# Patient Record
Sex: MALE | Race: WHITE | NOT HISPANIC OR LATINO | Employment: STUDENT | URBAN - METROPOLITAN AREA
[De-identification: names, ages, dates, MRNs, and addresses within clinical notes are randomized per-mention and may not be internally consistent; named-entity substitution may affect disease eponyms.]

---
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
## 2023-05-25 ENCOUNTER — HOSPITAL ENCOUNTER (INPATIENT)
Facility: HOSPITAL | Age: 18
DRG: 751 | End: 2023-05-25
Attending: PSYCHIATRY & NEUROLOGY | Admitting: PSYCHIATRY & NEUROLOGY
Payer: COMMERCIAL

## 2023-05-25 DIAGNOSIS — F39 MOOD DISORDER (HCC): Primary | ICD-10-CM

## 2023-05-25 PROBLEM — Z00.8 MEDICAL CLEARANCE FOR PSYCHIATRIC ADMISSION: Status: ACTIVE | Noted: 2023-05-25

## 2023-05-25 PROCEDURE — 99253 IP/OBS CNSLTJ NEW/EST LOW 45: CPT | Performed by: PHYSICIAN ASSISTANT

## 2023-05-25 RX ORDER — ACETAMINOPHEN 325 MG/1
650 TABLET ORAL EVERY 4 HOURS PRN
Status: DISCONTINUED | OUTPATIENT
Start: 2023-05-25 | End: 2023-08-11 | Stop reason: HOSPADM

## 2023-05-25 RX ORDER — LANOLIN ALCOHOL/MO/W.PET/CERES
9 CREAM (GRAM) TOPICAL
Status: DISCONTINUED | OUTPATIENT
Start: 2023-05-25 | End: 2023-05-31

## 2023-05-25 RX ORDER — DIPHENHYDRAMINE HYDROCHLORIDE 50 MG/ML
50 INJECTION INTRAMUSCULAR; INTRAVENOUS EVERY 6 HOURS PRN
Status: DISCONTINUED | OUTPATIENT
Start: 2023-05-25 | End: 2023-08-11 | Stop reason: HOSPADM

## 2023-05-25 RX ORDER — BENZTROPINE MESYLATE 1 MG/1
1 TABLET ORAL
Status: DISCONTINUED | OUTPATIENT
Start: 2023-05-25 | End: 2023-08-11 | Stop reason: HOSPADM

## 2023-05-25 RX ORDER — MAGNESIUM HYDROXIDE/ALUMINUM HYDROXICE/SIMETHICONE 120; 1200; 1200 MG/30ML; MG/30ML; MG/30ML
30 SUSPENSION ORAL EVERY 4 HOURS PRN
Status: DISCONTINUED | OUTPATIENT
Start: 2023-05-25 | End: 2023-08-11 | Stop reason: HOSPADM

## 2023-05-25 RX ORDER — HYDROXYZINE HYDROCHLORIDE 25 MG/1
25 TABLET, FILM COATED ORAL
Status: DISCONTINUED | OUTPATIENT
Start: 2023-05-25 | End: 2023-08-11 | Stop reason: HOSPADM

## 2023-05-25 RX ORDER — TRAZODONE HYDROCHLORIDE 50 MG/1
50 TABLET ORAL
Status: DISCONTINUED | OUTPATIENT
Start: 2023-05-25 | End: 2023-08-11 | Stop reason: HOSPADM

## 2023-05-25 RX ORDER — OLANZAPINE 10 MG/1
5 INJECTION, POWDER, LYOPHILIZED, FOR SOLUTION INTRAMUSCULAR
Status: DISCONTINUED | OUTPATIENT
Start: 2023-05-25 | End: 2023-08-11 | Stop reason: HOSPADM

## 2023-05-25 RX ORDER — AMOXICILLIN 250 MG
1 CAPSULE ORAL DAILY PRN
Status: DISCONTINUED | OUTPATIENT
Start: 2023-05-25 | End: 2023-08-11 | Stop reason: HOSPADM

## 2023-05-25 RX ORDER — LORAZEPAM 2 MG/ML
2 INJECTION INTRAMUSCULAR EVERY 6 HOURS PRN
Status: DISCONTINUED | OUTPATIENT
Start: 2023-05-25 | End: 2023-08-11 | Stop reason: HOSPADM

## 2023-05-25 RX ORDER — BENZTROPINE MESYLATE 1 MG/ML
1 INJECTION INTRAMUSCULAR; INTRAVENOUS
Status: DISCONTINUED | OUTPATIENT
Start: 2023-05-25 | End: 2023-08-11 | Stop reason: HOSPADM

## 2023-05-25 RX ORDER — ACETAMINOPHEN 325 MG/1
975 TABLET ORAL EVERY 6 HOURS PRN
Status: DISCONTINUED | OUTPATIENT
Start: 2023-05-25 | End: 2023-08-11 | Stop reason: HOSPADM

## 2023-05-25 RX ORDER — OLANZAPINE 2.5 MG/1
2.5 TABLET ORAL
Status: DISCONTINUED | OUTPATIENT
Start: 2023-05-25 | End: 2023-08-11 | Stop reason: HOSPADM

## 2023-05-25 RX ORDER — PROPRANOLOL HYDROCHLORIDE 10 MG/1
10 TABLET ORAL EVERY 8 HOURS PRN
Status: DISCONTINUED | OUTPATIENT
Start: 2023-05-25 | End: 2023-08-04

## 2023-05-25 RX ORDER — OLANZAPINE 5 MG/1
5 TABLET ORAL
Status: DISCONTINUED | OUTPATIENT
Start: 2023-05-25 | End: 2023-08-11 | Stop reason: HOSPADM

## 2023-05-25 RX ORDER — MIRTAZAPINE 7.5 MG/1
7.5 TABLET, FILM COATED ORAL
Status: DISCONTINUED | OUTPATIENT
Start: 2023-05-25 | End: 2023-08-11 | Stop reason: HOSPADM

## 2023-05-25 RX ORDER — ACETAMINOPHEN 325 MG/1
650 TABLET ORAL EVERY 6 HOURS PRN
Status: DISCONTINUED | OUTPATIENT
Start: 2023-05-25 | End: 2023-08-11 | Stop reason: HOSPADM

## 2023-05-25 RX ORDER — POLYETHYLENE GLYCOL 3350 17 G/17G
17 POWDER, FOR SOLUTION ORAL DAILY PRN
Status: DISCONTINUED | OUTPATIENT
Start: 2023-05-25 | End: 2023-05-26

## 2023-05-25 RX ORDER — OLANZAPINE 10 MG/1
10 INJECTION, POWDER, LYOPHILIZED, FOR SOLUTION INTRAMUSCULAR
Status: DISCONTINUED | OUTPATIENT
Start: 2023-05-25 | End: 2023-08-11 | Stop reason: HOSPADM

## 2023-05-25 RX ORDER — HYDROXYZINE 50 MG/1
100 TABLET, FILM COATED ORAL
Status: DISCONTINUED | OUTPATIENT
Start: 2023-05-25 | End: 2023-08-11 | Stop reason: HOSPADM

## 2023-05-25 RX ORDER — HYDROXYZINE 50 MG/1
50 TABLET, FILM COATED ORAL
Status: DISCONTINUED | OUTPATIENT
Start: 2023-05-25 | End: 2023-08-11 | Stop reason: HOSPADM

## 2023-05-25 RX ORDER — OLANZAPINE 10 MG/1
10 TABLET ORAL
Status: DISCONTINUED | OUTPATIENT
Start: 2023-05-25 | End: 2023-08-11 | Stop reason: HOSPADM

## 2023-05-25 RX ADMIN — Medication 9 MG: at 21:25

## 2023-05-25 RX ADMIN — DIVALPROEX SODIUM 750 MG: 500 TABLET, FILM COATED, EXTENDED RELEASE ORAL at 14:47

## 2023-05-25 RX ADMIN — MIRTAZAPINE 7.5 MG: 7.5 TABLET, FILM COATED ORAL at 21:25

## 2023-05-25 RX ADMIN — HYDROXYZINE HYDROCHLORIDE 50 MG: 50 TABLET, FILM COATED ORAL at 21:53

## 2023-05-25 NOTE — PROGRESS NOTES
05/25/23 1522   Referral Data   Referral Source Physician   Referral Name Dr. Annamarie Dowling from [facility]   Referral Reason [address] [facility]   Readmission Root Cause   30 Day Readmission No   Patient Information   Mental Status Alert   Primary Caregiver Self   Accompanied by/Relationship N/A   Support System Immediate family  (Parents Sarina Jurado and Gloria Monroy))   Mosque/Cultural Requests None identified   Legal Information   Tx Plan Signed Yes  (5/26/2023)   Current Status: 10   Legal Issues June 2022 summary harassment offense from [facility] a peer at [employer], but not on probation or parole; states he went to [facility] for a week.    Advance Directives   (None reported)   Health Care Proxy Appointed No   Activities of Daily Living Prior to Admission   Functional Status Minimum assistance   Assistive Device No device needed   Living Arrangement Lives with someone  (Parents and older siblings)   Ambulation Independent   Access to Firearms   Access to Firearms No   Income [address] [employer] [facility] of Transport to Appts: Family transport

## 2023-05-25 NOTE — CONSULTS
[facility]  Consult  Name: Lindy Chisholm 21 y.o. male I MRN: [id number]  Unit/Bed#: [facility] 106-01 I Date of Admission: 5/25/2023   Date of Service: 5/25/2023 I Hospital Day: 0    Inpatient consult for Medical Clearance for [facility] patient  Consult performed by: Tammy Giordano PA-C  Consult ordered by: Denise Ferrer, DO          Assessment/Plan   Medical clearance for psychiatric admission  Assessment & Plan  · VSS. Admission labs pending at the time of this review  Patient is medically cleared for admission to the [facility] for treatment of the underlying psychiatric illness  SLIM will sign off. Please call with questions or concerns              Counseling / Coordination of Care Time: 30 minutes. Greater than 50% of total time spent on patient counseling and coordination of care. Collaboration of Care: Were Recommendations Directly Discussed with Primary Treatment Team? - No     History of Present Illness:    Lindy Chisholm is a 21 y.o. male who is originally admitted to the psychiatry service due to need for extended inpatient psychiatric treatment. We are consulted for medical clearance for admission to [facility] and treatment of underlying psychiatric illness. This patient has a past medical history signficant for autism and MDD. Per chart review, he was initially hospitalized for SI with plan to jump off of a water tower. Review of Systems:    Review of Systems   Constitutional: Negative for chills, diaphoresis and fever. HENT: Negative for congestion, rhinorrhea, sinus pressure, sinus pain and sore throat. Eyes: Negative for pain and visual disturbance. Respiratory: Negative for cough, shortness of breath and wheezing. Cardiovascular: Negative for chest pain and palpitations. Gastrointestinal: Negative for abdominal distention, abdominal pain, constipation, diarrhea, nausea and vomiting.    Genitourinary: Negative for difficulty urinating, dysuria, frequency, hematuria and urgency. Musculoskeletal: Negative for arthralgias, back pain and myalgias. Skin: Negative for rash. Neurological: Negative for dizziness, weakness, light-headedness and headaches. All other systems reviewed and are negative. Past Medical and Surgical History:     Past Medical History:   Diagnosis Date   • Anxiety    • Asperger's syndrome    • Suicide attempt ([address])        No past surgical history on file. Meds/Allergies:    all medications and allergies reviewed    Allergies: Allergies   Allergen Reactions   • Chlorpromazine Other (See Comments)       Social History:     Marital Status: Single    Substance Use History:   Social History     Substance and Sexual Activity   Alcohol Use Not Currently     Social History     Tobacco Use   Smoking Status Never   Smokeless Tobacco Never     Social History     Substance and Sexual Activity   Drug Use Not Currently       Family History:    non-contributory    Physical Exam:     Vitals:   Blood Pressure: 128/75 (05/25/23 1400)  Pulse: 96 (05/25/23 1400)    Physical Exam  Constitutional:       General: He is not in acute distress. Appearance: Normal appearance. He is not ill-appearing, toxic-appearing or diaphoretic. HENT:      Head: Normocephalic and atraumatic. Nose: Nose normal. No congestion or rhinorrhea. Mouth/Throat:      Mouth: Mucous membranes are moist.   Eyes:      General: No scleral icterus. Extraocular Movements: Extraocular movements intact. Cardiovascular:      Rate and Rhythm: Normal rate and regular rhythm. Heart sounds: Normal heart sounds. No murmur heard. Pulmonary:      Effort: Pulmonary effort is normal. No respiratory distress. Breath sounds: Normal breath sounds. No wheezing. Abdominal:      General: Abdomen is flat. Bowel sounds are normal. There is no distension. Palpations: Abdomen is soft. Tenderness: There is no abdominal tenderness.    Musculoskeletal: Right lower leg: No edema. Left lower leg: No edema. Skin:     General: Skin is warm and dry. Coloration: Skin is not jaundiced. Neurological:      General: No focal deficit present. Mental Status: He is alert. Additional Data:     Lab Results: I have personally reviewed pertinent reports. No results found for: "HGBA1C"        EKG, Pathology, and Other Studies Reviewed on Admission:   · EKG     ** Please Note: This note has been constructed using a voice recognition system.  **

## 2023-05-25 NOTE — NURSING NOTE
Patient is a 201 admit from [facility] due to [facility] with plan to jump off a water tower. Hx of autism spectrum disorder, major depressive disorder. Patient was cooperative with the admission process and denies anxiety, depression, SI/HI, A/V/H and pain. Skin intact. Q 7 minutes safety checks initiated.

## 2023-05-25 NOTE — PLAN OF CARE
Problem: Ineffective Coping  Goal: Cooperates with admission process  Description: Interventions:   - Complete admission process  Outcome: Not Progressing  Goal: Identifies ineffective coping skills  Outcome: Not Progressing  Goal: Identifies healthy coping skills  Outcome: Not Progressing  Goal: Demonstrates healthy coping skills  Outcome: Not Progressing  Goal: Participates in unit activities  Description: Interventions:  - Provide therapeutic environment   - Provide required programming   - Redirect inappropriate behaviors   Outcome: Not Progressing  Goal: Patient/Family participate in treatment and DC plans  Description: Interventions:  - Provide therapeutic environment  Outcome: Not Progressing  Goal: Patient/Family verbalizes awareness of resources  Outcome: Not Progressing  Goal: Understands least restrictive measures  Description: Interventions:  - Utilize least restrictive behavior  Outcome: Not Progressing  Goal: Free from restraint events  Description: - Utilize least restrictive measures   - Provide behavioral interventions   - Redirect inappropriate behaviors   Outcome: Not Progressing     Problem: Risk for Self Injury/Neglect  Goal: Treatment Goal: Remain safe during length of stay, learn and adopt new coping skills, and be free of self-injurious ideation, impulses and acts at the time of discharge  Outcome: Not Progressing  Goal: Verbalize thoughts and feelings  Description: Interventions:  - Assess and re-assess patient's lethality and potential for self-injury  - Engage patient in 1:1 interactions, daily, for a minimum of 15 minutes  - Encourage patient to express feelings, fears, frustrations, hopes  - Establish rapport/trust with patient   Outcome: Not Progressing  Goal: Refrain from harming self  Description: Interventions:  - Monitor patient closely, per order  - Develop a trusting relationship  - Supervise medication ingestion, monitor effects and side effects   Outcome: Not Progressing  Goal: Attend and participate in unit activities, including therapeutic, recreational, and educational groups  Description: Interventions:  - Provide therapeutic and educational activities daily, encourage attendance and participation, and document same in the medical record  - Obtain collateral information, encourage visitation and family involvement in care   Outcome: Not Progressing  Goal: Recognize maladaptive responses and adopt new coping mechanisms  Outcome: Not Progressing  Goal: Complete daily ADLs, including personal hygiene independently, as able  Description: Interventions:  - Observe, teach, and assist patient with ADLS  - Monitor and promote a balance of rest/activity, with adequate nutrition and elimination  Outcome: Not Progressing     Problem: Depression  Goal: Treatment Goal: Demonstrate behavioral control of depressive symptoms, verbalize feelings of improved mood/affect, and adopt new coping skills prior to discharge  Outcome: Not Progressing  Goal: Verbalize thoughts and feelings  Description: Interventions:  - Assess and re-assess patient's level of risk   - Engage patient in 1:1 interactions, daily, for a minimum of 15 minutes   - Encourage patient to express feelings, fears, frustrations, hopes   Outcome: Not Progressing  Goal: Refrain from harming self  Description: Interventions:  - Monitor patient closely, per order   - Supervise medication ingestion, monitor effects and side effects   Outcome: Not Progressing  Goal: Refrain from isolation  Description: Interventions:  - Develop a trusting relationship   - Encourage socialization   Outcome: Not Progressing  Goal: Refrain from self-neglect  Outcome: Not Progressing  Goal: Attend and participate in unit activities, including therapeutic, recreational, and educational groups  Description: Interventions:  - Provide therapeutic and educational activities daily, encourage attendance and participation, and document same in the medical record   Outcome: Not Progressing  Goal: Complete daily ADLs, including personal hygiene independently, as able  Description: Interventions:  - Observe, teach, and assist patient with ADLS  -  Monitor and promote a balance of rest/activity, with adequate nutrition and elimination   Outcome: Not Progressing     Problem: Anxiety  Goal: Anxiety is at manageable level  Description: Interventions:  - Assess and monitor patient's anxiety level. - Monitor for signs and symptoms (heart palpitations, chest pain, shortness of breath, headaches, nausea, feeling jumpy, restlessness, irritable, apprehensive). - Collaborate with interdisciplinary team and initiate plan and interventions as ordered.   - [place] patient to unit/surroundings  - Explain treatment plan  - Encourage participation in care  - Encourage verbalization of concerns/fears  - Identify coping mechanisms  - Assist in developing anxiety-reducing skills  - Administer/offer alternative therapies  - Limit or eliminate stimulants  Outcome: Not Progressing

## 2023-05-25 NOTE — PROGRESS NOTES
05/25/23 1522   Patient Intake   Living Arrangement Lives with someone  (Parents and older siblings)   Can patient return home? 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 History of multiple suicide attempts and physical aggression. Treatment History Diagnosed with depression and anxiety at age 11, and started working with a therapist.  First hospitalization was at [facility] in 2022 for a suicide attempt; has had a total of 3 hospitalizations. Had a psychiatrist and therapist at [facility] prior to admit and a ICM at [employer] [facility]. Currently in Treatment Yes   Current Psychiatrist/Therapist ANA Overton and Darlene (therapist) [facility] [address] None; patient doesn't want to return   Name of ICM: [facility], but switching to Payton Valenzuela for [facility]   [address]: Other  ([email or web address])   [facility] Phone Number: [address] and Management   Medical Problems None   Legal Issues June 2022 summary harassment offense from [address] a peer at [employer], but not on probation or parole; states he went to senior care for a week.    Probation/ Name (if applicable) N/A   Substance Abuse No   Crisis Info   Release of Information Signed Yes  ([facility], parents, Miriam KLEIN, [place] and Bigg.)

## 2023-05-25 NOTE — ASSESSMENT & PLAN NOTE
· VSS. Admission labs pending at the time of this review  Patient is medically cleared for admission to the [facility] for treatment of the underlying psychiatric illness  MARV will sign off.  Please call with questions or concerns

## 2023-05-26 PROBLEM — F33.2 SEVERE EPISODE OF RECURRENT MAJOR DEPRESSIVE DISORDER, WITHOUT PSYCHOTIC FEATURES (HCC): Status: ACTIVE | Noted: 2023-05-26

## 2023-05-26 PROBLEM — F84.0 AUTISM SPECTRUM DISORDER: Status: ACTIVE | Noted: 2023-05-26

## 2023-05-26 PROBLEM — F41.9 ANXIETY DISORDER: Chronic | Status: ACTIVE | Noted: 2023-05-26

## 2023-05-26 PROBLEM — F39 MOOD DISORDER (HCC): Status: ACTIVE | Noted: 2023-05-26

## 2023-05-26 PROBLEM — F84.0 AUTISM SPECTRUM DISORDER: Chronic | Status: ACTIVE | Noted: 2023-05-26

## 2023-05-26 PROBLEM — F41.9 ANXIETY DISORDER: Status: ACTIVE | Noted: 2023-05-26

## 2023-05-26 PROBLEM — F33.2 SEVERE EPISODE OF RECURRENT MAJOR DEPRESSIVE DISORDER, WITHOUT PSYCHOTIC FEATURES (HCC): Chronic | Status: ACTIVE | Noted: 2023-05-26

## 2023-05-26 PROCEDURE — 93005 ELECTROCARDIOGRAM TRACING: CPT

## 2023-05-26 PROCEDURE — 99223 1ST HOSP IP/OBS HIGH 75: CPT | Performed by: PSYCHIATRY & NEUROLOGY

## 2023-05-26 RX ORDER — POLYETHYLENE GLYCOL 3350 17 G/17G
17 POWDER, FOR SOLUTION ORAL DAILY PRN
Status: DISCONTINUED | OUTPATIENT
Start: 2023-05-26 | End: 2023-08-11 | Stop reason: HOSPADM

## 2023-05-26 RX ADMIN — HYDROXYZINE HYDROCHLORIDE 50 MG: 50 TABLET, FILM COATED ORAL at 20:36

## 2023-05-26 RX ADMIN — DIVALPROEX SODIUM 750 MG: 500 TABLET, FILM COATED, EXTENDED RELEASE ORAL at 21:10

## 2023-05-26 RX ADMIN — Medication 9 MG: at 21:10

## 2023-05-26 RX ADMIN — MIRTAZAPINE 7.5 MG: 7.5 TABLET, FILM COATED ORAL at 21:10

## 2023-05-26 RX ADMIN — DIVALPROEX SODIUM 750 MG: 500 TABLET, FILM COATED, EXTENDED RELEASE ORAL at 08:26

## 2023-05-26 RX ADMIN — ACETAMINOPHEN 975 MG: 325 TABLET ORAL at 13:21

## 2023-05-26 NOTE — NURSING NOTE
Pt came to this writer and asked, "can I have ativan?" when this writer said no he said "what about klonopin?" Pt was smiling and calm during this encounter. He then stated, "I am not allergic to them, I am addicted to them. I thought I could get them since this is a different hospital." (still smiling.) Pt stated, "they help me control myself." This writer explained that if he can't control himself, his provider can review his medications with him and make changes. I encouraged him to use his coping skills. Pt then went to dining room where he is now spending time with peers.

## 2023-05-26 NOTE — TREATMENT PLAN
TREATMENT PLAN REVIEW - [address] 21 y.o. 2005 male MRN: [id number]    [address] [facility] [address] Room / Bed: [facility] 106-01 Encounter: [id number]          Admit Date/Time:  5/25/2023  1:49 PM    Treatment Team: Attending Provider: Jerry Santos MD; Registered Nurse: Suni Harkins RN; Patient Care Technician: My Cole;  Licensed Practical Nurse: Aileen Jennings LPN; : MICKY Soto    Diagnosis: Principal Problem:    Severe episode of recurrent major depressive disorder, without psychotic features ([address])  Active Problems:    Autism spectrum disorder    Mood disorder ([address])    Anxiety disorder    Medical clearance for psychiatric admission      Patient Strengths/Assets: cooperative, negotiates basic needs, patient is on a voluntary commitment    Patient Barriers/Limitations: difficulty adapting, chronic mental illness    Short Term Goals: decrease in depressive symptoms, decrease in anxiety symptoms, decrease in suicidal thoughts, ability to stay safe on the unit, mood stabilization    Long Term Goals: improvement in depression, improvement in anxiety, stabilization of mood, free of suicidal thoughts    Progress Towards Goals: continue psychiatric medications as prescribed    Recommended Treatment: medication management, patient medication education, group therapy, milieu therapy, continued Behavioral Health psychiatric evaluation/assessment process    Treatment Frequency: daily medication monitoring, group and milieu therapy daily, monitoring through interdisciplinary rounds, monitoring through weekly patient care conferences    Expected Discharge Date:  TBD    Discharge Plan: referrals as indicated    Treatment Plan Created/Updated By: Myranda Strange DO

## 2023-05-26 NOTE — PLAN OF CARE
Problem: Ineffective Coping  Goal: Cooperates with admission process  Description: Interventions:   - Complete admission process  Outcome: Progressing     Problem: Risk for Self Injury/Neglect  Goal: Verbalize thoughts and feelings  Description: Interventions:  - Assess and re-assess patient's lethality and potential for self-injury  - Engage patient in 1:1 interactions, daily, for a minimum of 15 minutes  - Encourage patient to express feelings, fears, frustrations, hopes  - Establish rapport/trust with patient   Outcome: Progressing     Problem: Depression  Goal: Verbalize thoughts and feelings  Description: Interventions:  - Assess and re-assess patient's level of risk   - Engage patient in 1:1 interactions, daily, for a minimum of 15 minutes   - Encourage patient to express feelings, fears, frustrations, hopes   Outcome: Progressing  Goal: Refrain from isolation  Description: Interventions:  - Develop a trusting relationship   - Encourage socialization   Outcome: Not Progressing     Problem: Anxiety  Goal: Anxiety is at manageable level  Description: Interventions:  - Assess and monitor patient's anxiety level. - Monitor for signs and symptoms (heart palpitations, chest pain, shortness of breath, headaches, nausea, feeling jumpy, restlessness, irritable, apprehensive). - Collaborate with interdisciplinary team and initiate plan and interventions as ordered.   - [place] patient to unit/surroundings  - Explain treatment plan  - Encourage participation in care  - Encourage verbalization of concerns/fears  - Identify coping mechanisms  - Assist in developing anxiety-reducing skills  - Administer/offer alternative therapies  - Limit or eliminate stimulants  Outcome: Progressing

## 2023-05-26 NOTE — PROGRESS NOTES
Progress Note - Behavioral Health     Shantelle Del Toro 21 y.o. male MRN: [id number]  [facility]-02 Encounter: [id number]      Shantelle Del Toro is a Pt with ASD. He was seen for continuing care and reviewed with treatment team.  Pt was calm, cooperative and internally preoccupied on approach, but agreeable to interview. He at times stared blankly at this provider, but seemed to take in what I was saying and answered questions correctly. He knows he is here for SI but denies further issues since admission and verbalizes the desire to leave multiple times. He presently denies depression, SI, HI, anxiety or psychotic Sxs. Per EMR and floor team, the Pt is acclimating to the unit, visible in the milieu, and quiet but cooperative. He already attended at least one therapeutic group with appropriate behavior among peers. He has been medication compliant, but with some seeking of BZD, though accepted it fairly well when the nurse encouraged Hydroxyzine prn and coping skills instead last night. However, later last night, he made some threatening statements that he would flip out if he had to stay longer, and also reminded staff of his violent outburst at a previous institution. He looked as though he might  a chair, but there have been no physically aggressive outbursts but staff has been cautiously observant for such. He is visible but socially isolative in the milieu.       Sleep: Reduced per Pt --due to being here; but adequate per nursing  Appetite: Good   Medication side effects: None per Pt    ROS: No complaints per Pt, (All ROS Negative)    Labs/Tests: Reviewed    Mental Status Evaluation:  Appearance:  Dressed, clean, good eye contact, with a blank stare at times   Behavior:  Calm, cooperative, pleasant   Speech:  Clear, normal rate and volume   Mood:  Euthymic during interview, but can be irritable    Affect:  Constricted   Thought Process:  Organized, Goal directed, [place], discharge focused   Associations: [place] associations   Thought Content:  No verbalized delusions   Perceptual Disturbances: Pt denies any hallucinations or paranoia and does not appear to be responding to internal stimuli   Risk Potential: Pt presently denies SI or HI , but he poses a risk to harm others or destroy property   Sensorium:  Self, birthday, Place, Day of the week, Month, Year   Memory:  short term memory grossly intact   Consciousness:  alert, awake   Attention: Fair   Insight:  Poor   Judgment: Limited   Gait/Station: Normal gait/station   Motor Activity: No abnormal movements     Vitals:    05/25/23 1400 05/26/23 0821 05/26/23 1516 05/27/23 0735   BP: 128/75 106/58 118/68 132/69   BP Location: Right arm Left arm Right arm Left arm   Pulse: 96 67 73 68   Resp:  16 18 18   Temp: 97.7 °F (36.5 °C) 97.6 °F (36.4 °C) 97.5 °F (36.4 °C) 97.5 °F (36.4 °C)   TempSrc: Temporal Temporal Temporal Temporal   SpO2:  100% 99% 97%   Weight:  80.3 kg (177 lb)     Height: 5' 11" (1.803 m)          Admission on 05/25/2023   Component Date Value   • WBC 05/27/2023 6.14    • RBC 05/27/2023 4.87    • Hemoglobin 05/27/2023 14.9    • Hematocrit 05/27/2023 45.2    • MCV 05/27/2023 93    • MCH 05/27/2023 30.6    • MCHC 05/27/2023 33.0    • RDW 05/27/2023 12.9    • MPV 05/27/2023 9.9    • Platelets 81/10/9284 224    • nRBC 05/27/2023 0    • Neutrophils Relative 05/27/2023 38 (L)    • Immat GRANS % 05/27/2023 0    • Lymphocytes Relative 05/27/2023 45 (H)    • Monocytes Relative 05/27/2023 14 (H)    • Eosinophils Relative 05/27/2023 2    • Basophils Relative 05/27/2023 1    • Neutrophils Absolute 05/27/2023 2.33    • Immature Grans Absolute 05/27/2023 0.00    • Lymphocytes Absolute 05/27/2023 2.82    • Monocytes Absolute 05/27/2023 0.83    • Eosinophils Absolute 05/27/2023 0.12    • Basophils Absolute 05/27/2023 0.04    • Sodium 05/27/2023 140    • Potassium 05/27/2023 4.1    • Chloride 05/27/2023 103    • CO2 05/27/2023 26 • ANION GAP 05/27/2023 11    • BUN 05/27/2023 15    • Creatinine 05/27/2023 0.66    • Glucose 05/27/2023 88    • Calcium 05/27/2023 9.4    • AST 05/27/2023 25    • ALT 05/27/2023 38    • Alkaline Phosphatase 05/27/2023 89    • Total Protein 05/27/2023 7.1    • Albumin 05/27/2023 4.7    • Total Bilirubin 05/27/2023 0.70    • eGFR 05/27/2023 140    • Cholesterol 05/27/2023 208 (H)    • Triglycerides 05/27/2023 190 (H)    • HDL, Direct 05/27/2023 39 (L)    • LDL Calculated 05/27/2023 131 (H)    • Non-HDL-Chol (CHOL-HDL) 05/27/2023 169    • TSH 3RD GENERATON 05/27/2023 1.637    • Valproic Acid, Total 05/27/2023 122 (H)    • Ventricular Rate 05/26/2023 62    • Atrial Rate 05/26/2023 62    • MA Interval 05/26/2023 152    • QRSD Interval 05/26/2023 88    • QT Interval 05/26/2023 368    • QTC Interval 05/26/2023 373    • P Axis 05/26/2023 68    • QRS Axis 05/26/2023 56    • T Wave Axis 05/26/2023 47        Progress Toward Goals:  Based on today's interview and review of prior notes, Pt is acclimating to the unit, but discharge focused. Though no overt physical outbursts have been made, he did verbalize a threat and the risk is there. Assault precautions placed. Continue the present medication regimen as ordered recently by the primary team -- but will recheck the VPA level due to the potential that the testing was just after dosing-??  Pt shows no signs of toxicity. If the level returns high again, I will reduce by 250mg per day. Assessment/Plan   Principal Problem:    Severe episode of recurrent major depressive disorder, without psychotic features ([address])  Active Problems:    Medical clearance for psychiatric admission    Autism spectrum disorder    Mood disorder ([address])    Anxiety disorder      Recommended Treatment: Continue with pharmacotherapy, group therapy, milieu therapy and occupational therapy.   The patient will be maintained on the following medications:  Current Facility-Administered Medications Medication Dose Route Frequency Provider Last Rate   • acetaminophen  650 mg Oral Q6H PRN Chyrel Michel, DO     • acetaminophen  650 mg Oral Q4H PRN Chyrel Michel, DO     • acetaminophen  975 mg Oral Q6H PRN Chyrel Michel, DO     • aluminum-magnesium hydroxide-simethicone  30 mL Oral Q4H PRN Chyrel Michel, DO     • benztropine  1 mg Intramuscular Q4H PRN Max 6/day Chyrel Michel, DO     • benztropine  1 mg Oral Q4H PRN Max 6/day Chyrel Michel, DO     • hydrOXYzine HCL  50 mg Oral Q6H PRN Max 4/day Chyrel Michel, DO      Or   • diphenhydrAMINE  50 mg Intramuscular Q6H PRN Chyrel Michel, DO     • divalproex sodium  750 mg Oral BID ANA Calle     • glycerin-hypromellose-  1 drop Both Eyes Q3H PRN Chyrel Michel, DO     • hydrOXYzine HCL  100 mg Oral Q6H PRN Max 4/day Chyrel Michel, DO      Or   • LORazepam  2 mg Intramuscular Q6H PRN Chyrel Michel, DO     • hydrOXYzine HCL  25 mg Oral Q6H PRN Max 4/day Chyrel Michel, DO     • melatonin  9 mg Oral HS Chyrel Michel, DO     • mirtazapine  7.5 mg Oral HS Chyrel Michel, DO     • OLANZapine  10 mg Oral Q3H PRN Max 3/day Chyrel Michel, DO      Or   • OLANZapine  10 mg Intramuscular Q3H PRN Max 3/day Chyrel Michel, DO     • OLANZapine  5 mg Oral Q3H PRN Max 6/day Chyrel Michel, DO      Or   • OLANZapine  5 mg Intramuscular Q3H PRN Max 6/day Chyrel Michel, DO     • OLANZapine  2.5 mg Oral Q3H PRN Max 8/day Ciro A Prayson, DO     • polyethylene glycol  17 g Oral Daily PRN Chyrel Michel, DO     • propranolol  10 mg Oral Q8H PRN Chyrel Michel, DO     • senna-docusate sodium  1 tablet Oral Daily PRN Chyrel Michel, DO     • traZODone  50 mg Oral HS PRN Chyrel Michel, DO         Risks, benefits and possible side effects of Medications:   Risks, benefits, and possible side effects of medications explained to patient and patient verbalizes understanding

## 2023-05-26 NOTE — NURSING NOTE
Received pt in bed at change of shift with eyes closed; chest movement noted. Continues the same thus this far as per q 7 min room checks. Will continue to monitor. 4196:  Attempted to obtain the lab as ordered and this nurse was unsuccessful as QNS. Samantha Dalal advised to increase fluids and must be fasting. Ice water provided. Will report to 7-3 shift. Pt was cooperative with procedure. computer/internet/verbal instruction/skill demonstration

## 2023-05-26 NOTE — PROGRESS NOTES
05/26/23 0830   Team Meeting   Meeting Type Daily Rounds   Initial Conference Date 05/26/23   Patient/Family Present   Patient Present No   Patient's Family Present No     Daily Rounds Documentation     Team Members Present:   DO Curtis Boateng CRNP Bertina Aris, FRANCOIS Castro, [address] admission as of yesterday; 201. Originally admitted to [facility] due to a suicide attempt; went up a water tower, but then couldn't jump or get down independently; trigger was he [facility]. Pleasant and cooperative with admission process. Utilized Atarax PRN once. Diagnosed with THAD, ASD, and MDD. Multiple suicide attempts. Supportive family. Compliant with medications and meals. Slept.

## 2023-05-26 NOTE — NURSING NOTE
Patient is compliant with medication and meals . Patient is new to the unit so he is still adjusting He is quiet and co operative  He attended 1 group after he was admitted yesterday Will continue to monitor and chart any progress.

## 2023-05-26 NOTE — TREATMENT TEAM
05/26/23 1350   Team Meeting   Meeting Type Tx Team Meeting   Initial Conference Date 05/26/23   Team Members Present   Team Members Present Physician;Nurse;   Physician Team Member Dr Philmore Olszewski Team Member [facility] Team Member Rachel Nino   Patient/Family Present   Patient Present Yes   Patient's Family Present No     Treatment plan and goals reviewed with pt; pt in agreement and signed.

## 2023-05-26 NOTE — NURSING NOTE
Dmitri Blanton has been awake, alert, and visible intermittently out in the milieu. Pt denies any depression, anxiety, a/v hallucinations, and has not verbalized any delusions. Pt denies having any SI, HI, intent, or plan and agrees to come to staff if he has any. Pt adjusting well to unit. Pt ate 100% supper. Quiet and spends time resting quietly in room most of shift. No interaction noted with peers. Pt did not attend any evening groups but came out for snack with peers. Compliant with scheduled meds   No behavioral issues. Continue to monitor/assess for any changes.

## 2023-05-26 NOTE — NURSING NOTE
Writer noted pt to have hand shakiness and anxious. Writer asked pt how he was feeling and pt stated that he was anxious. Writer asked pt what he was feeling anxious about and encouraged him to verbalize his feelings. Pt stated, "Just about being in here."  Writer offered pt prn medication and Atarax 50 mg po prn given for moderate anxiety at 2153. Pt compliant with medication and then went to his room. Will monitor/assess for effectiveness.

## 2023-05-26 NOTE — PLAN OF CARE
Problem: Ineffective Coping  Goal: Cooperates with admission process  Description: Interventions:   - Complete admission process  Outcome: Progressing  Goal: Identifies ineffective coping skills  Outcome: Not Progressing  Goal: Identifies healthy coping skills  Outcome: Not Progressing  Goal: Demonstrates healthy coping skills  Outcome: Not Progressing

## 2023-05-27 LAB
ALBUMIN SERPL BCP-MCNC: 4.7 G/DL (ref 3.5–5)
ALP SERPL-CCNC: 89 U/L (ref 34–104)
ALT SERPL W P-5'-P-CCNC: 38 U/L (ref 7–52)
ANION GAP SERPL CALCULATED.3IONS-SCNC: 11 MMOL/L (ref 4–13)
AST SERPL W P-5'-P-CCNC: 25 U/L (ref 13–39)
ATRIAL RATE: 62 BPM
BASOPHILS # BLD AUTO: 0.04 THOUSANDS/ÂΜL (ref 0–0.1)
BASOPHILS NFR BLD AUTO: 1 % (ref 0–1)
BILIRUB SERPL-MCNC: 0.7 MG/DL (ref 0.2–1)
BUN SERPL-MCNC: 15 MG/DL (ref 5–25)
CALCIUM SERPL-MCNC: 9.4 MG/DL (ref 8.4–10.2)
CHLORIDE SERPL-SCNC: 103 MMOL/L (ref 96–108)
CHOLEST SERPL-MCNC: 208 MG/DL
CO2 SERPL-SCNC: 26 MMOL/L (ref 21–32)
CREAT SERPL-MCNC: 0.66 MG/DL (ref 0.6–1.3)
EOSINOPHIL # BLD AUTO: 0.12 THOUSAND/ÂΜL (ref 0–0.61)
EOSINOPHIL NFR BLD AUTO: 2 % (ref 0–6)
ERYTHROCYTE [DISTWIDTH] IN BLOOD BY AUTOMATED COUNT: 12.9 % (ref 11.6–15.1)
EST. AVERAGE GLUCOSE BLD GHB EST-MCNC: 108 MG/DL
GFR SERPL CREATININE-BSD FRML MDRD: 140 ML/MIN/1.73SQ M
GLUCOSE SERPL-MCNC: 88 MG/DL (ref 65–140)
HBA1C MFR BLD: 5.4 %
HCT VFR BLD AUTO: 45.2 % (ref 36.5–49.3)
HDLC SERPL-MCNC: 39 MG/DL
HGB BLD-MCNC: 14.9 G/DL (ref 12–17)
IMM GRANULOCYTES # BLD AUTO: 0 THOUSAND/UL (ref 0–0.2)
IMM GRANULOCYTES NFR BLD AUTO: 0 % (ref 0–2)
LDLC SERPL CALC-MCNC: 131 MG/DL (ref 0–100)
LYMPHOCYTES # BLD AUTO: 2.82 THOUSANDS/ÂΜL (ref 0.6–4.47)
LYMPHOCYTES NFR BLD AUTO: 45 % (ref 14–44)
MCH RBC QN AUTO: 30.6 PG (ref 26.8–34.3)
MCHC RBC AUTO-ENTMCNC: 33 G/DL (ref 31.4–37.4)
MCV RBC AUTO: 93 FL (ref 82–98)
MONOCYTES # BLD AUTO: 0.83 THOUSAND/ÂΜL (ref 0.17–1.22)
MONOCYTES NFR BLD AUTO: 14 % (ref 4–12)
NEUTROPHILS # BLD AUTO: 2.33 THOUSANDS/ÂΜL (ref 1.85–7.62)
NEUTS SEG NFR BLD AUTO: 38 % (ref 43–75)
NONHDLC SERPL-MCNC: 169 MG/DL
NRBC BLD AUTO-RTO: 0 /100 WBCS
P AXIS: 68 DEGREES
PLATELET # BLD AUTO: 224 THOUSANDS/UL (ref 149–390)
PMV BLD AUTO: 9.9 FL (ref 8.9–12.7)
POTASSIUM SERPL-SCNC: 4.1 MMOL/L (ref 3.5–5.3)
PR INTERVAL: 152 MS
PROT SERPL-MCNC: 7.1 G/DL (ref 6.4–8.4)
QRS AXIS: 56 DEGREES
QRSD INTERVAL: 88 MS
QT INTERVAL: 368 MS
QTC INTERVAL: 373 MS
RBC # BLD AUTO: 4.87 MILLION/UL (ref 3.88–5.62)
SODIUM SERPL-SCNC: 140 MMOL/L (ref 135–147)
T WAVE AXIS: 47 DEGREES
TRIGL SERPL-MCNC: 190 MG/DL
TSH SERPL DL<=0.05 MIU/L-ACNC: 1.64 UIU/ML (ref 0.45–4.5)
VALPROATE SERPL-MCNC: 122 UG/ML (ref 50–100)
VENTRICULAR RATE: 62 BPM
WBC # BLD AUTO: 6.14 THOUSAND/UL (ref 4.31–10.16)

## 2023-05-27 PROCEDURE — 85025 COMPLETE CBC W/AUTO DIFF WBC: CPT

## 2023-05-27 PROCEDURE — 83036 HEMOGLOBIN GLYCOSYLATED A1C: CPT | Performed by: PSYCHIATRY & NEUROLOGY

## 2023-05-27 PROCEDURE — 84443 ASSAY THYROID STIM HORMONE: CPT

## 2023-05-27 PROCEDURE — 80053 COMPREHEN METABOLIC PANEL: CPT

## 2023-05-27 PROCEDURE — 99232 SBSQ HOSP IP/OBS MODERATE 35: CPT | Performed by: PHYSICIAN ASSISTANT

## 2023-05-27 PROCEDURE — 80061 LIPID PANEL: CPT

## 2023-05-27 PROCEDURE — 93010 ELECTROCARDIOGRAM REPORT: CPT | Performed by: INTERNAL MEDICINE

## 2023-05-27 PROCEDURE — 80164 ASSAY DIPROPYLACETIC ACD TOT: CPT

## 2023-05-27 RX ADMIN — MIRTAZAPINE 7.5 MG: 7.5 TABLET, FILM COATED ORAL at 21:19

## 2023-05-27 RX ADMIN — OLANZAPINE 10 MG: 10 TABLET, FILM COATED ORAL at 20:57

## 2023-05-27 RX ADMIN — PROPRANOLOL HYDROCHLORIDE 10 MG: 10 TABLET ORAL at 16:59

## 2023-05-27 RX ADMIN — DIVALPROEX SODIUM 750 MG: 500 TABLET, FILM COATED, EXTENDED RELEASE ORAL at 21:19

## 2023-05-27 RX ADMIN — HYDROXYZINE HYDROCHLORIDE 50 MG: 50 TABLET, FILM COATED ORAL at 12:41

## 2023-05-27 RX ADMIN — HYDROXYZINE HYDROCHLORIDE 100 MG: 50 TABLET, FILM COATED ORAL at 19:02

## 2023-05-27 RX ADMIN — Medication 9 MG: at 21:19

## 2023-05-27 RX ADMIN — DIVALPROEX SODIUM 750 MG: 500 TABLET, FILM COATED, EXTENDED RELEASE ORAL at 08:48

## 2023-05-27 NOTE — NURSING NOTE
Pt is present on the milieu. Pt is bizarre, flat and fixated on discharge. Pt said to this writer and several other staff "if I don't get out of here in 2 days I'm gonna flip out. I heard by law that you can't use restraints or give people injections." Pt was redirected to focus on recovery and to follow milieu rules and program. Pt consumed 100 % of dinner and had evening snack. Pt attended Evening and Wrap up group. He took his medications without incidence. Pt reported some depression and anxiety. No behavioral issues.

## 2023-05-27 NOTE — PLAN OF CARE
Problem: Depression  Goal: Refrain from isolation  Description: Interventions:  - Develop a trusting relationship   - Encourage socialization   Outcome: Progressing     Problem: Anxiety  Goal: Anxiety is at manageable level  Description: Interventions:  - Assess and monitor patient's anxiety level. - Monitor for signs and symptoms (heart palpitations, chest pain, shortness of breath, headaches, nausea, feeling jumpy, restlessness, irritable, apprehensive). - Collaborate with interdisciplinary team and initiate plan and interventions as ordered.   - [place] patient to unit/surroundings  - Explain treatment plan  - Encourage participation in care  - Encourage verbalization of concerns/fears  - Identify coping mechanisms  - Assist in developing anxiety-reducing skills  - Administer/offer alternative therapies  - Limit or eliminate stimulants  Outcome: Not Progressing

## 2023-05-27 NOTE — NURSING NOTE
Alert, and visible intermittently. Pt noted less anxious. Pt did voice to staff that he wants to go home and stated to call a code and if he acts up he will get kicked out. Pt was redirected and educated on proper behavior on the unit. Pt then smiled and calmly walked to room. Consumed 100% of dinner. Took all medication without prompting. Results reviewed by psych provider. N.O valporic acid level on 5/28/23. Med Maintained on safe precautions without incident.

## 2023-05-27 NOTE — NURSING NOTE
Pt stated that he has anxiety a 2/4. Pt states he would like to go home. Pt administered atarax 50mg @ 7801 for moderate anxiety and [place] anxiety scale a #18. Results pending.

## 2023-05-27 NOTE — PROGRESS NOTES
Ezio Craig [facility] [address]   [address] [facility], [place], operated by [facility], [address]   OP NOTE       Name:  Beka Lamas   MR#:  [id number]   :  1959   Account #:  [de-identified]    Surgery Date:  2017   Date of Adm:  2017           PROCEDURE:  Flexible bronchoscopy. ESTIMATED BLOOD LOSS: None    LOCATION: [place] [address] [address] (room 3). INDICATION: Abnormal chest CT with possible progression of bilateral   interstitial lung disease. ASA CLASSIFICATION: Grade 2. ANESTHESIA/PREMEDICATIONS: The patient was sedated with the   help of Anesthesiology using MAC. PRE-PROCEDURE EVALUATION: Prior to the procedure, the patient   was seen as an inpatient and a complete history and physical   examination was performed at the bedside. Examination of the mouth   was otherwise unremarkable. The neck flexion and extension were   normal. The patient did not have dentures. Prior to starting the   procedure, universal protocol was followed including time-out which   adequately identified the patient. INFORMED CONSENT: Informed consent for bronchoscopy with   moderate sedation using MAC by Anesthesiology was obtained from   the patient. The patient had a condition for which the procedure was   indicated. The procedure and possible alternatives, including the   option of no procedure were discussed. The benefits of the procedure   and its alternatives were also discussed. The nature and probability of   risks of the procedure and its alternatives were discussed. After our   discussion, the patient gave informed consent to undergo the   procedure and wished to proceed. BRONCHOSCOPIST: Layne Rodriguez MD.      MONITORING: Throughout the entire procedure, an intravenous line   was in place and an endoscopy nurse monitored the patient with   continuous pulse oximetry, EKG, and serial measurements of blood   pressure.       CONSCIOUS SEDATION: In order to provide Progress Note - Behavioral Health     Balbir Burns 21 y.o. male MRN: [id number]  Unit/Bed#: [facility] 111-02 Encounter: [id number]      Balbir Burns was seen for continuing care and reviewed with treatment team.  Pt was generally scant, but cooperative for interview and pleasant. He reported he did not sleep so well last night due to feeling "Antsy to leave."  He reports he might not continue to take the Depakote after point of future discharge because it can make him sleepy, but for right now it's tolerable. He would continue he other medicines and f/u with outpatient psychiatry through [facility]). He presently denies depression, SI, HI, or psychotic Sxs and states he did play cards with peers last night. Per EMR and floor team, the Pt has remained basically cooperative and medication compliant, but agitated and anxious particularly after a control team was called last night for another peer on the unit. He was frequently seeking nursing attention for prn medications to deal with anxiety or express his desire to go home. He asked the staff questions in a childlike manner --thinking he could get "Kicked out" for bad behavior--which he would like to happen so he can go home. No report of any aggressive outbursts through the weekend.     Sleep:Decreased last night  Appetite: Good   Medication side effects: As per HPI    ROS: As per HPI, (All else negative)    Labs/Tests: Reviewed    Mental Status Evaluation:  Appearance:  Dressed, clean, good eye contact, blank stare at times, but not as prominent as yesterday   Behavior:  Calm, cooperative, pleasant   Speech:  Clear, normal rate and volume, not very spontaneous but answers questions readily   Mood:  Anxious   Affect:  Constricted   Thought Process:  Organized, Goal directed, [place], fixed on discharge   Associations: [place] associations   Thought Content:  No verbalized delusions   Perceptual Disturbances: Pt denies any hallucinations or paranoia and does not appear to be responding to internal stimuli   Risk Potential: Pt presently denies SI or HI    Sensorium:  Self, birthday, Place, Day of the week, Month, Year   Memory:  short term memory grossly intact   Consciousness:  alert, awake   Attention: Fair   Insight:  Poor   Judgment: Limited   Gait/Station: Normal gait/station   Motor Activity: No abnormal movements     Vitals:    05/27/23 1519 05/27/23 1703 05/28/23 0726 05/28/23 1458   BP: 124/75  119/72 119/68   BP Location: Right arm  Right arm Right arm   Pulse: 77 99 77 75   Resp: 18  18 18   Temp: 97.6 °F (36.4 °C)  98.2 °F (36.8 °C) 97.6 °F (36.4 °C)   TempSrc: Temporal  Skin Temporal   SpO2: 96%  98% 97%   Weight:       Height:           Admission on 05/25/2023   Component Date Value   • WBC 05/27/2023 6.14    • RBC 05/27/2023 4.87    • Hemoglobin 05/27/2023 14.9    • Hematocrit 05/27/2023 45.2    • MCV 05/27/2023 93    • MCH 05/27/2023 30.6    • MCHC 05/27/2023 33.0    • RDW 05/27/2023 12.9    • MPV 05/27/2023 9.9    • Platelets 06/72/5856 224    • nRBC 05/27/2023 0    • Neutrophils Relative 05/27/2023 38 (L)    • Immat GRANS % 05/27/2023 0    • Lymphocytes Relative 05/27/2023 45 (H)    • Monocytes Relative 05/27/2023 14 (H)    • Eosinophils Relative 05/27/2023 2    • Basophils Relative 05/27/2023 1    • Neutrophils Absolute 05/27/2023 2.33    • Immature Grans Absolute 05/27/2023 0.00    • Lymphocytes Absolute 05/27/2023 2.82    • Monocytes Absolute 05/27/2023 0.83    • Eosinophils Absolute 05/27/2023 0.12    • Basophils Absolute 05/27/2023 0.04    • Sodium 05/27/2023 140    • Potassium 05/27/2023 4.1    • Chloride 05/27/2023 103    • CO2 05/27/2023 26    • ANION GAP 05/27/2023 11    • BUN 05/27/2023 15    • Creatinine 05/27/2023 0.66    • Glucose 05/27/2023 88    • Calcium 05/27/2023 9.4    • AST 05/27/2023 25    • ALT 05/27/2023 38    • Alkaline Phosphatase 05/27/2023 89    • Total Protein 05/27/2023 7.1    • Albumin sedation during the   procedure, this was accomplished using MAC with the help of   Anesthesiology. DESCRIPTION OF PROCEDURE: After the patient was adequately   sedated, the flexible digital bronchoscope was placed through the   established oral cavity and advanced to the level of the larynx. The   vocal cords were well visualized, moved symmetrically in the midline,   and had no evidence of mass or abnormalities. After instilling   additional lidocaine, the bronchoscope was advanced past the vocal   cords into the trachea. The tracheal mucosa was noted to be normal.   After instilling additional lidocaine, the bronchoscope was advanced   further and the entire tracheobronchial tree was examined at   subsegmental levels. There was no evidence of endobronchial lesions,   masses, or active bleeding. There were mild mucoid secretions noted bilaterally, particularly in the   right upper lobe and left lower lobe consistent with mild bilateral   pneumonia. The bronchoscope was wedged in the anterior   subsegment of the right upper lobe. A BAL was performed instilling   approximately 90 mL of normal saline, recovering approximately 55 mL   of BAL fluid. The bronchoscope was withdrawn without any difficulties. The total   estimated endoscopic time was about 20 minutes. The patient   tolerated the procedure without any difficulties. Furthermore, the vital   signs, pulse oximetry, and EKG remained normal throughout the entire   procedure. INTERPRETATION   1. Visualization of the entire tracheobronchial tree to subsegmental   levels with no evidence of endobronchial lesions, masses, or active   bleeding. 2. There were mild mucoid secretions noted bilaterally, particularly in   the right upper lobe and left lower lobe consistent with mild bilateral   pneumonia.    3. Bronchoalveolar lavage was performed in the anterior subsegment   of the right upper lobe, and the BAL fluid was sent to microbiology for   bacteria 05/27/2023 4.7    • Total Bilirubin 05/27/2023 0.70    • eGFR 05/27/2023 140    • Cholesterol 05/27/2023 208 (H)    • Triglycerides 05/27/2023 190 (H)    • HDL, Direct 05/27/2023 39 (L)    • LDL Calculated 05/27/2023 131 (H)    • Non-HDL-Chol (CHOL-HDL) 05/27/2023 169    • TSH 3RD GENERATON 05/27/2023 1.637    • Valproic Acid, Total 05/27/2023 122 (H)    • Hemoglobin A1C 05/27/2023 5.4    • EAG 05/27/2023 108    • Ventricular Rate 05/26/2023 62    • Atrial Rate 05/26/2023 62    • TN Interval 05/26/2023 152    • QRSD Interval 05/26/2023 88    • QT Interval 05/26/2023 368    • QTC Interval 05/26/2023 373    • P Axis 05/26/2023 68    • QRS Axis 05/26/2023 56    • T Wave Axis 05/26/2023 47    • Valproic Acid, Total 05/28/2023 119 (H)        Progress Toward Goals:  Based on today's interview and review of prior notes, no change thorugh the weekend and he remains discharge focused. VPA level still elevated at 119, though slightly down trended. Pt is also c/o sleepiness as a SE. Reduce Depakote ER by 250mg q AM   Continue other medications unchanged  Continue assault precautions        Assessment/Plan   Principal Problem:    Severe episode of recurrent major depressive disorder, without psychotic features ([address])  Active Problems:    Medical clearance for psychiatric admission    Autism spectrum disorder    Mood disorder ([address])    Anxiety disorder      Recommended Treatment: Continue with pharmacotherapy, group therapy, milieu therapy and occupational therapy.   The patient will be maintained on the following medications:  Current Facility-Administered Medications   Medication Dose Route Frequency Provider Last Rate   • acetaminophen  650 mg Oral Q6H PRN Sissy Matter, DO     • acetaminophen  650 mg Oral Q4H PRN Sissy Matter, DO     • acetaminophen  975 mg Oral Q6H PRN Sissy Matter, DO     • aluminum-magnesium hydroxide-simethicone  30 mL Oral Q4H PRN Sissy Matter, DO     • benztropine  1 mg smear and culture, AFB smear and culture, fungal culture,   legionella, and for respiratory viruses. 4. A portion of the BAL was sent for PCP, nocardia, and for cytology. 5. The patient tolerated the procedure well and there were no apparent   complications noted. 6. The procedure was performed with the help of Anesthesiology using   MAC. At the time of this dictation the results of all the tests are still pending.         Seth Cade MD      SR / DV   D:  02/03/2017   15:51   T:  02/03/2017   16:19   Job #:  [id number] Intramuscular Q4H PRN Max 6/day Live Sheehan DO     • benztropine  1 mg Oral Q4H PRN Max 6/day Live Sheehan DO     • hydrOXYzine HCL  50 mg Oral Q6H PRN Max 4/day Live Sheehan DO      Or   • diphenhydrAMINE  50 mg Intramuscular Q6H PRN Live Sheehan DO     • [START ON 5/29/2023] divalproex sodium  500 mg Oral QAM Ann Castle PA-C     • divalproex sodium  750 mg Oral HS Ann Castle PA-C     • glycerin-hypromellose-  1 drop Both Eyes Q3H PRN Live Sheehan DO     • hydrOXYzine HCL  100 mg Oral Q6H PRN Max 4/day Live Sheehan DO      Or   • LORazepam  2 mg Intramuscular Q6H PRN Live Sheehan DO     • hydrOXYzine HCL  25 mg Oral Q6H PRN Max 4/day Live Sheehan DO     • melatonin  9 mg Oral HS Live Sheehan DO     • mirtazapine  7.5 mg Oral HS Live Sheehan DO     • OLANZapine  10 mg Oral Q3H PRN Max 3/day Live Sheehan DO      Or   • OLANZapine  10 mg Intramuscular Q3H PRN Max 3/day Live Sheehan DO     • OLANZapine  5 mg Oral Q3H PRN Max 6/day Live Sheehan DO      Or   • OLANZapine  5 mg Intramuscular Q3H PRN Max 6/day Live Sheehan DO     • OLANZapine  2.5 mg Oral Q3H PRN Max 8/day Ciro Watters, DO     • polyethylene glycol  17 g Oral Daily PRN Live Sheehan DO     • propranolol  10 mg Oral Q8H PRN Live Sheehan DO     • senna-docusate sodium  1 tablet Oral Daily PRN Live Sheehan DO     • traZODone  50 mg Oral HS PRN Live Sheehan DO         Risks, benefits and possible side effects of Medications:   Risks, benefits, and possible side effects of medications explained to patient and patient verbalizes understanding

## 2023-05-27 NOTE — NURSING NOTE
Pt noted restless and up frequently at nurse's station. PRN propanol 10mg administered @ 1659. Results pending.
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
## 2023-05-27 NOTE — NURSING NOTE
Received pt in bed at change of shift with eyes closed; chest movement noted. Continues the same thus this far as per q 7 min room checks. Will continue to monitor.

## 2023-05-27 NOTE — NURSING NOTE
Pt reporting severe anxiety following control on unit. Pavon score of 26 recorded. Atarax given ordered.
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
## 2023-05-28 LAB — VALPROATE SERPL-MCNC: 119 UG/ML (ref 50–100)

## 2023-05-28 PROCEDURE — 99232 SBSQ HOSP IP/OBS MODERATE 35: CPT | Performed by: PHYSICIAN ASSISTANT

## 2023-05-28 PROCEDURE — 80164 ASSAY DIPROPYLACETIC ACD TOT: CPT | Performed by: PHYSICIAN ASSISTANT

## 2023-05-28 RX ORDER — DIVALPROEX SODIUM 500 MG/1
500 TABLET, EXTENDED RELEASE ORAL EVERY MORNING
Status: DISCONTINUED | OUTPATIENT
Start: 2023-05-29 | End: 2023-06-14

## 2023-05-28 RX ADMIN — PROPRANOLOL HYDROCHLORIDE 10 MG: 10 TABLET ORAL at 15:12

## 2023-05-28 RX ADMIN — HYDROXYZINE HYDROCHLORIDE 100 MG: 50 TABLET, FILM COATED ORAL at 17:58

## 2023-05-28 RX ADMIN — DIVALPROEX SODIUM 750 MG: 500 TABLET, FILM COATED, EXTENDED RELEASE ORAL at 08:46

## 2023-05-28 RX ADMIN — DIVALPROEX SODIUM 750 MG: 500 TABLET, FILM COATED, EXTENDED RELEASE ORAL at 21:18

## 2023-05-28 RX ADMIN — Medication 9 MG: at 21:18

## 2023-05-28 RX ADMIN — MIRTAZAPINE 7.5 MG: 7.5 TABLET, FILM COATED ORAL at 21:19
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
## 2023-05-28 NOTE — NURSING NOTE
Alert, cooperative and visible ocassionally this shift. Pt in room most of shift. No SI or HI noted. Denies depression, anxiety and pain. Attended fresh air. Consumed 100% of breakfast and 100% of lunch. Took all medication without prompting. Maintained on safe precautions without incident.  Will continue to monitor progress and recovery program.

## 2023-05-28 NOTE — PLAN OF CARE
Problem: Ineffective Coping  Goal: Identifies healthy coping skills  Outcome: Not Progressing     Problem: Anxiety  Goal: Anxiety is at manageable level  Description: Interventions:  - Assess and monitor patient's anxiety level. - Monitor for signs and symptoms (heart palpitations, chest pain, shortness of breath, headaches, nausea, feeling jumpy, restlessness, irritable, apprehensive). - Collaborate with interdisciplinary team and initiate plan and interventions as ordered.   - [place] patient to unit/surroundings  - Explain treatment plan  - Encourage participation in care  - Encourage verbalization of concerns/fears  - Identify coping mechanisms  - Assist in developing anxiety-reducing skills  - Administer/offer alternative therapies  - Limit or eliminate stimulants  5/28/2023 1936 by Glendon Eisenmenger, RN  Outcome: Not Progressing  5/28/2023 1804 by Glendon Eisenmenger, RN  Outcome: Not Progressing

## 2023-05-28 NOTE — NURSING NOTE
Alert, cooperative and visible intermittently. Consumed 100% of dinner. Took all medication without prompting. Pt noted anxious and trying to guard main unit doors. Pt educated on proper behavior on the unit and verbalized understanding. Pt stated he was very anxious. Pavon anxiety scale score a #25. PRN atarax 100mg administered PO @ 1758 and was effective an hr after administration. Pavon anxiety scale score a # 10 after one hr. Psych Provider gave N.O Valporic Acid level, CMP. CBC with diff on 5/31, and depakote 500mg in morning and 750mg @ bedtime. Maintained on safe precautions without incident.
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
## 2023-05-28 NOTE — NURSING NOTE
Pt c/o of feeling restless. Pt at nurse's station frequently this shift. PRN Propanolol administered PO @ 1512. Results pending.

## 2023-05-29 PROCEDURE — 99232 SBSQ HOSP IP/OBS MODERATE 35: CPT | Performed by: PSYCHIATRY & NEUROLOGY

## 2023-05-29 RX ADMIN — Medication 9 MG: at 21:11

## 2023-05-29 RX ADMIN — HYDROXYZINE HYDROCHLORIDE 100 MG: 50 TABLET, FILM COATED ORAL at 19:02

## 2023-05-29 RX ADMIN — DIVALPROEX SODIUM 500 MG: 500 TABLET, FILM COATED, EXTENDED RELEASE ORAL at 08:48

## 2023-05-29 RX ADMIN — DIVALPROEX SODIUM 750 MG: 500 TABLET, FILM COATED, EXTENDED RELEASE ORAL at 21:10

## 2023-05-29 RX ADMIN — MIRTAZAPINE 7.5 MG: 7.5 TABLET, FILM COATED ORAL at 21:11
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
## 2023-05-29 NOTE — NURSING NOTE
Ryan Harding has been visible intermittently in the milieu. Minimal interaction with peers noted. Able to make needs known to staff. Denies anxiety, depression and voices. No agitation noted. Ate 100% of his meals. Took medication without difficulty. Attended Coping Skills. Did go out on the deck for [employer]. Behavior controlled. Continue to monitor. Precautions maintained.
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
## 2023-05-30 PROCEDURE — 99232 SBSQ HOSP IP/OBS MODERATE 35: CPT | Performed by: PSYCHIATRY & NEUROLOGY

## 2023-05-30 RX ADMIN — Medication 9 MG: at 21:16

## 2023-05-30 RX ADMIN — ACETAMINOPHEN 650 MG: 325 TABLET ORAL at 13:35

## 2023-05-30 RX ADMIN — DIVALPROEX SODIUM 500 MG: 500 TABLET, FILM COATED, EXTENDED RELEASE ORAL at 08:16

## 2023-05-30 RX ADMIN — MIRTAZAPINE 7.5 MG: 7.5 TABLET, FILM COATED ORAL at 21:16

## 2023-05-30 RX ADMIN — DIVALPROEX SODIUM 750 MG: 500 TABLET, FILM COATED, EXTENDED RELEASE ORAL at 21:16
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
## 2023-05-30 NOTE — PLAN OF CARE
Problem: Risk for Self Injury/Neglect  Goal: Treatment Goal: Remain safe during length of stay, learn and adopt new coping skills, and be free of self-injurious ideation, impulses and acts at the time of discharge  Outcome: Progressing  Goal: Verbalize thoughts and feelings  Description: Interventions:  - Assess and re-assess patient's lethality and potential for self-injury  - Engage patient in 1:1 interactions, daily, for a minimum of 15 minutes  - Encourage patient to express feelings, fears, frustrations, hopes  - Establish rapport/trust with patient   Outcome: Progressing  Goal: Recognize maladaptive responses and adopt new coping mechanisms  Outcome: Progressing

## 2023-05-30 NOTE — SOCIAL WORK
SW met with patient privately for a therapeutic check-in. Patient was pleasant, calm, and attentive, but fixated on discharge. He expressed multiple times that he does not want to be here, but acknowledged that he has no where to go. He asked about a shelter, which we then spoke at length about. He seemed to understand that a shelter would not be in his best interest.  He also asked about his right to transfer to another hospital; SW confirmed that he does have a right to request a transfer, but that doesn't guarantee that he would be accepted. Patient did not request a transfer at this time. Some of his complaints were regarding the food, visitation, and no sunlight. SW offered to arrange a virtual visit for him, but he requested time to think about it. Additionally, he reported that the soap has no smell, and that his shoes don't fit him properly without the actual zip-ties. SW acknowledged all his concerns, and validated his feelings and frustrations, but also notes some things we could achieve together such as, applying for Social Security benefits, psychotherapy, housing, and building his support system. Patient expressed that he does want Social Security, and more supports. He thought it might be best to live in [facility] or [facility] as they have more supports; SW explained why this is not an option for him at the present. Though he continued to repeat that he doesn't want to be here he was calm, and has enough insight to understand that he has no options at this time. SW agreed to reach out to [employer] at [facility] tomorrow as patient is concerned they have denied him due to his history of aggression. SW did explain to patient that if he continues to make threats or have other episodes of aggression that it will make it difficult for us to find him housing, and also give the doctor grounds to keep him in the hospital longer. SW then praised patient for his much improved behaviors today.   Patient denied having any further questions or concerns.
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
## 2023-05-30 NOTE — PROGRESS NOTES
[facility] Group Note     05/30/23 1100   Activity/Group Checklist   Group Wellness  (Strengths Use Plan)   Attendance Attended   Attendance Duration (min) 46-60   Interactions Interacted appropriately  (but reserved and guarded at times)   Affect/Mood Appropriate;Calm   Goals Achieved Identified feelings; Discussed coping strategies; Able to listen to others; Able to engage in interactions; Able to reflect/comment on own behavior;Able to recieve feedback; Able to give feedback to another

## 2023-05-30 NOTE — PLAN OF CARE
Problem: Risk for Self Injury/Neglect  Goal: Treatment Goal: Remain safe during length of stay, learn and adopt new coping skills, and be free of self-injurious ideation, impulses and acts at the time of discharge  5/30/2023 1907 by Jeferson Hernandez RN  Outcome: Progressing  5/30/2023 1906 by Jeferson Hernandez RN  Outcome: Progressing  Goal: Verbalize thoughts and feelings  Description: Interventions:  - Assess and re-assess patient's lethality and potential for self-injury  - Engage patient in 1:1 interactions, daily, for a minimum of 15 minutes  - Encourage patient to express feelings, fears, frustrations, hopes  - Establish rapport/trust with patient   5/30/2023 1907 by Jeferson Hernandez RN  Outcome: Progressing  5/30/2023 1906 by Jeferson Hernandez RN  Outcome: Progressing  Goal: Recognize maladaptive responses and adopt new coping mechanisms  5/30/2023 1907 by Jeferson Hernandez RN  Outcome: Progressing  5/30/2023 1906 by Jeferson Hernandez RN  Outcome: Progressing
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
## 2023-05-30 NOTE — NURSING NOTE
Pt is present on the milieu and social with select peers. He consumed 100% of breakfast and lunch. Took his medications without incidence. Tylenol 650 mg given at 1335 for 6/10 neck pain. It was effective. He denied all psychiatric symptoms. Pt stares and smiles at this writer in a bizarre way. No behavioral issues.
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
## 2023-05-31 LAB
ALBUMIN SERPL BCP-MCNC: 4.4 G/DL (ref 3.5–5)
ALP SERPL-CCNC: 98 U/L (ref 34–104)
ALT SERPL W P-5'-P-CCNC: 43 U/L (ref 7–52)
ANION GAP SERPL CALCULATED.3IONS-SCNC: 9 MMOL/L (ref 4–13)
AST SERPL W P-5'-P-CCNC: 24 U/L (ref 13–39)
BASOPHILS # BLD AUTO: 0.04 THOUSANDS/ÂΜL (ref 0–0.1)
BASOPHILS NFR BLD AUTO: 1 % (ref 0–1)
BILIRUB SERPL-MCNC: 0.52 MG/DL (ref 0.2–1)
BUN SERPL-MCNC: 11 MG/DL (ref 5–25)
CALCIUM SERPL-MCNC: 9.3 MG/DL (ref 8.4–10.2)
CHLORIDE SERPL-SCNC: 102 MMOL/L (ref 96–108)
CO2 SERPL-SCNC: 29 MMOL/L (ref 21–32)
CREAT SERPL-MCNC: 0.72 MG/DL (ref 0.6–1.3)
EOSINOPHIL # BLD AUTO: 0.17 THOUSAND/ÂΜL (ref 0–0.61)
EOSINOPHIL NFR BLD AUTO: 2 % (ref 0–6)
ERYTHROCYTE [DISTWIDTH] IN BLOOD BY AUTOMATED COUNT: 12.8 % (ref 11.6–15.1)
GFR SERPL CREATININE-BSD FRML MDRD: 135 ML/MIN/1.73SQ M
GLUCOSE P FAST SERPL-MCNC: 89 MG/DL (ref 65–99)
GLUCOSE SERPL-MCNC: 89 MG/DL (ref 65–140)
HCT VFR BLD AUTO: 45.6 % (ref 36.5–49.3)
HGB BLD-MCNC: 15.2 G/DL (ref 12–17)
IMM GRANULOCYTES # BLD AUTO: 0.01 THOUSAND/UL (ref 0–0.2)
IMM GRANULOCYTES NFR BLD AUTO: 0 % (ref 0–2)
LYMPHOCYTES # BLD AUTO: 3.38 THOUSANDS/ÂΜL (ref 0.6–4.47)
LYMPHOCYTES NFR BLD AUTO: 48 % (ref 14–44)
MCH RBC QN AUTO: 30.7 PG (ref 26.8–34.3)
MCHC RBC AUTO-ENTMCNC: 33.3 G/DL (ref 31.4–37.4)
MCV RBC AUTO: 92 FL (ref 82–98)
MONOCYTES # BLD AUTO: 1.09 THOUSAND/ÂΜL (ref 0.17–1.22)
MONOCYTES NFR BLD AUTO: 15 % (ref 4–12)
NEUTROPHILS # BLD AUTO: 2.39 THOUSANDS/ÂΜL (ref 1.85–7.62)
NEUTS SEG NFR BLD AUTO: 34 % (ref 43–75)
NRBC BLD AUTO-RTO: 0 /100 WBCS
PLATELET # BLD AUTO: 227 THOUSANDS/UL (ref 149–390)
PMV BLD AUTO: 9.7 FL (ref 8.9–12.7)
POTASSIUM SERPL-SCNC: 4.4 MMOL/L (ref 3.5–5.3)
PROT SERPL-MCNC: 7 G/DL (ref 6.4–8.4)
RBC # BLD AUTO: 4.95 MILLION/UL (ref 3.88–5.62)
SODIUM SERPL-SCNC: 140 MMOL/L (ref 135–147)
VALPROATE SERPL-MCNC: 106 UG/ML (ref 50–100)
WBC # BLD AUTO: 7.08 THOUSAND/UL (ref 4.31–10.16)

## 2023-05-31 PROCEDURE — 99232 SBSQ HOSP IP/OBS MODERATE 35: CPT | Performed by: PSYCHIATRY & NEUROLOGY

## 2023-05-31 PROCEDURE — 80164 ASSAY DIPROPYLACETIC ACD TOT: CPT | Performed by: PSYCHIATRY & NEUROLOGY

## 2023-05-31 PROCEDURE — 80053 COMPREHEN METABOLIC PANEL: CPT | Performed by: PSYCHIATRY & NEUROLOGY

## 2023-05-31 PROCEDURE — 85025 COMPLETE CBC W/AUTO DIFF WBC: CPT | Performed by: PSYCHIATRY & NEUROLOGY

## 2023-05-31 RX ADMIN — DIVALPROEX SODIUM 750 MG: 500 TABLET, FILM COATED, EXTENDED RELEASE ORAL at 21:15

## 2023-05-31 RX ADMIN — HYDROXYZINE HYDROCHLORIDE 50 MG: 50 TABLET, FILM COATED ORAL at 16:23

## 2023-05-31 RX ADMIN — Medication 10 MG: at 21:15

## 2023-05-31 RX ADMIN — DIVALPROEX SODIUM 500 MG: 500 TABLET, FILM COATED, EXTENDED RELEASE ORAL at 08:10

## 2023-05-31 RX ADMIN — MIRTAZAPINE 7.5 MG: 7.5 TABLET, FILM COATED ORAL at 21:15

## 2023-05-31 NOTE — PLAN OF CARE
Problem: Ineffective Coping  Goal: Demonstrates healthy coping skills  Outcome: Progressing     Problem: Risk for Self Injury/Neglect  Goal: Verbalize thoughts and feelings  Description: Interventions:  - Assess and re-assess patient's lethality and potential for self-injury  - Engage patient in 1:1 interactions, daily, for a minimum of 15 minutes  - Encourage patient to express feelings, fears, frustrations, hopes  - Establish rapport/trust with patient   Outcome: Progressing     Problem: Depression  Goal: Refrain from isolation  Description: Interventions:  - Develop a trusting relationship   - Encourage socialization   Outcome: Progressing     Problem: Anxiety  Goal: Anxiety is at manageable level  Description: Interventions:  - Assess and monitor patient's anxiety level. - Monitor for signs and symptoms (heart palpitations, chest pain, shortness of breath, headaches, nausea, feeling jumpy, restlessness, irritable, apprehensive). - Collaborate with interdisciplinary team and initiate plan and interventions as ordered.   - [place] patient to unit/surroundings  - Explain treatment plan  - Encourage participation in care  - Encourage verbalization of concerns/fears  - Identify coping mechanisms  - Assist in developing anxiety-reducing skills  - Administer/offer alternative therapies  - Limit or eliminate stimulants  Outcome: Not Progressing

## 2023-05-31 NOTE — PROGRESS NOTES
05/31/23 0830   Team Meeting   Meeting Type Daily Rounds   Initial Conference Date 05/31/23   Patient/Family Present   Patient Present No   Patient's Family Present No     Daily Rounds Documentation     Team Members Present:   MD Erasmo Dickson Dr., MD Dr. Sherlyn Schiff, MD Spence Ricker, RN  [place] , [id number], PhD, LCSW    Asked multiple times about discharge. Elopement precautions added; has a history of elopement. Intrusive. Tylenol PRN for neck pain. Attended 3/6 groups. Compliant with medications and meals. Slept.

## 2023-05-31 NOTE — NURSING NOTE
Pt is medication and meal compliant. Pt is visible in the milieu and social with select peers. Pt denies s/s, but remains fixated on discharge. Pt observed pacing the unit and walking past male peer stating " you want the smoke?" with his arms up and continued to walk away with out issue. Pt counseled regarding appropriate interactions and not further issues at this time. Pt offers no complaints. Will continue to monitor.

## 2023-05-31 NOTE — NURSING NOTE
Hans Rose stated that prn Atarax "helped" and was effective for decrease in anxiety. Will monitor/assess for any changes.

## 2023-05-31 NOTE — SOCIAL WORK
E-mail sent to patient's supports coordinator confirming our meeting (in-person) for June 8th at 11:00AM, and asking her to confirm if her colleague (Cyrus Gasca) submitted a SSI/SSDI application on patient's behalf. E-mail also sent to Yris Olivas at [facility] inquiring if they are going to accept patient at their [facility] (OUTPATIENT CAMP[facility]). Tania Donald from the [facility] and [facility]) were also included on this e-mail. 13:11  Response received from Yris Olivas at [facility]; patient has been denied as they do not feel they can meet his needs.

## 2023-05-31 NOTE — NURSING NOTE
Patient was visible in the milieu socializing with select peers. Denies all psych s/s. No complaints offered. No behaviors noted. Attended 2/3 evening groups. Had 100% for dinner. Took his HS medications. Safety checks ongoing.
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
## 2023-05-31 NOTE — NURSING NOTE
Pt requested and given 50 mg Atarax for moderate anxiety at 1623. Pt scored #18 on [facility] Anxiety Scale. Pt said "I feel like everyone is trying to get me to slip up so I can go into restraints". Pt educated pt on the criteria to be put in to restraints and was receptive to this. Pt said "I just want to get out of here". Will monitor for effectiveness.

## 2023-05-31 NOTE — PROGRESS NOTES
[facility] Group Note     05/31/23 1100   Activity/Group Checklist   Group Wellness  (Positive Vs. Negative Statements)   Attendance Attended   Attendance Duration (min) 46-60   Interactions Interacted appropriately   Affect/Mood Appropriate;Bright   Goals Achieved Identified feelings; Identified triggers; Discussed coping strategies; Able to listen to others; Able to engage in interactions; Able to reflect/comment on own behavior;Able to recieve feedback; Able to give feedback to another

## 2023-06-01 PROCEDURE — 99232 SBSQ HOSP IP/OBS MODERATE 35: CPT | Performed by: PSYCHIATRY & NEUROLOGY

## 2023-06-01 RX ADMIN — DIVALPROEX SODIUM 750 MG: 500 TABLET, FILM COATED, EXTENDED RELEASE ORAL at 21:21

## 2023-06-01 RX ADMIN — Medication 10 MG: at 21:21

## 2023-06-01 RX ADMIN — ACETAMINOPHEN 975 MG: 325 TABLET ORAL at 23:08

## 2023-06-01 RX ADMIN — DIVALPROEX SODIUM 500 MG: 500 TABLET, FILM COATED, EXTENDED RELEASE ORAL at 08:30

## 2023-06-01 RX ADMIN — MIRTAZAPINE 7.5 MG: 7.5 TABLET, FILM COATED ORAL at 21:21

## 2023-06-01 NOTE — NURSING NOTE
Pt is present on the milieu and social with peers. He consumed 100% of breakfast and lunch. Took his medication without incidence. Pt reported 3/10 headache and stated he hasn't drank any water today. Fresh water given. It was effective. Pt stated, "I feel anxious about discharge" and when I asked if he feels depressed he stated, "no, I just feel down" with a smile on his face. He denied any needs. No behavioral issues.

## 2023-06-01 NOTE — PROGRESS NOTES
06/01/23 0830   Team Meeting   Meeting Type Daily Rounds   Initial Conference Date 06/01/23   Patient/Family Present   Patient Present No   Patient's Family Present No     Daily Rounds Documentation     Team Members Present:   MD Jeffrey Gustafson Dr., MD Dr. Rosita Knack, MD Waldon Harden, RN  Sherin Copbrian, [facility], [place]    More comfortable on the unit. Immature and goofy at times, but can be redirected. Poor boundaries. Still asking to discharge. Atarax PRN utilized once. Attended 6/7 groups. Compliant with medications and meals. Slept. Received denial letter from [facility]); SW will discuss with him today.

## 2023-06-01 NOTE — NURSING NOTE
Nelly Monreal has been awake, alert, and visible intermittently out in the milieu. Pt went out on the deck with staff and peers for fresh air. Pt ate 100% supper. Pt preoccupied at times. When asked pt if he has any depression, pt stated, "I think about it but I'm not ready to talk about it yet."  Pt sits in dining room with peers and watches tv at times. No behavioral issues. Pt attended and participated in evening nursing group, wrap up group, and had snack with peers. Compliant with scheduled meds. Continue to monitor/assess for any changes.
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
## 2023-06-01 NOTE — SOCIAL WORK
SW confirmed that we will discuss other placement options for patient at the meeting scheduled for 06/08 at 11:00AM.  Patient's CM at [facility] will also be participating to assist with patient's SIS assessment, which is needed in order to get a waiver.

## 2023-06-01 NOTE — NURSING NOTE
Received pt in bed at change of shift with eyes closed; chest movement noted. Awake and out of his room at 0310 requesting sleeping pill. Educated on the sleeping medication regimen for the unit then asked for atarax to get back to sleep. Appeared calm. Advised Atarax is not intended as a sleeping aid. Fede Walton stated "ok I'm going to bed now"  Retired to bed without any difficulties and appears to be sleeping at this time. Q 7 min safety checks in progress.

## 2023-06-02 PROCEDURE — 99232 SBSQ HOSP IP/OBS MODERATE 35: CPT | Performed by: PSYCHIATRY & NEUROLOGY

## 2023-06-02 RX ORDER — LITHIUM CARBONATE 300 MG/1
300 TABLET, FILM COATED, EXTENDED RELEASE ORAL EVERY 12 HOURS SCHEDULED
Status: DISCONTINUED | OUTPATIENT
Start: 2023-06-02 | End: 2023-06-03

## 2023-06-02 RX ADMIN — ALUMINUM HYDROXIDE, MAGNESIUM HYDROXIDE, AND SIMETHICONE 30 ML: 200; 200; 20 SUSPENSION ORAL at 02:10

## 2023-06-02 RX ADMIN — Medication 10 MG: at 21:22

## 2023-06-02 RX ADMIN — HYDROXYZINE HYDROCHLORIDE 25 MG: 25 TABLET ORAL at 14:52

## 2023-06-02 RX ADMIN — DIVALPROEX SODIUM 500 MG: 500 TABLET, FILM COATED, EXTENDED RELEASE ORAL at 08:32

## 2023-06-02 RX ADMIN — LITHIUM CARBONATE 300 MG: 300 TABLET, EXTENDED RELEASE ORAL at 10:51

## 2023-06-02 RX ADMIN — DIVALPROEX SODIUM 750 MG: 500 TABLET, FILM COATED, EXTENDED RELEASE ORAL at 21:22

## 2023-06-02 RX ADMIN — MIRTAZAPINE 7.5 MG: 7.5 TABLET, FILM COATED ORAL at 21:22

## 2023-06-02 RX ADMIN — LITHIUM CARBONATE 300 MG: 300 TABLET, EXTENDED RELEASE ORAL at 21:22

## 2023-06-02 NOTE — PROGRESS NOTES
06/02/23 0830   Team Meeting   Meeting Type Daily Rounds   Initial Conference Date 06/02/23   Patient/Family Present   Patient Present No   Patient's Family Present No     Daily Rounds Documentation     Team Members Present:   MD Raman Armstrong CRNP Teri Sharper, FRANCOIS Valentine, [facility]  Boo Moreno, W    Tylenol PRN for headache. Wanted anxiety medications, but appeared happy and was smiling so encouraged to use coping skills. Mylanta PRN over night due to episode of vomiting. Improved behaviors. Attended 6/10 groups. Compliant with medications and meals. Slept.

## 2023-06-02 NOTE — SOCIAL WORK
SW met with patient briefly, and informed him of the denial from [facility]. He was calm and maintained control, but grew more anxious. He expressed fearing we would send him to state hospital or that he would be here until 2024. SW acknowledged his feelings and provided support. SW reminded him of the meeting coming up this Thursday, and how he will have a whole team discussing other options for him. SW also reassured patient that he is not appropriate for [facility], and praised him for his good behaviors over the last several days. Patient reported feeling hopeless; however, was smiling. Overall, patient did not appear too distressed by this news. We will touch base again on Monday. Patient denied thoughts of suicide or self-harm. He was dressed appropriately, and appeared well groomed.

## 2023-06-02 NOTE — NURSING NOTE
Prn Atarax effective for decrease in anxiety. Pt stated "It helped."  Continue to monitor/assess for any changes.

## 2023-06-02 NOTE — PLAN OF CARE
Problem: Ineffective Coping  Goal: Demonstrates healthy coping skills  Outcome: Progressing     Problem: Risk for Self Injury/Neglect  Goal: Treatment Goal: Remain safe during length of stay, learn and adopt new coping skills, and be free of self-injurious ideation, impulses and acts at the time of discharge  Outcome: Progressing  Goal: Verbalize thoughts and feelings  Description: Interventions:  - Assess and re-assess patient's lethality and potential for self-injury  - Engage patient in 1:1 interactions, daily, for a minimum of 15 minutes  - Encourage patient to express feelings, fears, frustrations, hopes  - Establish rapport/trust with patient   Outcome: Progressing     Problem: Depression  Goal: Treatment Goal: Demonstrate behavioral control of depressive symptoms, verbalize feelings of improved mood/affect, and adopt new coping skills prior to discharge  Outcome: Progressing  Goal: Verbalize thoughts and feelings  Description: Interventions:  - Assess and re-assess patient's level of risk   - Engage patient in 1:1 interactions, daily, for a minimum of 15 minutes   - Encourage patient to express feelings, fears, frustrations, hopes   Outcome: Progressing  Goal: Refrain from isolation  Description: Interventions:  - Develop a trusting relationship   - Encourage socialization   Outcome: Progressing     Problem: Anxiety  Goal: Anxiety is at manageable level  Description: Interventions:  - Assess and monitor patient's anxiety level. - Monitor for signs and symptoms (heart palpitations, chest pain, shortness of breath, headaches, nausea, feeling jumpy, restlessness, irritable, apprehensive). - Collaborate with interdisciplinary team and initiate plan and interventions as ordered.   - [place] patient to unit/surroundings  - Explain treatment plan  - Encourage participation in care  - Encourage verbalization of concerns/fears  - Identify coping mechanisms  - Assist in developing anxiety-reducing skills  - Administer/offer alternative therapies  - Limit or eliminate stimulants  Outcome: Progressing
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
## 2023-06-02 NOTE — NURSING NOTE
Paris Miles complained of headache #9/10 and requested prn medication. Tylenol 975 mg po prn given at 2308. Continue to monitor/assess for effectiveness.

## 2023-06-02 NOTE — PROGRESS NOTES
06/02/23 1100   Activity/Group Checklist   Group Wellness   Attendance Attended   Attendance Duration (min) 31-45   Interactions Interacted appropriately   Affect/Mood Calm  (Appeared distracted at times)   Goals Achieved Able to engage in interactions

## 2023-06-02 NOTE — NURSING NOTE
Felicia Gómez has been awake, alert, and visible intermittently out in the milieu. Pt went out on the deck with staff and peers for fresh air group. Pt ate 100% supper. Pt napped in room at intervals. Minimal interaction noted with peers. Pt denies any depression, anxiety, a/v hallucinations, and has not verbalized any delusions. Pt stated that his thoughts are "good". Preoccupied at times. Pt did not attend evening group or wrap up group but came out for snack with peers after. Compliant with scheduled meds. No behavioral issues. Continue to monitor/assess for any changes.

## 2023-06-02 NOTE — TREATMENT TEAM
06/02/23 1300   Activity/Group Checklist   Group Nursing Education   Attendance Attended   Attendance Duration (min) 31-45   Interactions Interacted appropriately   Affect/Mood Appropriate   Goals Achieved Able to listen to others; Able to engage in interactions
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
## 2023-06-02 NOTE — NURSING NOTE
Pt came to this writer reporting feeling anxious because he "received bad news." He stated he "feels hopeless." Pavon anxiety score 7. Atarax 25 mg given at  for mild anxiety. This writer encouraged him to work on some goals for himself and pt agreed.

## 2023-06-02 NOTE — NURSING NOTE
Received pt in bed at start of shift with eyes closed, chest movement noted. Awake at 0200 to the BR experiencing an episode of vomiting wish  was witnessed by this writer. Emesis purplish in color with particles of indigested food. Vaibhav Pedro informs having a big salad for dinner. Pt advised to rest his stomach. Avoid salad for the next 24 hrs. Accepted Mylanta 30 ml po at 0210 and GA in small sips. Retired to bed without any difficulties. Will continue to monitor. Q 7 min safety checks in progress. 0421Cjuana Pedro continues to sleep after medicated with Mylanta thus this far. Q 7 min safety checks in progress. PRN seams to be effective.

## 2023-06-03 LAB
BASOPHILS # BLD AUTO: 0.02 THOUSANDS/ÂΜL (ref 0–0.1)
BASOPHILS NFR BLD AUTO: 0 % (ref 0–1)
EOSINOPHIL # BLD AUTO: 0.16 THOUSAND/ÂΜL (ref 0–0.61)
EOSINOPHIL NFR BLD AUTO: 2 % (ref 0–6)
ERYTHROCYTE [DISTWIDTH] IN BLOOD BY AUTOMATED COUNT: 13.1 % (ref 11.6–15.1)
HCT VFR BLD AUTO: 49 % (ref 36.5–49.3)
HGB BLD-MCNC: 15.6 G/DL (ref 12–17)
IMM GRANULOCYTES # BLD AUTO: 0.02 THOUSAND/UL (ref 0–0.2)
IMM GRANULOCYTES NFR BLD AUTO: 0 % (ref 0–2)
LYMPHOCYTES # BLD AUTO: 3 THOUSANDS/ÂΜL (ref 0.6–4.47)
LYMPHOCYTES NFR BLD AUTO: 45 % (ref 14–44)
MCH RBC QN AUTO: 29.9 PG (ref 26.8–34.3)
MCHC RBC AUTO-ENTMCNC: 31.8 G/DL (ref 31.4–37.4)
MCV RBC AUTO: 94 FL (ref 82–98)
MONOCYTES # BLD AUTO: 0.53 THOUSAND/ÂΜL (ref 0.17–1.22)
MONOCYTES NFR BLD AUTO: 8 % (ref 4–12)
NEUTROPHILS # BLD AUTO: 3.01 THOUSANDS/ÂΜL (ref 1.85–7.62)
NEUTS SEG NFR BLD AUTO: 45 % (ref 43–75)
NRBC BLD AUTO-RTO: 0 /100 WBCS
PLATELET # BLD AUTO: 167 THOUSANDS/UL (ref 149–390)
PMV BLD AUTO: 11.1 FL (ref 8.9–12.7)
RBC # BLD AUTO: 5.22 MILLION/UL (ref 3.88–5.62)
WBC # BLD AUTO: 6.74 THOUSAND/UL (ref 4.31–10.16)

## 2023-06-03 PROCEDURE — 99232 SBSQ HOSP IP/OBS MODERATE 35: CPT | Performed by: PHYSICIAN ASSISTANT

## 2023-06-03 PROCEDURE — 85025 COMPLETE CBC W/AUTO DIFF WBC: CPT | Performed by: PHYSICIAN ASSISTANT

## 2023-06-03 RX ORDER — DIPHENHYDRAMINE HCL 25 MG
25 TABLET ORAL EVERY 6 HOURS PRN
Status: DISCONTINUED | OUTPATIENT
Start: 2023-06-03 | End: 2023-06-03

## 2023-06-03 RX ORDER — DIPHENHYDRAMINE HCL 25 MG
50 TABLET ORAL ONCE
Status: COMPLETED | OUTPATIENT
Start: 2023-06-03 | End: 2023-06-03

## 2023-06-03 RX ADMIN — MIRTAZAPINE 7.5 MG: 7.5 TABLET, FILM COATED ORAL at 21:27

## 2023-06-03 RX ADMIN — DIVALPROEX SODIUM 750 MG: 500 TABLET, FILM COATED, EXTENDED RELEASE ORAL at 21:27

## 2023-06-03 RX ADMIN — DIPHENHYDRAMINE HCL 50 MG: 25 TABLET ORAL at 22:01

## 2023-06-03 RX ADMIN — HYDROXYZINE HYDROCHLORIDE 50 MG: 50 TABLET, FILM COATED ORAL at 11:42

## 2023-06-03 RX ADMIN — LITHIUM CARBONATE 300 MG: 300 TABLET, EXTENDED RELEASE ORAL at 08:48

## 2023-06-03 RX ADMIN — Medication 10 MG: at 21:27

## 2023-06-03 RX ADMIN — DIVALPROEX SODIUM 500 MG: 500 TABLET, FILM COATED, EXTENDED RELEASE ORAL at 08:48

## 2023-06-03 RX ADMIN — DIPHENHYDRAMINE HCL 25 MG: 25 TABLET ORAL at 17:16

## 2023-06-03 NOTE — PROGRESS NOTES
Progress Note - Behavioral Health   Vern Ameya 21 y.o. male MRN: [id number]  Unit/Bed#: [facility] 436-26 Encounter: [id number]    Lawrence Miller was seen for follow-up, chart reviewed and discussed with nursing staff. Nursing staff notes he received as needed hydroxyzine for anxiety yesterday and late this morning with positive affect. Slept well last night. Visible in the milieu but minimal interaction with peers. No aggression or agitation. Compliant with medications and meals. Patient reports that he feels anxious today. States that he is feeling anxious and upset due to being denied by a group home. Worried that he will be here long term. Aware he has upcoming meeting next week to discuss other options with his treatment team.  Discussed coping strategies and things he could do here to decrease his anxiety. States he enjoys reading and writing. Also enjoys walking outdoors which he knows he cannot do here but has access to the fresh air group. Compliant with medications and started on lithium yesterday. No adverse effects noted with his medications. Physically no reports of pain or discomfort.     Behavior over the last 24 hours:  unchanged  Sleep: normal  Appetite: normal  Medication side effects: No  ROS: no complaints  /63 (BP Location: Right arm)   Pulse 86   Temp 98.2 °F (36.8 °C) (Temporal)   Resp 18   Ht 5' 11" (1.803 m)   Wt 80.3 kg (177 lb)   SpO2 97%   BMI 24.69 kg/m²     Medications:   Current Facility-Administered Medications   Medication Dose Route Frequency   • acetaminophen (TYLENOL) tablet 650 mg  650 mg Oral Q6H PRN   • acetaminophen (TYLENOL) tablet 650 mg  650 mg Oral Q4H PRN   • acetaminophen (TYLENOL) tablet 975 mg  975 mg Oral Q6H PRN   • aluminum-magnesium hydroxide-simethicone (MYLANTA) oral suspension 30 mL  30 mL Oral Q4H PRN   • benztropine (COGENTIN) injection 1 mg  1 mg Intramuscular Q4H PRN Max 6/day   • benztropine (COGENTIN) tablet 1 mg  1 mg Oral Q4H PRN Max 6/day   • hydrOXYzine HCL (ATARAX) tablet 50 mg  50 mg Oral Q6H PRN Max 4/day    Or   • diphenhydrAMINE (BENADRYL) injection 50 mg  50 mg Intramuscular Q6H PRN   • divalproex sodium (DEPAKOTE ER) 24 hr tablet 500 mg  500 mg Oral QAM   • divalproex sodium (DEPAKOTE ER) 24 hr tablet 750 mg  750 mg Oral HS   • glycerin-hypromellose- (ARTIFICIAL TEARS) ophthalmic solution 1 drop  1 drop Both Eyes Q3H PRN   • hydrOXYzine HCL (ATARAX) tablet 100 mg  100 mg Oral Q6H PRN Max 4/day    Or   • LORazepam (ATIVAN) injection 2 mg  2 mg Intramuscular Q6H PRN   • hydrOXYzine HCL (ATARAX) tablet 25 mg  25 mg Oral Q6H PRN Max 4/day   • lithium carbonate (LITHOBID) CR tablet 300 mg  300 mg Oral Q12H [address]   • melatonin tablet 10 mg  10 mg Oral HS   • mirtazapine (REMERON) tablet 7.5 mg  7.5 mg Oral HS   • OLANZapine (ZyPREXA) tablet 10 mg  10 mg Oral Q3H PRN Max 3/day    Or   • OLANZapine (ZyPREXA) IM injection 10 mg  10 mg Intramuscular Q3H PRN Max 3/day   • OLANZapine (ZyPREXA) tablet 5 mg  5 mg Oral Q3H PRN Max 6/day    Or   • OLANZapine (ZyPREXA) IM injection 5 mg  5 mg Intramuscular Q3H PRN Max 6/day   • OLANZapine (ZyPREXA) tablet 2.5 mg  2.5 mg Oral Q3H PRN Max 8/day   • polyethylene glycol (MIRALAX) packet 17 g  17 g Oral Daily PRN   • propranolol (INDERAL) tablet 10 mg  10 mg Oral Q8H PRN   • senna-docusate sodium (SENOKOT S) 8.6-50 mg per tablet 1 tablet  1 tablet Oral Daily PRN   • traZODone (DESYREL) tablet 50 mg  50 mg Oral HS PRN       Labs:   Admission on 05/25/2023   Component Date Value Ref Range Status   • WBC 05/27/2023 6.14  4.31 - 10.16 Thousand/uL Final   • RBC 05/27/2023 4.87  3.88 - 5.62 Million/uL Final   • Hemoglobin 05/27/2023 14.9  12.0 - 17.0 g/dL Final   • Hematocrit 05/27/2023 45.2  36.5 - 49.3 % Final   • MCV 05/27/2023 93  82 - 98 fL Final   • MCH 05/27/2023 30.6  26.8 - 34.3 pg Final   • MCHC 05/27/2023 33.0  31.4 - 37.4 g/dL Final   • RDW 05/27/2023 12.9  11.6 - 15.1 % Final   • MPV 05/27/2023 9.9  8.9 - 12.7 fL Final   • Platelets 61/47/7849 224  149 - 390 Thousands/uL Final   • nRBC 05/27/2023 0  /100 WBCs Final   • Neutrophils Relative 05/27/2023 38 (L)  43 - 75 % Final   • Immat GRANS % 05/27/2023 0  0 - 2 % Final   • Lymphocytes Relative 05/27/2023 45 (H)  14 - 44 % Final   • Monocytes Relative 05/27/2023 14 (H)  4 - 12 % Final   • Eosinophils Relative 05/27/2023 2  0 - 6 % Final   • Basophils Relative 05/27/2023 1  0 - 1 % Final   • Neutrophils Absolute 05/27/2023 2.33  1.85 - 7.62 Thousands/µL Final   • Immature Grans Absolute 05/27/2023 0.00  0.00 - 0.20 Thousand/uL Final   • Lymphocytes Absolute 05/27/2023 2.82  0.60 - 4.47 Thousands/µL Final   • Monocytes Absolute 05/27/2023 0.83  0.17 - 1.22 Thousand/µL Final   • Eosinophils Absolute 05/27/2023 0.12  0.00 - 0.61 Thousand/µL Final   • Basophils Absolute 05/27/2023 0.04  0.00 - 0.10 Thousands/µL Final   • Sodium 05/27/2023 140  135 - 147 mmol/L Final   • Potassium 05/27/2023 4.1  3.5 - 5.3 mmol/L Final   • Chloride 05/27/2023 103  96 - 108 mmol/L Final   • CO2 05/27/2023 26  21 - 32 mmol/L Final   • ANION GAP 05/27/2023 11  4 - 13 mmol/L Final   • BUN 05/27/2023 15  5 - 25 mg/dL Final   • Creatinine 05/27/2023 0.66  0.60 - 1.30 mg/dL Final   • Glucose 05/27/2023 88  65 - 140 mg/dL Final   • Calcium 05/27/2023 9.4  8.4 - 10.2 mg/dL Final   • AST 05/27/2023 25  13 - 39 U/L Final   • ALT 05/27/2023 38  7 - 52 U/L Final   • Alkaline Phosphatase 05/27/2023 89  34 - 104 U/L Final   • Total Protein 05/27/2023 7.1  6.4 - 8.4 g/dL Final   • Albumin 05/27/2023 4.7  3.5 - 5.0 g/dL Final   • Total Bilirubin 05/27/2023 0.70  0.20 - 1.00 mg/dL Final   • eGFR 05/27/2023 140  ml/min/1.73sq m Final   • Cholesterol 05/27/2023 208 (H)  See Comment mg/dL Final   • Triglycerides 05/27/2023 190 (H)  See Comment mg/dL Final   • HDL, Direct 05/27/2023 39 (L)  >=40 mg/dL Final   • LDL Calculated 05/27/2023 131 (H)  0 - 100 mg/dL Final   • Non-HDL-Chol (CHOL-HDL) 05/27/2023 169  mg/dl Final   • TSH 3RD GENERATON 05/27/2023 1.637  0.450 - 4.500 uIU/mL Final   • Valproic Acid, Total 05/27/2023 122 (H)  50 - 100 ug/mL Final   • Hemoglobin A1C 05/27/2023 5.4  Normal 3.8-5.6%; PreDiabetic 5.7-6.4%;  Diabetic >=6.5%; Glycemic control for adults with diabetes <7.0% % Final   • EAG 05/27/2023 108  mg/dl Final   • Ventricular Rate 05/26/2023 62  BPM Final   • Atrial Rate 05/26/2023 62  BPM Final   • WA Interval 05/26/2023 152  ms Final   • QRSD Interval 05/26/2023 88  ms Final   • QT Interval 05/26/2023 368  ms Final   • QTC Interval 05/26/2023 373  ms Final   • P Axis 05/26/2023 68  degrees Final   • QRS Axis 05/26/2023 56  degrees Final   • T Wave Axis 05/26/2023 47  degrees Final   • Valproic Acid, Total 05/28/2023 119 (H)  50 - 100 ug/mL Final   • Valproic Acid, Total 05/31/2023 106 (H)  50 - 100 ug/mL Final   • Sodium 05/31/2023 140  135 - 147 mmol/L Final   • Potassium 05/31/2023 4.4  3.5 - 5.3 mmol/L Final   • Chloride 05/31/2023 102  96 - 108 mmol/L Final   • CO2 05/31/2023 29  21 - 32 mmol/L Final   • ANION GAP 05/31/2023 9  4 - 13 mmol/L Final   • BUN 05/31/2023 11  5 - 25 mg/dL Final   • Creatinine 05/31/2023 0.72  0.60 - 1.30 mg/dL Final   • Glucose 05/31/2023 89  65 - 140 mg/dL Final   • Glucose, Fasting 05/31/2023 89  65 - 99 mg/dL Final   • Calcium 05/31/2023 9.3  8.4 - 10.2 mg/dL Final   • AST 05/31/2023 24  13 - 39 U/L Final   • ALT 05/31/2023 43  7 - 52 U/L Final   • Alkaline Phosphatase 05/31/2023 98  34 - 104 U/L Final   • Total Protein 05/31/2023 7.0  6.4 - 8.4 g/dL Final   • Albumin 05/31/2023 4.4  3.5 - 5.0 g/dL Final   • Total Bilirubin 05/31/2023 0.52  0.20 - 1.00 mg/dL Final   • eGFR 05/31/2023 135  ml/min/1.73sq m Final   • WBC 05/31/2023 7.08  4.31 - 10.16 Thousand/uL Final   • RBC 05/31/2023 4.95  3.88 - 5.62 Million/uL Final   • Hemoglobin 05/31/2023 15.2  12.0 - 17.0 g/dL Final   • Hematocrit 05/31/2023 45.6  36.5 - 49.3 % Final   • MCV 05/31/2023 92  82 - 98 fL Final   • MCH 05/31/2023 30.7  26.8 - 34.3 pg Final   • MCHC 05/31/2023 33.3  31.4 - 37.4 g/dL Final   • RDW 05/31/2023 12.8  11.6 - 15.1 % Final   • MPV 05/31/2023 9.7  8.9 - 12.7 fL Final   • Platelets 09/65/8730 227  149 - 390 Thousands/uL Final   • nRBC 05/31/2023 0  /100 WBCs Final   • Neutrophils Relative 05/31/2023 34 (L)  43 - 75 % Final   • Immat GRANS % 05/31/2023 0  0 - 2 % Final   • Lymphocytes Relative 05/31/2023 48 (H)  14 - 44 % Final   • Monocytes Relative 05/31/2023 15 (H)  4 - 12 % Final   • Eosinophils Relative 05/31/2023 2  0 - 6 % Final   • Basophils Relative 05/31/2023 1  0 - 1 % Final   • Neutrophils Absolute 05/31/2023 2.39  1.85 - 7.62 Thousands/µL Final   • Immature Grans Absolute 05/31/2023 0.01  0.00 - 0.20 Thousand/uL Final   • Lymphocytes Absolute 05/31/2023 3.38  0.60 - 4.47 Thousands/µL Final   • Monocytes Absolute 05/31/2023 1.09  0.17 - 1.22 Thousand/µL Final   • Eosinophils Absolute 05/31/2023 0.17  0.00 - 0.61 Thousand/µL Final   • Basophils Absolute 05/31/2023 0.04  0.00 - 0.10 Thousands/µL Final       Mental Status Evaluation:  Appearance:  age appropriate and casually dressed   Behavior:  Calm, cooperative   Speech:  normal pitch and normal volume   Mood:  anxious   Affect:  constricted   Thought Process:  goal directed and logical   Thought Content:     Associations: No delusions voiced    Intact   Perceptual Disturbances: No auditory or visual hallucinations   Risk Potential: Suicidal Ideations none, Homicidal Ideations none and Potential for Aggression No   Sensorium:  person, place and time/date   Cognition:  recent and remote memory grossly intact   Consciousness:  alert and awake    Attention: attention span appeared shorter than expected for age   Insight:  limited   Judgment: limited   Gait/Station: normal gait/station   Motor Activity: no abnormal movements     Progress Toward Goals: Gradually progressing, medications being adjusted and started on lithium. Plan to discharge to group home with [facility] when stable    Assessment/Plan   Principal Problem:    Severe episode of recurrent major depressive disorder, without psychotic features ([address])  Active Problems:    Medical clearance for psychiatric admission    Autism spectrum disorder    Mood disorder ([address])    Anxiety disorder      Recommended Treatment:  Depakote  mg every morning and 750 mg nightly  Lithobid 300 mg every 12 hours started yesterday, level pending for 6/7/2023  Mirtazapine 7.5 mg at at bedtime    Continue with group therapy, milieu therapy and occupational therapy. Risks, benefits and possible side effects of Medications:   Risks, benefits, and possible side effects of medications explained to patient and patient verbalizes understanding. Counseling / Coordination of Care  Total floor / unit time spent today 25 minutes. Greater than 50% of total time was spent with the patient and / or family counseling and / or coordination of care.  A description of the counseling / coordination of care: Medication management, chart review, patient interview

## 2023-06-03 NOTE — NURSING NOTE
Patient was on phone with sister and then came to staff and stated he was feeling some anxiety Patient was given Atarax  50 mg at 1145

## 2023-06-03 NOTE — PROGRESS NOTES
06/03/23 1000   Activity/Group Checklist   Group Other (Comment)  (OPEN STUDIO Art Therapy/Social Group)   Attendance Attended   Attendance Duration (min) Greater than 60   Interactions Interacted appropriately   Affect/Mood Appropriate   Goals Achieved Able to listen to others; Able to engage in interactions

## 2023-06-03 NOTE — NURSING NOTE
David Torres has been awake, alert, and visible intermittently out in the milieu. Pt went out on the deck with staff and peers for fresh air group. Some socialization noted with select peers. Pt ate 100% supper. Pt walks around unit at intervals and stops at nurse's station to talk to staff at times. Pt attended and participated in evening bingo group, wrap up group, and had snack. Compliant with scheduled meds. No behavioral issues. Continue to monitor/assess for any changes.

## 2023-06-03 NOTE — NURSING NOTE
Patient is compliant with medication and meals. Patient voices opinion that he does not want to stay here. He fears we will not let him be discharged. He was assured that when the doctor feels he is ready he will be discharged. Will continue to monitor and chart all progress.

## 2023-06-03 NOTE — NURSING NOTE
Alesia Velázquez was c/o itching of his hand. Upon further examination he appeared to have a slight reds rash of both hands and it went slightly up his arms. He then said it was also on his stomach and this was only slightly obvious. Floyd Carlos was contacted. She ordered prn Benadryl and a CBC with diff which was drawn and sent to lab at 1725  PRN Benadryl was administered at 1720. Will monitor the results  At 1815 Becky Herring was still c/o itching so he was encouraged to take a cool shower without using any soap and to pat the skin dry without rubbing with the towel and creating any friction. He did this and said it did give him a small bit of relief  Attempted to contact the [facility] EAC on Call MD but unable  To reach him as of 1900.

## 2023-06-03 NOTE — QUICK NOTE
Contacted by primary RN, patient with new onset of rash of upper extremities and trunk. I did not physically see this patient but from the pictures sent via TT the rash appears to be consistent with a drug reaction. Patient was started on lithium 300mg bid yesterday and received 3 doses. He has never taken lithium in the past. He is also taking Depakote which can also cause a drug reaction. This is less likely the cause as his dose is currently being tapered. He came from [address] [place] [address] on 750 mg Q12 hrs and does was decreased on 05/29/23 to 500mg QAM and 750 mg HS. He reports the rash is mildly pruritic but denies any other associated symptoms. Will order Benadryl 25mg once. Recommend holding additional doses of lithium. I will see patient tomorrow. If any additional symptoms arise please reach out to overnight provider.

## 2023-06-04 PROBLEM — R21 RASH: Status: ACTIVE | Noted: 2023-06-04

## 2023-06-04 PROCEDURE — 99231 SBSQ HOSP IP/OBS SF/LOW 25: CPT | Performed by: PHYSICIAN ASSISTANT

## 2023-06-04 PROCEDURE — 99232 SBSQ HOSP IP/OBS MODERATE 35: CPT

## 2023-06-04 RX ORDER — LITHIUM CARBONATE 300 MG/1
300 TABLET, FILM COATED, EXTENDED RELEASE ORAL EVERY 12 HOURS SCHEDULED
Status: DISCONTINUED | OUTPATIENT
Start: 2023-06-04 | End: 2023-06-09

## 2023-06-04 RX ORDER — DIPHENHYDRAMINE HCL 25 MG
25 TABLET ORAL EVERY 6 HOURS PRN
Status: DISCONTINUED | OUTPATIENT
Start: 2023-06-04 | End: 2023-08-11 | Stop reason: HOSPADM

## 2023-06-04 RX ORDER — AMMONIUM LACTATE 12 G/100G
LOTION TOPICAL 2 TIMES DAILY PRN
Status: DISCONTINUED | OUTPATIENT
Start: 2023-06-04 | End: 2023-08-11 | Stop reason: HOSPADM

## 2023-06-04 RX ADMIN — DIVALPROEX SODIUM 750 MG: 500 TABLET, FILM COATED, EXTENDED RELEASE ORAL at 21:22

## 2023-06-04 RX ADMIN — LITHIUM CARBONATE 300 MG: 300 TABLET, EXTENDED RELEASE ORAL at 14:01

## 2023-06-04 RX ADMIN — DIPHENHYDRAMINE HCL 25 MG: 25 TABLET ORAL at 19:00

## 2023-06-04 RX ADMIN — Medication 10 MG: at 21:23

## 2023-06-04 RX ADMIN — DIVALPROEX SODIUM 500 MG: 500 TABLET, FILM COATED, EXTENDED RELEASE ORAL at 08:16

## 2023-06-04 RX ADMIN — MIRTAZAPINE 7.5 MG: 7.5 TABLET, FILM COATED ORAL at 21:23

## 2023-06-04 RX ADMIN — LITHIUM CARBONATE 300 MG: 300 TABLET, EXTENDED RELEASE ORAL at 21:23

## 2023-06-04 NOTE — NURSING NOTE
Patient is compliant with medication and meals. Patient has child like behavior, also very needy wants saff to constant talk with him.  Valentino Patten insists that we want to keep him here

## 2023-06-04 NOTE — NURSING NOTE
Pt received @ 1900. @ approximately 2100 pt continued to c/o itchiness & rash on B/L arms, legs & abdomen. Assessed pt & noticed redness. Tiger Texted [facility] medical provider Mel Resendiz. Lithium d/c & benadryl 50 mg ordered. Dr. Kasey Ruiz came to see pt. Doctor stated it looked benign & instructed to call if any changes.  Will continue to monitor

## 2023-06-04 NOTE — ASSESSMENT & PLAN NOTE
· Contacted last night - Patient complaining of dry, pruritic rash of upper extremities and torso. Per pictures sent via TT, it appeared that rash may have been secondary to drug reaction as he was recently started on lithium. He was given PO benadryl and Lithium was held  · On my evaluation today, rash appears less likely 2/2 to drug reaction and more likely sensitivity to soap/detergent.    · Ok to restart Lithium, continue to monitor rash  · Recommend using johnsons baby soap and hydrating skin cream for now, benadryl for itching

## 2023-06-04 NOTE — PROGRESS NOTES
[facility] Group Note     06/04/23 1100   Activity/Group Checklist   Group Life Skills  (Teamwork and Communication)   Attendance Attended   Attendance Duration (min) 46-60   Interactions Interacted appropriately   Affect/Mood Appropriate;Bright   Goals Achieved Discussed coping strategies; Able to listen to others; Able to engage in interactions; Able to reflect/comment on own behavior;Able to recieve feedback; Able to give feedback to another

## 2023-06-04 NOTE — PROGRESS NOTES
Progress Note - Behavioral Health     Marta Mejia 21 y.o. male MRN: [id number]   Unit/Bed#: [facility] 600-30 Encounter: [id number]    Documentation, nursing notes, medication reconciliation, labs, and vitals reviewed. Patient was seen for continuing care and reviewed with treatment team. No acute events over the past 24 hours. Per nursing report, Fede Walton with new onset of rash on upper extremities and trunk, SLIM notified and Lithium held, received prn Atarax for c/o anxiety, med/meal compliant. Medication changes over the past 24 hours: Lithium held due to new onset of rash. Continues to tolerate current medications with no adverse effects. On evaluation today, Fede Walton is observed in milieu throughout morning. He is focused on length of hospitalization throughout evaluation and feels anxious due to hospitalization. He feels hopeless at times but denies suicidal ideation, plan, or intent. No HI. No manic or psychotic symptoms present on evaluation. Remains without acute behavioral concerns.      Psychiatric ROS:  Behavior over the last 24 hours: unchanged  Sleep: normal  Appetite: normal  Medication side effects: No   ROS: reports rash, all other systems are negative    Mental Status Evaluation:    Appearance:  casually dressed, adequate grooming   Behavior:  pleasant, cooperative, calm   Speech:  normal rate and volume   Mood:  anxious   Affect:  constricted   Thought Process:  coherent, goal directed, concrete   Associations: concrete associations   Thought Content:  no overt delusions   Perceptual Disturbances: no auditory hallucinations, no visual hallucinations   Risk Potential: Suicidal ideation - None  Homicidal ideation - None  Potential for aggression - Not at present   Sensorium:  oriented to person, place, time/date and situation   Memory:  recent and remote memory grossly intact   Consciousness:  alert and awake   Attention/Concentration: attention span and concentration appear shorter than expected for age   Insight:  limited   Judgment: limited   Gait/Station: normal gait/station, normal balance   Motor Activity: no abnormal movements     Vital signs in last 24 hours:    Temp:  [96.7 °F (35.9 °C)-97.6 °F (36.4 °C)] 96.7 °F (35.9 °C)  HR:  [66-89] 66  Resp:  [18] 18  BP: (119-122)/(62-78) 122/78    Laboratory results: I have personally reviewed all pertinent laboratory/tests results    Results from the past 24 hours:   Recent Results (from the past 24 hour(s))   CBC and differential    Collection Time: 06/03/23  5:16 PM   Result Value Ref Range    WBC 6.74 4.31 - 10.16 Thousand/uL    RBC 5.22 3.88 - 5.62 Million/uL    Hemoglobin 15.6 12.0 - 17.0 g/dL    Hematocrit 49.0 36.5 - 49.3 %    MCV 94 82 - 98 fL    MCH 29.9 26.8 - 34.3 pg    MCHC 31.8 31.4 - 37.4 g/dL    RDW 13.1 11.6 - 15.1 %    MPV 11.1 8.9 - 12.7 fL    Platelets 589 673 - 437 Thousands/uL    nRBC 0 /100 WBCs    Neutrophils Relative 45 43 - 75 %    Immat GRANS % 0 0 - 2 %    Lymphocytes Relative 45 (H) 14 - 44 %    Monocytes Relative 8 4 - 12 %    Eosinophils Relative 2 0 - 6 %    Basophils Relative 0 0 - 1 %    Neutrophils Absolute 3.01 1.85 - 7.62 Thousands/µL    Immature Grans Absolute 0.02 0.00 - 0.20 Thousand/uL    Lymphocytes Absolute 3.00 0.60 - 4.47 Thousands/µL    Monocytes Absolute 0.53 0.17 - 1.22 Thousand/µL    Eosinophils Absolute 0.16 0.00 - 0.61 Thousand/µL    Basophils Absolute 0.02 0.00 - 0.10 Thousands/µL     Most Recent Labs:   Lab Results   Component Value Date    ALB 4.4 05/31/2023    ALKPHOS 98 05/31/2023    ALT 43 05/31/2023    AST 24 05/31/2023    BUN 11 05/31/2023    CALCIUM 9.3 05/31/2023    CHOLESTEROL 208 (H) 05/27/2023     05/31/2023    CO2 29 05/31/2023    CREATININE 0.72 05/31/2023     05/27/2023    GLUC 89 05/31/2023    HCT 49.0 06/03/2023    HDL 39 (L) 05/27/2023    HGB 15.6 06/03/2023    HGBA1C 5.4 05/27/2023    K 4.4 05/31/2023    LDLCALC 131 (H) 05/27/2023    NEUTROABS 3.01 06/03/2023    [address] [id number] 05/27/2023     06/03/2023    RBC 5.22 06/03/2023    RDW 13.1 06/03/2023    SODIUM 140 05/31/2023    TBILI 0.52 05/31/2023    TP 7.0 05/31/2023    TRIG 190 (H) 05/27/2023    [id number] 1.637 05/27/2023    VALPROICTOT 106 (H) 05/31/2023    WBC 6.74 06/03/2023       Suicide/Homicide Risk Assessment:    Risk of Harm to Self:   Nursing Suicide Risk Assessment Last 24 hours: C-SSRS Risk (Since Last Contact)  Calculated C-SSRS Risk Score (Since Last Contact): No Risk Indicated  Current Specific Risk Factors include: diagnosis of depression, mental illness diagnosis, current anxiety symptoms  Protective Factors: no current suicidal ideation, ability to communicate with staff on the unit, able to contract for safety on the unit, taking medications as ordered on the unit  Based on today's assessment, Becky Herring presents the following risk of harm to self: low    Risk of Harm to Others:  Nursing Homicide Risk Assessment: Violence Risk to Others: Denies within past 6 months  Current Specific Risk Factors include: none  Protective Factors: no current homicidal ideation, compliant with medications on the unit as ordered, compliant with unit milieu  Based on today's assessment, Becky Herring presents the following risk of harm to others: low    The following interventions are recommended: behavioral checks every 7 minutes, continued hospitalization on locked unit    Progress Toward Goals: progressing    Assessment/Plan   Principal Problem:    Severe episode of recurrent major depressive disorder, without psychotic features ([address])  Active Problems:    Medical clearance for psychiatric admission    Autism spectrum disorder    Mood disorder ([address])    Anxiety disorder    Rash      Recommended Treatment:     Planned medication and treatment changes:     All current active medications have been reviewed  Encourage group therapy, milieu therapy and occupational therapy  Behavioral Health checks every 7 minutes  Assault precautions   Restart Lithium  mg q12 hours for mood stabilization. Patient evaluated by [facility] provider today and rash does not appear to be a drug reaction.   Lithium level 6/7/2023  Continue current medications:    Current Facility-Administered Medications   Medication Dose Route Frequency Provider Last Rate   • acetaminophen  650 mg Oral Q6H PRN Chyrel Michel, DO     • acetaminophen  650 mg Oral Q4H PRN Chyrel Michel, DO     • acetaminophen  975 mg Oral Q6H PRN Chyrel Michel, DO     • aluminum-magnesium hydroxide-simethicone  30 mL Oral Q4H PRN Chyrel Michel, DO     • ammonium lactate   Topical BID PRN Ingrid Lopez PA-C     • benztropine  1 mg Intramuscular Q4H PRN Max 6/day Chyrel Michel, DO     • benztropine  1 mg Oral Q4H PRN Max 6/day Chyrel Michel, DO     • hydrOXYzine HCL  50 mg Oral Q6H PRN Max 4/day Chyrel Michel, DO      Or   • diphenhydrAMINE  50 mg Intramuscular Q6H PRN Chyrel Michel, DO     • divalproex sodium  500 mg Oral QAM Ann Castle PA-C     • divalproex sodium  750 mg Oral HS Ann Castle PA-C     • glycerin-hypromellose-  1 drop Both Eyes Q3H PRN Chyrel Michel, DO     • hydrOXYzine HCL  100 mg Oral Q6H PRN Max 4/day Chyrel Michel, DO      Or   • LORazepam  2 mg Intramuscular Q6H PRN Chyrel Michel, DO     • hydrOXYzine HCL  25 mg Oral Q6H PRN Max 4/day Chyrel Michel, DO     • lithium carbonate  300 mg Oral Q12H [address]NA [address]     • melatonin  10 mg Oral HS Raghu Washington MD     • mirtazapine  7.5 mg Oral HS Chyrel Michel, DO     • OLANZapine  10 mg Oral Q3H PRN Max 3/day Chyrel Michel, DO      Or   • OLANZapine  10 mg Intramuscular Q3H PRN Max 3/day Chyrel Michel, DO     • OLANZapine  5 mg Oral Q3H PRN Max 6/day Chyrel Michel, DO      Or   • OLANZapine  5 mg Intramuscular Q3H PRN Max 6/day Ciro Watters, DO     • OLANZapine  2.5 mg Oral Q3H PRN Max 8/day Ciro Watters,      • polyethylene glycol  17 g Oral Daily PRN Cam Lukes, DO     • propranolol  10 mg Oral Q8H PRN Cam Lukes, DO     • senna-docusate sodium  1 tablet Oral Daily PRN Cam Lukes, DO     • traZODone  50 mg Oral HS PRN Cam Lukes, DO       Risks / Benefits of Treatment:    Risks, benefits, and possible side effects of medications explained to patient and patient verbalizes understanding and agreement for treatment. Counseling / Coordination of Care:    Patient's progress discussed with staff in treatment team meeting. Medications, treatment progress and treatment plan reviewed with patient.     ANA Zhu 06/04/23

## 2023-06-04 NOTE — PROGRESS NOTES
[facility]  Progress Note  Name: Nehemiah Howell  MRN: [id number]  Unit/Bed#: [facility] 822-69  Date of Admission: 2023   Date of Service: 2023 I Hospital Day: 10    Assessment/Plan   Rash  Assessment & Plan  · Contacted last night - Patient complaining of dry, pruritic rash of upper extremities and torso. Per pictures sent via TT, it appeared that rash may have been secondary to drug reaction as he was recently started on lithium. He was given PO benadryl and Lithium was held  · On my evaluation today, rash appears less likely 2/2 to drug reaction and more likely sensitivity to soap/detergent. · Ok to restart Lithium, continue to monitor rash  · Recommend using johnsons baby soap and hydrating skin cream for now, benadryl for itching       Nurse Coordination of Care Discussion: discussed assessment and plan with primary RN    Discussions with Specialists or Other Care Team Provider: None    Education and Discussions with Family / Patient: answered patients questions to the best of my ability    Time Spent for Care: 30 minutes. More than 50% of total time spent on counseling and coordination of care as described above. Current Length of Stay: 10 day(s)    Code Status: Level 1 - Full Code      Subjective:   Patient complaining of pruritic rash of trunk and upper extremities. Denies SOB or facial edema. Objective:     Vitals:   Temp (24hrs), Av.2 °F (36.2 °C), Min:96.7 °F (35.9 °C), Max:97.6 °F (36.4 °C)    Temp:  [96.7 °F (35.9 °C)-97.6 °F (36.4 °C)] 96.7 °F (35.9 °C)  HR:  [66-89] 66  Resp:  [18] 18  BP: (119-122)/(62-78) 122/78  SpO2:  [99 %] 99 %  Body mass index is 24.69 kg/m². Physical Exam:     Physical Exam  Constitutional:       General: He is not in acute distress. Appearance: Normal appearance. He is not ill-appearing, toxic-appearing or diaphoretic. HENT:      Head: Normocephalic and atraumatic.       Nose: Nose normal. No congestion or rhinorrhea. Mouth/Throat:      Mouth: Mucous membranes are moist.   Eyes:      General: No scleral icterus. Extraocular Movements: Extraocular movements intact. Cardiovascular:      Rate and Rhythm: Normal rate and regular rhythm. Heart sounds: Normal heart sounds. No murmur heard. Pulmonary:      Effort: Pulmonary effort is normal. No respiratory distress. Breath sounds: Normal breath sounds. No wheezing. Musculoskeletal:      Right lower leg: No edema. Left lower leg: No edema. Skin:     General: Skin is warm and dry. Coloration: Skin is not jaundiced. Neurological:      General: No focal deficit present. Mental Status: He is alert. Psychiatric:      Comments: Dry, fine, sandpaper rash of upper extremities and torso           Additional Data:     Labs:    Results from last 7 days   Lab Units 06/03/23  1716   EOS PCT % 2   HEMATOCRIT % 49.0   HEMOGLOBIN g/dL 15.6   LYMPHS PCT % 45*   MONOS PCT % 8   NEUTROS PCT % 45   PLATELETS Thousands/uL 167   WBC Thousand/uL 6.74     Results from last 7 days   Lab Units 05/31/23  0548   ANION GAP mmol/L 9   ALBUMIN g/dL 4.4   ALK PHOS U/L 98   ALT U/L 43   AST U/L 24   BUN mg/dL 11   CALCIUM mg/dL 9.3   CHLORIDE mmol/L 102   CO2 mmol/L 29   CREATININE mg/dL 0.72   GLUCOSE RANDOM mg/dL 89   POTASSIUM mmol/L 4.4   SODIUM mmol/L 140   TOTAL BILIRUBIN mg/dL 0.52                     * I Have Reviewed All Lab Data Listed Above. * Additional Pertinent Lab Tests Reviewed:  All Labs Within Last 24 Hours Reviewed    Imaging:    Imaging Reports Reviewed Today Include: No imaging reviewed today      Last 24 Hours Medication List:   Current Facility-Administered Medications   Medication Dose Route Frequency Provider Last Rate   • acetaminophen  650 mg Oral Q6H PRN Geannie Few, DO     • acetaminophen  650 mg Oral Q4H PRN Geannie Few, DO     • acetaminophen  975 mg Oral Q6H PRN Geannie Few, DO     • aluminum-magnesium hydroxide-simethicone  30 mL Oral Q4H PRN Chyrel Michel, DO     • benztropine  1 mg Intramuscular Q4H PRN Max 6/day Chyrel Michel, DO     • benztropine  1 mg Oral Q4H PRN Max 6/day Chyrel Michel, DO     • hydrOXYzine HCL  50 mg Oral Q6H PRN Max 4/day Chyrel Michel, DO      Or   • diphenhydrAMINE  50 mg Intramuscular Q6H PRN Chyrel Michel, DO     • divalproex sodium  500 mg Oral QAM Ann Castle PA-C     • divalproex sodium  750 mg Oral HS Ann Castle PA-C     • glycerin-hypromellose-  1 drop Both Eyes Q3H PRN Chyrel Michel, DO     • hydrOXYzine HCL  100 mg Oral Q6H PRN Max 4/day Chyrel Michel, DO      Or   • LORazepam  2 mg Intramuscular Q6H PRN Chyrel Michel, DO     • hydrOXYzine HCL  25 mg Oral Q6H PRN Max 4/day Chyrel Michel, DO     • melatonin  10 mg Oral HS Raghu Washington MD     • mirtazapine  7.5 mg Oral HS Chyrel Michel, DO     • OLANZapine  10 mg Oral Q3H PRN Max 3/day Chyrel Michel, DO      Or   • OLANZapine  10 mg Intramuscular Q3H PRN Max 3/day Chyrel Michel, DO     • OLANZapine  5 mg Oral Q3H PRN Max 6/day Chyrel Michel, DO      Or   • OLANZapine  5 mg Intramuscular Q3H PRN Max 6/day Chyrel Michel, DO     • OLANZapine  2.5 mg Oral Q3H PRN Max 8/day Ciro Watters, DO     • polyethylene glycol  17 g Oral Daily PRN Chyrel Michel, DO     • propranolol  10 mg Oral Q8H PRN Chyrel Michel, DO     • senna-docusate sodium  1 tablet Oral Daily PRN Chyrel Michel, DO     • traZODone  50 mg Oral HS PRN Chyrel Michel, DO          Today, Patient Was Seen By: Ingrid Lopez PA-C      ** Please Note: Dictation voice to text software may have been used in the creation of this document.  **

## 2023-06-05 PROCEDURE — 99232 SBSQ HOSP IP/OBS MODERATE 35: CPT | Performed by: PSYCHIATRY & NEUROLOGY

## 2023-06-05 RX ORDER — PROPRANOLOL HYDROCHLORIDE 10 MG/1
10 TABLET ORAL EVERY 8 HOURS PRN
Status: DISCONTINUED | OUTPATIENT
Start: 2023-06-05 | End: 2023-08-04

## 2023-06-05 RX ADMIN — LITHIUM CARBONATE 300 MG: 300 TABLET, EXTENDED RELEASE ORAL at 21:07

## 2023-06-05 RX ADMIN — PROPRANOLOL HYDROCHLORIDE 10 MG: 10 TABLET ORAL at 16:45

## 2023-06-05 RX ADMIN — LITHIUM CARBONATE 300 MG: 300 TABLET, EXTENDED RELEASE ORAL at 08:28

## 2023-06-05 RX ADMIN — Medication 10 MG: at 21:07

## 2023-06-05 RX ADMIN — DIVALPROEX SODIUM 750 MG: 500 TABLET, FILM COATED, EXTENDED RELEASE ORAL at 21:07

## 2023-06-05 RX ADMIN — MIRTAZAPINE 7.5 MG: 7.5 TABLET, FILM COATED ORAL at 21:07

## 2023-06-05 RX ADMIN — DIVALPROEX SODIUM 500 MG: 500 TABLET, FILM COATED, EXTENDED RELEASE ORAL at 08:28

## 2023-06-05 NOTE — PROGRESS NOTES
06/05/23 1728   Team Meeting   Meeting Type Tx Team Meeting   Initial Conference Date 06/05/23   Team Members Present   Team Members Present Physician;Nurse;   Physician Team Member Mona Zarate MD   Nursing Team Member [id number] RN   Social Work Team Member [facility]   Patient/Family Present   Patient Present No   Patient's Family Present No       Patient presented for his treatment team meeting this morning, prepared with a completed self assessment. He appears well groomed; his affect constricted and somewhat odd and incongruent with the serious topic. Patient denied all symptoms other than anxiety. He denied AH, VH, paranoia, SI, HI, and thoughts of self harming. A goal he identified having completed this week was going to group. One recovery centered goal he continues to work on is "patience." A challenge he identified is "getting declined from group home."    On his assessment, patient checked off "feeling depressed, sad, lonely" "feeling anxious" "feeling bored" "not feeling good about myself" and "negative thinking." He identifies coping skills of listening to music, walking, and grounding. His self care includes meditation, writing, and relaxing. Patient listed his medication and doses and seems to have a decent understanding of how his medications support him. He denied medical issues and reactions to medications this past week. He listed as a topic he wants to address "plan for outside to keep you out of the hospital."    Patient engaged well during his meeting, was able to communicate his thoughts clearly, and was fixated on discharge (he asked the doctor again "so when do you think I'll be leaving?" He has asked this before with explanations provided, though he does continue to ask (he verbalizes understanding of what steps are next, namely the meeting he has this week to discuss discharge plans and explore options.

## 2023-06-05 NOTE — PROGRESS NOTES
06/05/23 1208   Team Meeting   Meeting Type Daily Rounds   Initial Conference Date 06/05/23   Patient/Family Present   Patient Present No   Patient's Family Present No       Daily Titi Saucedo MD, Yuri Holman PhD, [place] RN, Antonio POOLEW  Case reviewed. Inappropriate, child-like behavior. Wanted to rap a song for a nurse and used many expletive words, required redirection. Saturday had a rash on his stomach and medical consulted. Stopped lithium Saturday but received Lithium Sunday. Rash improved. 6/7 groups.

## 2023-06-05 NOTE — PROGRESS NOTES
Psychiatry Progress Note [address] 21 y.o. male MRN: [id number]  [facility]01 Encounter: [id number]  Code Status: Level 1 - Full Code    PCP: Telma Clayton DO    Date of Admission:  5/25/2023 1349   Date of Service:  06/05/23    Patient Active Problem List   Diagnosis   • Medical clearance for psychiatric admission   • Autism spectrum disorder   • Mood disorder ([address])   • Severe episode of recurrent major depressive disorder, without psychotic features ([address])   • Anxiety disorder   • Rash         Review of systems: Unremarkable but complained of pruritic rashes on upper body and was thought to be from lithium which was held and then restarted after evaluation by Medical and placed on Benadryl and no rashes visible today at all after he switched his soap   Diagnosis: Major depression recurrent, moderate disorder, anxiety disorder, ADHD and PTSD   assessment  • Overall Status: Still somewhat childish demanding discharge attention seeking better not expressing any suicidal thoughts or aggressive behaviors and compliant with lithium to restarting it, childish, immature, attention seeking   • certification Statement: The patient will continue to require additional inpatient hospital stay due to potential for suicide and aggressive behaviors as he almost jumped off a high water tower prior to recent admission and has attempted to strangle self in the past    Medications: Depakote 1250 mg a day, melatonin 9 mg at bedtime, Remeron 7.5 mg at bedtime Lithobid 300 mg twice a day  Side effects from treatment: None reported  Medication changes   Lithobid  mg twice a day started last Friday, started inderal as 10 mg po q  Hrs as prn for anxiety at his request as it did help in the past    Medication education   Risks side effects benefits and precautions of medications discussed with patient and he did verbalize an understanding about risks for metabolic syndrome from being on neuroleptics and is form tardive dyskinesia etc.    Understanding of medications: Has some understanding   Justification for dual anti-psychotics: Not applicable    Non-pharmacological treatments  • Continue with individual, group, milieu and occupational therapy using recovery principles and psycho-education about accepting illness and the need for treatment. •  to get prior hospital records and contact adoptive family for more records and history  • Continued with assault precautions  • Placed on elopement precautions as it reportedly tried to elope from [facility] once using a student's ID badge  t    Safety  • Safety and communication plan established to target dynamic risk factors discussed above. Discharge Plan   • To the group home with [facility] services once stabilized as parents are refusing to take him back    Interval Progress   Did start taking the lithium but complained of rashes which was checked by medical and thought to be secondary to stop and lithium restarted with Benadryl as as needed. Not destructive or agitated or threatening or demanding or self-abusive on the unit but childish attention-seeking and again demanding discharge to live [facility]. Somewhat cold and aloof when approached but can smile transiently at times. Somewhat isolated in interaction and understands he needs to talk with supports coordination services to discuss appropriate placement as the group home refused to take [place] and [place]. No overt psychotic symptoms reported.  Staff reports he sings rap music and needed to be told not to sing music with curse words in it, Still somewhat immature, childish, attention seeking,   • Acceptance by patient: Not fully  • Hopefulness in recovery: Being at the group home  • Involved in reintegration process: Talking with family members  • Trusting in relationship with psychiatrist: Beginning to trust  • Sleep: Good  • Appetite: Good  • Compliance with Medications: Compliant  • Group attendance: Attending 6/7  Significant events: Demanding discharge and transfer to a different facility    Mental Status Exam  Appearance: age appropriate, dressed appropriately, marginal hygiene, looks younger than stated age attended team meeting, clean shaven, somewhat silly and childish     behavior: cooperative, mildly anxious, evasive, inappropriate  cold and aloof, with transient smile at times   fspeech: normal rate and volume, fluent, clear, coherent, monotone  Mood: normal  Affect: blunted, constricted, mood-congruent with minimal mood reactivity   thought Process: organized, logical, coherent, goal directed, linear, normal rate of thoughts, concrete  Thought Content: no overt delusions, preoccupied, poverty of thought, paucity of thought, no current suicidal or homicidal thoughts and no plans verbalized. No phobias obsessions or compulsions reported.    Perceptual Disturbances: denies auditory hallucinations when asked, does not appear responding to internal stimuli, no visual hallucinations, denies auditory or visual hallucinations when asked  Hx Risk Factors: chronic psychiatric problems, chronic mood disorder, history of suicidal behaviors, history of serious suicide attempts, history of incarceration on charges filed recently which were later dropped for assaulting a peer at the [address]: alert, oriented x 3 spheres and situation   Cognition: recent and remote memory grossly intact  Consciousness: alert and awake  Attention: attention span and concentration are age appropriate  Intellect: appears to be of average intelligence   Insight: limited  Judgement: limited  Motor Activity: no abnormal movements     Vitals  Temp:  [96.7 °F (35.9 °C)-97.6 °F (36.4 °C)] 97.6 °F (36.4 °C)  HR:  [66-78] 78  Resp:  [18] 18  BP: (122-135)/(69-78) 135/69  SpO2:  [96 %-99 %] 96 %  No intake or output data in the 24 hours ending 06/05/23 05    Lab Results:  [facility] Admission Reviewed    Current Facility-Administered Medications   Medication Dose Route Frequency Provider Last Rate   • acetaminophen  650 mg Oral Q6H PRN Larnell Darlington, DO     • acetaminophen  650 mg Oral Q4H PRN Larnell Darlington, DO     • acetaminophen  975 mg Oral Q6H PRN Larnell Darlington, DO     • aluminum-magnesium hydroxide-simethicone  30 mL Oral Q4H PRN Larnell Darlington, DO     • ammonium lactate   Topical BID PRN Kerney Alpers, PA-C     • benztropine  1 mg Intramuscular Q4H PRN Max 6/day Larnell Darlington, DO     • benztropine  1 mg Oral Q4H PRN Max 6/day Larnell Darlington, DO     • hydrOXYzine HCL  50 mg Oral Q6H PRN Max 4/day Larnell Darlington, DO      Or   • diphenhydrAMINE  50 mg Intramuscular Q6H PRN Larnell Darlington, DO     • diphenhydrAMINE  25 mg Oral Q6H PRN Frutoso GuÃ¡nica, DO     • divalproex sodium  500 mg Oral QAM Ann Castle PA-C     • divalproex sodium  750 mg Oral HS Ann Castle PA-C     • glycerin-hypromellose-  1 drop Both Eyes Q3H PRN Larnell Darlington, DO     • hydrOXYzine HCL  100 mg Oral Q6H PRN Max 4/day Larnell Darlington, DO      Or   • LORazepam  2 mg Intramuscular Q6H PRN Larnell Darlington, DO     • hydrOXYzine HCL  25 mg Oral Q6H PRN Max 4/day Larnell Darlington, DO     • lithium carbonate  300 mg Oral Q12H [address] [facility] ANA [place]     • melatonin  10 mg Oral HS Ally Stewart MD     • mirtazapine  7.5 mg Oral HS Larnell Darlington, DO     • OLANZapine  10 mg Oral Q3H PRN Max 3/day Larnell Darlington, DO      Or   • OLANZapine  10 mg Intramuscular Q3H PRN Max 3/day Larnell Darlington, DO     • OLANZapine  5 mg Oral Q3H PRN Max 6/day Larnell Darlington, DO      Or   • OLANZapine  5 mg Intramuscular Q3H PRN Max 6/day Ciro A Prayson, DO     • OLANZapine  2.5 mg Oral Q3H PRN Max 8/day Ciro A Prayson, DO     • polyethylene glycol  17 g Oral Daily PRN Larnell Darlington, DO     • propranolol  10 mg Oral Q8H PRN Ahsley Pop, DO     • senna-docusate sodium  1 tablet Oral Daily PRN Ashley Pop, DO     • traZODone  50 mg Oral HS PRN Ashley Pop, DO         Counseling / Coordination of Care: Total floor / unit time spent today 15 minutes. Greater than 50% of total time was spent with the patient and / or family counseling and / or somewhat receptive to supportive listening and teaching positive coping skills to deal with symptom mangement. Patient's Rights, confidentiality and exceptions to confidentiality, use of automated medical record, [facility] staff access to medical record, and consent to treatment reviewed. This note has been dictated and hence there may be problems with punctuation, spelling and formatting and if anyone has any concerns please address them to Dr. Arnold Lynch   This note is not shared with patient due to potential for making patient's condition worse by knowing the content of the note.

## 2023-06-05 NOTE — NURSING NOTE
Savanna Conway was saying he was depressed as he had nowhere to go. Initially he told another nurse that he was feeling hopeless  And he said "no one wanted him" When I spoke to him he said he was upset about being here. He said he had no where to go, there were no options, he said he wanted to go into the sunlight and walk around. He complained about the lack of SW to patient ratio. He wanted more counseling. He did not like the groups. He was encouraged to attend more groups and to journal some of these concerns. He was encouraged to have patience that he would find a place if he had patience and he is young and had a long life in front of him  After a lengthy talk he later came and requested something for anxiety. He was given 10 mg Propranol at 1440  Soon after that he ate dinner  After dinner he ambulated about the hallway a few times and then went to his room and when I checked on him he was asleep at 1530  He got up OOB at 1830 and was asking for soap to take a shower but he got his linens at 1600 and should have gotten his soap then and did not. Apparently this has been an issue before and Savanna Conway was told he needs to get his toiletries when they are announced. After going to several different staff members he stated that he "was just messing with the staff"   He is inappropriate at times and I spoke with him about this before  (like when he was playing a very inappropriate song during Spirituality Group) When I spoke to him about this he finally did admit that that was inappropriate.   He seems to like to do things to seek attention

## 2023-06-05 NOTE — NURSING NOTE
Juanis Owusu displays childlike behavior. Very needy and attention seeking. Needs much redirection. Kept asking for a new order for Benadryl for itching. Once the med was ordered, he never asked for it and kept himself occupied. Med and meal compliant, visible on the unit,some peer interacting noted. Q 7 minute patient checks maintained.

## 2023-06-05 NOTE — NURSING NOTE
Pt is present on the milieu and social with select peers. He consumed 100% of breakfast and lunch. Took his medications without incidence. Denied all psychiatric symptoms. Pt brightens on approach. During spirituality group he played a vulgar song. This writer explained the inappropriateness of this and pt was receptive.

## 2023-06-05 NOTE — TREATMENT TEAM
06/05/23 0930   Activity/Group Checklist   Group Spiritual resources   Attendance Attended   Attendance Duration (min) 31-45   Interactions Other (Comment)  (pt played music with curse words and needed redirection)   Affect/Mood Appropriate

## 2023-06-06 PROCEDURE — 99232 SBSQ HOSP IP/OBS MODERATE 35: CPT | Performed by: PSYCHIATRY & NEUROLOGY

## 2023-06-06 RX ADMIN — LITHIUM CARBONATE 300 MG: 300 TABLET, EXTENDED RELEASE ORAL at 08:31

## 2023-06-06 RX ADMIN — LITHIUM CARBONATE 300 MG: 300 TABLET, EXTENDED RELEASE ORAL at 21:06

## 2023-06-06 RX ADMIN — DIVALPROEX SODIUM 750 MG: 500 TABLET, FILM COATED, EXTENDED RELEASE ORAL at 21:05

## 2023-06-06 RX ADMIN — ACETAMINOPHEN 975 MG: 325 TABLET ORAL at 18:06

## 2023-06-06 RX ADMIN — MIRTAZAPINE 7.5 MG: 7.5 TABLET, FILM COATED ORAL at 21:05

## 2023-06-06 RX ADMIN — Medication 10 MG: at 21:05

## 2023-06-06 RX ADMIN — DIVALPROEX SODIUM 500 MG: 500 TABLET, FILM COATED, EXTENDED RELEASE ORAL at 08:31

## 2023-06-06 NOTE — PROGRESS NOTES
06/06/23 1100   Activity/Group Checklist   Group Other (Comment)  (Art Therapy)   Attendance Attended   Attendance Duration (min) 46-60   Interactions Interacted appropriately   Affect/Mood Appropriate;Calm   Goals Achieved Identified feelings; Able to engage in interactions; Able to listen to others     While engaging in the creative process, Pt did express that he no longer wanted to be here and he believes that he is okay and able to be transferred. Pt and this writer discussed why he was there and what he could do for himself to enjoy it more while he is on EAC.

## 2023-06-06 NOTE — NURSING NOTE
Received pt in bed at change of shift with eyes closed; chest movement noted. Awake times one briefly; returning to bed without any difficulties.

## 2023-06-06 NOTE — NURSING NOTE
Pt is present on the milieu intermittently and social with select peers. He consumed 100% of breakfast and lunch. Took his medications without incidence. Pt reported feeling "anxious and stressed." He said he wants to "get out of here." This writer encouraged him to attend groups and learn as much as he can so he doesn't need to come back to the hospital. He brightens on approach and is pleasant. No behavioral issues. Pt stated to this writer and another RN that he wants to move hospitals so that he can be prescribed klonopin or percocet.

## 2023-06-06 NOTE — PROGRESS NOTES
06/06/23 0858   Team Meeting   Meeting Type Daily Rounds   Initial Conference Date 06/06/23   Patient/Family Present   Patient Present No   Patient's Family Present No     Daily Rounds  Williams Sung MD, Jannette Sutherland, Meg Farmer RN, Veena Raines LCSW  Case reviewed. Played a vulgar inappropriate song during spirituality group and redirected with this again. Reported he does not like  to patient ratio. Child-like behaviors, seeking out staff frequently. Received Inderal 10 mg for anxiety. Labs due 6/9. 5/8 groups.

## 2023-06-06 NOTE — PROGRESS NOTES
Psychiatry Progress Note [address] 21 y.o. male MRN: [id number]  [facility]01 Encounter: [id number]  Code Status: Level 1 - Full Code    PCP: Noel Herrera DO    Date of Admission:  5/25/2023 1349   Date of Service:  06/06/23    Patient Active Problem List   Diagnosis   • Medical clearance for psychiatric admission   • Autism spectrum disorder   • Mood disorder ([address])   • Severe episode of recurrent major depressive disorder, without psychotic features ([address])   • Anxiety disorder   • Rash         Review of systems: Unremarkable and rashes have disappeared  Diagnosis: Major depression recurrent, mood disorder, anxiety disorder, ASD PTSD  assessment  • Overall Status: Still childish, attention seeking and immature and was reportedly singing a vulgar song during spirituality group which he later acknowledged as inappropriate, not expressing any suicidal or aggressive thoughts but did need Inderal 10 mg for anxiety and does feel frustrated over having no place to go feeling hopeless but to contract for safety  • certification Statement: The patient will continue to require additional inpatient hospital stay due to potential for suicide and aggressive behaviors as he almost jumped off a high water tower prior to recent admission and has attempted to strangle self in the past    Medications: Depakote 1250 mg a day, melatonin 9 mg at bedtime, Remeron 7.5 mg at bedtime Lithobid 300 mg twice a day  Side effects from treatment: None reported  Medication changes   Lithobid  mg twice a day started last Friday, started inderal as 10 mg po q  8 Hrs as prn for anxiety at his request as it did help in the past    Medication education   Risks side effects benefits and precautions of medications discussed with patient and he did verbalize an understanding about risks for metabolic syndrome from being on neuroleptics and is form tardive dyskinesia etc.    Understanding of medications: Has some understanding   Justification for dual anti-psychotics: Not applicable    Non-pharmacological treatments  • Continue with individual, group, milieu and occupational therapy using recovery principles and psycho-education about accepting illness and the need for treatment. •  to get prior hospital records and contact adoptive family for more records and history  • Continued with assault precautions  • Placed on elopement precautions as it reportedly tried to elope from [facility] once using a student's ID badge  t    Safety  • Safety and communication plan established to target dynamic risk factors discussed above. Discharge Plan   • To the group home with [facility] services once stabilized as parents are refusing to take him back    Interval Progress   Note longer complains of rashes and his Benadryl was started and lithium was resumed. Still childish attention seeking any major singing vulgar rap songs during spirituality group and was told to refrain from such behaviors in the future and he later acknowledged that it was inappropriate. Does report some hopelessness over having no place to go and understands supports coordinator will have a meeting with  and figuring out waiver options. Compliant with medications. Not aggressive or agitated or suicidal or self-abusive but did need Inderal 10 mg as as needed for anxiety yesterday. No overt hallucinations or delusions elicited. No destructive or demanding.     • Acceptance by patient: Not quite  • Hopefulness in recovery: Being at the group home  • Involved in reintegration process: Talking with family members  • Trusting in relationship with psychiatrist: Beginning to trust  • Sleep: Good  • Appetite: Good  •  Compliance with Medications: Compliant  • Group attendance: Most of the groups 5/8  Significant events: Feeling frustrated over having no place to go and again demanding transfer to a different unit    Mental Status Exam  Appearance: age appropriate, dressed appropriately, marginal hygiene, looks younger than stated age patient found sitting on his bed in his room somewhat childish interaction   behavior: cooperative, mildly anxious, evasive, inappropriate cold and aloof with transient smile at times as usual   fspeech: normal rate and volume, fluent, clear, coherent, monotone  Mood: normal  Affect: blunted, constricted, mood-congruent with minimal mood reactivity   thought Process: organized, logical, coherent, goal directed, linear, normal rate of thoughts, concrete  Thought Content: no overt delusions, preoccupied, poverty of thought, paucity of thought, no current suicidal or homicidal thoughts and no plans verbalized. No phobias obsessions or compulsions reported. Perceptual Disturbances: denies auditory hallucinations when asked, does not appear responding to internal stimuli, no visual hallucinations, denies auditory or visual hallucinations when asked  Hx Risk Factors: chronic psychiatric problems, chronic mood disorder, history of suicidal behaviors, history of serious suicide attempts, history of incarceration on charges filed recently which were later dropped for assaulting a peer at the [address] [place] [address] Road: Alert oriented x3 spheres and situation  Cognition: recent and remote memory grossly intact  Consciousness: alert and awake  Attention: attention span and concentration are age appropriate  Intellect: appears to be of average intelligence   Insight: limited  Judgement: limited  Motor Activity: no abnormal movements     Vitals  Temp:  [97.4 °F (36.3 °C)-97.5 °F (36.4 °C)] 97.5 °F (36.4 °C)  HR:  [64-81] 81  Resp:  [18] 18  BP: (122-124)/(70-80) 124/80  SpO2:  [98 %-100 %] 98 %  No intake or output data in the 24 hours ending 06/06/23 3191    Lab Results:  [facility] Admission Reviewed    Current Facility-Administered Medications   Medication Dose Route Frequency Provider Last Rate   • acetaminophen  650 mg Oral Q6H PRN Grier Jordan, DO     • acetaminophen  650 mg Oral Q4H PRN Haileier Jordan, DO     • acetaminophen  975 mg Oral Q6H PRN Grier Jordan, DO     • aluminum-magnesium hydroxide-simethicone  30 mL Oral Q4H PRN Grier Jordan, DO     • ammonium lactate   Topical BID PRN Don Ramirez PA-C     • benztropine  1 mg Intramuscular Q4H PRN Max 6/day Haileier Jordan, DO     • benztropine  1 mg Oral Q4H PRN Max 6/day Haileier Jordan, DO     • hydrOXYzine HCL  50 mg Oral Q6H PRN Max 4/day Haileier Jordan, DO      Or   • diphenhydrAMINE  50 mg Intramuscular Q6H PRN Haileier Jordan, DO     • diphenhydrAMINE  25 mg Oral Q6H PRN Yayo Cabrera DO     • divalproex sodium  500 mg Oral QAM Ann Castle PA-C     • divalproex sodium  750 mg Oral HS Ann Castle PA-C     • glycerin-hypromellose-  1 drop Both Eyes Q3H PRN Grier Jordan, DO     • hydrOXYzine HCL  100 mg Oral Q6H PRN Max 4/day Haileier Jordan, DO      Or   • LORazepam  2 mg Intramuscular Q6H PRN Grier Jordan, DO     • hydrOXYzine HCL  25 mg Oral Q6H PRN Max 4/day Grier Jordan, DO     • lithium carbonate  300 mg Oral Q12H [address] [facility] ANA [place]     • melatonin  10 mg Oral HS Isabella Scott MD     • mirtazapine  7.5 mg Oral HS Haile Santos, DO     • OLANZapine  10 mg Oral Q3H PRN Max 3/day Grier Jordan, DO      Or   • OLANZapine  10 mg Intramuscular Q3H PRN Max 3/day Haileier Jordan, DO     • OLANZapine  5 mg Oral Q3H PRN Max 6/day Haileier Jordan, DO      Or   • OLANZapine  5 mg Intramuscular Q3H PRN Max 6/day Ciro A Prayson, DO     • OLANZapine  2.5 mg Oral Q3H PRN Max 8/day Ciro A Prayson, DO     • polyethylene glycol  17 g Oral Daily PRN Haile Santos, DO     • propranolol  10 mg Oral Q8H PRN Haile Santos, DO     • propranolol  10 mg Oral Q8H PRN Isabella Scott MD     • senna-docusate sodium  1 tablet Oral Daily PRN Ivana Hudson DO     • traZODone  50 mg Oral HS PRN Ivana Hudson DO         Counseling / Coordination of Care: Total floor / unit time spent today 15 minutes. Greater than 50% of total time was spent with the patient and / or family counseling and / or somewhat receptive to supportive listening and teaching positive coping skills to deal with symptom mangement. Patient's Rights, confidentiality and exceptions to confidentiality, use of automated medical record, [address] [place] [address] staff access to medical record, and consent to treatment reviewed. This note has been dictated and hence there may be problems with punctuation, spelling and formatting and if anyone has any concerns please address them to Dr. Velia Garcia   This note is not shared with patient due to potential for making patient's condition worse by knowing the content of the note.

## 2023-06-06 NOTE — NURSING NOTE
Franki Bucio was quiet in the beginning of the evening. He came and was c/o of back pain in the mid back # 8 at 18 48. He received Tylenol 975 mg.  We will monitor for effectiveness  [place] stated the Tylenol took his back pain to a # 3-4 and said that he also felt better because he was participating in the [address] [place] [address],[facility] group and it was interesting to him and he spoke about how he likes to write (rap lyrics) and wants to travel (to [facility]) and about his love for music and his favorite singers  Delaney Favre and Lb ahumada)  After the group he said the back pain was gone completely

## 2023-06-07 PROCEDURE — 99232 SBSQ HOSP IP/OBS MODERATE 35: CPT | Performed by: PSYCHIATRY & NEUROLOGY

## 2023-06-07 RX ADMIN — MIRTAZAPINE 7.5 MG: 7.5 TABLET, FILM COATED ORAL at 21:28

## 2023-06-07 RX ADMIN — LITHIUM CARBONATE 300 MG: 300 TABLET, EXTENDED RELEASE ORAL at 21:28

## 2023-06-07 RX ADMIN — LITHIUM CARBONATE 300 MG: 300 TABLET, EXTENDED RELEASE ORAL at 08:17

## 2023-06-07 RX ADMIN — DIVALPROEX SODIUM 500 MG: 500 TABLET, FILM COATED, EXTENDED RELEASE ORAL at 08:17

## 2023-06-07 RX ADMIN — Medication 10 MG: at 21:28

## 2023-06-07 RX ADMIN — ACETAMINOPHEN 650 MG: 325 TABLET ORAL at 18:33

## 2023-06-07 RX ADMIN — OLANZAPINE 5 MG: 5 TABLET, FILM COATED ORAL at 18:52

## 2023-06-07 RX ADMIN — DIVALPROEX SODIUM 750 MG: 500 TABLET, FILM COATED, EXTENDED RELEASE ORAL at 21:28

## 2023-06-07 NOTE — NURSING NOTE
Pt is present on the milieu intermittently and social with select peers. He consumed 100% of breakfast and lunch. Took his medications without incidence. Reported feeling "anxious" but denied any needs. Pt smiles as he talks about feeling anxious. Denied all other psychiatric symptoms. No behavioral issues.

## 2023-06-07 NOTE — PROGRESS NOTES
06/06/23 1300   Activity/Group Checklist   Group Other (Comment)  (Group Art Therapy/Psychodynamic, Open Choice with Discussion)   Attendance Attended   Attendance Duration (min) Greater than 60   Interactions Interacted appropriately   Affect/Mood Appropriate   Goals Achieved Able to listen to others; Able to engage in interactions; Discussed coping strategies; Able to recieve feedback; Able to give feedback to another  (Patient chose to observe only; social and participated in discussion)

## 2023-06-07 NOTE — NURSING NOTE
Yuliya Steen requested Tylenol for 5/10 headache. Pt given Tylenol 650 mg for HA at 1833. Will monitor for effectiveness.

## 2023-06-07 NOTE — NURSING NOTE
Vincenzo Suero came to nurses station and said he felt lke punching something. He was sick of being here and didn't know why he was here and wanted to hit something He was given Zyprexa 5 mg po as prn for moderate anxiety at 1850.

## 2023-06-07 NOTE — PLAN OF CARE
Problem: Ineffective Coping  Goal: Identifies ineffective coping skills  Outcome: Not Progressing  Goal: Identifies healthy coping skills  Outcome: Progressing  Goal: Demonstrates healthy coping skills  Outcome: Progressing     Problem: Risk for Self Injury/Neglect  Goal: Treatment Goal: Remain safe during length of stay, learn and adopt new coping skills, and be free of self-injurious ideation, impulses and acts at the time of discharge  Outcome: Progressing  Goal: Verbalize thoughts and feelings  Description: Interventions:  - Assess and re-assess patient's lethality and potential for self-injury  - Engage patient in 1:1 interactions, daily, for a minimum of 15 minutes  - Encourage patient to express feelings, fears, frustrations, hopes  - Establish rapport/trust with patient   Outcome: Not Progressing  Goal: Recognize maladaptive responses and adopt new coping mechanisms  Outcome: Not Progressing     Problem: Depression  Goal: Treatment Goal: Demonstrate behavioral control of depressive symptoms, verbalize feelings of improved mood/affect, and adopt new coping skills prior to discharge  Outcome: Progressing  Goal: Verbalize thoughts and feelings  Description: Interventions:  - Assess and re-assess patient's level of risk   - Engage patient in 1:1 interactions, daily, for a minimum of 15 minutes   - Encourage patient to express feelings, fears, frustrations, hopes   Outcome: Not Progressing  Goal: Refrain from isolation  Description: Interventions:  - Develop a trusting relationship   - Encourage socialization   Outcome: Progressing     Problem: Anxiety  Goal: Anxiety is at manageable level  Description: Interventions:  - Assess and monitor patient's anxiety level. - Monitor for signs and symptoms (heart palpitations, chest pain, shortness of breath, headaches, nausea, feeling jumpy, restlessness, irritable, apprehensive).    - Collaborate with interdisciplinary team and initiate plan and interventions as ordered.   - [place] patient to unit/surroundings  - Explain treatment plan  - Encourage participation in care  - Encourage verbalization of concerns/fears  - Identify coping mechanisms  - Assist in developing anxiety-reducing skills  - Administer/offer alternative therapies  - Limit or eliminate stimulants  Outcome: Progressing

## 2023-06-07 NOTE — SOCIAL WORK
Notification received that patient's SIS assessment will not be until 6/14. Meeting with patient's supports coordinator will still be tomorrow at 11:00AM.      SW met with patient briefly to provide him of this update. Patient found in bed, but awake. He was calm, quiet, and attentive. He expressed that he feels anxious; he is worried about how long he will be here. SW reassured him that it won't be forever and that he won't be going to [facility] hospital.  SW also praised him for demonstrating improved behaviors. He also reported some depression, but denied thoughts of self-harm or suicide. He denied having any questions about the upcoming meetings. He denied having any needs at this time. He was encouraged to reach out should he need anything or wants to talk.

## 2023-06-07 NOTE — PROGRESS NOTES
06/07/23 0906   Team Meeting   Meeting Type Daily Rounds   Initial Conference Date 06/07/23   Patient/Family Present   Patient Present No   Patient's Family Present No       Daily Rounds  Darlyn Essex MD, Gaviota Faria RN, Rohan Gusman [facility]  Case reviewed. Lithium level due 6/9. Inappropriate language at times, can be child like. Tylenol prn for back pain, effective. Has meeting tomorrow with supports coordinator. 4/8 groups.

## 2023-06-07 NOTE — PROGRESS NOTES
Psychiatry Progress Note [address] 21 y.o. male MRN: [id number]  [facility] Encounter: [id number]  Code Status: Level 1 - Full Code    PCP: Jocelynn Fisher DO    Date of Admission:  5/25/2023 1349   Date of Service:  06/07/23    Patient Active Problem List   Diagnosis   • Medical clearance for psychiatric admission   • Autism spectrum disorder   • Mood disorder ([address] )   • Severe episode of recurrent major depressive disorder, without psychotic features ([address])   • Anxiety disorder   • Rash         Review of systems: Unremarkable with no more rashes  Diagnosis: Major depression recurrent mood disorder, anxiety disorder ASD and PTSD  assessment  • Overall Status: Status quo   • certification Statement: The patient will continue to require additional inpatient hospital stay due to potential for suicide and aggressive behaviors as he almost jumped off a high water tower prior to recent admission and has attempted to strangle self in the past    Medications: Depakote 1250 mg a day, melatonin 9 mg at bedtime, Remeron 7.5 mg at bedtime Lithobid 300 mg twice a day Inderal 10 mg every 8 hours as needed for anxiety  Side effects from treatment: None reported  Medication changes   None today   Medication education   Risks side effects benefits and precautions of medications discussed with patient and he did verbalize an understanding about risks for metabolic syndrome from being on neuroleptics and is form tardive dyskinesia etc.    Understanding of medications: Has some understanding   Justification for dual anti-psychotics: Not applicable    Non-pharmacological treatments  • Continue with individual, group, milieu and occupational therapy using recovery principles and psycho-education about accepting illness and the need for treatment.   •  to get prior hospital records and contact adoptive family for more records and history  • Continued with assault precautions  • Placed on elopement precautions as it reportedly tried to elope from [facility] once using a student's ID badge  t    Safety  • Safety and communication plan established to target dynamic risk factors discussed above. Discharge Plan   • To the group home with [facility] services once stabilized as parents are refusing to take him back    Interval Progress   No further complaints of rashes and tolerating lithium. Still somewhat attention seeking, immature and childish tending to sing Justina wrapped songs. Does report hopelessness about having a place to go and understands he will have the supports coordination meeting with  tomorrow to figure out discharge waiver options. Compliant with medications. Not aggressive or agitated or suicidal or self-abusive. No behavioral as needed as needed yesterday. No current hallucinations or delusions elicited.   Not aggressive or destructive or self demanding but periodically asks about being discharged to the street  • Acceptance by patient: Not quite  • Hopefulness in recovery: Being at the group home  • Involved in reintegration process: Talking with family members  • Trusting in relationship with psychiatrist: Beginning to trust  • Sleep: Good  • Appetite: Good  •  Compliance with Medications: Compliant  • Group attendance: Attending about  50% 4/8  Significant events: Frustrated over having no place to go and demanding transfer to a different unit but not aggressive or agitated and not self abusive or suicidal or homicidal or threatening    Mental Status Exam  Appearance: age appropriate, dressed appropriately, marginal hygiene, looks younger than stated age patient found outside in the hallway and friend of  his room somewhat childish and immature   behavior: cooperative, mildly anxious, evasive, inappropriate cold and aloof with transient smile at times as usual   fspeech: normal rate and volume, fluent, clear, coherent, monotone  Mood: normal  Affect: blunted, constricted, mood-congruent with minimal mood reactivity  thought Process: organized, logical, coherent, goal directed, linear, normal rate of thoughts, concrete  Thought Content: no overt delusions, preoccupied, poverty of thought, paucity of thought, no current suicidal or homicidal thoughts and no plans verbalized. No phobias obsessions or compulsions reported. Perceptual Disturbances: denies auditory hallucinations when asked, does not appear responding to internal stimuli, no visual hallucinations, denies auditory or visual hallucinations when asked  Hx Risk Factors: chronic psychiatric problems, chronic mood disorder, history of suicidal behaviors, history of serious suicide attempts, history of incarceration on charges filed recently which were later dropped for assaulting a peer at the [address]: Alert oriented x3 spheres and situation   cognition: recent and remote memory grossly intact  Consciousness: alert and awake  Attention: attention span and concentration are age appropriate  Intellect: appears to be of average intelligence   Insight: limited  Judgement: limited  Motor Activity: no abnormal movements     Vitals  Temp:  [97.9 °F (36.6 °C)-98.2 °F (36.8 °C)] 97.9 °F (36.6 °C)  HR:  [76-81] 81  Resp:  [18] 18  BP: (107-128)/(68-73) 128/73  SpO2:  [95 %-100 %] 100 %  No intake or output data in the 24 hours ending 06/07/23 0615    Lab Results:  [address] Admission Reviewed    Current Facility-Administered Medications   Medication Dose Route Frequency Provider Last Rate   • acetaminophen  650 mg Oral Q6H PRN Villafuerte Nestle, DO     • acetaminophen  650 mg Oral Q4H PRN Villafuerte Mindale, DO     • acetaminophen  975 mg Oral Q6H PRN Villafuerte Nestle, DO     • aluminum-magnesium hydroxide-simethicone  30 mL Oral Q4H PRN Villafuerte Nestle, DO     • ammonium lactate   Topical BID PRN Maximus Lenz PA-C     • benztropine  1 mg Intramuscular Q4H PRN Max 6/day Clearnce Roll, DO     • benztropine  1 mg Oral Q4H PRN Max 6/day Clearnce Roll, DO     • hydrOXYzine HCL  50 mg Oral Q6H PRN Max 4/day Clearnce Roll, DO      Or   • diphenhydrAMINE  50 mg Intramuscular Q6H PRN Clearnce Roll, DO     • diphenhydrAMINE  25 mg Oral Q6H PRN Enzo Pastel, DO     • divalproex sodium  500 mg Oral QAM Ann Caslte PA-C     • divalproex sodium  750 mg Oral HS Ann Castle PA-C     • glycerin-hypromellose-  1 drop Both Eyes Q3H PRN Clearnce Roll, DO     • hydrOXYzine HCL  100 mg Oral Q6H PRN Max 4/day Clearnce Roll, DO      Or   • LORazepam  2 mg Intramuscular Q6H PRN Clearnce Roll, DO     • hydrOXYzine HCL  25 mg Oral Q6H PRN Max 4/day Clearnce Roll, DO     • lithium carbonate  300 [facility] Q12H [address] [facility]     • melatonin  10 mg Oral HS Niranjan Bailon MD     • mirtazapine  7.5 mg Oral HS Clearnce Roll, DO     • OLANZapine  10 mg Oral Q3H PRN Max 3/day Clearnce Roll, DO      Or   • OLANZapine  10 mg Intramuscular Q3H PRN Max 3/day Clearnce Roll, DO     • OLANZapine  5 mg Oral Q3H PRN Max 6/day Clearnce Roll, DO      Or   • OLANZapine  5 mg Intramuscular Q3H PRN Max 6/day Ciro A Prayson, DO     • OLANZapine  2.5 mg Oral Q3H PRN Max 8/day Ciro A Prayson, DO     • polyethylene glycol  17 g Oral Daily PRN Clearnce Roll, DO     • propranolol  10 mg Oral Q8H PRN Clearnce Roll, DO     • propranolol  10 mg Oral Q8H PRN Niranjan Bailon MD     • senna-docusate sodium  1 tablet Oral Daily PRN Clearnce Roll, DO     • traZODone  50 mg Oral HS PRN Clearnce Roll, DO         Counseling / Coordination of Care: Total floor / unit time spent today 15 minutes. Greater than 50% of total time was spent with the patient and / or family counseling and / or somewhat receptive to supportive listening and teaching positive coping skills to deal with symptom mangement. Patient's Rights, confidentiality and exceptions to confidentiality, use of automated medical record, [address] [place] [address] staff access to medical record, and consent to treatment reviewed. This note has been dictated and hence there may be problems with punctuation, spelling and formatting and if anyone has any concerns please address them to Dr. Carlee Sánchez   This note is not shared with patient due to potential for making patient's condition worse by knowing the content of the note.

## 2023-06-07 NOTE — NURSING NOTE
Lawrence Miller stated the Zyprexa worked fairly well to lessen the agitation he was feeling earlier.  He still feels like he wants to be discharged and does not feel he needs to be here but he no longer feels like he wants to punch things at this time  At [facility] med pass Lawrence Miller was asleep in bed and had to be woken up for meds which he took then went back to sleep

## 2023-06-08 PROCEDURE — 99232 SBSQ HOSP IP/OBS MODERATE 35: CPT | Performed by: PSYCHIATRY & NEUROLOGY

## 2023-06-08 RX ADMIN — DIVALPROEX SODIUM 750 MG: 500 TABLET, FILM COATED, EXTENDED RELEASE ORAL at 21:09

## 2023-06-08 RX ADMIN — LITHIUM CARBONATE 300 MG: 300 TABLET, EXTENDED RELEASE ORAL at 08:10

## 2023-06-08 RX ADMIN — MIRTAZAPINE 7.5 MG: 7.5 TABLET, FILM COATED ORAL at 21:10

## 2023-06-08 RX ADMIN — DIVALPROEX SODIUM 500 MG: 500 TABLET, FILM COATED, EXTENDED RELEASE ORAL at 08:10

## 2023-06-08 RX ADMIN — LITHIUM CARBONATE 300 MG: 300 TABLET, EXTENDED RELEASE ORAL at 21:10

## 2023-06-08 RX ADMIN — PROPRANOLOL HYDROCHLORIDE 10 MG: 10 TABLET ORAL at 18:57

## 2023-06-08 RX ADMIN — Medication 10 MG: at 21:09

## 2023-06-08 NOTE — NURSING NOTE
Pt is present on the milieu intermittently and social with select peers. He consumed 100% of breakfast and lunch. Took his medications without incidence. Pt denied all psychiatric symptoms. He stated that the doctor is going to prescribe him klonopin. This writer encouraged him to use the punching bag next time he wants to punch walls. No behavioral issues.

## 2023-06-08 NOTE — SOCIAL WORK
Supports Coordination  11:00AM-12:30PM    SW and patient met privately with patient's newly assigned supports coordinator, Franck Donnelly from [employer]. Franck Donnelly completed her initial intake and treatment plan with patient. Patient presented as calm, quiet, flat, and distracted at times. He reported that he felt anxious, and explained that he is still worried about being in the hospital for a long time. He was able to answer almost all questions independently, and showed some good self-awareness. He was dressed appropriately, and well groomed. Patient's father participated in this meeting via telephone; he gave some valuable insight and seemed supportive. Patient identified 2 primary goal areas: employment and stabilization. Franck Donnelly spoke at length about patient's upcoming SIS assessment, and what patient should expect. She confirmed that the waiver is what pays for services such as group homes, , etc.  She stated that it should only take a couple of weeks to get the waiver once the SIS assessment is done, and that there are homes with open beds especially if he is willing to go outside of [place] county. Patient expressed willingness to live outside his county, and that he does want to live in a group home, but that he has some anxiety about living with strangers.   SIS assessment scheduled for 06/14/2023 at 1:00PM.

## 2023-06-08 NOTE — PROGRESS NOTES
Progress Note - Behavioral Health   Lindy Chisholm 21 y.o. male MRN: [id number]  Unit/Bed#: [facility] 109-01 Encounter: [id number]    Assessment/Plan   Principal Problem:    Severe episode of recurrent major depressive disorder, without psychotic features ([address])  Active Problems:    Medical clearance for psychiatric admission    Autism spectrum disorder    Mood disorder ([address] )    Anxiety disorder    Rash    Recommended Treatment:   Continue with group therapy, milieu therapy and occupational therapy. Continue medications: Depakote 1250 mg a day, melatonin 9 mg at bedtime, Remeron 7.5 mg at bedtime Lithobid 300 mg twice a day. Case discussed with treatment team.  Risks, benefits and possible side effects of Medications: Risks, benefits, and possible side effects of medications have been explained to the patient, who verbalizes understanding. Progress Toward Goals: slow improvement, good insight, needs to work on coping mechanisms for anger. Has a meeting today with  at 11:00 AM to discuss discharge options for him.     ------------------------------------------------------------    Subjective: Saleem Mg has been compliant with his medications without acute side effects. He has no questions or concerns at this time. Saleem Mg feels anxious about discharge and he "does not want to be trapped here". He is nervous about his meeting today, because he is afraid of being denied placement. Patient confirms feelings of depression and is sad at the prospect of being in a facility. At this time, patient denies suicidal ideations/plans. Christianne Bethea was feeling angry and wanted to punch a wall. He said his PRN's helped. When asked what non-pharmacological tool could help him with his anger, he stated "punching a wall". He no longer feels as angry, but states "I feel moderately angry today". Denies wanting to hurt himself, or others or wanting to punch a wall at this time.  Denies violent premonitions or delusions/voice commands urging him to be violent or act aggressive. Patient confirms good energy, appetite and sleep. Patient denies fatigue, overt paranoia, hearing voices, seeing things that are not fixed in reality, denies experiences involving delusions. Angelita Talamantes stated he attended "a couple of groups yesterday" and he enjoys them. Angelita Talamantes is consenting for safety on the unit. Psychiatric Review of Systems:  Behavior over the last 24 hours: improved, "less angry"  Sleep: good  Appetite: good  Medication side effects: none verbalized   ROS: complete review of systems is negative except as noted above     Vital signs in last 24 hours:  Temp:  [97.7 °F (36.5 °C)] 97.7 °F (36.5 °C)  HR:  [69-83] 83  Resp:  [18] 18  BP: (114-125)/(67-72) 114/67    Mental Status Evaluation:  Appearance:  age appropriate, looks stated age, well groomed, no facial hair, short buzzcut, poor eye contact, in bed sitting up wearing pajamas   Behavior:  cooperative, mildly anxious, friendly and pleasant, smiles inappropriately at times    Speech:  normal rate, normal volume, normal pitch, fluent, clear, coherent, soft, monotone   Mood:  Has been "feeling anxious"   Affect:  Congruent with mood, stable, within normal range, appears anxious, appropriate to thought content   Thought Process:  Organized, logical, goal-directed   Thought Content: No verbalized delusions of grandiose, persecutory, paranoia, somatic or bizarre nature. Denies overt paranoia. Denies suicidal/homicidal ideations/plans. Denies phobias, compulsions, distorted body perceptions. No preoccupation with violence. Not appearing paranoid or hypervigilant. Perceptual disturbances: Denied hallucinations and does not appear to be responding to internal stimuli at this time   Risk Potential: No active or passive suicidal or homicidal ideation was verbalized during interview. No longer wants to punch a wall, but still feels "moderately angry".     Cognition: oriented to self and situation, memory grossly intact, appears to be of average intelligence, cognition not formally tested.     Insight:  intact   Judgment: intact       Current Medications:  Current Facility-Administered Medications   Medication Dose Route Frequency Provider Last Rate   • acetaminophen  650 mg Oral Q6H PRN Lenny Sierras, DO     • acetaminophen  650 mg Oral Q4H PRN Lenny Sierras, DO     • acetaminophen  975 mg Oral Q6H PRN Lenny Sierras, DO     • aluminum-magnesium hydroxide-simethicone  30 mL Oral Q4H PRN Lenny Sierras, DO     • ammonium lactate   Topical BID PRN Roddy Loja PA-C     • benztropine  1 mg Intramuscular Q4H PRN Max 6/day Lenny Sierras, DO     • benztropine  1 mg Oral Q4H PRN Max 6/day Lenny Sierras, DO     • hydrOXYzine HCL  50 mg Oral Q6H PRN Max 4/day Lenny Sierras, DO      Or   • diphenhydrAMINE  50 mg Intramuscular Q6H PRN Lenny Sierras, DO     • diphenhydrAMINE  25 mg Oral Q6H PRN Ade Evens, DO     • divalproex sodium  500 mg Oral QAM Ann Castle PA-C     • divalproex sodium  750 mg Oral HS Ann Castle PA-C     • glycerin-hypromellose-  1 drop Both Eyes Q3H PRN Lenny Sierras, DO     • hydrOXYzine HCL  100 mg Oral Q6H PRN Max 4/day Lenny Sierras, DO      Or   • LORazepam  2 mg Intramuscular Q6H PRN Lenny Sierras, DO     • hydrOXYzine HCL  25 mg Oral Q6H PRN Max 4/day Lenny Sierras, DO     • lithium carbonate  300 mg Oral Q12H [address]     • melatonin  10 mg Oral HS Enzo Kim MD     • mirtazapine  7.5 mg Oral HS Lenny Sierras, DO     • OLANZapine  10 mg Oral Q3H PRN Max 3/day Lenny Sierras, DO      Or   • OLANZapine  10 mg Intramuscular Q3H PRN Max 3/day Lenny Sierras, DO     • OLANZapine  5 mg Oral Q3H PRN Max 6/day Lenny Sierras, DO      Or   • OLANZapine  5 mg Intramuscular Q3H PRN Max 6/day Ciro Watters DO     • OLANZapine  2.5 mg Oral Q3H PRN Max 8/day Kristal HUGGINS Prayson, DO     • polyethylene glycol  17 g Oral Daily PRN Romayne Duster, DO     • propranolol  10 mg Oral Q8H PRN Romayne Duster, DO     • propranolol  10 mg Oral Q8H PRN Jonah Huffman MD     • senna-docusate sodium  1 tablet Oral Daily PRN Romayne Duster, DO     • traZODone  50 mg Oral HS PRN Romayne Duster, DO         Behavioral Health Medications: all current active meds have been reviewed. Changes as above. Laboratory results:  I have personally reviewed all pertinent laboratory/tests results. No results found for this or any previous visit (from the past 48 hour(s)). This note has been constructed using a voice recognition system. There may be translation, syntax, or grammatical errors. If you have any questions, please contact the dictating provider.     SARAH Cruz

## 2023-06-08 NOTE — NURSING NOTE
Gerber Albert complained for feeling restless and anxious. He requested Inderal 10 mg po prn  At 1857 for same. Pavon scale was #13. Will continue to monitor.

## 2023-06-08 NOTE — PROGRESS NOTES
06/08/23 0905   Team Meeting   Meeting Type Daily Rounds   Initial Conference Date 06/08/23   Patient/Family Present   Patient Present No   Patient's Family Present No     Daily Sharron Damico MD, Gaviota Villanueva RN, Crescencio Chang LCSW  No changes. No behavioral issues in the evening. Reported he felt like punching the wall. Zyprexa 5 mg prn given. Tylenol prn for headache given. Has meeting today with supports coordinator. Lithium level due 6/9. 1/6 groups.

## 2023-06-09 LAB
B BURGDOR IGG+IGM SER-ACNC: <0.2 AI
LITHIUM SERPL-SCNC: 0.4 MMOL/L (ref 0.6–1.2)

## 2023-06-09 PROCEDURE — 99232 SBSQ HOSP IP/OBS MODERATE 35: CPT | Performed by: PSYCHIATRY & NEUROLOGY

## 2023-06-09 PROCEDURE — 86618 LYME DISEASE ANTIBODY: CPT | Performed by: PSYCHIATRY & NEUROLOGY

## 2023-06-09 PROCEDURE — 80178 ASSAY OF LITHIUM: CPT | Performed by: PSYCHIATRY & NEUROLOGY

## 2023-06-09 RX ORDER — LITHIUM CARBONATE 450 MG
450 TABLET, EXTENDED RELEASE ORAL EVERY 12 HOURS SCHEDULED
Status: DISCONTINUED | OUTPATIENT
Start: 2023-06-09 | End: 2023-08-11 | Stop reason: HOSPADM

## 2023-06-09 RX ORDER — CLONAZEPAM 0.5 MG/1
0.5 TABLET ORAL 2 TIMES DAILY
Status: DISCONTINUED | OUTPATIENT
Start: 2023-06-09 | End: 2023-08-09

## 2023-06-09 RX ADMIN — DIVALPROEX SODIUM 750 MG: 500 TABLET, FILM COATED, EXTENDED RELEASE ORAL at 21:22

## 2023-06-09 RX ADMIN — MIRTAZAPINE 7.5 MG: 7.5 TABLET, FILM COATED ORAL at 21:23

## 2023-06-09 RX ADMIN — LITHIUM CARBONATE 450 MG: 450 TABLET, EXTENDED RELEASE ORAL at 21:23

## 2023-06-09 RX ADMIN — LITHIUM CARBONATE 300 MG: 300 TABLET, EXTENDED RELEASE ORAL at 08:39

## 2023-06-09 RX ADMIN — CLONAZEPAM 0.5 MG: 0.5 TABLET ORAL at 17:10

## 2023-06-09 RX ADMIN — Medication 10 MG: at 21:23

## 2023-06-09 RX ADMIN — DIVALPROEX SODIUM 500 MG: 500 TABLET, FILM COATED, EXTENDED RELEASE ORAL at 08:39

## 2023-06-09 RX ADMIN — CLONAZEPAM 0.5 MG: 0.5 TABLET ORAL at 09:53

## 2023-06-09 NOTE — NURSING NOTE
Pt is present on the milieu intermittently and social with select peers. He consumed 100% of breakfast and 50% of lunch. Took his medications without incidence. When this writer gave him his klonopin he asked, "can I have another one?" He denied all psychiatric symptoms except reported feeling anxious. No behavioral issues.

## 2023-06-09 NOTE — PROGRESS NOTES
Progress Note - Behavioral Health   Balbir Burns 21 y.o. male MRN: [id number]  Unit/Bed#: CRUZITO RAMIREZ [facility] [place] [place] 109-01 Encounter: [id number]    Assessment/Plan   Principal Problem:    Severe episode of recurrent major depressive disorder, without psychotic features ([address])  Active Problems:    Medical clearance for psychiatric admission    Autism spectrum disorder    Mood disorder ([address])    Anxiety disorder    Rash    Recommended Treatment:   Continue with group therapy, milieu therapy and occupational therapy. Continue medications: Depakote 1250 mg a day, melatonin 9 mg at bedtime, Remeron 7.5 mg at bedtime, Lithobid was increased from 300 mg twice a day to 450 mg twice a day. Klonopin was recently added to medication list: 0.5 mg tablet BID. Case discussed with treatment team.  Risks, benefits and possible side effects of Medications: Risks, benefits, and possible side effects of medications have been explained to the patient, who verbalizes understanding. Progress Toward Goals: slow improvement, patient has a meeting next week to discuss discharge options. Patient feeling angry and aggressive at the prospect of being in this facility and not being transferred. Patient states his anger and anxiety are getting worse and feels like punching walls. Medication was readjusted and he will be reassessed the following days. Need to work on coping mechanisms for his anger. ------------------------------------------------------------    Subjective: Rashida Soliman has been compliant with medications without acute side effects. Chronic side effect patient states is a 10 lb weight gain from his Depakote. Patient wants to be transferred to a psych unit in [facility]. He states he feels like his "needs are not being met at this facility". He was under the impression he would have more freedom and integration into the community at this facility and regrets "signing a 201 and agreeing to come here".  Patient states he feels angry and wants to punch a wall and a mirror. He does not want to use the punching bag that is available in the unit. Patient states he is very anxious about discharge. He needed a Inderal PRN for anxiety last night. He states the medication helped his anxiety. He denies wanting to hurt himself or others. He denies feelings of depression, suicidal ideations or plans. Denies violent premonitions towards other people. Denies delusions/voice commands urging him to be violent or act aggressive. Patient confirms good energy, appetite and sleep. Patient denies fatigue, overt paranoia, hearing voices, seeing things that are not fixed in reality, denies experiences involving delusions. Patient states he wants to see his parents. He is requesting visitation. Shalini Alvarez attended 3/8 groups yesterday. Shalini Alvarez is consenting for safety on the unit. Psychiatric Review of Systems:  Behavior over the last 24 hours: regressed, feeling angry and anxious  Sleep: good  Appetite: good  Medication side effects: no acute side effects noted  ROS: complete review of systems except as noted above.      Vital signs in last 24 hours:  Temp:  [97.6 °F (36.4 °C)-97.7 °F (36.5 °C)] 97.7 °F (36.5 °C)  HR:  [86] 86  Resp:  [18] 18  BP: (110-117)/(60-88) 117/62    Mental Status Evaluation:  Appearance:  age appropriate, looks stated age, well groomed, no facial hair, short buzzcut, poor eye contact, in bed sitting up wearing clothes from home   Behavior:  Cooperative, anxious, friendly and pleasant, smiles inappropriately at times (smiled when talking about punching mirrors)   Speech:  normal rate, normal volume, normal pitch, fluent, clear, coherent, soft, monotone, became louder when frustrated   Mood:  Has been "feeling anxious and angry"   Affect:  Congruent with mood, stable, within normal range, appears anxious, appropriate to thought content, got frustrated when talking about healthy anger coping mechanisms and stood up, stared at me for awhile and raised his voice, then calmed down momentarily after    Thought Process:  Organized, logical, goal-directed   Thought Content: No verbalized delusions of grandiose, persecutory, paranoia, somatic or bizarre nature. Denies overt paranoia. Denies suicidal/homicidal ideations/plans. Denies phobias, compulsions, distorted body perceptions. No preoccupation with violence. Not appearing paranoid or hypervigilant   Perceptual disturbances: Denied hallucinations and does not appear to be responding to internal stimuli at this time   Risk Potential: No active or passive suicidal or homicidal ideation was verbalized during interview. Wants to punch a wall/mirror.     Cognition: oriented to self and situation, memory grossly intact, appears to be of average intelligence, cognition not formally tested   Insight:  intact   Judgment: intact       Current Medications:  Current Facility-Administered Medications   Medication Dose Route Frequency Provider Last Rate   • acetaminophen  650 mg Oral Q6H PRN Alfonso Valdez DO     • acetaminophen  650 mg Oral Q4H PRN Alfonso Valdez DO     • acetaminophen  975 mg Oral Q6H PRN Alfonso Valdez DO     • aluminum-magnesium hydroxide-simethicone  30 mL Oral Q4H PRN Alfonso Valdez DO     • ammonium lactate   Topical BID PRN Taco Ortiz PA-C     • benztropine  1 mg Intramuscular Q4H PRN Max 6/day Alfonso Valdez DO     • benztropine  1 mg Oral Q4H PRN Max 6/day Alfonso Valdez DO     • clonazePAM  0.5 mg Oral BID Aiyana Wilson MD     • hydrOXYzine HCL  50 mg Oral Q6H PRN Max 4/day Alfonso Valdez DO      Or   • diphenhydrAMINE  50 mg Intramuscular Q6H PRN Alfonso Valdez DO     • diphenhydrAMINE  25 mg Oral Q6H PRN Mae Doshi DO     • divalproex sodium  500 mg Oral QAM Ann Castle PA-C     • divalproex sodium  750 mg Oral HS Ann Castle PA-C     • glycerin-hypromellose-  1 drop Both Eyes Q3H PRN Alfonso Valdez DO     • hydrOXYzine HCL  100 mg Oral Q6H PRN Max 4/day Chyrel Michel, DO      Or   • LORazepam  2 mg Intramuscular Q6H PRN Chyrel Michel, DO     • hydrOXYzine HCL  25 mg Oral Q6H PRN Max 4/day Chyrel Michel, DO     • lithium carbonate  450 mg Oral Q12H Murphy Daley MD     • melatonin  10 mg Oral HS Raghu Washington MD     • mirtazapine  7.5 mg Oral HS Chyrel Michel, DO     • OLANZapine  10 mg Oral Q3H PRN Max 3/day Chyrel Michel, DO      Or   • OLANZapine  10 mg Intramuscular Q3H PRN Max 3/day Chyrel Michel, DO     • OLANZapine  5 mg Oral Q3H PRN Max 6/day Chyrel Michel, DO      Or   • OLANZapine  5 mg Intramuscular Q3H PRN Max 6/day Chyrel Michel, DO     • OLANZapine  2.5 mg Oral Q3H PRN Max 8/day Ciro A Prayson, DO     • polyethylene glycol  17 g Oral Daily PRN Chyrel Michel, DO     • propranolol  10 mg Oral Q8H PRN Chyrel Michel, DO     • propranolol  10 mg Oral Q8H PRN Raghu Washington MD     • senna-docusate sodium  1 tablet Oral Daily PRN Chyrel Michel, DO     • traZODone  50 mg Oral HS PRN Chyrel Michel, DO         Behavioral Health Medications: all current active meds have been reviewed. Changes as above. Laboratory results:  I have personally reviewed all pertinent laboratory/tests results. Recent Results (from the past 48 hour(s))   Lithium level    Collection Time: 06/09/23  8:36 AM   Result Value Ref Range    Lithium Lvl 0.4 (L) 0.6 - 1.2 mmol/L        This note has been constructed using a voice recognition system. There may be translation, syntax, or grammatical errors. If you have any questions, please contact the dictating provider.     SARAH Meyer

## 2023-06-09 NOTE — NURSING NOTE
Stephan Leak said that he had a good response to his prn Inderal he requested for his feeling anxious and restless. He does spend a lot of time standing at the office windows talking with staff. He is needy for attention, from staff. . He is med and meal compliant, poor peer interacting. Q 7 minute patient checks maintained.

## 2023-06-09 NOTE — SOCIAL WORK
SW and patient met privately for an individual session. Patient was polite, calm, and attentive; at times he smiled inappropriately. He continues to report anxiety, but denies depression, thoughts of self-harm, and suicide. He was dressed appropriately, and appeared well groomed. He immediately expressed that he wants to transfer to another hospital and when asked where he indicated [facility] even though he has never been there. SW again reminded him that he has a right to request a transfer; however, that there is no guarantee that another facility would accept him. SW also explained to him that it would slow down the progress we are making with getting him a waiver. Patient was able to be rational, and decided to think more about whether or not he should transfer. SW reminded patient of the progress he has made here. Patient talked about wanting more groups, and wanting to work on his anger. We spoke at length about anger, and tried to understand other feelings under the surface of his anger, but he was guarded. Later in session he did acknowledge that he has a low self-esteem. He spoke briefly about feeling ashamed and judged about being in a psychiatric hospital.  Patient seems to have some high standards for himself, but does not understand how his disabilities and up bringing have created more challenges for him. We agreed that next session we should continue to explore things further.

## 2023-06-09 NOTE — PROGRESS NOTES
06/09/23 0900   Team Meeting   Meeting Type Daily Rounds   Initial Conference Date 06/09/23   Patient/Family Present   Patient Present No   Patient's Family Present No       Daily Rounds  Kaleigh Goel MD, Gaviota Maurice RN, Cruz Benton LCSW  Case reviewed. Obtained labs. Lithium level and Lyme test pending. Received 10mg of Inderal for anxiety. Has [facility] appointment on 6/14. 2/8 groups.

## 2023-06-10 PROCEDURE — 99232 SBSQ HOSP IP/OBS MODERATE 35: CPT

## 2023-06-10 RX ADMIN — CLONAZEPAM 0.5 MG: 0.5 TABLET ORAL at 17:14

## 2023-06-10 RX ADMIN — DIVALPROEX SODIUM 750 MG: 500 TABLET, FILM COATED, EXTENDED RELEASE ORAL at 21:20

## 2023-06-10 RX ADMIN — MIRTAZAPINE 7.5 MG: 7.5 TABLET, FILM COATED ORAL at 21:20

## 2023-06-10 RX ADMIN — CLONAZEPAM 0.5 MG: 0.5 TABLET ORAL at 08:45

## 2023-06-10 RX ADMIN — LITHIUM CARBONATE 450 MG: 450 TABLET, EXTENDED RELEASE ORAL at 21:20

## 2023-06-10 RX ADMIN — Medication 10 MG: at 21:20

## 2023-06-10 RX ADMIN — LITHIUM CARBONATE 450 MG: 450 TABLET, EXTENDED RELEASE ORAL at 08:44

## 2023-06-10 RX ADMIN — DIVALPROEX SODIUM 500 MG: 500 TABLET, FILM COATED, EXTENDED RELEASE ORAL at 08:44

## 2023-06-10 NOTE — NURSING NOTE
Gerber Price has been visible on the unit the entire evening, although interacting with peers and staff is limited. Writer tried to engage Gerber Price in conversation. Writer asked how his evening was going and he said "I don't know."  Asked what the movie was about and he said the same thing. He looked at [facility] with a strange look of confusion as writer was trying for some conversation with him. A back and forth talk never occurred. He is med and meal compliant. Q 7 minute patient checks maintained.

## 2023-06-10 NOTE — PROGRESS NOTES
Progress Note - Behavioral Health     Laureen Mora 21 y.o. male MRN: [id number]   Unit/Bed#: [facility] 233-41 Encounter: [id number]    Documentation, nursing notes, medication reconciliation, labs, and vitals reviewed. Patient was seen for continuing care and reviewed with treatment team. No acute events over the past 24 hours. Per nursing report, patient remains fixated on discharge, remains in behavioral control. Medication changes over the past 24 hours: Lithium dose increased and Klonopin started. Continues to tolerate current medications with no adverse effects. On evaluation today, Samantha Dalal is observed in milieu throughout morning and interacts with peers. He remains focused on discharge. He describes his mood as "okay." Smiles inappropriately during evaluation. Denies perceptual disturbances on direct inquiry. No SI/HI. He reports daytime fatigue but remains awake throughout morning. No acute behavioral concerns.      Psychiatric ROS:  Behavior over the last 24 hours: unchanged  Sleep: normal  Appetite: normal  Medication side effects: Yes - some tiredness   ROS: no complaints, all other systems are negative    Mental Status Evaluation:    Appearance:  casually dressed, adequate grooming   Behavior:  cooperative, intense eye contact   Speech:  normal rate and volume   Mood:  anxious, irritable   Affect:  inappropriate smile   Thought Process:  coherent, goal directed   Associations: intact associations   Thought Content:  no overt delusions   Perceptual Disturbances: no auditory hallucinations, no visual hallucinations   Risk Potential: Suicidal ideation - None  Homicidal ideation - None  Potential for aggression - Not at present   Sensorium:  oriented to person, place, time/date and situation   Memory:  recent and remote memory grossly intact   Consciousness:  alert and awake   Attention/Concentration: attention span and concentration appear shorter than expected for age   Insight:  limited   Judgment: limited   Gait/Station: normal gait/station, normal balance   Motor Activity: no abnormal movements     Vital signs in last 24 hours:    Temp:  [97.3 °F (36.3 °C)-97.6 °F (36.4 °C)] 97.3 °F (36.3 °C)  HR:  [68-74] 68  Resp:  [18] 18  BP: (126-133)/(66-71) 133/71    Laboratory results: I have personally reviewed all pertinent laboratory/tests results    Results from the past 24 hours: No results found for this or any previous visit (from the past 24 hour(s)).   Most Recent Labs:   Lab Results   Component Value Date    ALB 4.4 05/31/2023    ALKPHOS 98 05/31/2023    ALT 43 05/31/2023    AST 24 05/31/2023    BUN 11 05/31/2023    CALCIUM 9.3 05/31/2023    CHOLESTEROL 208 (H) 05/27/2023     05/31/2023    CO2 29 05/31/2023    CREATININE 0.72 05/31/2023     05/27/2023    GLUC 89 05/31/2023    HCT 49.0 06/03/2023    HDL 39 (L) 05/27/2023    HGB 15.6 06/03/2023    HGBA1C 5.4 05/27/2023    K 4.4 05/31/2023    LDLCALC 131 (H) 05/27/2023    LITHIUM 0.4 (L) 06/09/2023    NEUTROABS 3.01 06/03/2023    NONHDLC 169 05/27/2023     06/03/2023    RBC 5.22 06/03/2023    RDW 13.1 06/03/2023    SODIUM 140 05/31/2023    TBILI 0.52 05/31/2023    TP 7.0 05/31/2023    TRIG 190 (H) 05/27/2023    EKN6WRGOCCKD 1.637 05/27/2023    VALPROICTOT 106 (H) 05/31/2023    WBC 6.74 06/03/2023       Suicide/Homicide Risk Assessment:    Risk of Harm to Self:   Nursing Suicide Risk Assessment Last 24 hours: C-SSRS Risk (Since Last Contact)  Calculated C-SSRS Risk Score (Since Last Contact): No Risk Indicated  Current Specific Risk Factors include: diagnosis of depression, mental illness diagnosis  Protective Factors: no current suicidal ideation, ability to communicate with staff on the unit, able to contract for safety on the unit, taking medications as ordered on the unit  Based on today's assessment, Juanis Owusu presents the following risk of harm to self: low    Risk of Harm to Others:  Nursing Homicide Risk Assessment: Violence Risk to Others: Denies within past 6 months  Current Specific Risk Factors include: none  Protective Factors: no current homicidal ideation, compliant with medications on the unit as ordered, compliant with unit milieu  Based on today's assessment, Odilon Nath presents the following risk of harm to others: low    The following interventions are recommended: behavioral checks every 7 minutes, continued hospitalization on locked unit    Progress Toward Goals: progressing    Assessment/Plan   Principal Problem:    Severe episode of recurrent major depressive disorder, without psychotic features ([address])  Active Problems:    Medical clearance for psychiatric admission    Autism spectrum disorder    Mood disorder ([address])    Anxiety disorder    Rash      Recommended Treatment:     Planned medication and treatment changes:     All current active medications have been reviewed  Encourage group therapy, milieu therapy and occupational therapy  Behavioral Health checks every 7 minutes  Assault precautions   Recheck Lithium level 6/14/2023  Continue current medications:    Current Facility-Administered Medications   Medication Dose Route Frequency Provider Last Rate   • acetaminophen  650 mg Oral Q6H PRN Jimena Espinal DO     • acetaminophen  650 mg Oral Q4H PRN Jimena Espinal DO     • acetaminophen  975 mg Oral Q6H PRN Jimena Espinal DO     • aluminum-magnesium hydroxide-simethicone  30 mL Oral Q4H PRN Jimena Espinal DO     • ammonium lactate   Topical BID PRN Brandon Gomez PA-C     • benztropine  1 mg Intramuscular Q4H PRN Max 6/day Jimena Espinal DO     • benztropine  1 mg Oral Q4H PRN Max 6/day Jimena Espinal DO     • clonazePAM  0.5 mg Oral BID Roberto Stephenson MD     • hydrOXYzine HCL  50 mg Oral Q6H PRN Max 4/day Jimena Espinal DO      Or   • diphenhydrAMINE  50 mg Intramuscular Q6H PRN Jimena Espinal DO     • diphenhydrAMINE  25 mg Oral Q6H PRN Nancy Thompson, DO     • divalproex sodium  500 mg Oral FARZAD Castle PA-C     • divalproex sodium  750 mg Oral HS Ann Castle PA-C     • glycerin-hypromellose-  1 drop Both Eyes Q3H PRN Chyrel Michel, DO     • hydrOXYzine HCL  100 mg Oral Q6H PRN Max 4/day Chyrel Michel, DO      Or   • LORazepam  2 mg Intramuscular Q6H PRN Chyrel Michel, DO     • hydrOXYzine HCL  25 mg Oral Q6H PRN Max 4/day Chyrel Michel, DO     • lithium carbonate  450 mg Oral Q12H Murphy Daley MD     • melatonin  10 mg Oral HS Raghu Washington MD     • mirtazapine  7.5 mg Oral HS Chyrel Michel, DO     • OLANZapine  10 mg Oral Q3H PRN Max 3/day Chyrel Michel, DO      Or   • OLANZapine  10 mg Intramuscular Q3H PRN Max 3/day Chyrel Michel, DO     • OLANZapine  5 mg Oral Q3H PRN Max 6/day Chyrel Michel, DO      Or   • OLANZapine  5 mg Intramuscular Q3H PRN Max 6/day Chyrel Michel, DO     • OLANZapine  2.5 mg Oral Q3H PRN Max 8/day Ciro A Prayson, DO     • polyethylene glycol  17 g Oral Daily PRN Chyrel Michel, DO     • propranolol  10 mg Oral Q8H PRN Chyrel Michel, DO     • propranolol  10 mg Oral Q8H PRN Raghu Washington MD     • senna-docusate sodium  1 tablet Oral Daily PRN Chyrel Michel, DO     • traZODone  50 mg Oral HS PRN Chyrel Michel, DO       Risks / Benefits of Treatment:    Risks, benefits, and possible side effects of medications explained to patient and patient verbalizes understanding and agreement for treatment. Counseling / Coordination of Care:    Patient's progress discussed with staff in treatment team meeting. Medications, treatment progress and treatment plan reviewed with patient.     ANA Grant 06/10/23

## 2023-06-10 NOTE — NURSING NOTE
Shalini Alvarez appears to be resting comfortably with no signs of distress and with no complaints as observed on Q 7 minute rounds by staff throughout the night

## 2023-06-10 NOTE — NURSING NOTE
Moshe Ceballos has been awake, alert, and visible intermittently out in the milieu. Pt went out on the deck with staff and peers for fresh air group. Pt ate 100% supper. Pt spends time listening to music on radio and watching tv in living room. Pt denies any depression, anxiety, a/v hallucinations, but still preoccupied at times and fixated on discharge. Some socialization noted with select peers. Pt watched movie with peers in living room for evening group. No behavioral issues. Pt attended and participated in evening wrap up group. Pt had evening snack. Compliant with scheduled meds. Continue to monitor/assess for any changes.

## 2023-06-10 NOTE — NURSING NOTE
Patient is compliant with medication and meals He does go to groups. He is fixated on discharge. He  Does get along with his peers and is polite and and social with peers.

## 2023-06-10 NOTE — PLAN OF CARE
Problem: Ineffective Coping  Goal: Demonstrates healthy coping skills  Outcome: Progressing     Problem: Risk for Self Injury/Neglect  Goal: Verbalize thoughts and feelings  Description: Interventions:  - Assess and re-assess patient's lethality and potential for self-injury  - Engage patient in 1:1 interactions, daily, for a minimum of 15 minutes  - Encourage patient to express feelings, fears, frustrations, hopes  - Establish rapport/trust with patient   Outcome: Progressing     Problem: Depression  Goal: Verbalize thoughts and feelings  Description: Interventions:  - Assess and re-assess patient's level of risk   - Engage patient in 1:1 interactions, daily, for a minimum of 15 minutes   - Encourage patient to express feelings, fears, frustrations, hopes   Outcome: Progressing  Goal: Refrain from isolation  Description: Interventions:  - Develop a trusting relationship   - Encourage socialization   Outcome: Progressing     Problem: Anxiety  Goal: Anxiety is at manageable level  Description: Interventions:  - Assess and monitor patient's anxiety level. - Monitor for signs and symptoms (heart palpitations, chest pain, shortness of breath, headaches, nausea, feeling jumpy, restlessness, irritable, apprehensive). - Collaborate with interdisciplinary team and initiate plan and interventions as ordered.   - [place] patient to unit/surroundings  - Explain treatment plan  - Encourage participation in care  - Encourage verbalization of concerns/fears  - Identify coping mechanisms  - Assist in developing anxiety-reducing skills  - Administer/offer alternative therapies  - Limit or eliminate stimulants  Outcome: Progressing

## 2023-06-11 PROCEDURE — 99232 SBSQ HOSP IP/OBS MODERATE 35: CPT

## 2023-06-11 RX ADMIN — MIRTAZAPINE 7.5 MG: 7.5 TABLET, FILM COATED ORAL at 21:16

## 2023-06-11 RX ADMIN — DIVALPROEX SODIUM 750 MG: 500 TABLET, FILM COATED, EXTENDED RELEASE ORAL at 21:15

## 2023-06-11 RX ADMIN — LITHIUM CARBONATE 450 MG: 450 TABLET, EXTENDED RELEASE ORAL at 21:16

## 2023-06-11 RX ADMIN — CLONAZEPAM 0.5 MG: 0.5 TABLET ORAL at 08:15

## 2023-06-11 RX ADMIN — DIVALPROEX SODIUM 500 MG: 500 TABLET, FILM COATED, EXTENDED RELEASE ORAL at 10:40

## 2023-06-11 RX ADMIN — SENNOSIDES AND DOCUSATE SODIUM 1 TABLET: 50; 8.6 TABLET ORAL at 08:15

## 2023-06-11 RX ADMIN — CLONAZEPAM 0.5 MG: 0.5 TABLET ORAL at 17:21

## 2023-06-11 RX ADMIN — Medication 10 MG: at 21:15

## 2023-06-11 RX ADMIN — LITHIUM CARBONATE 450 MG: 450 TABLET, EXTENDED RELEASE ORAL at 08:16

## 2023-06-11 NOTE — PROGRESS NOTES
[facility] Group Note     06/11/23 1100   Activity/Group Checklist   Group Life Skills  (Teamwork and Communication)   Attendance Attended   Attendance Duration (min) 46-60   Interactions Interacted appropriately   Affect/Mood Appropriate;Bright   Goals Achieved Discussed coping strategies; Able to listen to others; Able to engage in interactions; Able to reflect/comment on own behavior;Able to recieve feedback; Able to give feedback to another

## 2023-06-11 NOTE — PLAN OF CARE
Problem: Risk for Self Injury/Neglect  Goal: Verbalize thoughts and feelings  Description: Interventions:  - Assess and re-assess patient's lethality and potential for self-injury  - Engage patient in 1:1 interactions, daily, for a minimum of 15 minutes  - Encourage patient to express feelings, fears, frustrations, hopes  - Establish rapport/trust with patient   Outcome: Progressing     Problem: Depression  Goal: Verbalize thoughts and feelings  Description: Interventions:  - Assess and re-assess patient's level of risk   - Engage patient in 1:1 interactions, daily, for a minimum of 15 minutes   - Encourage patient to express feelings, fears, frustrations, hopes   Outcome: Progressing  Goal: Refrain from isolation  Description: Interventions:  - Develop a trusting relationship   - Encourage socialization   Outcome: Progressing     Problem: Anxiety  Goal: Anxiety is at manageable level  Description: Interventions:  - Assess and monitor patient's anxiety level. - Monitor for signs and symptoms (heart palpitations, chest pain, shortness of breath, headaches, nausea, feeling jumpy, restlessness, irritable, apprehensive). - Collaborate with interdisciplinary team and initiate plan and interventions as ordered.   - [place] patient to unit/surroundings  - Explain treatment plan  - Encourage participation in care  - Encourage verbalization of concerns/fears  - Identify coping mechanisms  - Assist in developing anxiety-reducing skills  - Administer/offer alternative therapies  - Limit or eliminate stimulants  Outcome: Progressing     Problem: Ineffective Coping  Goal: Identifies healthy coping skills  Outcome: Not Progressing

## 2023-06-11 NOTE — NURSING NOTE
Patient is compliant with medication and meals PATIENT IS SOMEWHAT  Social once in a while. He is fixated on going home.

## 2023-06-11 NOTE — NURSING NOTE
Juanis Umer has been visible intermittently in the milieu. Interacts with select peers and staff. Able to make needs known. Pleasant and cooperative upon approach. Fair eye contact. Denies anxiety and depression. Ate 100% of his meal. Swallowed pills without difficulty. He likes to listen to music and watch TV. Had a BM today. Took HS medication and ate snack. Behavior controlled. Continue to monitor. Precautions maintained.

## 2023-06-11 NOTE — PROGRESS NOTES
Progress Note - Behavioral Health     Yehuda Martinez 21 y.o. male MRN: [id number]   Unit/Bed#: [facility] 825-55 Encounter: [id number]    Documentation, nursing notes, medication reconciliation, labs, and vitals reviewed. Patient was seen for continuing care and reviewed with treatment team. No acute events over the past 24 hours. Per nursing report, patient reporting last BM on the 7th, minimal interaction in milieu, intense stare, denying symptoms. No scheduled medication changes over the past 24 hours. Continues to tolerate current medications with no adverse effects. On evaluation today, patient is found in bedroom. Showered and attended to ADLs. Ongoing intense stare during evaluation. Scant in response to questioning. Less focused on discharge today. Reports improved anxiety with recent medication changes. Denies depressed mood. No SI/HI. Remains without acute behavioral concerns.      Psychiatric ROS:  Behavior over the last 24 hours: slowly improving  Sleep: normal  Appetite: normal  Medication side effects: Yes - constipation   ROS: no complaints, all other systems are negative    Mental Status Evaluation:    Appearance:  casually dressed, adequate grooming   Behavior:  cooperative, calm, guarded, intense eye contact   Speech:  normal rate and volume, scant   Mood:  dysphoric   Affect:  blunted   Thought Process:  coherent, goal directed, concrete   Associations: concrete associations   Thought Content:  no overt delusions   Perceptual Disturbances: no auditory hallucinations, no visual hallucinations   Risk Potential: Suicidal ideation - None  Homicidal ideation - None  Potential for aggression - Not at present   Sensorium:  oriented to person, place, time/date and situation   Memory:  recent and remote memory grossly intact   Consciousness:  alert and awake   Attention/Concentration: attention span and concentration appear shorter than expected for age   Insight:  limited   Judgment: limited Gait/Station: normal gait/station, normal balance   Motor Activity: no abnormal movements     Vital signs in last 24 hours:    Temp:  [97.7 °F (36.5 °C)] 97.7 °F (36.5 °C)  HR:  [68-76] 68  Resp:  [19-20] 19  BP: (121)/(66-70) 121/66    Laboratory results: I have personally reviewed all pertinent laboratory/tests results    Results from the past 24 hours: No results found for this or any previous visit (from the past 24 hour(s)).   Most Recent Labs:   Lab Results   Component Value Date    ALB 4.4 05/31/2023    ALKPHOS 98 05/31/2023    ALT 43 05/31/2023    AST 24 05/31/2023    BUN 11 05/31/2023    CALCIUM 9.3 05/31/2023    CHOLESTEROL 208 (H) 05/27/2023     05/31/2023    CO2 29 05/31/2023    CREATININE 0.72 05/31/2023     05/27/2023    GLUC 89 05/31/2023    HCT 49.0 06/03/2023    HDL 39 (L) 05/27/2023    HGB 15.6 06/03/2023    HGBA1C 5.4 05/27/2023    K 4.4 05/31/2023    LDLCALC 131 (H) 05/27/2023    LITHIUM 0.4 (L) 06/09/2023    NEUTROABS 3.01 06/03/2023    NONHDLC 169 05/27/2023     06/03/2023    RBC 5.22 06/03/2023    RDW 13.1 06/03/2023    SODIUM 140 05/31/2023    TBILI 0.52 05/31/2023    TP 7.0 05/31/2023    TRIG 190 (H) 05/27/2023    JBY5MHVYCUHR 1.637 05/27/2023    VALPROICTOT 106 (H) 05/31/2023    WBC 6.74 06/03/2023       Suicide/Homicide Risk Assessment:    Risk of Harm to Self:   Nursing Suicide Risk Assessment Last 24 hours: C-SSRS Risk (Since Last Contact)  Calculated C-SSRS Risk Score (Since Last Contact): No Risk Indicated  Current Specific Risk Factors include: diagnosis of depression, mental illness diagnosis  Protective Factors: no current suicidal ideation, ability to communicate with staff on the unit, able to contract for safety on the unit, taking medications as ordered on the unit  Based on today's assessment, Shalini Alvarez presents the following risk of harm to self: low    Risk of Harm to Others:  Nursing Homicide Risk Assessment: Violence Risk to Others: Denies within past 6 months  Current Specific Risk Factors include: none  Protective Factors: no current homicidal ideation, compliant with medications on the unit as ordered, compliant with unit milieu  Based on today's assessment, Nelly Monreal presents the following risk of harm to others: low    The following interventions are recommended: behavioral checks every 7 minutes, continued hospitalization on locked unit    Progress Toward Goals: progressing    Assessment/Plan   Principal Problem:    Severe episode of recurrent major depressive disorder, without psychotic features ([address])  Active Problems:    Medical clearance for psychiatric admission    Autism spectrum disorder    Mood disorder ([address])    Anxiety disorder    Rash      Recommended Treatment:     Planned medication and treatment changes:     All current active medications have been reviewed  Encourage group therapy, milieu therapy and occupational therapy  Behavioral Health checks every 7 minutes  Assault precautions  Recheck Lithium level 6/14/2023  Continue current medications:    Current Facility-Administered Medications   Medication Dose Route Frequency Provider Last Rate   • acetaminophen  650 mg Oral Q6H PRN Chyrel Michel, DO     • acetaminophen  650 mg Oral Q4H PRN Chyrel Michel, DO     • acetaminophen  975 mg Oral Q6H PRN Chyrel Michel, DO     • aluminum-magnesium hydroxide-simethicone  30 mL Oral Q4H PRN Chyrel Michel, DO     • ammonium lactate   Topical BID PRN Ingrid Lopez PA-C     • benztropine  1 mg Intramuscular Q4H PRN Max 6/day Chyrel Michel, DO     • benztropine  1 mg Oral Q4H PRN Max 6/day Chyrel Michel, DO     • clonazePAM  0.5 mg Oral BID Raghu Washington MD     • hydrOXYzine HCL  50 mg Oral Q6H PRN Max 4/day Chyrel Michel, DO      Or   • diphenhydrAMINE  50 mg Intramuscular Q6H PRN Chyrel Michel, DO     • diphenhydrAMINE  25 mg Oral Q6H PRN Roxene Pancake, DO     • divalproex sodium  500 mg Oral MAGALYSM Ann Castle PA-C • divalproex sodium  750 mg Oral HS Ann Castle PA-C     • glycerin-hypromellose-  1 drop Both Eyes Q3H PRN Live Sheehan, DO     • hydrOXYzine HCL  100 mg Oral Q6H PRN Max 4/day Live Sheehan, DO      Or   • LORazepam  2 mg Intramuscular Q6H PRN Live Sheehan, DO     • hydrOXYzine HCL  25 mg Oral Q6H PRN Max 4/day Live Sheehan, DO     • lithium carbonate  450 mg Oral Q12H You Huang MD     • melatonin  10 mg Oral HS Vladislav Shi MD     • mirtazapine  7.5 mg Oral HS Live Sheehan, DO     • OLANZapine  10 mg Oral Q3H PRN Max 3/day Live Sheehan, DO      Or   • OLANZapine  10 mg Intramuscular Q3H PRN Max 3/day Live Sheehan, DO     • OLANZapine  5 mg Oral Q3H PRN Max 6/day Live Sheehan, DO      Or   • OLANZapine  5 mg Intramuscular Q3H PRN Max 6/day Live Sheehan, DO     • OLANZapine  2.5 mg Oral Q3H PRN Max 8/day Ciro Watters, DO     • polyethylene glycol  17 g Oral Daily PRN Live Sheehan, DO     • propranolol  10 mg Oral Q8H PRN Live Sheehan DO     • propranolol  10 mg Oral Q8H PRN Vladislav Shi MD     • senna-docusate sodium  1 tablet Oral Daily PRN Live Sheehan, DO     • traZODone  50 mg Oral HS PRN Live Sheehan DO       Risks / Benefits of Treatment:    Risks, benefits, and possible side effects of medications explained to patient and patient verbalizes understanding and agreement for treatment. Counseling / Coordination of Care:    Patient's progress discussed with staff in treatment team meeting. Medications, treatment progress and treatment plan reviewed with patient.     ANA Burleson 06/11/23

## 2023-06-11 NOTE — NURSING NOTE
Nelly Monreal maintained on ongoing assault and SAFE precaution without incident on this shift.  Observed laying in bed with eyes closed, breath even and easy.  Q 7 minutes checks implemented.  Behavioral control.  Will continue to monitor.

## 2023-06-11 NOTE — PLAN OF CARE
Problem: Ineffective Coping  Goal: Cooperates with admission process  Description: Interventions:   - Complete admission process  Outcome: Progressing  Goal: Identifies ineffective coping skills  Outcome: Not Progressing

## 2023-06-12 PROCEDURE — 99232 SBSQ HOSP IP/OBS MODERATE 35: CPT | Performed by: PSYCHIATRY & NEUROLOGY

## 2023-06-12 RX ADMIN — CLONAZEPAM 0.5 MG: 0.5 TABLET ORAL at 17:18

## 2023-06-12 RX ADMIN — CLONAZEPAM 0.5 MG: 0.5 TABLET ORAL at 08:52

## 2023-06-12 RX ADMIN — DIVALPROEX SODIUM 500 MG: 500 TABLET, FILM COATED, EXTENDED RELEASE ORAL at 08:51

## 2023-06-12 RX ADMIN — OLANZAPINE 5 MG: 5 TABLET, FILM COATED ORAL at 14:14

## 2023-06-12 RX ADMIN — LITHIUM CARBONATE 450 MG: 450 TABLET, EXTENDED RELEASE ORAL at 21:16

## 2023-06-12 RX ADMIN — LITHIUM CARBONATE 450 MG: 450 TABLET, EXTENDED RELEASE ORAL at 08:52

## 2023-06-12 RX ADMIN — DIVALPROEX SODIUM 750 MG: 500 TABLET, FILM COATED, EXTENDED RELEASE ORAL at 21:16

## 2023-06-12 RX ADMIN — MIRTAZAPINE 7.5 MG: 7.5 TABLET, FILM COATED ORAL at 21:16

## 2023-06-12 RX ADMIN — Medication 10 MG: at 21:16

## 2023-06-12 NOTE — PROGRESS NOTES
06/12/23 0830   Team Meeting   Meeting Type Daily Rounds   Initial Conference Date 06/12/23   Patient/Family Present   Patient Present No   Patient's Family Present No     Daily Rounds Documentation     Team Members Present:   MD Franck Erickson CRNP  [place], RN  Britni Jansen, ROB Posada    Good weekend; no behavioral issues. Fixated on discharge. Social.  Senna PRN effective. Attended 3/6 groups on Friday. Compliant with medications and meals. Slept.

## 2023-06-12 NOTE — SOCIAL WORK
Psychotherapy Summary    SW and patient met privately for his weekly scheduled individual session. Patient found in the hallway, and verbalized feeling frustrated. He was polite, calm, and attentive, but smiled inappropriately at times. He was dressed appropriately, and appeared well groomed. Patient was able to express appropriately why he is frustrated with his bathroom mate; SW validated his feelings. SW spoke to patient about threatening to "fight" due to his frustration, and he expressed he only said that out of anger. He was able to identify consequences that might occur should he act out aggressively. We also spoke at length about why threats to harm self or others are taken seriously, which he seemed to understand. SW went over some simple anger management techniques he can use to prevent outbursts or saying something he doesn't mean. SW informed patient that she would talk with staff about the possibility of making a room change, but also explained that it might not be feasible at the moment. Patient then became fixated on wanting to leave this hospital, and spoke about being transferred or living in the [facility]; however, said this with a smile. SW able to assist him with more rational thinking. He spoke briefly about being homeless prior to his adoption, and acknowledged that it was scary at times. He was again reminded that he has a right to request a transfer, but still has yet to do so. The remaining part of session we worked on identify some therapy goals as well as discussed some of his personal goals. He spoke about envisioning stabilization as a house with a yard, people inside, and 2 kids on a swing. He then clarified that the kids would be his. We started to break this goal into smaller steps. What he would like to accomplish most in the next 6 months is employment.   Regarding therapy, we discussed screening him for negative thinking patterns; he will be provided a handout of the most common cognitive distortions. At the end of session, he expressed that he felt better, but a few minutes later was overheard talking to the RN about wanting to transfer. He was also over heard telling the CRNP that he wants to fight; SW reminded him of what we just talked about.

## 2023-06-12 NOTE — NURSING NOTE
Pt advised he was feeling agitated. RN encouraged coping skills, but pt advised that was not working and requested PRN for agitation. Pt was given 5mg Zyprexa. Will monitor for PRN effectiveness.

## 2023-06-12 NOTE — NURSING NOTE
Patient is constricted, incongruent, and child-like. Staff reported patient was cursing to male peer. When approached, patient was smiling stating peer left water on the floor after his shower. This writer educated for him to report to staff next time he has an issue. This writer cleaned up water and spoke to male peer about cleanliness. Patient requested immediate discharge to [facility]. 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 Patient denies ever surfing before. Patient also asking staff to contact his parents to put money on his commissary so he can buy Ramen noodles. Patient reminded he his on a behavioral health unit and not custodial. Frequently appraoching this writer w/ intense stare w/o having a need to be met. Ate 100% of breakfast and lunch. Medication complaint. Attended coping skills and wellness. No exit seeking behaviors observed. Assault and elopement precautions maintained.

## 2023-06-12 NOTE — PROGRESS NOTES
Progress Note - Behavioral Health   Cecile Mancini 21 y.o. male MRN: [id number]  Unit/Bed#: [facility] 109-01 Encounter: [id number]    Assessment/Plan   Principal Problem:    Severe episode of recurrent major depressive disorder, without psychotic features ([address])  Active Problems:    Medical clearance for psychiatric admission    Autism spectrum disorder    Mood disorder ([address])    Anxiety disorder    Rash    Recommended Treatment:   Continue with group therapy, milieu therapy and occupational therapy. Continue medications: Depakote 1250 mg a day, melatonin 9 mg at bedtime, Remeron 7.5 mg at bedtime, Lithobid was increased from 300 mg twice a day to 450 mg twice a day. Klonopin was recently added to medication list: 0.5 mg tablet BID.      Case discussed with treatment team.  Risks, benefits and possible side effects of Medications: Risks, benefits, and possible side effects of medications have been explained to the patient, who verbalizes understanding.       Progress Toward Goals: slow improvement, patient has a meeting next week to discuss discharge options. Patient feeling angry and aggressive at the prospect of being in this facility and not being transferred. Patient states his anger and anxiety are better, but still present. He still wants to punch a wall. Medication was readjusted and he will be reassessed the following days. Need to work on coping mechanisms for his anger. ------------------------------------------------------------    Subjective: Stephan Vidal has been complaint with medications without acute side effects. Patient's anger and anxiety have significantly reduced, but are still present. Patient confirms he still wants to punch a wall. He will consider using the punching bag in the unit. Patient still fixated on discharge and is anxious about that. He said PRNs help with this anxiety. Denies desire to self harm or hurt others. Denies depression, SI, or suicidal plans.  Denies violent premonitions towards other people. Denies delusions/voice commands urging him to be violent or act aggressive. Patient confirms good energy, sleep and appetite. Patient denies fatigue, overt paranoia, hearing voices, seeing things that are not fixed in reality, denies experiences involving delusions. Vincenzo Suero attended 3/6 groups on Friday and he is enjoying them. Patient was constipated over the weekend and Senokot helped. Last BM was Saturday, 2 days ago. Psychiatric Review of Systems:  Behavior over the last 24 hours: improved  Sleep: good  Appetite: good  Medication side effects: none verbalized   ROS: complete ROS is neg except as noted above     Vital signs in last 24 hours:  Temp:  [97.9 °F (36.6 °C)-98.1 °F (36.7 °C)] 98.1 °F (36.7 °C)  HR:  [92-94] 92  Resp:  [18-19] 18  BP: (124-127)/(69-70) 127/69    Mental Status Evaluation:  Appearance:  age appropriate, looks stated age, well groomed, no facial hair, short buzzcut, poor eye contact as if he's looking past you, in bed sitting up wearing pajamas   Behavior:  Cooperative, anxious, friendly and pleasant,   Speech:  normal rate, normal volume, normal pitch, fluent, clear, coherent, soft, monotone,   Mood:  "feeling anxious and angry"   Affect:  Congruent with mood, stable, within normal range, appears anxious, appropriate to thought content   Thought Process:  Organized, logical, goal directed   Thought Content: No verbalized delusions of grandiose, persecutory, paranoia, somatic or bizarre nature. Denies overt paranoia. Denies suicidal/homicidal ideations/plans. Denies phobias, compulsions, distorted body perceptions. No preoccupation with violence. Not appearing paranoid or hypervigilant   Perceptual disturbances: Denied hallucinations and does not appear to be responding to internal stimuli at this time   Risk Potential: No active or passive suicidal or homicidal ideation was verbalized during interview. Wants to punch a wall/mirror.     Cognition: oriented to self and situation, memory grossly intact, appears to be of average intelligence, cognition not formally tested   Insight:  intact   Judgment: intact       Current Medications:  Current Facility-Administered Medications   Medication Dose Route Frequency Provider Last Rate   • acetaminophen  650 mg Oral Q6H PRN Walda Everts, DO     • acetaminophen  650 mg Oral Q4H PRN Walda Everts, DO     • acetaminophen  975 mg Oral Q6H PRN Walda Everts, DO     • aluminum-magnesium hydroxide-simethicone  30 mL Oral Q4H PRN Walda Everts, DO     • ammonium lactate   Topical BID PRN Amanda Kaur PA-C     • benztropine  1 mg Intramuscular Q4H PRN Max 6/day Walda Everts, DO     • benztropine  1 mg Oral Q4H PRN Max 6/day Walda Everts, DO     • clonazePAM  0.5 mg Oral BID Shirley Gonsales MD     • hydrOXYzine HCL  50 mg Oral Q6H PRN Max 4/day Walda Everts, DO      Or   • diphenhydrAMINE  50 mg Intramuscular Q6H PRN Walda Everts, DO     • diphenhydrAMINE  25 mg Oral Q6H PRN Raheem Jc DO     • divalproex sodium  500 mg Oral QAM Ann Castle PA-C     • divalproex sodium  750 mg Oral HS Ann Castle PA-C     • glycerin-hypromellose-  1 drop Both Eyes Q3H PRN Walda Everts, DO     • hydrOXYzine HCL  100 mg Oral Q6H PRN Max 4/day Walda Everts, DO      Or   • LORazepam  2 mg Intramuscular Q6H PRN Walda Everts, DO     • hydrOXYzine HCL  25 mg Oral Q6H PRN Max 4/day Walda Everts, DO     • lithium carbonate  450 mg Oral Q12H Shaylee Sierra MD     • melatonin  10 mg Oral HS Shirley Gonsales MD     • mirtazapine  7.5 mg Oral HS Walda Everts, DO     • OLANZapine  10 mg Oral Q3H PRN Max 3/day Walda Everts, DO      Or   • OLANZapine  10 mg Intramuscular Q3H PRN Max 3/day Walda Everts, DO     • OLANZapine  5 mg Oral Q3H PRN Max 6/day Ciro A Prayson, DO      Or   • OLANZapine  5 mg Intramuscular Q3H PRN Max 6/day Ciro A Prayson, DO     • OLANZapine  2.5 mg Oral Q3H PRN Max 8/day Denise Last, DO     • polyethylene glycol  17 g Oral Daily PRN Denise Last, DO     • propranolol  10 mg Oral Q8H PRN Denise Last, DO     • propranolol  10 mg Oral Q8H PRN Rosemary Elise MD     • senna-docusate sodium  1 tablet Oral Daily PRN Denise Last, DO     • traZODone  50 mg Oral HS PRN Denise Last, DO         Behavioral Health Medications: all current active meds have been reviewed. Changes as above. Laboratory results:  I have personally reviewed all pertinent laboratory/tests results. No results found for this or any previous visit (from the past 48 hour(s)). This note has been constructed using a voice recognition system. There may be translation, syntax, or grammatical errors. If you have any questions, please contact the dictating provider.     ISABEL IversonS

## 2023-06-12 NOTE — PROGRESS NOTES
Rah Robles was present for group. Rah Robles is a follower and takes on the mood of the group. He was confronted in group about his projecting blame for his unhappiness onto others and takes no responsibility . Lacks motivation, wants things done for him,  not advocating for self, avoids recovery process.

## 2023-06-13 PROCEDURE — 99232 SBSQ HOSP IP/OBS MODERATE 35: CPT | Performed by: PSYCHIATRY & NEUROLOGY

## 2023-06-13 RX ADMIN — CLONAZEPAM 0.5 MG: 0.5 TABLET ORAL at 17:07

## 2023-06-13 RX ADMIN — CLONAZEPAM 0.5 MG: 0.5 TABLET ORAL at 08:42

## 2023-06-13 RX ADMIN — DIVALPROEX SODIUM 500 MG: 500 TABLET, FILM COATED, EXTENDED RELEASE ORAL at 08:42

## 2023-06-13 RX ADMIN — Medication 10 MG: at 21:16

## 2023-06-13 RX ADMIN — DIVALPROEX SODIUM 750 MG: 500 TABLET, FILM COATED, EXTENDED RELEASE ORAL at 21:17

## 2023-06-13 RX ADMIN — MIRTAZAPINE 7.5 MG: 7.5 TABLET, FILM COATED ORAL at 21:17

## 2023-06-13 RX ADMIN — LITHIUM CARBONATE 450 MG: 450 TABLET, EXTENDED RELEASE ORAL at 21:17

## 2023-06-13 RX ADMIN — LITHIUM CARBONATE 450 MG: 450 TABLET, EXTENDED RELEASE ORAL at 08:42

## 2023-06-13 NOTE — NURSING NOTE
Patient was visible in the milieu socializing with select peers. Report anxiety, denies all other psych s/s. No behaviors noted. Had 100% for bother breakfast/lunch. Took his medications. Safety checks ongoing.

## 2023-06-13 NOTE — PROGRESS NOTES
Progress Note - Behavioral Health   Mildred Almaguer 21 y.o. male MRN: [id number]  Unit/Bed#: CRUZITO RAMIREZ [facility] 109-01 Encounter: [id number]    Assessment/Plan   Principal Problem:    Severe episode of recurrent major depressive disorder, without psychotic features ([address])  Active Problems:    Medical clearance for psychiatric admission    Autism spectrum disorder    Mood disorder ([address])    Anxiety disorder    Rash    Recommended Treatment:   Continue with group therapy, milieu therapy and occupational therapy. Continue medications: Depakote 1250 mg a day, melatonin 9 mg at bedtime, Remeron 7.5 mg at bedtime, Lithobid was increased from 300 mg twice a day to 450 mg twice a day. Klonopin was recently added to medication list: 0.5 mg tablet BID. Case discussed with treatment team.  Risks, benefits and possible side effects of Medications: Risks, benefits, and possible side effects of medications have been explained to the patient, who verbalizes understanding. Progress Toward Goals: slow improvement, patient has a meeting next week to discuss discharge options. Patient feeling angry and aggressive at the prospect of being in this facility and not being transferred. Patient states his anger and anxiety are better, but still present. He still wants to punch a wall. Medication was readjusted and he will be reassessed the following days. Need to work on coping mechanisms for his anger. ------------------------------------------------------------    Subjective: Jeffery Putnam has been compliant with medications without acute side effects. Patient states he feels anxious about discharge and about being in this facility. He is fixated on being transferred to another psych facility in [place], because he feels limited at the [facility]. Patient states PRN Zyprexa and Clonopin help with his anxiety. Patient states his anger is getting worse secondary to the prospect of being at the [facility] longer than what he expected.  He still wants to punch a wall/mirror. Patient states is learning to cope with his anger by "walking away from situations when people make me mad". Patient denies getting into a disagreement with someone on the floor or denies feeling mistreated by anyone. He confirms he feels safe on the unit. Patient denies desire to self harm or hurt others. Denies depression, SI or suicidal plans. Denies violent premonitions towards other people. Denies delusions/voice commands urging him to be violent or act aggressive. Patient confirms good energy, sleep and appetite. Patient denies fatigue, overt paranoia, hearing voices, seeing things not fixed in reality, denies experiences involving delusions. Stephan Vidal attended 4/8 groups yesterday and enjoys them. He denies constipation. He consents to safety on the unit. Psychiatric Review of Systems:  Behavior over the last 24 hours: improved anxiety, worsening anger  Sleep: good  Appetite: good  Medication side effects: none verbalized   ROS: complete ROS is negative except what is noted above.     Vital signs in last 24 hours:  Temp:  [97.4 °F (36.3 °C)-97.7 °F (36.5 °C)] 97.7 °F (36.5 °C)  HR:  [71-73] 73  Resp:  [17-18] 17  BP: (111-117)/(56-78) 117/78    Mental Status Evaluation:  Appearance:  age appropriate, looks stated age, well groomed, no facial hair, short buzzcut, poor eye contact as if he's looking past you, sitting down in a chair   Behavior:  Cooperative, anxious, friendly and pleasant, child-like, smiling a lot at the start of the encounter and then immediately his smile dropped and he had a flat, staring off into space look, would smile inappropriately at times when talking about transferring and being angry   Speech:  normal rate, normal volume, normal pitch, fluent, clear, coherent, soft, monotone,   Mood:  "I am feeling angry and anxious"   Affect:  Congruent with mood, stable, within normal range, appears anxious, appropriate to thought content   Thought Process:  Organized, logical, goal directed    Thought Content: No verbalized delusions of grandiose, persecutory, paranoia, somatic or bizarre nature. Denies overt paranoia. Denies suicidal/homicidal ideations/plans. Denies phobias, compulsions, distorted body perceptions. No preoccupation with violence.  Not appearing paranoid or hypervigilant   Perceptual disturbances: Denied hallucinations and does not appear to be responding to internal stimuli at this time   Risk Potential: No active or passive suicidal or homicidal ideation was verbalized during interview. Wants to punch a wall/mirror.    Cognition: oriented to self and situation, memory grossly intact, appears to be of average intelligence, cognition not formally tested   Insight:  intact   Judgment: intact       Current Medications:  Current Facility-Administered Medications   Medication Dose Route Frequency Provider Last Rate   • acetaminophen  650 mg Oral Q6H PRN Chyrel Michel, DO     • acetaminophen  650 mg Oral Q4H PRN Chyrel Michel, DO     • acetaminophen  975 mg Oral Q6H PRN Chyrel Michel, DO     • aluminum-magnesium hydroxide-simethicone  30 mL Oral Q4H PRN Chyrel Michel, DO     • ammonium lactate   Topical BID PRN Ingrid Lopez PA-C     • benztropine  1 mg Intramuscular Q4H PRN Max 6/day Chyrel Michel, DO     • benztropine  1 mg Oral Q4H PRN Max 6/day Chyrel Michel, DO     • clonazePAM  0.5 mg Oral BID Raghu Washington MD     • hydrOXYzine HCL  50 mg Oral Q6H PRN Max 4/day Chyrel Michel, DO      Or   • diphenhydrAMINE  50 mg Intramuscular Q6H PRN Chyrel Michel, DO     • diphenhydrAMINE  25 mg Oral Q6H PRN Roxene Pancake, DO     • divalproex sodium  500 mg Oral QAM Ann Castle PA-C     • divalproex sodium  750 mg Oral HS Ann Castle PA-C     • glycerin-hypromellose-  1 drop Both Eyes Q3H PRN Chyrel Michel, DO     • hydrOXYzine HCL  100 mg Oral Q6H PRN Max 4/day Ciro Watters, DO      Or   • LORazepam  2 mg Intramuscular Q6H PRN Isidro Rank, DO     • hydrOXYzine HCL  25 mg Oral Q6H PRN Max 4/day Isidro Rank, DO     • lithium carbonate  450 mg Oral Q12H Anita Patterson MD     • melatonin  10 mg Oral HS Sandy Corral MD     • mirtazapine  7.5 mg Oral HS Isidro Rank, DO     • OLANZapine  10 mg Oral Q3H PRN Max 3/day Isidro Rank, DO      Or   • OLANZapine  10 mg Intramuscular Q3H PRN Max 3/day Isidro Rank, DO     • OLANZapine  5 mg Oral Q3H PRN Max 6/day Isidro Rank, DO      Or   • OLANZapine  5 mg Intramuscular Q3H PRN Max 6/day Isidro Rank, DO     • OLANZapine  2.5 mg Oral Q3H PRN Max 8/day Ciro A Prayson, DO     • polyethylene glycol  17 g Oral Daily PRN Isidro Rank, DO     • propranolol  10 mg Oral Q8H PRN Isidro Rank, DO     • propranolol  10 mg Oral Q8H PRN Sandy Corral MD     • senna-docusate sodium  1 tablet Oral Daily PRN Isidro Rank, DO     • traZODone  50 mg Oral HS PRN Isidro Rank, DO         Behavioral Health Medications: all current active meds have been reviewed. Changes as above. Laboratory results:  I have personally reviewed all pertinent laboratory/tests results. No results found for this or any previous visit (from the past 48 hour(s)). This note has been constructed using a voice recognition system. There may be translation, syntax, or grammatical errors. If you have any questions, please contact the dictating provider.     Sumit Connolly, PA-S

## 2023-06-13 NOTE — NURSING NOTE
Mary Brewer maintained on ongoing assault and SAFE precaution without incident on this shift.  Observed laying in bed with eyes closed, breath even and easy.  Q 7 minutes checks implemented.  Behavioral control.  Will continue to monitor

## 2023-06-13 NOTE — PROGRESS NOTES
06/13/23 0830   Team Meeting   Meeting Type Daily Rounds   Initial Conference Date 06/13/23   Patient/Family Present   Patient Present No   Patient's Family Present No     Daily Rounds Documentation     Team Members Present:   MD Ally Holder, ANA  [facility], RN  Cliff Hercules, [facility]  Dane Frederick, W  Malgorzata Greenberg, Pharm. D    Upset with peer about bathroom cleanliness, and made comments to staff about wanting to fight. PRN Zyprexa given for agitation. Childlike; says things to get a reaction. Attended 4/8 groups. Compliant with medications and meals. Slept.

## 2023-06-13 NOTE — NURSING NOTE
Dufm Pulse maintained on ongoing assault and SAFE precaution without incident on this shift.  Awake, alert, suspicious,paranoid.  Attended and participated in 4 out of 8 groups. He express and was fixed on discharge. He was redirected. Denies depression or anxiety at this time. No delusion or A/T/V hallucination noted.   Behavior control.   Will continue to monitor

## 2023-06-13 NOTE — PLAN OF CARE
Problem: Ineffective Coping  Goal: Cooperates with admission process  Description: Interventions:   - Complete admission process  Outcome: Adequate for Discharge  Goal: Identifies ineffective coping skills  Outcome: Not Progressing  Goal: Identifies healthy coping skills  Outcome: Progressing  Goal: Demonstrates healthy coping skills  Outcome: Progressing     Problem: Risk for Self Injury/Neglect  Goal: Treatment Goal: Remain safe during length of stay, learn and adopt new coping skills, and be free of self-injurious ideation, impulses and acts at the time of discharge  Outcome: Progressing  Goal: Verbalize thoughts and feelings  Description: Interventions:  - Assess and re-assess patient's lethality and potential for self-injury  - Engage patient in 1:1 interactions, daily, for a minimum of 15 minutes  - Encourage patient to express feelings, fears, frustrations, hopes  - Establish rapport/trust with patient   Outcome: Not Progressing  Goal: Recognize maladaptive responses and adopt new coping mechanisms  Outcome: Progressing     Problem: Depression  Goal: Verbalize thoughts and feelings  Description: Interventions:  - Assess and re-assess patient's level of risk   - Engage patient in 1:1 interactions, daily, for a minimum of 15 minutes   - Encourage patient to express feelings, fears, frustrations, hopes   Outcome: Not Progressing  Goal: Refrain from isolation  Description: Interventions:  - Develop a trusting relationship   - Encourage socialization   Outcome: Progressing     Problem: Anxiety  Goal: Anxiety is at manageable level  Description: Interventions:  - Assess and monitor patient's anxiety level. - Monitor for signs and symptoms (heart palpitations, chest pain, shortness of breath, headaches, nausea, feeling jumpy, restlessness, irritable, apprehensive). - Collaborate with interdisciplinary team and initiate plan and interventions as ordered.   - [place] patient to unit/surroundings  - Explain treatment plan  - Encourage participation in care  - Encourage verbalization of concerns/fears  - Identify coping mechanisms  - Assist in developing anxiety-reducing skills  - Administer/offer alternative therapies  - Limit or eliminate stimulants  Outcome: Progressing

## 2023-06-13 NOTE — NURSING NOTE
Felicia Gómez remains visible in the milieu at small intervals. He appears to be distractesd; daydreaming. His answers are delayed. He attended the  [facility] and began his "Biook about Me" He did his quite quickly so I am not too sure how in depth his answers got or how comfortable he was with the topics.

## 2023-06-14 LAB — LITHIUM SERPL-SCNC: 0.7 MMOL/L (ref 0.6–1.2)

## 2023-06-14 PROCEDURE — 99232 SBSQ HOSP IP/OBS MODERATE 35: CPT | Performed by: PSYCHIATRY & NEUROLOGY

## 2023-06-14 PROCEDURE — 80178 ASSAY OF LITHIUM: CPT | Performed by: PSYCHIATRY & NEUROLOGY

## 2023-06-14 RX ADMIN — CLONAZEPAM 0.5 MG: 0.5 TABLET ORAL at 17:02

## 2023-06-14 RX ADMIN — DIVALPROEX SODIUM 750 MG: 500 TABLET, FILM COATED, EXTENDED RELEASE ORAL at 21:28

## 2023-06-14 RX ADMIN — CLONAZEPAM 0.5 MG: 0.5 TABLET ORAL at 08:19

## 2023-06-14 RX ADMIN — LITHIUM CARBONATE 450 MG: 450 TABLET, EXTENDED RELEASE ORAL at 21:28

## 2023-06-14 RX ADMIN — MIRTAZAPINE 7.5 MG: 7.5 TABLET, FILM COATED ORAL at 21:29

## 2023-06-14 RX ADMIN — Medication 10 MG: at 21:28

## 2023-06-14 RX ADMIN — LITHIUM CARBONATE 450 MG: 450 TABLET, EXTENDED RELEASE ORAL at 08:19

## 2023-06-14 RX ADMIN — DIVALPROEX SODIUM 500 MG: 500 TABLET, FILM COATED, EXTENDED RELEASE ORAL at 08:19

## 2023-06-14 NOTE — NURSING NOTE
Patient is visible in the milieu socializing with select peer. Report anxiety, denies all other psych s/s. No behaviors noted. Had 100% for breakfast/lunch. Took his medications. Safety checks ongoing.

## 2023-06-14 NOTE — SOCIAL WORK
[facility]; copy of MICHAEL sent to Chad at [facility] as requested.

## 2023-06-14 NOTE — PROGRESS NOTES
Progress Note - Behavioral Health   Sary Bound 21 y.o. male MRN: [id number]  Unit/Bed#: CRUZITO RAMIREZ [facility] 109-01 Encounter: [id number]    Assessment/Plan   Principal Problem:    Severe episode of recurrent major depressive disorder, without psychotic features ([address])  Active Problems:    Medical clearance for psychiatric admission    Autism spectrum disorder    Mood disorder ([address])    Anxiety disorder    Rash    Recommended Treatment:   Continue with group therapy, milieu therapy and occupational therapy. Continue medications: Depakote 1250 mg a day, melatonin 9 mg at bedtime, Remeron 7.5 mg at bedtime, Lithobid was increased from 300 mg twice a day to 450 mg twice a day. Klonopin was recently added to medication list: 0.5 mg tablet BID.        Case discussed with treatment team.  Risks, benefits and possible side effects of Medications: Risks, benefits, and possible side effects of medications have been explained to the patient, who verbalizes understanding. Progress Toward Goals: slow improvement, patient is currently working on coping mechanisms for his anger. Still feeling anger and desire to punch walls/mirror, but his anxiety has reduced. Patient is in the process of setting up either a virtual or in person meeting with his parents with the . ------------------------------------------------------------    Subjective: Chantelle Montague has been compliant with medications without acute side effects. Patient's anxiety has reduced and he did not need a PRN yesterday. Karen Bridges is feeling fatigued throughout the day. He is getting 7 hours of sleep. He is waking up 1-2 times a night to get water. He denies getting nightmares. Patient's anger remains secondary to the prospect of being at the [facility] for longer than he intended. He still wants to punch a wall/mirror, and has no desire to use the punching bags on the unit.  Patient states he is learning to cope with his anger by "walking away from situations when people make him mad". Patient has gotten annoyed at roommate for leaving the bathroom dirty, but states he is able to control his anger. Patient denies desire to self harm or hurt others. Denies depression, SI or suicidal plans. Denies violent premonitions towards other people. Denies delusions/voice commands urging him to be violent or act aggressive. Patient confirms good energy, sleep and appetite. Patient denies fatigue, overt paranoia, hearing voices, seeing/feeling/smelling/tasting things not fixed in reality and denies experiences involving delusions. Becky Herring attended 4/7 groups yesterday and enjoys them. Patient denies constipation. Last bowel movement was yesterday. He consents to safety on the unit. He talked to his  and is in the process of setting up either a virtual or in person meeting with his parents. Psychiatric Review of Systems:  Behavior over the last 24 hours: anxiety improved, anger persists, feeling new onset fatigue   Sleep: good  Appetite: good  Medication side effects: none verbalized  ROS: complete ROS is negative, except what is noted above.     Vital signs in last 24 hours:  Temp:  [97.5 °F (36.4 °C)] 97.5 °F (36.4 °C)  HR:  [77-81] 81  Resp:  [17-18] 18  BP: (110-114)/(64-88) 114/88    Mental Status Evaluation:  Appearance:  age appropriate, looks stated age, well groomed, no facial hair, short buzzcut, poor eye contact as if he's looking past you, sat up on bed when he talked to me, wearing pajamas   Behavior:  Cooperative, anxious, friendly and pleasant, child-like, smiles at inappropriate times kelechi when talking about his anger/annoyance    Speech:  Normal rate, volume and pitch, fluent, clear, coherent, soft, monotone   Mood:  "I am feeling fatigued"   Affect:  Congruent with mood, stable, within normal range, appears anxious and tired, appropriate to thought content   Thought Process:  Organized, logical, goal directed   Thought Content: No verbalized delusions of grandiose, persecutory, paranoia, somatic or bizarre nature. Denies overt paranoia. Denies suicidal/homicidal ideations/plans. Denies phobias, compulsions, distorted body perceptions. No preoccupation with violence.  Not appearing paranoid or hypervigilant   Perceptual disturbances: Denied hallucinations and does not appear to be responding to internal stimuli at this time   Risk Potential: No active or passive suicidal or homicidal ideation was verbalized during interview. Wants to punch a wall/mirror.    Cognition: oriented to self and situation, memory grossly intact, appears to be of average intelligence, cognition not formally tested   Insight:  intact   Judgment: intact       Current Medications:  Current Facility-Administered Medications   Medication Dose Route Frequency Provider Last Rate   • acetaminophen  650 mg Oral Q6H PRN Cobb Heaps, DO     • acetaminophen  650 mg Oral Q4H PRN Cobb Heaps, DO     • acetaminophen  975 mg Oral Q6H PRN Cobb Heaps, DO     • aluminum-magnesium hydroxide-simethicone  30 mL Oral Q4H PRN Cobb Heaps, DO     • ammonium lactate   Topical BID PRN Tip Oh PA-C     • benztropine  1 mg Intramuscular Q4H PRN Max 6/day Cobb Heaps, DO     • benztropine  1 mg Oral Q4H PRN Max 6/day Cobb Heaps, DO     • clonazePAM  0.5 mg Oral BID Rella Frankel, MD     • hydrOXYzine HCL  50 mg Oral Q6H PRN Max 4/day Cobb Heaps, DO      Or   • diphenhydrAMINE  50 mg Intramuscular Q6H PRN Cobb Heaps, DO     • diphenhydrAMINE  25 mg Oral Q6H PRN Augustin Wyatt DO     • divalproex sodium  500 mg Oral QAM Ann Castle PA-C     • divalproex sodium  750 mg Oral HS Ann Castle PA-C     • glycerin-hypromellose-  1 drop Both Eyes Q3H PRN Cobb Heaps, DO     • hydrOXYzine HCL  100 mg Oral Q6H PRN Max 4/day Cobb Heaps, DO      Or   • LORazepam  2 mg Intramuscular Q6H PRN Cobb Heaps, DO     • hydrOXYzine HCL  25 mg Oral Q6H PRN Max 4/day Bonnyman Heaps, DO     • lithium carbonate  450 mg Oral Q12H Reinier Maya MD     • melatonin  10 mg Oral HS Rella Frankel, MD     • mirtazapine  7.5 mg Oral HS Bonnyman Heaps, DO     • OLANZapine  10 mg Oral Q3H PRN Max 3/day Bonnyman Heaps, DO      Or   • OLANZapine  10 mg Intramuscular Q3H PRN Max 3/day Bonnyman Heaps, DO     • OLANZapine  5 mg Oral Q3H PRN Max 6/day Bonnyman Heaps, DO      Or   • OLANZapine  5 mg Intramuscular Q3H PRN Max 6/day Bonnyman Heaps, DO     • OLANZapine  2.5 mg Oral Q3H PRN Max 8/day Bonnyman Heaps, DO     • polyethylene glycol  17 g Oral Daily PRN Bonnyman Heaps, DO     • propranolol  10 mg Oral Q8H PRN Bonnyman Heaps, DO     • propranolol  10 mg Oral Q8H PRN Rella Frankel, MD     • senna-docusate sodium  1 tablet Oral Daily PRN Bonnyman Heaps, DO     • traZODone  50 mg Oral HS PRN Bonnyman Heaps, DO         Behavioral Health Medications: all current active meds have been reviewed. Changes as above. Laboratory results:  I have personally reviewed all pertinent laboratory/tests results. No results found for this or any previous visit (from the past 48 hour(s)). This note has been constructed using a voice recognition system. There may be translation, syntax, or grammatical errors. If you have any questions, please contact the dictating provider.     Karie SMITH

## 2023-06-14 NOTE — PROGRESS NOTES
06/14/23 1100   Activity/Group Checklist   Group Wellness   Attendance Attended   Attendance Duration (min) 31-45   Interactions Interacted appropriately   Affect/Mood Appropriate   Goals Achieved Identified feelings

## 2023-06-14 NOTE — SOCIAL WORK
SIS (Supports Intensity Scale) Assessment  1:00PM-3:40PM    In Attendance:  Sandra Cunha ([address], [address])  Paul Clement ([address] [facility])  Oneal Barnard ([employer] Management)  Lisa Delgadillo (Father)  Neela Quevedo (Mother)  Angela Smith ([address])  Anayeli Officer (Patient)    Patient's SIS assessment was held today via video conference; Davina was the . Patient was present for the entire assessment, and was calm, polite, and mostly attentive. He was able to participate in the assessment, but his responses were direct and lacked explanation so family and other team members gave more specifics. Pancho Colon expressed that it would take about 2 weeks to finalize the assessment, and that then they will wait for a waiver slot to open; no time frame given. ATIYA informed that the wavier will not cover all of the housing expenses, and that patient will still be required to cover room and board. Patient's mom asked if the SSI/SSD application has been submitted and Bay Hawkins expressed that she believes it was. ATIYA explained that she reached out for an update on this, but hasn't heard back. ATIYA explained to the family that if it hasn't been started a discussion will need to be had as we can't keep him in the hospital for financial reasons. Bay Hawkins agreed to send this ATIYA West's supervisor's information so ATIYA can follow up.

## 2023-06-15 PROCEDURE — 99232 SBSQ HOSP IP/OBS MODERATE 35: CPT | Performed by: PSYCHIATRY & NEUROLOGY

## 2023-06-15 RX ADMIN — Medication 10 MG: at 21:12

## 2023-06-15 RX ADMIN — CLONAZEPAM 0.5 MG: 0.5 TABLET ORAL at 08:35

## 2023-06-15 RX ADMIN — PROPRANOLOL HYDROCHLORIDE 10 MG: 10 TABLET ORAL at 17:06

## 2023-06-15 RX ADMIN — LITHIUM CARBONATE 450 MG: 450 TABLET, EXTENDED RELEASE ORAL at 08:35

## 2023-06-15 RX ADMIN — LITHIUM CARBONATE 450 MG: 450 TABLET, EXTENDED RELEASE ORAL at 21:12

## 2023-06-15 RX ADMIN — CLONAZEPAM 0.5 MG: 0.5 TABLET ORAL at 16:59

## 2023-06-15 RX ADMIN — DIVALPROEX SODIUM 750 MG: 500 TABLET, FILM COATED, EXTENDED RELEASE ORAL at 21:12

## 2023-06-15 RX ADMIN — ALUMINUM HYDROXIDE, MAGNESIUM HYDROXIDE, AND SIMETHICONE 30 ML: 200; 200; 20 SUSPENSION ORAL at 22:05

## 2023-06-15 RX ADMIN — MIRTAZAPINE 7.5 MG: 7.5 TABLET, FILM COATED ORAL at 21:12

## 2023-06-15 NOTE — PLAN OF CARE
Problem: Ineffective Coping  Goal: Cooperates with admission process  Description: Interventions:   - Complete admission process  Outcome: Progressing  Goal: Identifies ineffective coping skills  Outcome: Not Progressing  Goal: Identifies healthy coping skills  Outcome: Progressing  Goal: Demonstrates healthy coping skills  Outcome: Progressing

## 2023-06-15 NOTE — SOCIAL WORK
Message left for Sander Pittman at [employer] requesting a call back. SW explained the she is trying to confirm that an SSI/SSD application was submitted on patient's behalf by his previous CM, [address].

## 2023-06-15 NOTE — PROGRESS NOTES
Progress Note - Behavioral Health   Kerrie Santiago 21 y.o. male MRN: [id number]  [facility] Encounter: [id number]    Assessment/Plan   Principal Problem:    Severe episode of recurrent major depressive disorder, without psychotic features ([address] [facility])  Active Problems:    Medical clearance for psychiatric admission    Autism spectrum disorder    Mood disorder ([address] [facility])    Anxiety disorder    Rash    Recommended Treatment:   Continue current regiment. Continue with group therapy, milieu therapy and occupational therapy. Case discussed with treatment team.  Risks, benefits and possible side effects of Medications: Risks, benefits, and possible side effects of medications have been explained to the patient, who verbalizes understanding. Progress Toward Goals: slow improvement, patient is currently working on anger coping mechanisms. Still wants to punch walls/mirrors. Patient had a Support Intensity Scale (SIS) meeting yesterday that went well. Patient stated he understood the outcome of the meeting.     ------------------------------------------------------------    Subjective: Vincenzo Suero has been compliant with medications without acute side effects. Patient's anxiety has reduced and did not need a PRN today or yesterday. George's fatigue has resolved. He is getting 7 hours of sleep. Patient's anger remains secondary to being at the [facility] and feeling trapped and "when people disagree with" him. He still wants to punch a wall/mirror, and does not want to use the punching bags on the unit. He is learning "to walk away from situations when they make him mad". Patient states he gets angry at his roommate for not flushing the toilet and leaving toilet paper on the ground in the bathroom. Patient states he is able to control his anger at this time and denies needing a PRN. Patient denies wanting to self harm or hurt others. Denies depression, SI or suicidal plans. Denies violent premonitions towards other people. Denies delusions/voice commands urging him to be violent or act aggressive. Patient confirms good energy, sleep and appetite. Patient denies fatigue, paranoia, or experiences of hallucinations or delusions. Chelsy Tavarez attended 3/6 groups yesterday and enjoys them. Patient is currently passing the time by socializing with peers and reading a book about "people moving to [place]". Patient denies constipation. Last BM was yesterday. Video/in person visit with parents is to be schedule with his . Patient stated that the conclusion of the SIS meeting yesterday was that he "required supports when leaving this facility". Patient is content with this conclusion and has no questions at this time. Chelsy Tavarez is consenting for safety on the unit. Psychiatric Review of Systems:  Behavior over the last 24 hours: unchanged  Sleep: good  Appetite: good  Medication side effects: none verbalized   ROS: complete ROS is neg except what is noted above     Vital signs in last 24 hours:  Temp:  [97.7 °F (36.5 °C)-97.9 °F (36.6 °C)] 97.9 °F (36.6 °C)  HR:  [76-81] 81  Resp:  [16-18] 16  BP: (111-119)/(72-78) 111/72    Mental Status Evaluation:  Appearance:  Age appropriate, looks stated age, well groomed with no facial hair, short hair, poor eye contact, was lying down under his bed sheets, but sat up on bed when approached, wearing pajamas   Behavior:  Calm, cooperative, mildly anxious, child-like at times, smiling at random times as if about to laugh, gets excited when talking about music, will have periods of flat affect and appearing as if he is zoning out/bored   Speech:  Normal rate, volume and pitch, fluent, clear, coherent, soft monotone   Mood:  "Feeling anxious about discharge"   Affect:  Congruent with mood, stable, appears anxious and tired, appropriate to thought content, range of affect not within normal limits   Thought Process:  Organized, logical and goal directed   Thought Content: Denies delusional experiences. Denies overt paranoia. Denies suicidal/homicidal ideations/plans. Denies phobias, compulsions or distorted body perceptions. No preoccupation with violence. Not appearing paranoid or hypervigilant during interview. Perceptual disturbances: Denied hallucinations and does not appear to be responding to internal stimuli at this time   Risk Potential: No active or passive suicidal or homicidal ideations was verbalized. Still wants to punch a wall/mirror. He states he is working on coping mechanisms.     Cognition: Oriented to self and situation, memory grossly intact, appears to be of average intelligence, cognition not formally tested   Insight:  intact   Judgment: intact       Current Medications:  Current Facility-Administered Medications   Medication Dose Route Frequency Provider Last Rate   • acetaminophen  650 mg Oral Q6H PRN Dorrene Skeans, DO     • acetaminophen  650 mg Oral Q4H PRN Dorrene Skeans, DO     • acetaminophen  975 mg Oral Q6H PRN Dorrene Skeans, DO     • aluminum-magnesium hydroxide-simethicone  30 mL Oral Q4H PRN Dorrene Skeans, DO     • ammonium lactate   Topical BID PRN Aristeo Gómez PA-C     • benztropine  1 mg Intramuscular Q4H PRN Max 6/day Dorrene Skeans, DO     • benztropine  1 mg Oral Q4H PRN Max 6/day Dorrene Skeans, DO     • clonazePAM  0.5 mg Oral BID Darío Long MD     • hydrOXYzine HCL  50 mg Oral Q6H PRN Max 4/day Dorrene Skeans, DO      Or   • diphenhydrAMINE  50 mg Intramuscular Q6H PRN Dorrene Skeans, DO     • diphenhydrAMINE  25 mg Oral Q6H PRN Carlos Colon DO     • divalproex sodium  750 mg Oral HS Ann Castle PA-C     • glycerin-hypromellose-  1 drop Both Eyes Q3H PRN Dorrene Skeans, DO     • hydrOXYzine HCL  100 mg Oral Q6H PRN Max 4/day Dorrene Skeans, DO      Or   • LORazepam  2 mg Intramuscular Q6H PRN Dorrene Skeans, DO     • hydrOXYzine HCL  25 mg Oral Q6H PRN Max 4/day Dorrene Skeans, DO     • lithium carbonate  450 mg Oral Q12H Maged Turcios MD     • melatonin  10 mg Oral HS Brinda Pizano MD     • mirtazapine  7.5 mg Oral HS Ana Laura Pian, DO     • OLANZapine  10 mg Oral Q3H PRN Max 3/day Ana Laura Pian, DO      Or   • OLANZapine  10 mg Intramuscular Q3H PRN Max 3/day Ana Laura Pian, DO     • OLANZapine  5 mg Oral Q3H PRN Max 6/day Ana Laura Pian, DO      Or   • OLANZapine  5 mg Intramuscular Q3H PRN Max 6/day Ana Laura Pian, DO     • OLANZapine  2.5 mg Oral Q3H PRN Max 8/day Ana Laura Pian, DO     • polyethylene glycol  17 g Oral Daily PRN Ana Laura Pian, DO     • propranolol  10 mg Oral Q8H PRN Ana Laura Pian, DO     • propranolol  10 mg Oral Q8H PRN Brinda Pizano MD     • senna-docusate sodium  1 tablet Oral Daily PRN Ana Laura Pian, DO     • traZODone  50 mg Oral HS PRN Ana Laura Pian, DO         Behavioral Health Medications: all current active meds have been reviewed. Changes as above. Laboratory results:  I have personally reviewed all pertinent laboratory/tests results. Recent Results (from the past 48 hour(s))   Lithium level    Collection Time: 06/14/23  8:14 AM   Result Value Ref Range    Lithium Lvl 0.7 0.6 - 1.2 mmol/L        This note has been constructed using a voice recognition system. There may be translation, syntax, or grammatical errors. If you have any questions, please contact the dictating provider.     SARAH Treviño

## 2023-06-15 NOTE — PROGRESS NOTES
Progress Note - Behavioral Health   Florecita Stearns 21 y.o. male MRN: [id number]  Unit/Bed#: [facility] 109-01 Encounter: [id number]    Assessment/Plan   Principal Problem:    Severe episode of recurrent major depressive disorder, without psychotic features ([address])  Active Problems:    Medical clearance for psychiatric admission    Autism spectrum disorder    Mood disorder ([address])    Anxiety disorder    Rash    Recommended Treatment:   Continue with group therapy, milieu therapy and occupational therapy. Continue medications: Depakote 1250 mg a day, melatonin 9 mg at bedtime, Remeron 7.5 mg at bedtime, Lithobid 450 mg twice a day. Klonopin 0.5 mg tablet BID.         Case discussed with treatment team.  Risks, benefits and possible side effects of Medications: Risks, benefits, and possible side effects of medications have been explained to the patient, who verbalizes understanding.      Progress Toward Goals: slow improvement, patient is currently working on coping mechanisms for his anger. Still feeling anger and desire to punch walls/mirror, but his anxiety has reduced. Patient is in the process of setting up either a virtual or in person meeting with his parents with the . Patient had a Support Intensity Scale (SIS) meeting yesterday that went well. Patient stated he understands the outcome of the meeting.     ------------------------------------------------------------    Subjective: Greg Kelly has been compliant with medications without acute side effects. Patient's anxiety has reduced and he did not need a PRN yesterday. George's fatigue has resolved. He is getting 7 hours of sleep. Patient's anger remains secondary to the prospect of being at the [facility] for longer than he intended and "when people disagree with me". He still wants to punch a wall/mirror, and has no desire to use the punching bags on the unit.  Patient states he is learning to cope with his anger by "walking away from situations when people make him mad". Patient has gotten annoyed at roommate for leaving the bathroom dirty, but states he is able to control his anger. He states he does not like that his roommate does not flush the toilet and leaves toilet paper everywhere. Patient denies desire to self harm or hurt others. Denies depression, SI or suicidal plans. Denies violent premonitions towards other people. Denies delusions/voice commands urging him to be violent or act aggressive. Patient confirms good energy, sleep and appetite. Patient denies fatigue, overt paranoia, hearing voices, seeing/feeling/smelling/tasting things not fixed in reality and denies experiences involving delusions. Felicia Gómez attended 3/6 groups yesterday and enjoys them. Patient is reading a book "about people moving to [place]" that he enjoys. Patient denies constipation. Last bowel movement was yesterday. He consents to safety on the unit. He talked to his  and is in the process of setting up either a virtual or in person meeting with his parents. Patient stated that the conclusion of the SIS meeting he had yesterday was that he "required support when leaving this facility".  Patient is content with this conclusion and has no questions at this time.        Psychiatric Review of Systems:  Behavior over the last 24 hours: unchanged   Sleep: good  Appetite: good  Medication side effects: none verbalized   ROS: complete ROS is neg except what is noted above     Vital signs in last 24 hours:  Temp:  [97.7 °F (36.5 °C)-97.9 °F (36.6 °C)] 97.9 °F (36.6 °C)  HR:  [76-81] 81  Resp:  [16-18] 16  BP: (111-119)/(72-78) 111/72    Mental Status Evaluation:  Appearance:  age appropriate, looks stated age, well groomed, no facial hair, short buzzcut, poor eye contact as if he's looking past you, sat up on bed when he talked to me, wearing pajamas   Behavior:  Cooperative, anxious, friendly and pleasant, child-like, smiles at inappropriate times kelechi when talking about his anger/annoyance, gets excited when you talk about music    Speech:  Normal rate, volume and pitch, fluent, clear, coherent, soft, monotone   Mood:  "I am feeling anxious about discharge"   Affect:  Congruent with mood, stable, within normal range, appears anxious and tired, appropriate to thought content   Thought Process:  Organized, logical, goal directed   Thought Content: No verbalized delusions of grandiose, persecutory, paranoia, somatic or bizarre nature. Denies overt paranoia. Denies suicidal/homicidal ideations/plans. Denies phobias, compulsions, distorted body perceptions. No preoccupation with violence.  Not appearing paranoid or hypervigilant   Perceptual disturbances: Denied hallucinations and does not appear to be responding to internal stimuli at this time   Risk Potential: No active or passive suicidal or homicidal ideation was verbalized during interview. Wants to punch a wall/mirror.    Cognition: oriented to self and situation, memory grossly intact, appears to be of average intelligence, cognition not formally tested   Insight:  intact   Judgment: intact          Current Medications:  Current Facility-Administered Medications   Medication Dose Route Frequency Provider Last Rate   • acetaminophen  650 mg Oral Q6H PRN Reta Ray, DO     • acetaminophen  650 mg Oral Q4H PRN Reta Ray, DO     • acetaminophen  975 mg Oral Q6H PRN Reta Ray, DO     • aluminum-magnesium hydroxide-simethicone  30 mL Oral Q4H PRN Reta Ray, DO     • ammonium lactate   Topical BID PRN Nelia Bowles PA-C     • benztropine  1 mg Intramuscular Q4H PRN Max 6/day Reta Ray, DO     • benztropine  1 mg Oral Q4H PRN Max 6/day Reta Ray, DO     • clonazePAM  0.5 mg Oral BID Shereen Cabrera MD     • hydrOXYzine HCL  50 mg Oral Q6H PRN Max 4/day Reta Ray, DO      Or   • diphenhydrAMINE  50 mg Intramuscular Q6H PRN Reta Ray, DO     • diphenhydrAMINE  25 mg Oral Q6H PRN Roxene Pancake, DO     • divalproex sodium  750 mg Oral HS Ann Castle PA-C     • glycerin-hypromellose-  1 drop Both Eyes Q3H PRN Chyrel Michel, DO     • hydrOXYzine HCL  100 mg Oral Q6H PRN Max 4/day Chyrel Michel, DO      Or   • LORazepam  2 mg Intramuscular Q6H PRN Chyrel Michel, DO     • hydrOXYzine HCL  25 mg Oral Q6H PRN Max 4/day Chyrel Michel, DO     • lithium carbonate  450 mg Oral Q12H Murphy Daley MD     • melatonin  10 mg Oral HS Raghu Washington MD     • mirtazapine  7.5 mg Oral HS Chyrel Michel, DO     • OLANZapine  10 mg Oral Q3H PRN Max 3/day Chyrel Michel, DO      Or   • OLANZapine  10 mg Intramuscular Q3H PRN Max 3/day Chyrel Michel, DO     • OLANZapine  5 mg Oral Q3H PRN Max 6/day Chyrel Michel, DO      Or   • OLANZapine  5 mg Intramuscular Q3H PRN Max 6/day Chyrel Michel, DO     • OLANZapine  2.5 mg Oral Q3H PRN Max 8/day Ciro BHAVNA Prayson, DO     • polyethylene glycol  17 g Oral Daily PRN Chyrel Michel, DO     • propranolol  10 mg Oral Q8H PRN Chyrel Michel, DO     • propranolol  10 mg Oral Q8H PRN Raghu Washington MD     • senna-docusate sodium  1 tablet Oral Daily PRN Chyrel Michel, DO     • traZODone  50 mg Oral HS PRN Chyrel Michel, DO         Behavioral Health Medications: all current active meds have been reviewed. Changes as above. Laboratory results:  I have personally reviewed all pertinent laboratory/tests results. Recent Results (from the past 48 hour(s))   Lithium level    Collection Time: 06/14/23  8:14 AM   Result Value Ref Range    Lithium Lvl 0.7 0.6 - 1.2 mmol/L        This note has been constructed using a voice recognition system. There may be translation, syntax, or grammatical errors. If you have any questions, please contact the dictating provider.     ISABEL MeyerS

## 2023-06-15 NOTE — NURSING NOTE
Patent is compliant with medication and meals  Patient continues to be very child like. He is fixated on discharge all the time . He att  Tended  3 groups on Wednesday He mostly stays to himself . No issues at this time . Cody Ahmadi

## 2023-06-15 NOTE — NURSING NOTE
Shalini Alvarez is visible on the unit and has been visible on the unit. Not much interacting with peers noted. Med and meal compliant. He did ask writer if there was a padded room on this unit. Q 7 minute patient checks maintained.

## 2023-06-15 NOTE — PROGRESS NOTES
06/15/23 0830   Team Meeting   Meeting Type Daily Rounds   Initial Conference Date 06/15/23   Patient/Family Present   Patient Present No   Patient's Family Present No     Daily Rounds Documentation     Team Members Present:   MD Cali Combs, CATHY Mcmahan, VIRGILIOW  Buddy Castro, LSW    Visible. Social.  Some anxiety. Did well with SIS assessment yesterday. Attended 3/6 groups. Compliant with medications and meals. Slept.

## 2023-06-16 PROCEDURE — 99232 SBSQ HOSP IP/OBS MODERATE 35: CPT | Performed by: PSYCHIATRY & NEUROLOGY

## 2023-06-16 RX ADMIN — Medication 10 MG: at 21:24

## 2023-06-16 RX ADMIN — DIVALPROEX SODIUM 750 MG: 500 TABLET, FILM COATED, EXTENDED RELEASE ORAL at 21:24

## 2023-06-16 RX ADMIN — HYDROXYZINE HYDROCHLORIDE 50 MG: 50 TABLET, FILM COATED ORAL at 13:21

## 2023-06-16 RX ADMIN — CLONAZEPAM 0.5 MG: 0.5 TABLET ORAL at 17:12

## 2023-06-16 RX ADMIN — LITHIUM CARBONATE 450 MG: 450 TABLET, EXTENDED RELEASE ORAL at 21:24

## 2023-06-16 RX ADMIN — LITHIUM CARBONATE 450 MG: 450 TABLET, EXTENDED RELEASE ORAL at 08:53

## 2023-06-16 RX ADMIN — CLONAZEPAM 0.5 MG: 0.5 TABLET ORAL at 08:53

## 2023-06-16 RX ADMIN — MIRTAZAPINE 7.5 MG: 7.5 TABLET, FILM COATED ORAL at 21:24
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
## 2023-06-16 NOTE — NURSING NOTE
Pt pushed button in dining room he stated he did this because he thought security was going to come so he can get an IM injection. Pt preoccupied with discharge and reports feeling "stressed" about not wanting to be here. PRN Atarax 50 mg given at 1321. Pt denies all psych signs and symptoms. Med/meal compliant. Will maintain q7 min checks.

## 2023-06-16 NOTE — PLAN OF CARE
Problem: Ineffective Coping  Goal: Demonstrates healthy coping skills  Outcome: Not Progressing     Problem: Risk for Self Injury/Neglect  Goal: Verbalize thoughts and feelings  Description: Interventions:  - Assess and re-assess patient's lethality and potential for self-injury  - Engage patient in 1:1 interactions, daily, for a minimum of 15 minutes  - Encourage patient to express feelings, fears, frustrations, hopes  - Establish rapport/trust with patient   Outcome: Progressing     Problem: Anxiety  Goal: Anxiety is at manageable level  Description: Interventions:  - Assess and monitor patient's anxiety level. - Monitor for signs and symptoms (heart palpitations, chest pain, shortness of breath, headaches, nausea, feeling jumpy, restlessness, irritable, apprehensive). - Collaborate with interdisciplinary team and initiate plan and interventions as ordered.   - [place] patient to unit/surroundings  - Explain treatment plan  - Encourage participation in care  - Encourage verbalization of concerns/fears  - Identify coping mechanisms  - Assist in developing anxiety-reducing skills  - Administer/offer alternative therapies  - Limit or eliminate stimulants  Outcome: Not Progressing
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
## 2023-06-16 NOTE — PROGRESS NOTES
Progress Note - Behavioral Health     [facility] [place] 21 [facility] MRN: [id number]   [facility]76-09 Encounter: [id number]    Documentation, nursing notes, medication reconciliation, labs, and vitals reviewed. Patient was seen for continuing care and reviewed with treatment team. No acute events over the past 24 hours. Per nursing report, patient childlike, requesting prn medication while smiling, seeking staff at times, fixated on discharge, received prn Atarax for c/o anxiety. No scheduled medication changes over the past 24 hours. Continues to tolerate current medications with no adverse effects. On evaluation today, patient is found attending art group. He is cooperative with evaluation. Mildly delayed in response to questioning. Describes mood as "anxious and irritable." Denies depressed mood. No SI/HI. Less focused on discharge during evaluation today.      Psychiatric ROS:  Behavior over the last 24 hours: unchanged  Sleep: normal  Appetite: normal  Medication side effects: No   ROS: no complaints, all other systems are negative    Mental Status Evaluation:    Appearance:  casually dressed, adequate grooming   Behavior:  cooperative, calm   Speech:  normal rate and volume, scant, delayed   Mood:  "irritable and anxious" appears calm   Affect:  mood-incongruent   Thought Process:  concrete   Associations: concrete associations   Thought Content:  no overt delusions   Perceptual Disturbances: no auditory hallucinations, no visual hallucinations   Risk Potential: Suicidal ideation - None  Homicidal ideation - None  Potential for aggression - Not at present   Sensorium:  oriented to person, place, time/date and situation   Memory:  recent and remote memory grossly intact   Consciousness:  alert and awake   Attention/Concentration: attention span and concentration appear shorter than expected for age   Insight:  poor   Judgment: poor   Gait/Station: normal gait/station, normal balance   Motor Activity: no abnormal movements     Vital signs in last 24 hours:    Temp:  [97.6 °F (36.4 °C)-97.9 °F (36.6 °C)] 97.9 °F (36.6 °C)  HR:  [66-92] 66  Resp:  [18] 18  BP: (116-125)/(73-78) 116/78    Laboratory results: I have personally reviewed all pertinent laboratory/tests results    Results from the past 24 hours: No results found for this or any previous visit (from the past 24 hour(s)).   Most Recent Labs:   Lab Results   Component Value Date    WBC 6.74 06/03/2023    RBC 5.22 06/03/2023    HGB 15.6 06/03/2023    HCT 49.0 06/03/2023     06/03/2023    RDW 13.1 06/03/2023    NEUTROABS 3.01 06/03/2023    SODIUM 140 05/31/2023    K 4.4 05/31/2023     05/31/2023    CO2 29 05/31/2023    BUN 11 05/31/2023    CREATININE 0.72 05/31/2023    GLUC 89 05/31/2023    CALCIUM 9.3 05/31/2023    AST 24 05/31/2023    ALT 43 05/31/2023    ALKPHOS 98 05/31/2023    TP 7.0 05/31/2023    ALB 4.4 05/31/2023    TBILI 0.52 05/31/2023    CHOLESTEROL 208 (H) 05/27/2023    HDL 39 (L) 05/27/2023    TRIG 190 (H) 05/27/2023    LDLCALC 131 (H) 05/27/2023    [email or web address] 05/27/2023    VALPROICTOT 106 (H) 05/31/2023    LITHIUM 0.7 06/14/2023    [id number] 1.637 05/27/2023    HGBA1C 5.4 05/27/2023     05/27/2023       Suicide/Homicide Risk Assessment:    Risk of Harm to Self:   Nursing Suicide Risk Assessment Last 24 hours: C-SSRS Risk (Since Last Contact)  Calculated C-SSRS Risk Score (Since Last Contact): No Risk Indicated  Current Specific Risk Factors include: diagnosis of depression, mental illness diagnosis  Protective Factors: no current suicidal ideation, ability to communicate with staff on the unit, able to contract for safety on the unit, taking medications as ordered on the unit  Based on today's assessment, Shalini Alvarez presents the following risk of harm to self: low    Risk of Harm to Others:  Nursing Homicide Risk Assessment: Violence Risk to Others: Denies within past 6 months  Current Specific Risk Factors include: poor insight  Protective Factors: no current homicidal ideation, compliant with medications on the unit as ordered, compliant with unit milieu  Based on today's assessment, Max Malloy presents the following risk of harm to others: low    The following interventions are recommended: behavioral checks every 7 minutes, continued hospitalization on locked unit    Progress Toward Goals: progressing    Assessment/Plan   Principal Problem:    Severe episode of recurrent major depressive disorder, without psychotic features ([address])  Active Problems:    Medical clearance for psychiatric admission    Autism spectrum disorder    Mood disorder ([address])    Anxiety disorder    Rash      Recommended Treatment:     Planned medication and treatment changes:     All current active medications have been reviewed  Encourage group therapy, milieu therapy and occupational therapy  Behavioral Health checks every 7 minutes  Assault and elopement precautions  Check Lithium level 6/21/2023  Continue current medications:    Current Facility-Administered Medications   Medication Dose Route Frequency Provider Last Rate   • acetaminophen  650 mg Oral Q6H PRN Alfonso Valdez, DO     • acetaminophen  650 mg Oral Q4H PRN Alfonso Valdez, DO     • acetaminophen  975 mg Oral Q6H PRN Alfonso Valdez, DO     • aluminum-magnesium hydroxide-simethicone  30 mL Oral Q4H PRN Alfonso Valdez, DO     • ammonium lactate   Topical BID PRN Taco Ortiz PA-C     • benztropine  1 mg Intramuscular Q4H PRN Max 6/day Alfonso Valdez DO     • benztropine  1 mg Oral Q4H PRN Max 6/day Alfonso Valdez, DO     • clonazePAM  0.5 mg Oral BID Aiyana Wilson MD     • hydrOXYzine HCL  50 mg Oral Q6H PRN Max 4/day Alfonso Valdez DO      Or   • diphenhydrAMINE  50 mg Intramuscular Q6H PRN Alfonso Valdez DO     • diphenhydrAMINE  25 mg Oral Q6H PRN Mae Dohsi DO     • divalproex sodium  750 mg Oral HS Ann Castle PA-C     • glycerin-hypromellose-PEG 400  1 drop Both Eyes Q3H PRN Mark Shell, DO     • hydrOXYzine HCL  100 mg Oral Q6H PRN Max 4/day Mark Shell, DO      Or   • LORazepam  2 mg Intramuscular Q6H PRN Mark Shell, DO     • hydrOXYzine HCL  25 mg Oral Q6H PRN Max 4/day Mark Shell, DO     • lithium carbonate  450 mg Oral Q12H Parrish Moran MD     • melatonin  10 mg Oral HS Sharon Ballard MD     • mirtazapine  7.5 mg Oral HS Mark Shell, DO     • OLANZapine  10 mg Oral Q3H PRN Max 3/day Mark Shell, DO      Or   • OLANZapine  10 mg Intramuscular Q3H PRN Max 3/day Mark Shell, DO     • OLANZapine  5 mg Oral Q3H PRN Max 6/day Mark Shell, DO      Or   • OLANZapine  5 mg Intramuscular Q3H PRN Max 6/day Mark Shell, DO     • OLANZapine  2.5 mg Oral Q3H PRN Max 8/day Ciro BHAVNA Pralucindaon, DO     • polyethylene glycol  17 g Oral Daily PRN Mark Shell, DO     • propranolol  10 mg Oral Q8H PRN Mark Shell, DO     • propranolol  10 mg Oral Q8H PRN Sharon Ballard MD     • senna-docusate sodium  1 tablet Oral Daily PRN Mark Shell, DO     • traZODone  50 mg Oral HS PRN Mark Shell, DO       Risks / Benefits of Treatment:    Risks, benefits, and possible side effects of medications explained to patient and patient verbalizes understanding and agreement for treatment. Counseling / Coordination of Care:    Patient's progress discussed with staff in treatment team meeting. Medications, treatment progress and treatment plan reviewed with patient.     Isamar Lu, [address] [place] [address] 06/17/23

## 2023-06-16 NOTE — PROGRESS NOTES
06/16/23 0830   Team Meeting   Meeting Type Daily Rounds   Initial Conference Date 06/16/23   Patient/Family Present   Patient Present No   Patient's Family Present No     Daily Rounds Documentation     Team Members Present:   MD Dante Gayle, RN  Yumiko Roger, VIRGILIOW  ROBINA GilesW    Fixated on discharge. PRN Propranolol for anxiety/restlessness. PRN Mylanta for stomach issues. Childlike and attention seeking; made a comment about hurting staff in order to go to 3rd floor. Attended 6/8 groups. Compliant with medications ane meals. Slept.

## 2023-06-16 NOTE — PROGRESS NOTES
Progress Note - Behavioral Health   Jesenia Ruiz 21 y.o. male MRN: [id number]  Unit/Bed#: [facility]IRE[facility] 109-01 Encounter: [id number]    Assessment/Plan   Principal Problem:    Severe episode of recurrent major depressive disorder, without psychotic features ([address] )  Active Problems:    Medical clearance for psychiatric admission    Autism spectrum disorder    Mood disorder ([address] )    Anxiety disorder    Rash    Recommended Treatment:   Continue current regiment. Continue with group therapy, milieu therapy and occupational therapy. Case discussed with treatment team.  Risks, benefits and possible side effects of Medications: Risks, benefits, and possible side effects of medications have been explained to the patient, who verbalizes understanding. Progress Toward Goals: slow improvement, patient's anger has improved and he no longer feels like punching walls/mirrors. ------------------------------------------------------------    Subjective: Nelly Monreal has been compliant with medications without acute side effects. Patient is fixated on discharge and anxious about being in the [facility]. Yesterday, he needed a PRN, propanolol, for anxiety. Also, he stated to some staff that if he hurts one of them could that warrant him being transferred to another floor. Patient denies anxiety, anger, violent premonitions at this time. And his PRN helped. Seven Bahenalacey confirms continued fatigue most likely from his medications. He is getting 7 hours of sleep without disturbances. He denies wanting to self harm or hurt others. Denies depression, SI or suicidal plans. Denies delusions/voice commands urging him to be violent or act aggressive. Patient confirms good energy, sleep and appetite. Patient denies paranoia or experiences of hallucinations/delusions. Nelly Monreal attended 6/8 groups and he enjoys them. He is enjoying reading his book currently. Patient denies constipation. Last BM was yesterday. Patient has no questions at this time.  Nelly Monreal is consenting to safety on the unit. Psychiatric Review of Systems:  Behavior over the last 24 hours: improved  Sleep: good  Appetite: good  Medication side effects: fatigue possibly from meds  ROS: complete ROS is neg except what is noted above    Vital signs in last 24 hours:  Temp:  [97.5 °F (36.4 °C)-98.1 °F (36.7 °C)] 98.1 °F (36.7 °C)  HR:  [83-97] 97  Resp:  [18] 18  BP: (106-121)/(60-73) 106/60    Mental Status Evaluation:  Appearance:  Age appropriate, looks stated age, well groomed with no facial hair, short hair, little to no eye contact, was lying down under his sheets, but sat up on bed when approached, wearing pajamas   Behavior:  Calm, cooperative, mildly anxious, child like at times, smiling at random times, will have periods of flat affect/lack of emotion   Speech:  Normal rate, volume and pitch, fluent, clear, coherent, soft monotone   Mood:  "I am okay, I just feel anxious about discharge"   Affect:  Congruent with mood, stable, appears anxious and tired, appropriate to thought content, range of affect not within normal limits   Thought Process:  Organized, logical and goal directed   Thought Content: Denies delusional experiences. Denies overt paranoia. Denies suicidal/homicidal ideations/plans. Denies phobias, compulsions or distorted body perceptions. No preoccupation with violence. Not appearing paranoid or hypervigilant. Perceptual disturbances: Denied hallucinations and does not appear to be responding to internal stimuli at this time. Risk Potential: None at this time. No active/passive SI/homicidal ideation. No longer wants to punch a wall. Learning how to cope with anger in healthy ways. Cognition: Oriented self and situation, memory grossly intact, appears to be of average intelligence, cognition not formally tested.    Insight:  intact   Judgment: intact       Current Medications:  Current Facility-Administered Medications   Medication Dose Route Frequency Provider Last Rate • acetaminophen  650 mg Oral Q6H PRN [facility], DO     • acetaminophen  650 mg Oral Q4H PRN [facility], DO     • acetaminophen  975 mg Oral Q6H PRN [facility], DO     • aluminum-magnesium hydroxide-simethicone  30 mL Oral Q4H PRN [facility], DO     • ammonium lactate   Topical BID PRN Joel Sanchez PA-C     • benztropine  1 mg Intramuscular Q4H PRN Max 6/day [facility], DO     • benztropine  1 mg Oral Q4H PRN Max 6/day [facility], DO     • clonazePAM  0.5 mg Oral BID Mary Stiles MD     • hydrOXYzine HCL  50 mg Oral Q6H PRN Max 4/day [facility], DO      Or   • diphenhydrAMINE  50 mg Intramuscular Q6H PRN [facility], DO     • diphenhydrAMINE  25 mg Oral Q6H PRN Laisha Mcclures, DO     • divalproex sodium  750 mg Oral HS Ann Castle PA-C     • glycerin-hypromellose-  1 drop Both Eyes Q3H PRN [facility], DO     • hydrOXYzine HCL  100 mg Oral Q6H PRN Max 4/day [facility], DO      Or   • LORazepam  2 mg Intramuscular Q6H PRN [facility], DO     • hydrOXYzine HCL  25 mg Oral Q6H PRN Max 4/day [facility], DO     • lithium carbonate  450 mg Oral Q12H Daryl Moran MD     • melatonin  10 mg Oral HS Mary Stiles MD     • mirtazapine  7.5 mg Oral HS [facility], DO     • OLANZapine  10 mg Oral Q3H PRN Max 3/day [facility], DO      Or   • OLANZapine  10 mg Intramuscular Q3H PRN Max 3/day [facility], DO     • OLANZapine  5 mg Oral Q3H PRN Max 6/day [facility], DO      Or   • OLANZapine  5 mg Intramuscular Q3H PRN Max 6/day Ciro A Prayson, DO     • OLANZapine  2.5 mg Oral Q3H PRN Max 8/day Ciro A Prayson, DO     • polyethylene glycol  17 g Oral Daily PRN [facility], DO     • propranolol  10 mg Oral Q8H PRN [facility], DO     • propranolol  10 mg Oral Q8H PRN Mary Stiles MD     • senna-docusate sodium  1 tablet Oral Daily PRN Ivana Hudson DO     • traZODone  50 mg Oral HS PRN Sanjuana Nallely, DO         Behavioral Health Medications: all current active meds have been reviewed. Changes as above. Laboratory results:  I have personally reviewed all pertinent laboratory/tests results. No results found for this or any previous visit (from the past 48 hour(s)). This note has been constructed using a voice recognition system. There may be translation, syntax, or grammatical errors. If you have any questions, please contact the dictating provider.     SARAH Tate

## 2023-06-17 PROCEDURE — 99232 SBSQ HOSP IP/OBS MODERATE 35: CPT

## 2023-06-17 RX ADMIN — MIRTAZAPINE 7.5 MG: 7.5 TABLET, FILM COATED ORAL at 21:10

## 2023-06-17 RX ADMIN — CLONAZEPAM 0.5 MG: 0.5 TABLET ORAL at 08:17

## 2023-06-17 RX ADMIN — LITHIUM CARBONATE 450 MG: 450 TABLET, EXTENDED RELEASE ORAL at 08:17

## 2023-06-17 RX ADMIN — Medication 10 MG: at 21:10

## 2023-06-17 RX ADMIN — CLONAZEPAM 0.5 MG: 0.5 TABLET ORAL at 17:04

## 2023-06-17 RX ADMIN — LITHIUM CARBONATE 450 MG: 450 TABLET, EXTENDED RELEASE ORAL at 21:10

## 2023-06-17 RX ADMIN — DIVALPROEX SODIUM 750 MG: 500 TABLET, FILM COATED, EXTENDED RELEASE ORAL at 21:10

## 2023-06-17 RX ADMIN — PROPRANOLOL HYDROCHLORIDE 10 MG: 10 TABLET ORAL at 14:33

## 2023-06-17 NOTE — NURSING NOTE
Odilon Nath has been out and about in the milieu. Interacts with staff and select peers. Able to make needs known. Approaches the nurses station with requests or needs. Denies anxiety, depression and voices. Ate 100% of meals. Took medication without difficulty. When this writer gave him his pills he stated, "Is that my Percocet?"  When asked why he would need Percocet he just laughed. He was then talking with peer about "Perc 30's". Later he saw the supplies for blood draws and asked, "Can I have some blood done?"  He stated that he wanted to "put it in a cup and drink it". He was smiling and laughing about it. He attended Coping Skills, Art Therapy and Nursing Group. Continue to monitor. Precautions maintained.

## 2023-06-17 NOTE — PROGRESS NOTES
06/17/23 1000   Activity/Group Checklist   Group Other (Comment)  (OPEN STUDIO Art Therapy/Social Group)   Attendance Attended   Attendance Duration (min) Greater than 60   Interactions Interacted appropriately   Affect/Mood Appropriate   Goals Achieved Able to listen to others; Able to engage in interactions

## 2023-06-17 NOTE — PLAN OF CARE
Problem: Anxiety  Goal: Anxiety is at manageable level  Description: Interventions:  - Assess and monitor patient's anxiety level. - Monitor for signs and symptoms (heart palpitations, chest pain, shortness of breath, headaches, nausea, feeling jumpy, restlessness, irritable, apprehensive). - Collaborate with interdisciplinary team and initiate plan and interventions as ordered.   - [place] patient to unit/surroundings  - Explain treatment plan  - Encourage participation in care  - Encourage verbalization of concerns/fears  - Identify coping mechanisms  - Assist in developing anxiety-reducing skills  - Administer/offer alternative therapies  - Limit or eliminate stimulants  Outcome: Progressing

## 2023-06-17 NOTE — NURSING NOTE
Received Ryan Meaghan in bed sleeping at 0300. Respirations easy and non labored. No issues or behaviors. Continue to monitor. Precautions maintained.

## 2023-06-17 NOTE — PROGRESS NOTES
Progress Note - Behavioral Health     Makenzie Love 21 y.o. male MRN: [id number]   Unit/Bed#: [facility] 227-16 Encounter: [id number]    Documentation, nursing notes, medication reconciliation, labs, and vitals reviewed. Patient was seen for continuing care and reviewed with treatment team. No acute events over the past 24 hours. Per nursing report, patient with somatic complaints, seeking staff frequently, childlike, inappropriate comments about drugs, running in hallway, received prn propanolol for c/o anxiety, c/o chest pain last evening while calm and smiling. No scheduled medication changes over the past 24 hours. Continues to tolerate current medications with no adverse effects. On evaluation today, patient is found resting in bed. He is minimal in response to questioning. Remains focused on discharge. Describes mood as irritable, anxious, and depressed due to ongoing hospitalization. No SI/HI. Reports some tiredness from medications but otherwise denies side effects.      Psychiatric ROS:  Behavior over the last 24 hours: unchanged  Sleep: normal  Appetite: normal  Medication side effects: Yes - some tiredness   ROS: no complaints, all other systems are negative    Mental Status Evaluation:    Appearance:  casually dressed, adequate grooming   Behavior:  calm   Speech:  normal rate and volume, scant   Mood:  dysphoric   Affect:  mood-incongruent   Thought Process:  concrete   Associations: concrete associations   Thought Content:  no overt delusions   Perceptual Disturbances: no auditory hallucinations, no visual hallucinations   Risk Potential: Suicidal ideation - None  Homicidal ideation - None  Potential for aggression - Not at present   Sensorium:  oriented to person, place, time/date and situation   Memory:  recent and remote memory grossly intact   Consciousness:  alert and awake   Attention/Concentration: attention span and concentration appear shorter than expected for age   Insight:  poor Judgment: poor   Gait/Station: normal gait/station, normal balance   Motor Activity: no abnormal movements     Vital signs in last 24 hours:    Temp:  [97.7 °F (36.5 °C)-97.8 °F (36.6 °C)] 97.7 °F (36.5 °C)  HR:  [84-92] 87  Resp:  [18] 18  BP: (109-132)/(61-85) 109/61    Laboratory results: I have personally reviewed all pertinent laboratory/tests results    Results from the past 24 hours: No results found for this or any previous visit (from the past 24 hour(s)).   Most Recent Labs:   Lab Results   Component Value Date    WBC 6.74 06/03/2023    RBC 5.22 06/03/2023    HGB 15.6 06/03/2023    HCT 49.0 06/03/2023     06/03/2023    RDW 13.1 06/03/2023    NEUTROABS 3.01 06/03/2023    SODIUM 140 05/31/2023    K 4.4 05/31/2023     05/31/2023    CO2 29 05/31/2023    BUN 11 05/31/2023    CREATININE 0.72 05/31/2023    GLUC 89 05/31/2023    CALCIUM 9.3 05/31/2023    AST 24 05/31/2023    ALT 43 05/31/2023    ALKPHOS 98 05/31/2023    TP 7.0 05/31/2023    ALB 4.4 05/31/2023    TBILI 0.52 05/31/2023    CHOLESTEROL 208 (H) 05/27/2023    HDL 39 (L) 05/27/2023    TRIG 190 (H) 05/27/2023    LDLCALC 131 (H) 05/27/2023    [address] 169 05/27/2023    VALPROICTOT 106 (H) 05/31/2023    LITHIUM 0.7 06/14/2023    ESB5ALUYVGTV 1.637 05/27/2023    HGBA1C 5.4 05/27/2023     05/27/2023       Suicide/Homicide Risk Assessment:    Risk of Harm to Self:   Nursing Suicide Risk Assessment Last 24 hours: C-SSRS Risk (Since Last Contact)  Calculated C-SSRS Risk Score (Since Last Contact): No Risk Indicated  Current Specific Risk Factors include: diagnosis of depression, mental illness diagnosis, poor impulse control  Protective Factors: no current suicidal ideation, ability to communicate with staff on the unit, able to contract for safety on the unit, taking medications as ordered on the unit  Based on today's assessment, Bill Palmer presents the following risk of harm to self: low    Risk of Harm to Others:  Nursing Homicide Risk Assessment: Violence Risk to Others: Denies within past 6 months  Current Specific Risk Factors include: poor impulse control, poor insight  Protective Factors: no current homicidal ideation, compliant with medications on the unit as ordered, compliant with unit milieu  Based on today's assessment, Paris Stagers presents the following risk of harm to others: low    The following interventions are recommended: behavioral checks every 7 minutes, continued hospitalization on locked unit    Progress Toward Goals: progressing    Assessment/Plan   Principal Problem:    Severe episode of recurrent major depressive disorder, without psychotic features ([facility])  Active Problems:    Medical clearance for psychiatric admission    Autism spectrum disorder    Mood disorder ([address] [facility])    Anxiety disorder    Rash      Recommended Treatment:     Planned medication and treatment changes:     All current active medications have been reviewed  Encourage group therapy, milieu therapy and occupational therapy  Behavioral Health checks every 7 minutes  Assault and elopement precautions   Check Lithium level 6/21/2023  Continue current medications:    Current Facility-Administered Medications   Medication Dose Route Frequency Provider Last Rate   • acetaminophen  650 mg Oral Q6H PRN Haile Santos, DO     • acetaminophen  650 mg Oral Q4H PRN Haile Santos DO     • acetaminophen  975 mg Oral Q6H PRN Haile Santos DO     • aluminum-magnesium hydroxide-simethicone  30 mL Oral Q4H PRN Haile Santos DO     • ammonium lactate   Topical BID PRN Don Ramirez PA-C     • benztropine  1 mg Intramuscular Q4H PRN Max 6/day Haile Santos DO     • benztropine  1 mg Oral Q4H PRN Max 6/day Haile Santos DO     • clonazePAM  0.5 mg Oral BID Isabella Scott MD     • hydrOXYzine HCL  50 mg Oral Q6H PRN Max 4/day Haile Santos DO      Or   • diphenhydrAMINE  50 mg Intramuscular Q6H PRN Haile Santos DO     • diphenhydrAMINE  25 mg Oral Q6H PRN Jay Vazquez, DO     • divalproex sodium  750 mg Oral HS Ann Castle PA-C     • glycerin-hypromellose-  1 drop Both Eyes Q3H PRN Sanjuana Nallely, DO     • hydrOXYzine HCL  100 mg Oral Q6H PRN Max 4/day Sanjuana Nallely, DO      Or   • LORazepam  2 mg Intramuscular Q6H PRN Sanjuana Nallely, DO     • hydrOXYzine HCL  25 mg Oral Q6H PRN Max 4/day Sanjuana Nallely, DO     • lithium carbonate  450 mg Oral Q12H Vini Bahena MD     • melatonin  10 mg Oral HS Calista Payne MD     • mirtazapine  7.5 mg Oral HS Sanjuana Nallely, DO     • OLANZapine  10 mg Oral Q3H PRN Max 3/day Sanjuana Nallely, DO      Or   • OLANZapine  10 mg Intramuscular Q3H PRN Max 3/day Sanjuana Nallely, DO     • OLANZapine  5 mg Oral Q3H PRN Max 6/day Sanjuana Nallely, DO      Or   • OLANZapine  5 mg Intramuscular Q3H PRN Max 6/day Sanjuana Nallely, DO     • OLANZapine  2.5 mg Oral Q3H PRN Max 8/day Ciro Watters, DO     • polyethylene glycol  17 g Oral Daily PRN Sanjuana Nallely, DO     • propranolol  10 mg Oral Q8H PRN Sanjuaan Nallely, DO     • propranolol  10 mg Oral Q8H PRN Calista Payne MD     • senna-docusate sodium  1 tablet Oral Daily PRN Sanjuana Nallely, DO     • traZODone  50 mg Oral HS PRN Sanjuana Nallely, DO       Risks / Benefits of Treatment:    Risks, benefits, and possible side effects of medications explained to patient and patient verbalizes understanding and agreement for treatment. Counseling / Coordination of Care:    Patient's progress discussed with staff in treatment team meeting. Medications, treatment progress and treatment plan reviewed with patient.     Michael Dubose, [address] [place] [address] 06/18/23

## 2023-06-18 PROCEDURE — 99232 SBSQ HOSP IP/OBS MODERATE 35: CPT

## 2023-06-18 RX ADMIN — MIRTAZAPINE 7.5 MG: 7.5 TABLET, FILM COATED ORAL at 21:13

## 2023-06-18 RX ADMIN — Medication 10 MG: at 21:13

## 2023-06-18 RX ADMIN — DIVALPROEX SODIUM 750 MG: 500 TABLET, FILM COATED, EXTENDED RELEASE ORAL at 21:13

## 2023-06-18 RX ADMIN — LITHIUM CARBONATE 450 MG: 450 TABLET, EXTENDED RELEASE ORAL at 20:44

## 2023-06-18 RX ADMIN — PROPRANOLOL HYDROCHLORIDE 10 MG: 10 TABLET ORAL at 21:57

## 2023-06-18 RX ADMIN — LITHIUM CARBONATE 450 MG: 450 TABLET, EXTENDED RELEASE ORAL at 08:46

## 2023-06-18 RX ADMIN — CLONAZEPAM 0.5 MG: 0.5 TABLET ORAL at 08:46

## 2023-06-18 RX ADMIN — OLANZAPINE 5 MG: 5 TABLET, FILM COATED ORAL at 20:44

## 2023-06-18 RX ADMIN — CLONAZEPAM 0.5 MG: 0.5 TABLET ORAL at 17:09

## 2023-06-18 NOTE — PLAN OF CARE
Problem: Ineffective Coping  Goal: Cooperates with admission process  Description: Interventions:   - Complete admission process  Outcome: Progressing  Goal: Identifies ineffective coping skills  Outcome: Progressing  Goal: Identifies healthy coping skills  Outcome: Not Progressing  Goal: Demonstrates healthy coping skills  Outcome: Progressing

## 2023-06-18 NOTE — NURSING NOTE
Franki Bucio was visible this evening but he did not have much interaction with peers or staff. He did come to writer at [employer] saying he was feeling tightness in his chest. He arsh the same thing last night at the same time. I told him it may be related to anxiety and he just received medication for anxiety. That medication takes forty minutes to take effect and he should let it take the time to work and see if the tightness goes away. I asked him to lay down for  A little while and see if that helped. He did that and then the feeling got better. He realized it may have been related to anxiety and was relieved by the Klonopin he had recently received  Franki Bucio came to the nurses station and said he was agitated and requested a Haldol. I looked at his Mar and said he did not have a Haldol ordered. I said he had Atarax and Olanzapine but he did not have Haldol. He did not appear agitated. He was in the Living Room a few minutes earlier and he picked up the foot stool and was told to put it down and he initially did not do this  He told me that he was "using it to exercise" I told him that was not appropriate. That was not a piece of exercise equipment  He continued to insist on getting Haldol He was given Zyprexa 5 mg at 2044. He scored moderate on the scale.  The medicate only worked minimally as Franki Bucio continued to have an irritable edge

## 2023-06-18 NOTE — NURSING NOTE
Pt is present on the milieu intermittently able to make needs known. He consumed 100 % of dinner. Took his medications without incidence. After dinner pt c/o having chest pain. VS WNL /84 pulse 84, O2 97 %. Pt sitting a phone smiling and calm. A few minutes later pt came up to this writer and said "my chest hurts I need some Perc 30s or some Vicodin." Education and redirection provided to pt. Pt was observed by BHT running. Pt was asked why he was running and pt said "I want to run around the block." Pt was able to be redirected and education provided regarding safety on the unit. Will continue to monitor. Assault and elopement precautions maintained.

## 2023-06-18 NOTE — PROGRESS NOTES
[facility] Group Note     06/18/23 1100   Activity/Group Checklist   Group Life Skills  (Teamwork and Communication)   Attendance Attended   Attendance Duration (min) 46-60   Interactions Interacted appropriately   Affect/Mood Appropriate;Bright   Goals Achieved Identified feelings; Discussed coping strategies; Able to listen to others; Able to engage in interactions; Able to reflect/comment on own behavior;Able to self-disclose; Able to recieve feedback; Able to give feedback to another

## 2023-06-18 NOTE — NURSING NOTE
Gerber Price maintained on ongoing assault and SAFE precaution without incident on this shift.  Observed laying in bed with eyes closed, breath even and easy.  Q 7 minutes checks implemented.  Behavioral control.  Will continue to monitor

## 2023-06-18 NOTE — NURSING NOTE
Patient is compliant with medication and meals . Patient is very child like Patient spends a lot of time in his room.  He is very paranoid and has child like behavior Will continue to monitor and chart any progress

## 2023-06-19 PROCEDURE — 99232 SBSQ HOSP IP/OBS MODERATE 35: CPT | Performed by: PSYCHIATRY & NEUROLOGY

## 2023-06-19 RX ORDER — HALOPERIDOL 5 MG/1
5 TABLET ORAL
Status: DISCONTINUED | OUTPATIENT
Start: 2023-06-19 | End: 2023-06-26

## 2023-06-19 RX ORDER — DIVALPROEX SODIUM 250 MG/1
250 TABLET, EXTENDED RELEASE ORAL DAILY
Status: COMPLETED | OUTPATIENT
Start: 2023-06-21 | End: 2023-06-22

## 2023-06-19 RX ORDER — DIVALPROEX SODIUM 500 MG/1
500 TABLET, EXTENDED RELEASE ORAL
Status: COMPLETED | OUTPATIENT
Start: 2023-06-19 | End: 2023-06-20

## 2023-06-19 RX ADMIN — CLONAZEPAM 0.5 MG: 0.5 TABLET ORAL at 17:23

## 2023-06-19 RX ADMIN — CLONAZEPAM 0.5 MG: 0.5 TABLET ORAL at 09:26

## 2023-06-19 RX ADMIN — LITHIUM CARBONATE 450 MG: 450 TABLET, EXTENDED RELEASE ORAL at 21:29

## 2023-06-19 RX ADMIN — MIRTAZAPINE 7.5 MG: 7.5 TABLET, FILM COATED ORAL at 21:30

## 2023-06-19 RX ADMIN — DIVALPROEX SODIUM 500 MG: 500 TABLET, FILM COATED, EXTENDED RELEASE ORAL at 21:29

## 2023-06-19 RX ADMIN — PROPRANOLOL HYDROCHLORIDE 10 MG: 10 TABLET ORAL at 20:09

## 2023-06-19 RX ADMIN — LITHIUM CARBONATE 450 MG: 450 TABLET, EXTENDED RELEASE ORAL at 09:26

## 2023-06-19 RX ADMIN — Medication 10 MG: at 21:29

## 2023-06-19 RX ADMIN — HALOPERIDOL 5 MG: 5 TABLET ORAL at 21:29

## 2023-06-19 NOTE — PROGRESS NOTES
Davidflaco Torres has been visible on the unit this shift. He is intrusive at the nurse's station trying to push limits. He is asking about inappropriate acts he could do to be discharged home. He was also talking about the events from yesterday and took no responsibility for his actions. He was redirected and encouraged to think about his actions prior to security arriving on the unit. The remainder of the shift he was more appropriate and attended groups. Will continue with q 7 min checks.

## 2023-06-19 NOTE — PROGRESS NOTES
06/19/23 1111   Team Meeting   Meeting Type Daily Rounds   Initial Conference Date 06/19/23   Patient/Family Present   Patient Present No   Patient's Family Present No     Daily Rounds  Zoila Yañez MD, [address] Livier Shafer RN, [facility]  Case reviewed. Per report patient pushed orange button and stated it was on purpose and [to annoy staff]. Requested narcotic medications. Started to  the [address] [place] [address] furniture and caused peers to become uneasy (they thought he may throw it). Sat on ramp ledge in dining area and was not redirectable to get off the ledge; security was called and had to carry patient off of ledge. Received Inderal, Zyprexa and Atarax prns. 4/8 groups.

## 2023-06-19 NOTE — PLAN OF CARE
Problem: Ineffective Coping  Goal: Demonstrates healthy coping skills  Outcome: Progressing     Problem: Risk for Self Injury/Neglect  Goal: Verbalize thoughts and feelings  Description: Interventions:  - Assess and re-assess patient's lethality and potential for self-injury  - Engage patient in 1:1 interactions, daily, for a minimum of 15 minutes  - Encourage patient to express feelings, fears, frustrations, hopes  - Establish rapport/trust with patient   Outcome: Progressing  Goal: Recognize maladaptive responses and adopt new coping mechanisms  Outcome: Not Progressing     Problem: Depression  Goal: Verbalize thoughts and feelings  Description: Interventions:  - Assess and re-assess patient's level of risk   - Engage patient in 1:1 interactions, daily, for a minimum of 15 minutes   - Encourage patient to express feelings, fears, frustrations, hopes   Outcome: Progressing  Goal: Refrain from isolation  Description: Interventions:  - Develop a trusting relationship   - Encourage socialization   Outcome: Progressing     Problem: Anxiety  Goal: Anxiety is at manageable level  Description: Interventions:  - Assess and monitor patient's anxiety level. - Monitor for signs and symptoms (heart palpitations, chest pain, shortness of breath, headaches, nausea, feeling jumpy, restlessness, irritable, apprehensive). - Collaborate with interdisciplinary team and initiate plan and interventions as ordered.   - [place] patient to unit/surroundings  - Explain treatment plan  - Encourage participation in care  - Encourage verbalization of concerns/fears  - Identify coping mechanisms  - Assist in developing anxiety-reducing skills  - Administer/offer alternative therapies  - Limit or eliminate stimulants  Outcome: Progressing

## 2023-06-19 NOTE — NURSING NOTE
Chelsy Tavarez maintained on ongoing assault and SAFE precaution without incident on this shift.  Observed laying in bed with eyes closed, breath even and easy.  Q 7 minutes checks implemented.  Behavioral control.  Will continue to monitor

## 2023-06-19 NOTE — PROGRESS NOTES
Progress Note - Behavioral Health   Balbir Burns 21 y.o. male MRN: [id number]  [facility]01 Encounter: [id number]    Assessment/Plan   Principal Problem:    Severe episode of recurrent major depressive disorder, without psychotic features ([address])  Active Problems:    Medical clearance for psychiatric admission    Autism spectrum disorder    Mood disorder ([address])    Anxiety disorder    Rash    Recommended Treatment:   Continue current regiment: melatonin 9 mg at bedtime, Remeron 7.5 mg at bedtime, Lithobid 450 mg BID, Klonopin 0.5 mg tablet BID. Stop Depakote 1250 mg a day, and adding Haldol PO. Continue with group therapy, milieu therapy and occupational therapy. Case discussed with treatment team.  Risks, benefits and possible side effects of Medications: Risks, benefits, and possible side effects of medications have been explained to the patient, who verbalizes understanding. Progress Toward Goals: slow improvement, patient needs to working on coping mechanisms for his anger, often will act childish to get attention    ------------------------------------------------------------    Subjective: Rashida Soliman has been compliant with medications without acute side effects. Rashida Soliman is fixated on discharge. Rashida Soliman states he feels anxious and angry about not being discharged. He has needed multiple PRNs over the weekend for his anxiety, and believes he will need one today. Over the weekend, the patient was moving chairs around in the unit for no reason, sitting on the floor and refusing to get up, clicking the orange button to alert the nursing staff for help with the stated intention, "I wanted to annoy them" and was noncompliant with security. When I asked patient about his behavior, he stated "I am mad about being here, and I want to leave". His anger has not reduced since then. Patient states PRNs help with his mood and remains to not want to use the punching bag on the unit.  Patient confirms that he wants to punch a wall/mirror in frustration, but denies violent premonitions/homicidal ideation/aggressive thoughts towards someone else or himself. Patient states he feels fatigued and decreased energy, and gets 7 hours of sleep with no sleep disturbances. Denies nightmares. He denies depression, SI, or suicidal plans. Denies delusions/voice commands urging him to be violent or act aggressive. Patient confirms good appetite. However, he does not feel like eating breakfast today. Patient denies overt paranoia, or experiences involving hallucinations or delusions. Patient denies constipation. Jeffery Putnam is consenting for safety on the unit. Psychiatric Review of Systems:  Behavior over the last 24 hours: unchanged  Sleep: good  Appetite: good  Medication side effects: none verbalized   ROS: complete ROS is negative except what is noted above. Vital signs in last 24 hours:  Temp:  [97.5 °F (36.4 °C)-97.6 °F (36.4 °C)] 97.5 °F (36.4 °C)  HR:  [73-88] 78  Resp:  [18] 18  BP: (108-129)/(57-77) 108/57    Mental Status Evaluation:  Appearance:  alert, lacks eye contact, appears stated age, seen lying down in bed with sheets covering him, but sits up when approached, wearing pajamas, appropriate grooming and hygiene    Behavior:  Cooperative, anxious, friendly, pleasant, child-like, smiles at inappropriate times   Speech:  Normal rate, volume, pitch, fluent, clear, coherent, soft, monotone   Mood:  "I am feeling tired and anxious"   Affect:  Congruent with mood, stable appears anxious and tired, appropriate to thought content, affect not within normal range, has periods of flat affect   Thought Process:  Organized, logical, goal directed    Thought Content: No verbalized delusions of grandiose, persecutory, paranoia, somatic or bizarre nature. Denies overt paranoia. Denies suicidal/homicidal ideations/plans. Denies phobias, compulsions, distorted body perceptions. No preoccupation with violence.  Not appearing paranoid or hypervigilant. Perceptual disturbances: Denies hallucinations and does not appear to be responding to internal stimuli at this time. Risk Potential: No active or passive suicidal or homicidal ideation was verbalized during this interview. Wants to punch a wall/mirror, anger unchanged   Cognition: Oriented to self and situation, memory grossly intact, appears to be of average intelligence, cognition not formally tested.     Insight:  intact   Judgment: intact       Current Medications:  Current Facility-Administered Medications   Medication Dose Route Frequency Provider Last Rate   • acetaminophen  650 mg Oral Q6H PRN [place] Lion, DO     • acetaminophen  650 mg Oral Q4H PRN [place] Lion, DO     • acetaminophen  975 mg Oral Q6H PRN [place] Lion, DO     • aluminum-magnesium hydroxide-simethicone  30 mL Oral Q4H PRN [place] Lion, DO     • ammonium lactate   Topical BID PRN Joel Sanchez PA-C     • benztropine  1 mg Intramuscular Q4H PRN Max 6/day [place] Lion, DO     • benztropine  1 mg Oral Q4H PRN Max 6/day [place] Lion, DO     • clonazePAM  0.5 mg Oral BID Mary Stiles MD     • hydrOXYzine HCL  50 mg Oral Q6H PRN Max 4/day [place] Lion, DO      Or   • diphenhydrAMINE  50 mg Intramuscular Q6H PRN [place] Lion, DO     • diphenhydrAMINE  25 mg Oral Q6H PRN Shelbylean Dues, DO     • divalproex sodium  750 mg Oral HS nAn Castle PA-C     • glycerin-hypromellose-  1 drop Both Eyes Q3H PRN [place] Lion, DO     • hydrOXYzine HCL  100 mg Oral Q6H PRN Max 4/day Cal[place]ico Lion, DO      Or   • LORazepam  2 mg Intramuscular Q6H PRN [place] Lion, DO     • hydrOXYzine HCL  25 mg Oral Q6H PRN Max 4/day [place] Lion, DO     • lithium carbonate  450 mg Oral Q12H Daryl Moran MD     • melatonin  10 mg Oral HS Mary Stiles MD     • mirtazapine  7.5 mg Oral HS [place] Lion, DO     • OLANZapine  10 mg Oral Q3H PRN Max 3/day [place] Lion, DO      Or   • OLANZapine  10 mg Intramuscular Q3H PRN Max 3/day Neal Marge, DO     • OLANZapine  5 mg Oral Q3H PRN Max 6/day Neal Marge, DO      Or   • OLANZapine  5 mg Intramuscular Q3H PRN Max 6/day Neal Marge, DO     • OLANZapine  2.5 mg Oral Q3H PRN Max 8/day Ciro BHAVNA Prayson, DO     • polyethylene glycol  17 g Oral Daily PRN Neal France, DO     • propranolol  10 mg Oral Q8H PRN Neal Marge, DO     • propranolol  10 mg Oral Q8H PRN Sterling Kerns MD     • senna-docusate sodium  1 tablet Oral Daily PRN Neal France, DO     • traZODone  50 mg Oral HS PRN Neal France, DO         Behavioral Health Medications: all current active meds have been reviewed. Changes as above. Laboratory results:  I have personally reviewed all pertinent laboratory/tests results. No results found for this or any previous visit (from the past 48 hour(s)). This note has been constructed using a voice recognition system. There may be translation, syntax, or grammatical errors. If you have any questions, please contact the dictating provider.     SARAH Billy

## 2023-06-19 NOTE — NURSING NOTE
The Zyprexa only worked minimally well that was given at 2044. Fede Walton retained an irritable edge throughout the rest of the evening. At 2152 he was sitting on the wall in the dining room. He was asked to come down off that ledge. He was asked repeatedly by several staff members. He said he "didn't want to be here" After awhile, Security was called and they removed him from the ledge. He was taken to his room.  He was given Propranolol 10 mg. /[id number] [phone] He was told he needed to stay in his room for the remainder of the night  Fede Walton was asleep at 2250

## 2023-06-20 PROCEDURE — 99232 SBSQ HOSP IP/OBS MODERATE 35: CPT | Performed by: PSYCHIATRY & NEUROLOGY

## 2023-06-20 RX ADMIN — CLONAZEPAM 0.5 MG: 0.5 TABLET ORAL at 17:26

## 2023-06-20 RX ADMIN — DIVALPROEX SODIUM 500 MG: 500 TABLET, FILM COATED, EXTENDED RELEASE ORAL at 21:16

## 2023-06-20 RX ADMIN — LITHIUM CARBONATE 450 MG: 450 TABLET, EXTENDED RELEASE ORAL at 08:27

## 2023-06-20 RX ADMIN — ACETAMINOPHEN 975 MG: 325 TABLET ORAL at 12:14

## 2023-06-20 RX ADMIN — LITHIUM CARBONATE 450 MG: 450 TABLET, EXTENDED RELEASE ORAL at 21:16

## 2023-06-20 RX ADMIN — Medication 10 MG: at 21:17

## 2023-06-20 RX ADMIN — MIRTAZAPINE 7.5 MG: 7.5 TABLET, FILM COATED ORAL at 21:17

## 2023-06-20 RX ADMIN — CLONAZEPAM 0.5 MG: 0.5 TABLET ORAL at 08:27

## 2023-06-20 RX ADMIN — HALOPERIDOL 5 MG: 5 TABLET ORAL at 21:16

## 2023-06-20 NOTE — PROGRESS NOTES
06/20/23 0942   Team Meeting   Meeting Type Tx Team Meeting   Initial Conference Date 06/20/23   Next Conference Date 07/03/23   Team Members Present   Team Members Present Physician;Nurse;   Physician Team Member Dr. Rosalina Bowser MD   Nursing Team Member Kathi Jackson RN   Social Work Team Member Buddy Castro, [place]   Patient/Family Present   Patient Present Yes   Patient's Family Present No     Patient was present for his treatment team meeting prepared with a completed self-assessment. He was calm, flat, and appeared distracted at times. He was dressed appropriately and well groomed. He was able to share his self-assessment appropriately. Something he learned while attending groups last week was patience. One goal he accomplished last week was working on his anxiety. One recovery-centered goal he is still working on is anger management. Something challenging he dealt with last week was getting declined by a group home. Symptoms he experienced included: depression and anxiety. Other struggles included feeling bored, negative thinking, and low self-esteem. Coping skills he has used to manage his symptoms include: music, walking, writing, and grounding. His self-care included: meditation, groups, and breathing. Patient was able to identify all his medications, and denied having any issues with them. He denied having any medical concerns. Something he would like to discuss more is his anger, anxiety, and discharge planning. Patient was praised for how well he did with the self-assessment. A 30 day treatment plan review was completed during this meeting. Patient's childlike behaviors were addressed; he denies that the behaviors had anything to do with seeking attention and smiled when confronted. He was reminded that his behaviors impact whether or not a group home will accept him.   Patient was encouraged to use the coping skills he has to manage his boredom, frustration, and anxiety. Patient and team agreed that patient should continue to build his coping skills in order to better mange his anxiety and depression. Patient reports that anxiety is still high, and rated his depression a 5/10. Patient denied any thoughts of self-harm or suicide so goal will be resolved. Patient is doing well with group attendance, team meetings, and meetings with SW.  Discharge plan is a group home, but first he needs a waiver. Patient reminded that it will likely take another week or more to get the waiver. SW and patient will meet later today to discuss his behaviors in more depth, and to call Social Security to see if their is a pending application.

## 2023-06-20 NOTE — PROGRESS NOTES
06/20/23 0830   Team Meeting   Meeting Type Daily Rounds   Initial Conference Date 06/20/23   Patient/Family Present   Patient Present No   Patient's Family Present No     Daily Rounds Documentation     Team Members Present:   MD Raman Armstrong, [address], RN  Melecio Valentine, [facility]  Boo Moreno, [facility]    Testing limits-pushing buttons, threatening to sit on ledge, and threatening to spit on security. Fixated on discharge. Inderal PRN given for anxiety. Haldol HS added. Attended 7/8 groups. Compliant with medications and meals. Slept.

## 2023-06-20 NOTE — PROGRESS NOTES
Tobi Loving has been visible on the unit this shift. He remains with a flat affect and delayed responses. Tobi Loving attended his treatment team meeting this morning, see SW notes. He has been less intrusive this shift. He did stand at the nurse's station one time today and when if we could help him Tobi Loving replied "I need a percocet". This writer redirected him stating that he know he does not have percocet ordered. I then asked where he was having pain. Tobi Loving reported having 7/10 back pain. Tylenol 975 mg was given at 1214. Tobi Loving reports that this was effective upon reassessment. He is attending groups. Medication and meal complaint. Will continue with q 7 min checks.

## 2023-06-20 NOTE — NURSING NOTE
Male BHT reported at 80 that he heard pt state in dining that he was going behave like he did last night and sit on ledge in dining room. Writer then spoke to pt and encouraged him to verbalize his thoughts. Pt remains fixated on discharge and then rambled on about the events that occurred last evening. Writer encouraged pt to focus on the present and his behavior now. Writer offered pt prn medication. Pt initially stated that he wanted Haldol "Because it makes me sleep."  Writer informed pt that he now has Haldol scheduled for HS starting tonight. Pt then stated that "Inderal" works for him. Inderal 10 mg po prn given for anxiety at 2009 ([facility]xiety [facility]). Will monitor/assess for effectiveness.

## 2023-06-20 NOTE — NURSING NOTE
Prn Inderal effective for decrease in anxiety. Pt noted calmer at present and having snack in dining room with peers.

## 2023-06-20 NOTE — NURSING NOTE
Jed Khan has been awake, alert, and visible intermittently out in the milieu. Pt went out on the deck with staff and peers for fresh air group. Pt ate 100% supper. Pt remains fixated on discharge. Some socialization noted with peers. Pt attended and participated in evening group, wrap up group, and had evening snack with peers. Compliant with scheduled meds, reviewed meds with pt, and pt verbalized understanding his meds. Continue to monitor/assess for any changes.

## 2023-06-20 NOTE — PLAN OF CARE
Overall patient's psychiatric symptoms appear to be improving, but he continues to struggle behaviorally at times. He is compliant with medications. He attends groups regularly. He recently completed his SIS assessment, and will soon have the waiver he needs for residential services. Patient should continue to build his coping skills and impulse control. Goal related to risk for self-harm resolved; patient has not had any SI or thoughts to self-harm in the last 30 days. Problem: Ineffective Coping  Goal: Cooperates with admission process  Description: Interventions:   - Complete admission process  Outcome: Completed  Goal: Identifies ineffective coping skills  Outcome: Progressing  Goal: Identifies healthy coping skills  Outcome: Progressing  Goal: Demonstrates healthy coping skills  Outcome: Progressing     Problem: Risk for Self Injury/Neglect  Goal: Verbalize thoughts and feelings  Description: Interventions:  - Assess and re-assess patient's lethality and potential for self-injury  - Engage patient in 1:1 interactions, daily, for a minimum of 15 minutes  - Encourage patient to express feelings, fears, frustrations, hopes  - Establish rapport/trust with patient   Outcome: Completed     Problem: Depression  Goal: Verbalize thoughts and feelings  Description: Interventions:  - Assess and re-assess patient's level of risk   - Engage patient in 1:1 interactions, daily, for a minimum of 15 minutes   - Encourage patient to express feelings, fears, frustrations, hopes   Outcome: Progressing  Goal: Refrain from isolation  Description: Interventions:  - Develop a trusting relationship   - Encourage socialization   Outcome: Progressing     Problem: Anxiety  Goal: Anxiety is at manageable level  Description: Interventions:  - Assess and monitor patient's anxiety level.    - Monitor for signs and symptoms (heart palpitations, chest pain, shortness of breath, headaches, nausea, feeling jumpy, restlessness, irritable, apprehensive). - Collaborate with interdisciplinary team and initiate plan and interventions as ordered. - [place] patient to unit/surroundings  - Explain treatment plan  - Encourage participation in care  - Encourage verbalization of concerns/fears  - Identify coping mechanisms  - Assist in developing anxiety-reducing skills  - Administer/offer alternative therapies  - Limit or eliminate stimulants  Outcome: Progressing     Problem: Individualized Interventions  Goal: Attend and participate in 50% of unit activities, including therapeutic, recreational, and educational groups weekly. Outcome: Progressing  Goal: Attend biweekly treatment team meetings, and come prepared with a completed self-assessment. Outcome: Progressing  Goal: Engage with  at least twice weekly, and in individual psychotherapy as able to.   Outcome: Progressing     Problem: DISCHARGE PLANNING - CARE MANAGEMENT  Goal: Discharge to post-acute care or home with appropriate resources  Description: INTERVENTIONS:  - Conduct assessment to determine patient/family and health care team treatment goals, and need for post-acute services based on payer coverage, community resources, and patient preferences, and barriers to discharge  - Address psychosocial, clinical, and financial barriers to discharge as identified in assessment in conjunction with the patient/family and health care team  - Arrange appropriate level of post-acute services according to patient’s   needs and preference and payer coverage in collaboration with the physician and health care team  - Communicate with and update the patient/family, physician, and health care team regarding progress on the discharge plan  - Arrange appropriate transportation to post-acute venues  Outcome: Progressing
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
## 2023-06-20 NOTE — SOCIAL WORK
Psychotherapy Summary    SW and patient met privately for patient's weekly scheduled psychotherapy session. Patient was calm, polite, and childlike at times. He also appeared preoccupied at times, but mostly with discharge. Patient needed redirection at times to stay on topic. Overall, he was able to address the inappropriate behaviors he has been displaying. Patient struggles with impulse control and managing his frustrations; we agreed that he would practice counting to 10 before doing or saying anything or ask for the radio when feeling frustrated. Patient identified that listening to music is an effective coping skill for him. SW also reiterated that his actions can impact whether or not a group home accepts him. A few times he asked about going to a shelter, and was redirected. SW also spent time assessing patient's thought patterns; patient came prepared with an assignment from last week. The assignment indicated that he likely has several cognitive distortions. Patient unknowingly identified some core beliefs; that he is weird and that he is not good enough. We spoke about this beliefs at length.  patient introduced to the concept of CBT, and SW modeled putting negative/irrational thoughts on trial and re-framing thoughts. SW also had patient identify his strengths, and provided a lot of praise when appropriate. These core belief seem to stem from early childhood and being adopted. Patient expressed that he does wonder if his biological parents ever wanted him. Patient also feels like an outside in his current family, and states that he is weird because he has been to half-way, has been in multiple psychiatric hospitals, doesn't have a car, and doesn't have a job.   He feels his parents don't care as much about him as his other siblings, which is why he is still in the hospital.  SW and patient will continue to meet 2-4 times monthly to strengthen patient's self-esteem through CBT, to work on improving his impulse control, and to strengthen his ability to handle distress. Patient's RN informed that patient should be encouraged to use the radio or count to 10 when he starts to struggle. First 10 minutes of session spent talking to [employer] [place]; no claim has been submitted. Appointment with [facility] to apply for SSI chriss scheduled for 8/11/2023 at 10:30AM.  SW advised to call in weekly to see if a sooner appointment becomes available.

## 2023-06-21 LAB — LITHIUM SERPL-SCNC: 0.7 MMOL/L (ref 0.6–1.2)

## 2023-06-21 PROCEDURE — 99232 SBSQ HOSP IP/OBS MODERATE 35: CPT | Performed by: PSYCHIATRY & NEUROLOGY

## 2023-06-21 PROCEDURE — 80178 ASSAY OF LITHIUM: CPT | Performed by: PSYCHIATRY & NEUROLOGY

## 2023-06-21 RX ADMIN — CLONAZEPAM 0.5 MG: 0.5 TABLET ORAL at 17:22

## 2023-06-21 RX ADMIN — LITHIUM CARBONATE 450 MG: 450 TABLET, EXTENDED RELEASE ORAL at 08:18

## 2023-06-21 RX ADMIN — HALOPERIDOL 5 MG: 5 TABLET ORAL at 21:12

## 2023-06-21 RX ADMIN — LITHIUM CARBONATE 450 MG: 450 TABLET, EXTENDED RELEASE ORAL at 21:12

## 2023-06-21 RX ADMIN — DIVALPROEX SODIUM 250 MG: 250 TABLET, EXTENDED RELEASE ORAL at 08:18

## 2023-06-21 RX ADMIN — CLONAZEPAM 0.5 MG: 0.5 TABLET ORAL at 08:18

## 2023-06-21 RX ADMIN — MIRTAZAPINE 7.5 MG: 7.5 TABLET, FILM COATED ORAL at 21:13

## 2023-06-21 RX ADMIN — Medication 10 MG: at 21:12

## 2023-06-21 NOTE — NURSING NOTE
Odilon Nath was in bed resting quietly with no apparent distress as seen by staff on their 7 minute rounds when observed throughout the night

## 2023-06-21 NOTE — PROGRESS NOTES
06/21/23 0830   Team Meeting   Meeting Type Daily Rounds   Initial Conference Date 06/21/23   Patient/Family Present   Patient Present No   Patient's Family Present No     Daily Rounds Documentation     Team Members Present:   MD Joaquim Shipley, Richi Sanchez, RN  Ricky Handley, [facility], [place], [place]    Delayed. Less intrusive and limit testing. Open in therapy session. Attended 2/7 groups. Compliant with medications and meals. Slept.

## 2023-06-21 NOTE — PLAN OF CARE
Problem: Ineffective Coping  Goal: Demonstrates healthy coping skills  Outcome: Progressing     Problem: Risk for Self Injury/Neglect  Goal: Treatment Goal: Remain safe during length of stay, learn and adopt new coping skills, and be free of self-injurious ideation, impulses and acts at the time of discharge  Outcome: Progressing     Problem: Depression  Goal: Verbalize thoughts and feelings  Description: Interventions:  - Assess and re-assess patient's level of risk   - Engage patient in 1:1 interactions, daily, for a minimum of 15 minutes   - Encourage patient to express feelings, fears, frustrations, hopes   Outcome: Progressing  Goal: Refrain from isolation  Description: Interventions:  - Develop a trusting relationship   - Encourage socialization   Outcome: Progressing     Problem: Anxiety  Goal: Anxiety is at manageable level  Description: Interventions:  - Assess and monitor patient's anxiety level. - Monitor for signs and symptoms (heart palpitations, chest pain, shortness of breath, headaches, nausea, feeling jumpy, restlessness, irritable, apprehensive). - Collaborate with interdisciplinary team and initiate plan and interventions as ordered.   - [place] patient to unit/surroundings  - Explain treatment plan  - Encourage participation in care  - Encourage verbalization of concerns/fears  - Identify coping mechanisms  - Assist in developing anxiety-reducing skills  - Administer/offer alternative therapies  - Limit or eliminate stimulants  Outcome: Progressing

## 2023-06-21 NOTE — NURSING NOTE
Artis Steiner has been awake, alert, and visible intermittently out in the milieu. Pt ate 100% supper. Pt preoccupied, easily distracted in conversation with staff, and has delayed responses. Pt naps at intervals in his room. No behavioral issues this shift. Pt denies any depression, anxiety, a/v hallucinations, and has not verbalized any delusions. Pt complained of feeling "nauseous" upon awakening from nap at [address] [facility]. Ginger ale and saltine crackers given and effective for no further complaints of nausea. Pt did not attend evening nursing group or wrap up group but came out and had evening snack with peers. Compliant with scheduled meds. Continue to monitor/assess for any changes.

## 2023-06-21 NOTE — PROGRESS NOTES
06/21/23 1100   Activity/Group Checklist   Group Wellness  (Negative thoughts/Coping thoughts)   Attendance Attended   Attendance Duration (min) 46-60   Interactions Interacted appropriately  Nelly Monreal participated in the session by volunteering to read portions of the resource sheet out loud and raising his hand to agree to sometimes have the negative thoughts on these sheets.)   Affect/Mood Appropriate  Nelly Monreal read the negative thought "I am unwanted"/coping thought "Even though I feel rejected, it doesn't mean that I am". Nelly Monreal stated that his parents don't want him. He was encouraged to challenge that thought, and why he may be feeling this way.)   Goals Achieved Identified feelings; Discussed coping strategies; Able to listen to others; Able to engage in interactions; Able to self-disclose; Able to recieve feedback; Able to give feedback to another

## 2023-06-21 NOTE — PROGRESS NOTES
06/20/23 1300   Activity/Group Checklist   Group Other (Comment)  (Group Art Therapy/Psychodynamic,Reflecting on the Past Week with Discussion)   Attendance Attended   Attendance Duration (min) 46-60   Interactions Interacted appropriately   Affect/Mood Appropriate   Goals Achieved Able to listen to others; Able to engage in interactions  (Patient did not engage materials; partial participation and left group prior to discussion)

## 2023-06-21 NOTE — PROGRESS NOTES
Progress Note - Behavioral Health   Ramona Turpin 21 y.o. male MRN: [id number]  Unit/Bed#: CRUZITO [facility] 109-01 Encounter: [id number]    Assessment/Plan   Principal Problem:    Severe episode of recurrent major depressive disorder, without psychotic features ([address])  Active Problems:    Medical clearance for psychiatric admission    Autism spectrum disorder    Mood disorder ([address])    Anxiety disorder    Rash    Recommended Treatment:   Continue current regiment. Depakote was stopped and Haldol PO 5 mg tablet at night was added recently. Continue with group therapy, milieu therapy and occupational therapy. Case discussed with treatment team.  Risks, benefits and possible side effects of Medications: Risks, benefits, and possible side effects of medications have been explained to the patient, who verbalizes understanding. Progress Toward Goals: slow improvement, patient needs to work on coping mechanisms for his anger, will act childish to get attention    ------------------------------------------------------------    Subjective: Tobi Loving has been compliant with medications without acute side effects. Patient is okay with new medication adjustment. Although the Haldol is helping with his sleep at night, patient still reports daytime fatigue, which does get better as the day passes. Tobi Loving is waking up 2-3 times a night to urinate, but other than this, denies other sleep disturbances and denies nightmares. He is getting around 7 hours of sleep at night. He drinks 5-6 cups of water a day. Tobi Loving is still fixated on discharge and feels anxious because of it. He denies depression, low energy, decreased concentration, loss in interest, feelings of guilt, appetite changes, psychomotor agitation/retardation and SI/plans. He stated his aggression is getting better, but still feels moderate anger. Denies wanting to punch walls/mirrors today, and denies homicidal ideations/violent premonitions.  Walking around and music helps him. Denies experiences of delusions, voice commands, hallucinations and paranoia. Patient attended 2/7 groups yesterday. Patient had b/l lower back pain yesterday and asked the nurses for a "percocet". He received Tylenol with relief. He is experiencing some back pain this morning. He does not know how he got it and denies body aches anywhere else. He denies fevers, weakness or chills. David Torres is consenting for safety on the unit. Psychiatric Review of Systems:  Behavior over the last 24 hours: improved  Sleep: normal  Appetite: good  Medication side effects: none verbalized  ROS: Complete review of systems is negative except as noted above. Vital signs in last 24 hours:  Temp:  [97.5 °F (36.4 °C)] 97.5 °F (36.4 °C)  HR:  [] 101  Resp:  [18] 18  BP: (102-122)/(66-72) 102/66    Mental Status Evaluation:  Appearance:  Alert, lacks eye contact, appears stated age, was found lying down in bed under the covers, sits up when approached, wearing pajamas, well groomed, short hair. No facial hair, good hygiene   Behavior:  Cooperative, anxious, friendly, pleasant, child-like, smiles at inappropriate times. Body was physically shaking/shivering during whole interview, patient states he is cold and he's under the vent. Speech:  Delayed response to questions, volume, pitch, fluent, clear, coherent, soft, monotone   Mood:  "Feeling tired and anxious"   Affect:  Congruent with mood, stable, appears anxious and tired, appropriate to thought content, affect not within normal range, has periods of unprompted smiling/acting silly and then flat affect and a zoning out look   Thought Process:  Organized, logical, goal-directed   Thought Content: no verbalized delusions or overt paranoia. Denies SI/plans. Denies homicidal ideations/plans. Denies phobias, compulsions, distorted body perceptions. No preoccupation with violence. Not appearing paranoid or hypervigilant.     Perceptual disturbances: no reported hallucinations and does not appear to be responding to internal stimuli at this time   Risk Potential: No active or passive suicidal or homicidal ideation was verbalized during interview, Low potential for aggression based on previous behavior   Cognition: oriented to self and situation, appears to be of average intelligence and cognition not formally tested   Insight:  intact   Judgment: intact       Current Medications:  Current Facility-Administered Medications   Medication Dose Route Frequency Provider Last Rate   • acetaminophen  650 mg Oral Q6H PRN Chyrel Michel, DO     • acetaminophen  650 mg Oral Q4H PRN Chyrel Michel, DO     • acetaminophen  975 mg Oral Q6H PRN Chyrel Michel, DO     • aluminum-magnesium hydroxide-simethicone  30 mL Oral Q4H PRN Chyrel Michel, DO     • ammonium lactate   Topical BID PRN Ingrid Lopez PA-C     • benztropine  1 mg Intramuscular Q4H PRN Max 6/day Chyrel Michel, DO     • benztropine  1 mg Oral Q4H PRN Max 6/day Chyrel Michel, DO     • clonazePAM  0.5 mg Oral BID Raghu Washington MD     • hydrOXYzine HCL  50 mg Oral Q6H PRN Max 4/day Chyrel Michel, DO      Or   • diphenhydrAMINE  50 mg Intramuscular Q6H PRN Chyrel Michel, DO     • diphenhydrAMINE  25 mg Oral Q6H PRN Roxene Pancake, DO     • divalproex sodium  250 mg Oral Daily Raghu Washington MD     • glycerin-hypromellose-  1 drop Both Eyes Q3H PRN Chyrel Michel, DO     • haloperidol  5 mg Oral HS Raghu Washington MD     • hydrOXYzine HCL  100 mg Oral Q6H PRN Max 4/day Chyrel Michel, DO      Or   • LORazepam  2 mg Intramuscular Q6H PRN Chyrel Michel, DO     • hydrOXYzine HCL  25 mg Oral Q6H PRN Max 4/day Chyrel Michel, DO     • lithium carbonate  450 mg Oral Q12H Murphy Daley MD     • melatonin  10 mg Oral HS Raghu Washington MD     • mirtazapine  7.5 mg Oral HS Chyrel Michel, DO     • OLANZapine  10 mg Oral Q3H PRN Max 3/day Chyrel Michel, DO      Or   • OLANZapine  10 mg Intramuscular Q3H PRN Max 3/day Sanjuana Nallely, DO     • OLANZapine  5 mg Oral Q3H PRN Max 6/day Sanjuana Nallely, DO      Or   • OLANZapine  5 mg Intramuscular Q3H PRN Max 6/day Sanjauna Nallely, DO     • OLANZapine  2.5 mg Oral Q3H PRN Max 8/day Ciro A Prayson, DO     • polyethylene glycol  17 g Oral Daily PRN Sanjuana Nallely, DO     • propranolol  10 mg Oral Q8H PRN Sanjuana Nallely, DO     • propranolol  10 mg Oral Q8H PRN Calista Payne MD     • senna-docusate sodium  1 tablet Oral Daily PRN Sanjuana Nallely, DO     • traZODone  50 mg Oral HS PRN Sanjuana Nallely, DO         Behavioral Health Medications: all current active meds have been reviewed. Changes as above. Laboratory results:  I have personally reviewed all pertinent laboratory/tests results. Recent Results (from the past 48 hour(s))   Lithium level    Collection Time: 06/21/23  6:19 AM   Result Value Ref Range    Lithium Lvl 0.7 0.6 - 1.2 mmol/L        This note has been constructed using a voice recognition system. There may be translation, syntax, or grammatical errors. If you have any questions, please contact the dictating provider.     SARAH Tate
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
## 2023-06-22 PROCEDURE — 99232 SBSQ HOSP IP/OBS MODERATE 35: CPT | Performed by: PSYCHIATRY & NEUROLOGY

## 2023-06-22 RX ADMIN — LITHIUM CARBONATE 450 MG: 450 TABLET, EXTENDED RELEASE ORAL at 08:13

## 2023-06-22 RX ADMIN — Medication 10 MG: at 21:16

## 2023-06-22 RX ADMIN — LITHIUM CARBONATE 450 MG: 450 TABLET, EXTENDED RELEASE ORAL at 21:16

## 2023-06-22 RX ADMIN — HALOPERIDOL 5 MG: 5 TABLET ORAL at 21:16

## 2023-06-22 RX ADMIN — PROPRANOLOL HYDROCHLORIDE 10 MG: 10 TABLET ORAL at 19:47

## 2023-06-22 RX ADMIN — DIVALPROEX SODIUM 250 MG: 250 TABLET, EXTENDED RELEASE ORAL at 08:14

## 2023-06-22 RX ADMIN — MIRTAZAPINE 7.5 MG: 7.5 TABLET, FILM COATED ORAL at 21:16

## 2023-06-22 RX ADMIN — CLONAZEPAM 0.5 MG: 0.5 TABLET ORAL at 17:08

## 2023-06-22 RX ADMIN — CLONAZEPAM 0.5 MG: 0.5 TABLET ORAL at 08:14

## 2023-06-22 NOTE — PROGRESS NOTES
Patient is more isolative to his room this shift. Only coming out for meds, meals and some groups. He was more appropriate this shift. He has not been intrusive at the nurses station. Patient is medication and meal compliant. No issues or concerns noted. Will continue with q7 min checks.

## 2023-06-22 NOTE — PROGRESS NOTES
Progress Note - Behavioral Health   Cher Galeana 21 y.o. male MRN: [id number]  Unit/Bed#: [facility]ITO [facility] 109-01 Encounter: [id number]    Assessment/Plan   Principal Problem:    Severe episode of recurrent major depressive disorder, without psychotic features ([address])  Active Problems:    Medical clearance for psychiatric admission    Autism spectrum disorder    Mood disorder ([address])    Anxiety disorder    Rash    Recommended Treatment:   Continue current regiment. Depakote was stopped and Haldol PO 5 mg tablet at night was added recently. Continue with group therapy, milieu therapy and occupational therapy. Case discussed with treatment team.  Risks, benefits and possible side effects of Medications: Risks, benefits, and possible side effects of medications have been explained to the patient, who verbalizes understanding. Discharge planning: group home or will be discharged home until SSI is processed and then will go to group home    Progress Toward Goals: slow improvement, patient is fixated more on discharge than coping mechanisms/getting better, will act childish at times to get attention      ------------------------------------------------------------    Subjective: Dmitri Blanton has been compliant with medications. Patient reports fatigue as side effect. Patient remains okay with medication adjustment. Patient is reporting continued daytime fatigue that somewhat resolves by noon time. He gets 6 hours of sleep and wakes up 2-3 times a night to urinate. He drinks 6 cups of water a day. Denies other sleep disturbances like SOB, nightmares or racing thoughts. He does not take naps throughout the day. Dmitri Blanton states he feels anxious and is fixated on discharge. Patient states he had a meeting with his  yesterday where they discussed discharge options for him. Patient states that his social security is being processed so he currently cannot afford to go to a group home.  There was a discussion about whether his parents will pay out of pocket until SSI is processed or if he will be discharged home with his parents until SSI goes through. Patient is stressed about this situation. He does not want his parents to pay and is thinking of working so he can pay for the group home. Patient does not want to talk to his parents about it, because it makes him "anxious and scared". He does not know what they are going to say, and he feels bad about the possibility of them paying out of pocket for him. He states he feels sad and stressed about the situation. He confirms decreased energy. He denies depression, loss of interest, decreased concentration, psychomotor agitation/retardation, suicidal ideation/plan. Patient states he no longer feels angry and denies wanting to punch walls/mirrors. He denies annoyance,agression towards anyone. He denies desire to self harm and denies homicidal ideations/plans. Patient denies experiences related to hallucinations/delusions. Patient denies paranoia. Patient reports good appetite and had breakfast this morning. He had toast with butter. Patient does not have back pain today. Patient is attending groups and enjoys them. Rah Robles is consenting for safety on the unit. Psychiatric Review of Systems:  Behavior over the last 24 hours: improved   Sleep: 6 hours and waking up 2-3 times to urinate  Appetite: good  Medication side effects: "fatigue"  ROS: complete ROS is neg except what is noted above. Vital signs in last 24 hours:  Temp:  [97.6 °F (36.4 °C)] 97.6 °F (36.4 °C)  HR:  [79-93] 79  Resp:  [18] 18  BP: (122-126)/(76) 122/76    Mental Status Evaluation:  Appearance:  Alert, lacks eye contact, staring into space, found sitting on bed in a hunched position, wearing home clothes drinking a cup of water with a slight hand tremor, appears stated age, well groomed, short hair.  No facial hair, good hygiene    Behavior:  Cooperative, pleasant, anxious, smiles at inappropriate times   Speech: Delayed response to questions, soft, monotone, coherent, clear, fluent, normal pitch   Mood:  "I feel tired and anxious"   Affect:  Congruent with mood, stable, appears anxious, tired and more sad, appropriate to thought content. Affect not within normal range: has periods of unprompted smiling and then flat affect   Thought Process:  Organized, logical, goal-directed   Thought Content: no verbalized delusions or overt paranoia. Denies SI/plans. Denies homicidal ideations/plans. Denies phobias, compulsions, distorted body perceptions. No preoccupation with violence. Not appearing paranoid or hypervigilant. Perceptual disturbances: no reported hallucinations and does not appear to be responding to internal stimuli at this time   Risk Potential: No active or passive suicidal or homicidal ideation was verbalized during interview.  Low potential for aggression based on previous behavior    Cognition: oriented to self and situation, appears to be of average intelligence and cognition not formally tested   Insight:  Fair   Judgment: Fair       Current Medications:  Current Facility-Administered Medications   Medication Dose Route Frequency Provider Last Rate   • acetaminophen  650 mg Oral Q6H PRN Mark Shell, DO     • acetaminophen  650 mg Oral Q4H PRN Mark Shell, DO     • acetaminophen  975 mg Oral Q6H PRN Mark Shell, DO     • aluminum-magnesium hydroxide-simethicone  30 mL Oral Q4H PRN Mark Shell, DO     • ammonium lactate   Topical BID PRN Kelly García PA-C     • benztropine  1 mg Intramuscular Q4H PRN Max 6/day Mark Shell, DO     • benztropine  1 mg Oral Q4H PRN Max 6/day Mark Shell, DO     • clonazePAM  0.5 mg Oral BID Sharon Ballard MD     • hydrOXYzine HCL  50 mg Oral Q6H PRN Max 4/day Mark Shell, DO      Or   • diphenhydrAMINE  50 mg Intramuscular Q6H PRN Mark Shell, DO     • diphenhydrAMINE  25 mg Oral Q6H PRN Bang Boudreaux DO     • glycerin-hypromellose-  1 drop Both Eyes Q3H PRN Clearnce Roll, DO     • haloperidol  5 mg Oral HS Niranjan Bailon MD     • hydrOXYzine HCL  100 mg Oral Q6H PRN Max 4/day Clearnce Roll, DO      Or   • LORazepam  2 mg Intramuscular Q6H PRN Clearnce Roll, DO     • hydrOXYzine HCL  25 mg Oral Q6H PRN Max 4/day Clearnce Roll, DO     • lithium carbonate  450 mg Oral Q12H Mary Joaquin MD     • melatonin  10 mg Oral HS Niranjan Bailon MD     • mirtazapine  7.5 mg Oral HS Clearnce Roll, DO     • OLANZapine  10 mg Oral Q3H PRN Max 3/day Clearnce Roll, DO      Or   • OLANZapine  10 mg Intramuscular Q3H PRN Max 3/day Clearnce Roll, DO     • OLANZapine  5 mg Oral Q3H PRN Max 6/day Clearnce Roll, DO      Or   • OLANZapine  5 mg Intramuscular Q3H PRN Max 6/day Clearnce Roll, DO     • OLANZapine  2.5 mg Oral Q3H PRN Max 8/day Ciroliza Jimon, DO     • polyethylene glycol  17 g Oral Daily PRN Clearnce Roll, DO     • propranolol  10 mg Oral Q8H PRN Clearnce Roll, DO     • propranolol  10 mg Oral Q8H PRN Niranjan Bailon MD     • senna-docusate sodium  1 tablet Oral Daily PRN Clearnce Roll, DO     • traZODone  50 mg Oral HS PRN Clearnce Roll, DO         Behavioral Health Medications: all current active meds have been reviewed. Changes as above. Laboratory results:  I have personally reviewed all pertinent laboratory/tests results. Recent Results (from the past 48 hour(s))   Lithium level    Collection Time: 06/21/23  6:19 AM   Result Value Ref Range    Lithium Lvl 0.7 0.6 - 1.2 mmol/L        This note has been constructed using a voice recognition system. There may be translation, syntax, or grammatical errors. If you have any questions, please contact the dictating provider.     SARAH Posadas
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
## 2023-06-22 NOTE — PROGRESS NOTES
06/22/23 1100   Activity/Group Checklist   Group Wellness  (relaxation)   Attendance Attended   Attendance Duration (min) 31-45   Interactions Interacted appropriately   Affect/Mood Appropriate   Goals Achieved Able to listen to others; Able to engage in interactions; Discussed coping strategies

## 2023-06-22 NOTE — PLAN OF CARE
Problem: Ineffective Coping  Goal: Demonstrates healthy coping skills  Outcome: Progressing     Problem: Risk for Self Injury/Neglect  Goal: Recognize maladaptive responses and adopt new coping mechanisms  Outcome: Not Progressing     Problem: Depression  Goal: Verbalize thoughts and feelings  Description: Interventions:  - Assess and re-assess patient's level of risk   - Engage patient in 1:1 interactions, daily, for a minimum of 15 minutes   - Encourage patient to express feelings, fears, frustrations, hopes   Outcome: Progressing  Goal: Refrain from isolation  Description: Interventions:  - Develop a trusting relationship   - Encourage socialization   Outcome: Progressing     Problem: Anxiety  Goal: Anxiety is at manageable level  Description: Interventions:  - Assess and monitor patient's anxiety level. - Monitor for signs and symptoms (heart palpitations, chest pain, shortness of breath, headaches, nausea, feeling jumpy, restlessness, irritable, apprehensive). - Collaborate with interdisciplinary team and initiate plan and interventions as ordered.   - Felch patient to unit/surroundings  - Explain treatment plan  - Encourage participation in care  - Encourage verbalization of concerns/fears  - Identify coping mechanisms  - Assist in developing anxiety-reducing skills  - Administer/offer alternative therapies  - Limit or eliminate stimulants  Outcome: Progressing

## 2023-06-23 PROCEDURE — 99232 SBSQ HOSP IP/OBS MODERATE 35: CPT | Performed by: PSYCHIATRY & NEUROLOGY

## 2023-06-23 RX ADMIN — Medication 10 MG: at 21:21

## 2023-06-23 RX ADMIN — LITHIUM CARBONATE 450 MG: 450 TABLET, EXTENDED RELEASE ORAL at 21:21

## 2023-06-23 RX ADMIN — HALOPERIDOL 5 MG: 5 TABLET ORAL at 21:20

## 2023-06-23 RX ADMIN — CLONAZEPAM 0.5 MG: 0.5 TABLET ORAL at 08:41

## 2023-06-23 RX ADMIN — MIRTAZAPINE 7.5 MG: 7.5 TABLET, FILM COATED ORAL at 21:21

## 2023-06-23 RX ADMIN — OLANZAPINE 5 MG: 5 TABLET, FILM COATED ORAL at 19:11

## 2023-06-23 RX ADMIN — CLONAZEPAM 0.5 MG: 0.5 TABLET ORAL at 17:27

## 2023-06-23 RX ADMIN — LITHIUM CARBONATE 450 MG: 450 TABLET, EXTENDED RELEASE ORAL at 08:41

## 2023-06-23 NOTE — NURSING NOTE
Vincenzo Suero had evening snack with pts. Compliant with scheduled meds. No further behavioral issues. Resting quietly in bed at present, arouses easily. Continue to monitor/assess for any changes.

## 2023-06-23 NOTE — PROGRESS NOTES
06/23/23 1100   Activity/Group Checklist   Group Wellness  (stress management)   Attendance Attended   Attendance Duration (min) 46-60   Interactions Interacted appropriately   Affect/Mood Appropriate   Goals Achieved Able to listen to others; Able to engage in interactions; Discussed coping strategies; Identified feelings
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
## 2023-06-23 NOTE — PROGRESS NOTES
06/23/23 0830   Team Meeting   Meeting Type Daily Rounds   Initial Conference Date 06/23/23   Patient/Family Present   Patient Present No   Patient's Family Present No   Daily Rounds Documentation     Team Members Present:   MD Ovidio Fraser CRNP Teri Starring, FRANCOIS Ulloa, [address] [place] [address]    6/22 last shower. Inappropriate language, but talking to self. Child-like; jumping in halls. Inderal PRN for anxiety in the evening. Attended 6/9 groups. Compliant with medications and meals. Slept.
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
## 2023-06-23 NOTE — NURSING NOTE
Patient is compliant with medication and meals. Patient continues to be fixated on being discharged. Patient is doing better but not quite ready for discharge.

## 2023-06-23 NOTE — NURSING NOTE
Saleem Mg has been awake, alert, and visible intermittently out in the milieu. Pt went out on the deck with staff and peers for fresh air group. Pt ate 100% supper. Pt naps at intervals in his room. Pt attended and participated in evening group. After group BHT reported that pt mumbled under his breath as he walked in hallway "Tell that bitch to shut the fuck up."  As writer was approaching pt to speak to him, pt noted to jump up in the hallway and slap the ceiling. Writer instructed pt to stop as writer asked to speak to pt. Pt initially kept walking but then stopped and pt then stated "I just want to get out of here."  Security called to do a walk through on unit as a safety precaution. Pt anxious and offered pt prn medication which pt agreed to take. Inderal 10 po prn given at 1947 for anxiety #18 on the [address] [place] [address] Avenue. Pt able to calm self down but remains focused on discharge. Continue to monitor/assess for effectiveness.

## 2023-06-23 NOTE — PLAN OF CARE
Problem: Ineffective Coping  Goal: Identifies ineffective coping skills  Outcome: Not Progressing  Goal: Identifies healthy coping skills  Outcome: Not Progressing  Goal: Demonstrates healthy coping skills  Outcome: Not Progressing

## 2023-06-23 NOTE — NURSING NOTE
Angelita Talamantes attended and participated in evening wrap up group. Prn Inderal effective for decrease in anxiety at 2047. Continue to monitor/assess for any changes.

## 2023-06-24 PROCEDURE — 99232 SBSQ HOSP IP/OBS MODERATE 35: CPT | Performed by: NURSE PRACTITIONER

## 2023-06-24 RX ADMIN — CLONAZEPAM 0.5 MG: 0.5 TABLET ORAL at 09:27

## 2023-06-24 RX ADMIN — MIRTAZAPINE 7.5 MG: 7.5 TABLET, FILM COATED ORAL at 21:14

## 2023-06-24 RX ADMIN — ACETAMINOPHEN 975 MG: 325 TABLET ORAL at 14:03

## 2023-06-24 RX ADMIN — LITHIUM CARBONATE 450 MG: 450 TABLET, EXTENDED RELEASE ORAL at 21:14

## 2023-06-24 RX ADMIN — Medication 10 MG: at 21:14

## 2023-06-24 RX ADMIN — LITHIUM CARBONATE 450 MG: 450 TABLET, EXTENDED RELEASE ORAL at 09:27

## 2023-06-24 RX ADMIN — HALOPERIDOL 5 MG: 5 TABLET ORAL at 21:14

## 2023-06-24 RX ADMIN — CLONAZEPAM 0.5 MG: 0.5 TABLET ORAL at 17:15

## 2023-06-24 NOTE — NURSING NOTE
Patient remained in bed and slept through the night without incident. Staff to maintain continuous rounding for safety and support.

## 2023-06-24 NOTE — NURSING NOTE
Hans Rose slept through breakfast. He approached the nurses station when he woke (0095) to request his medication. Took pills without difficult. Denies anxiety, depression and voices. Behavior controlled. Delayed answers to questions. Fair eye contact. Interacts with peers and staff. Attended [facility] and [facility]. Continue to monitor. Precautions maintained.

## 2023-06-24 NOTE — NURSING NOTE
Prn Tylenol effective for relief from headache pain at 1701. Pt now rating his pain a #2/10. Continue to monitor/assess for any changes.

## 2023-06-24 NOTE — NURSING NOTE
Jason Costello has settled down as the evening progressed. His Broset scale went from a #2 to a #1. He talked about his doing all kind of things just to get out of here. Said that he rather be in [facility],then to stay here. He said many times how he dislikes his doctor. No further issues noted this shift. Q 7 minute patient checks maintained.

## 2023-06-24 NOTE — PLAN OF CARE
Problem: Ineffective Coping  Goal: Demonstrates healthy coping skills  Outcome: Progressing     Problem: Risk for Self Injury/Neglect  Goal: Treatment Goal: Remain safe during length of stay, learn and adopt new coping skills, and be free of self-injurious ideation, impulses and acts at the time of discharge  Outcome: Progressing     Problem: Depression  Goal: Verbalize thoughts and feelings  Description: Interventions:  - Assess and re-assess patient's level of risk   - Engage patient in 1:1 interactions, daily, for a minimum of 15 minutes   - Encourage patient to express feelings, fears, frustrations, hopes   Outcome: Progressing  Goal: Refrain from isolation  Description: Interventions:  - Develop a trusting relationship   - Encourage socialization   Outcome: Progressing     Problem: Anxiety  Goal: Anxiety is at manageable level  Description: Interventions:  - Assess and monitor patient's anxiety level. - Monitor for signs and symptoms (heart palpitations, chest pain, shortness of breath, headaches, nausea, feeling jumpy, restlessness, irritable, apprehensive). - Collaborate with interdisciplinary team and initiate plan and interventions as ordered.   - [place] patient to unit/surroundings  - Explain treatment plan  - Encourage participation in care  - Encourage verbalization of concerns/fears  - Identify coping mechanisms  - Assist in developing anxiety-reducing skills  - Administer/offer alternative therapies  - Limit or eliminate stimulants  Outcome: Not Progressing

## 2023-06-24 NOTE — PROGRESS NOTES
Progress Note - Behavioral Health   Lisa Guerra 21 y.o. male MRN: [id number]  [facility] [address] Encounter: [id number]    The patient was seen for continuing care and reviewed with treatment team.    Severe episode of recurrent major depressive disorder, without psychotic features ([address])    Vital signs in last 24 hours:  Temp:  [97.8 °F (36.6 °C)] 97.8 °F (36.6 °C)  HR:  [85] 85  Resp:  [18] 18  BP: (116)/(56) 116/56    Mental Status Evaluation:    Appearance Adequate hygiene and grooming and Poor eye contact   Behavior cooperative and calm   Mood anxious   Speech Increased latency of response   Affect constricted   Thought Processes intellectual disability and Goal directed and coherent   Thought Content Does not verbalize delusional material   Perceptual Disturbances Denies hallucinations and does not appear to be responding to internal stimuli   Risk Potential Suicidal/Homicidal Ideation - No evidence of suicidal or homicidal ideation and patient does not verbalize suicidal or homicidal ideation  Risk of Violence - No evidence of risk for violence found on assessment  Risk of Self Mutilation - No evidence of risk for self mutilation found on assessment   Associations concrete associations   Sensorium oriented to person, place, time/date and situation   Cognition/Memory recent and remote memory grossly intact   Consciousness alert and awake   Attention/Concentration attention span and concentration appear shorter than expected for age   Insight partial   Judgement limited   Muscle Strength and Gait/Station normal muscle strength and normal muscle tone, normal gait/station and normal balance   Motor Activity no abnormal movements       Progress Toward Goals: Patient observed in room. He is mostly cooperative on approach reports feeling stressed and anxious. Denies depression or auditory hallucinations. States he is sleeping and eating well. Appears to have adequate daytime energy.   He denies any medication side effects and is compliant with his psychiatric medications. He is fixated on discharge and can be agitated at times. Did receive Zyprexa 5 overnight. Staff report at times affect is incongruent and can be behavioral problem on the unit. Recommended Treatment: Continue with pharmacotherapy, group therapy, milieu therapy and occupational therapy.   The patient will be maintained on the following medications:  Current Facility-Administered Medications   Medication Dose Route Frequency Provider Last Rate   • acetaminophen  650 mg Oral Q6H PRN Cam Lukes, DO     • acetaminophen  650 mg Oral Q4H PRN Cam Lukes, DO     • acetaminophen  975 mg Oral Q6H PRN Cam Lukes, DO     • aluminum-magnesium hydroxide-simethicone  30 mL Oral Q4H PRN Cam Lukes, DO     • ammonium lactate   Topical BID PRN Abhijit Easley PA-C     • benztropine  1 mg Intramuscular Q4H PRN Max 6/day Cam Lukes, DO     • benztropine  1 mg Oral Q4H PRN Max 6/day Cam Lukes, DO     • clonazePAM  0.5 mg Oral BID Denver Rohrer, MD     • hydrOXYzine HCL  50 mg Oral Q6H PRN Max 4/day Cam Luchanell, DO      Or   • diphenhydrAMINE  50 mg Intramuscular Q6H PRN Cam Urena, DO     • diphenhydrAMINE  25 mg Oral Q6H PRN Octaviolievin Lizama, DO     • glycerin-hypromellose-  1 drop Both Eyes Q3H PRN Cam Lukes, DO     • haloperidol  5 mg Oral HS Denver Rohrer, MD     • hydrOXYzine HCL  100 mg Oral Q6H PRN Max 4/day Cam Urena, DO      Or   • LORazepam  2 mg Intramuscular Q6H PRN Cam Lukes, DO     • hydrOXYzine HCL  25 mg Oral Q6H PRN Max 4/day Cam Lukes, DO     • lithium carbonate  450 mg Oral Q12H Darío Gould MD     • melatonin  10 mg Oral HS Denver Rohrer, MD     • mirtazapine  7.5 mg Oral HS Cam Lukes, DO     • OLANZapine  10 mg Oral Q3H PRN Max 3/day Cam Romel, DO      Or   • OLANZapine  10 mg Intramuscular Q3H PRN Max 3/day Alexa Guthrie Prayson, DO     • OLANZapine  5 mg Oral Q3H PRN Max 6/day Adra Violeta, DO      Or   • OLANZapine  5 mg Intramuscular Q3H PRN Max 6/day Adra Violeta, DO     • OLANZapine  2.5 mg Oral Q3H PRN Max 8/day Adra Violeta, DO     • polyethylene glycol  17 g Oral Daily PRN Adra Violeta, DO     • propranolol  10 mg Oral Q8H PRN Adra Violeta, DO     • propranolol  10 mg Oral Q8H PRN Ida Mcmillan MD     • senna-docusate sodium  1 tablet Oral Daily PRN Adra Violeta, DO     • traZODone  50 mg Oral HS PRN Adra Violeta, DO         Severe episode of recurrent major depressive disorder, without psychotic features ([address])

## 2023-06-25 PROCEDURE — 99232 SBSQ HOSP IP/OBS MODERATE 35: CPT | Performed by: NURSE PRACTITIONER

## 2023-06-25 RX ADMIN — LITHIUM CARBONATE 450 MG: 450 TABLET, EXTENDED RELEASE ORAL at 20:48

## 2023-06-25 RX ADMIN — CLONAZEPAM 0.5 MG: 0.5 TABLET ORAL at 08:54

## 2023-06-25 RX ADMIN — LITHIUM CARBONATE 450 MG: 450 TABLET, EXTENDED RELEASE ORAL at 08:54

## 2023-06-25 RX ADMIN — Medication 10 MG: at 21:09

## 2023-06-25 RX ADMIN — HALOPERIDOL 5 MG: 5 TABLET ORAL at 21:09

## 2023-06-25 RX ADMIN — PROPRANOLOL HYDROCHLORIDE 10 MG: 10 TABLET ORAL at 20:48

## 2023-06-25 RX ADMIN — MIRTAZAPINE 7.5 MG: 7.5 TABLET, FILM COATED ORAL at 21:08

## 2023-06-25 RX ADMIN — HYDROXYZINE HYDROCHLORIDE 50 MG: 50 TABLET, FILM COATED ORAL at 11:02

## 2023-06-25 RX ADMIN — CLONAZEPAM 0.5 MG: 0.5 TABLET ORAL at 17:06

## 2023-06-25 NOTE — NURSING NOTE
Becky Herring has been visible intermittently in the milieu. No interaction with peers noted. He does approach the nurses station with requests/needs. Fair eye contact. Delayed responses to questions. Smiles inappropriately at times. Preoccupied with wanting to go home. Mentioned that anxiety is because of being here. Ate 100% of breakfast and 75% of lunch. . Took medication without an issue. Continue to monitor. Precautions maintained.

## 2023-06-25 NOTE — PLAN OF CARE
Problem: Depression  Goal: Verbalize thoughts and feelings  Description: Interventions:  - Assess and re-assess patient's level of risk   - Engage patient in 1:1 interactions, daily, for a minimum of 15 minutes   - Encourage patient to express feelings, fears, frustrations, hopes   Outcome: Progressing  Goal: Refrain from isolation  Description: Interventions:  - Develop a trusting relationship   - Encourage socialization   Outcome: Progressing     Problem: Ineffective Coping  Goal: Identifies healthy coping skills  Outcome: Not Progressing     Problem: Anxiety  Goal: Anxiety is at manageable level  Description: Interventions:  - Assess and monitor patient's anxiety level. - Monitor for signs and symptoms (heart palpitations, chest pain, shortness of breath, headaches, nausea, feeling jumpy, restlessness, irritable, apprehensive). - Collaborate with interdisciplinary team and initiate plan and interventions as ordered.   - [place] patient to unit/surroundings  - Explain treatment plan  - Encourage participation in care  - Encourage verbalization of concerns/fears  - Identify coping mechanisms  - Assist in developing anxiety-reducing skills  - Administer/offer alternative therapies  - Limit or eliminate stimulants  Outcome: Not Progressing

## 2023-06-25 NOTE — NURSING NOTE
Vincenzo Suero has been awake, alert, and visible intermittently out in the milieu. Pt ate 100% supper. Pt at nurse's station at times throughout shift to talk to staff and remains fixated on discharge. Pt remains easily distracted in conversation and delayed in answers to questions asked by staff. Pt denies any depression, anxiety, a/v hallucinations, and has not verbalized any delusions. Pt social with select peers. Pt attended and participated in evening wrap up group and had snack after. Compliant with scheduled meds. No behavioral issues. Continue to monitor/assess for any changes.

## 2023-06-25 NOTE — NURSING NOTE
At [facility] Michelle yelled out from his room. He stated that he had anxiety. He did it a second time while this writer was in the nurses station checking on anxiety medication for him. Focused on wanting to go home. "I see everyone leaving". Medicated with Atarax 50mg po at 1102. Currently in bed, quiet with his eyes closed. Continue to monitor.

## 2023-06-25 NOTE — NURSING NOTE
Savanna Conway reported feeling "relaxed" approximately 30 minutes after getting Atarax 50mg po prn at 1102. He rested in bed prior to going to [facility]. Continue to monitor.

## 2023-06-25 NOTE — PROGRESS NOTES
[facility] Group Note     06/25/23 1100   Activity/Group Checklist   Group Life Skills  (Teamwork and Communication)   Attendance Attended   Attendance Duration (min) 16-30  (arrived late to group)   Interactions Interacted appropriately   Affect/Mood Appropriate;Bright   Goals Achieved Discussed coping strategies; Able to listen to others; Able to engage in interactions; Able to reflect/comment on own behavior;Able to recieve feedback; Able to give feedback to another POD 3 s/p bilateral SUNNI breast reconstruction, doing well.  Pain controlled.  Tolerating PO    AF VSS    Bilateral reconstructed breasts soft, nt/nd, no sign of seroma/expanding hematoma, good symmetry.  Small area of left lateral mastectomy flap ecchymosis, stable (no change in size as the area has been marked).   Skin paddles normal color, warm and well-perfused.    Drains with serosanguinous drainage.  Left breast drainage mostly serous.    UOP adequate.  BUN/Cr normal.    Hgb 10.3 from 9.2 after 1 unit of prbc.    POD 3 s/p bilateral SUNNI breast reconstruction, doing well.    -Ok for discharge, follow-up with Dr. Hopson on 04/02/19 in the office.  -Patient instructed to hold BP medications for now and to follow-up with her PCP for management of blood pressure medications.  -Patient instructed to call if she gets dizzy, light-headed, or weak. POD 2 s/p bilateral SUNNI breast reconstruction, doing well.  Pain controlled.  Tolerating PO    AF VSS    Bilateral reconstructed breasts soft, nt/nd, no sign of seroma/hematoma, good symmetry.  Skin paddles normal color, warm and well-perfused.    POD 2 s/p bilateral SUNNI breast reconstruction, doing well.    -Continue current care.  -1 U of PRBC now.  -Repeat labs in the morning.  -Plan for possible discharge tomorrow. Patient comfortable. Slept overnight.  vss/a jps mod serosang.   flaps viable  mastectomy skin viable  vioptix with stable numbers  abdomen CDI    Hct 29.8    Doing well  Reviewed plan with patient.  May transfer to floor.    Chavez out this morning  OOB to chair then ambulate with assistance  advance to regular diet  incentive spirometry  continue vioptix Patient is doing well. She has minimal pain and has ambulated.    Gen: A&Ox3 NAD  Chest/Breast: Bilateral reconstructed SUNNI flaps; viable, no evidence of hematoma; noted ecchymoses superficial mastectomy flap at the areolar incision.   Abd: Soft, NTND, Incision, clean, dry and intact  BING drains with serousanguinous drainage HOSPITALIST ATTENDING PROGRESS NOTE    Chart and meds reviewed.  Patient seen and examined    HPI: 55 yo postmenopausal female with PMH of Left breast ca  s/p bilateral mastectomy with L SLNB with ALND secondary to failure to map and bilateral SUNNI reconstruction,  HTN, Hashimoto thyroiditis, Chemo induced neuropathy , Depression with anxiety. Patient reports 6/10 incisional pain, well controlled with tylenol and toradol prn.  She is doing well and had an episode of hypotension this morning,  -110 however there is no evidence of bleeding or hematoma.   Patient denies cp, dizziness, palpitations, afebrile, + constipation.     3/30/19 pt seen and examined, stable, BP mildly on the lower side, not symptomatic.    All 10 systems reviewed and found to be negative with the exception of what has been described above.    MEDICATIONS  (STANDING):  calcium carbonate   1250 mG (OsCal) 1 Tablet(s) Oral three times a day  cyanocobalamin 500 MICROGram(s) Oral daily  docusate sodium 100 milliGRAM(s) Oral two times a day  escitalopram 20 milliGRAM(s) Oral daily  gabapentin 400 milliGRAM(s) Oral two times a day  heparin  Injectable 5000 Unit(s) SubCutaneous every 8 hours  ketorolac   Injectable 15 milliGRAM(s) IV Push every 6 hours  metoprolol succinate ER 12.5 milliGRAM(s) Oral daily  pyridoxine 100 milliGRAM(s) Oral daily  senna 2 Tablet(s) Oral at bedtime  sodium chloride 0.9% lock flush 3 milliLiter(s) IV Push every 8 hours  vancomycin  IVPB 1000 milliGRAM(s) IV Intermittent every 24 hours    MEDICATIONS  (PRN):  ondansetron   Disintegrating Tablet 4 milliGRAM(s) Oral every 6 hours PRN Nausea and/or Vomiting  ondansetron Injectable 4 milliGRAM(s) IV Push every 4 hours PRN Nausea and/or Vomiting  oxyCODONE    IR 10 milliGRAM(s) Oral every 6 hours PRN breakthrough pain  prochlorperazine   Injectable 10 milliGRAM(s) IV Push every 6 hours PRN nausea unrelieved by zofran  traMADol 50 milliGRAM(s) Oral every 6 hours PRN Moderate Pain (4 - 6)  zolpidem 5 milliGRAM(s) Oral at bedtime PRN Insomnia  zolpidem 5 milliGRAM(s) Oral at bedtime PRN Insomnia      VITALS:  T(F): 99.4 (03-30-19 @ 04:07), Max: 99.4 (03-30-19 @ 04:07)  HR: 89 (03-30-19 @ 04:07) (89 - 113)  BP: 102/47 (03-30-19 @ 04:07) (102/47 - 134/65)  RR: 17 (03-30-19 @ 04:07) (16 - 18)  SpO2: 96% (03-30-19 @ 04:07) (95% - 97%)  Wt(kg): --    I&O's Summary    29 Mar 2019 07:01  -  30 Mar 2019 07:00  --------------------------------------------------------  IN: 273 mL / OUT: 941 mL / NET: -668 mL        CAPILLARY BLOOD GLUCOSE          PHYSICAL EXAM:    HEENT:  pupils equal and reactive, EOMI, no oropharyngeal lesions, erythema, exudates, oral thrush  NECK:   supple, no carotid bruits, no palpable lymph nodes, no thyromegaly  CV:  +S1, +S2, regular, no murmurs or rubs  RESP:   lungs clear to auscultation bilaterally, no wheezing, rales, rhonchi, good air entry bilaterally  BREAST:  not examined  GI:  abdomen soft, non-tender, non-distended, normal BS, no bruits, no abdominal masses, no palpable masses  RECTAL:  not examined  :  not examined  MSK:   normal muscle tone, no atrophy, no rigidity, no contractions  EXT:  no clubbing, no cyanosis, no edema, no calf pain, swelling or erythema  VASCULAR:  pulses equal and symmetric in the upper and lower extremities  NEURO:  AAOX3, no focal neurological deficits, follows all commands, able to move extremities spontaneously  SKIN:  no ulcers, lesions or rashes    LABS:                            10.3   7.09  )-----------( 157      ( 30 Mar 2019 05:58 )             31.8     03-30    141  |  109<H>  |  13  ----------------------------<  103<H>  4.3   |  27  |  0.60    Ca    8.4<L>      30 Mar 2019 05:58      CARDIAC MARKERS ( 28 Mar 2019 18:27 )  <0.015 ng/mL / x     / 447 U/L / x     / x                                              CULTURES:

## 2023-06-25 NOTE — PROGRESS NOTES
Progress Note - Behavioral Health   Lindy Chisholm 21 y.o. male MRN: [id number]  [facility] Encounter: [id number]    The patient was seen for continuing care and reviewed with treatment team.    Severe episode of recurrent major depressive disorder, without psychotic features ([address])    Vital signs in last 24 hours:  Temp:  [97.5 °F (36.4 °C)-97.9 °F (36.6 °C)] 97.9 °F (36.6 °C)  HR:  [73-84] 73  Resp:  [18-20] 18  BP: (113-134)/(64-73) 113/70    Mental Status Evaluation:    Appearance Adequate hygiene and grooming and Poor eye contact   Behavior cooperative   Mood anxious   Speech Sparse and Increased latency of response   Affect constricted   Thought Processes Goal directed and coherent   Thought Content Does not verbalize delusional material   Perceptual Disturbances Denies hallucinations and does not appear to be responding to internal stimuli   Risk Potential Suicidal/Homicidal Ideation - No evidence of suicidal or homicidal ideation and patient does not verbalize suicidal or homicidal ideation  Risk of Violence - No evidence of risk for violence found on assessment  Risk of Self Mutilation - No evidence of risk for self mutilation found on assessment   Associations concrete associations   Sensorium oriented to person, place, time/date and situation   Cognition/Memory recent and remote memory grossly intact   Consciousness alert and awake   Attention/Concentration attention span and concentration appear shorter than expected for age   Insight partial   Judgement limited   Muscle Strength and Gait/Station normal muscle strength and normal muscle tone, normal gait/station and normal balance   Motor Activity no abnormal movements       Progress Toward Goals: Patient observed resting in room. States he feels tired today. Does endorse feelings of anxiety due to being hospitalized and unable to leave. Denies depression or auditory hallucinations. Reports he is sleeping and eating well.   Denies any medication side effects. No suicidal thoughts. Focused on discharge and occasionally yelling out due to anxiety no issues reported overnight by staff. Recommended Treatment: Continue with pharmacotherapy, group therapy, milieu therapy and occupational therapy.   The patient will be maintained on the following medications:  Current Facility-Administered Medications   Medication Dose Route Frequency Provider Last Rate   • acetaminophen  650 mg Oral Q6H PRN Walda Everts, DO     • acetaminophen  650 mg Oral Q4H PRN Walda Everts, DO     • acetaminophen  975 mg Oral Q6H PRN Walda Everts, DO     • aluminum-magnesium hydroxide-simethicone  30 mL Oral Q4H PRN Walda Everts, DO     • ammonium lactate   Topical BID PRN Amanda Kaur PA-C     • benztropine  1 mg Intramuscular Q4H PRN Max 6/day Walda Everts, DO     • benztropine  1 mg Oral Q4H PRN Max 6/day Walda Everts, DO     • clonazePAM  0.5 mg Oral BID Shirley Gonsales MD     • hydrOXYzine HCL  50 mg Oral Q6H PRN Max 4/day Walda Everts, DO      Or   • diphenhydrAMINE  50 mg Intramuscular Q6H PRN Walda Everts, DO     • diphenhydrAMINE  25 mg Oral Q6H PRN Raheem Jc DO     • glycerin-hypromellose-  1 drop Both Eyes Q3H PRN Walda Everts, DO     • haloperidol  5 mg Oral HS Shirley Gonsales MD     • hydrOXYzine HCL  100 mg Oral Q6H PRN Max 4/day Walda Everts, DO      Or   • LORazepam  2 mg Intramuscular Q6H PRN Walda Everts, DO     • hydrOXYzine HCL  25 mg Oral Q6H PRN Max 4/day Walda Everts, DO     • lithium carbonate  450 mg Oral Q12H Shaylee Sierra MD     • melatonin  10 mg Oral HS Shirley Gonsales MD     • mirtazapine  7.5 mg Oral HS Walda Everts, DO     • OLANZapine  10 mg Oral Q3H PRN Max 3/day Walda Everts, DO      Or   • OLANZapine  10 mg Intramuscular Q3H PRN Max 3/day Walda Everts, DO     • OLANZapine  5 mg Oral Q3H PRN Max 6/day Ciro Watters, DO      Or   • OLANZapine 5 mg Intramuscular Q3H PRN Max 6/day Dorrene Skjaylans, DO     • OLANZapine  2.5 mg Oral Q3H PRN Max 8/day Dorveronica Skjaylans, DO     • polyethylene glycol  17 g Oral Daily PRN Jas Howens, DO     • propranolol  10 mg Oral Q8H PRN Jas Howens, DO     • propranolol  10 mg Oral Q8H PRN Darío Long MD     • senna-docusate sodium  1 tablet Oral Daily PRN Jas Melara, DO     • traZODone  50 mg Oral HS PRN Jas Melara, DO         Severe episode of recurrent major depressive disorder, without psychotic features ([address])

## 2023-06-25 NOTE — NURSING NOTE
Chantelle Montague went out on the deck for [employer]. Behavior controlled this evening. Ate 100% of his meal. Took medication without an issue. Continue to monitor. Precautions maintained.

## 2023-06-26 PROCEDURE — 99232 SBSQ HOSP IP/OBS MODERATE 35: CPT | Performed by: PSYCHIATRY & NEUROLOGY

## 2023-06-26 RX ORDER — HALOPERIDOL 5 MG/1
5 TABLET ORAL 2 TIMES DAILY
Status: DISCONTINUED | OUTPATIENT
Start: 2023-06-26 | End: 2023-07-31

## 2023-06-26 RX ADMIN — LITHIUM CARBONATE 450 MG: 450 TABLET, EXTENDED RELEASE ORAL at 09:25

## 2023-06-26 RX ADMIN — PROPRANOLOL HYDROCHLORIDE 10 MG: 10 TABLET ORAL at 10:06

## 2023-06-26 RX ADMIN — CLONAZEPAM 0.5 MG: 0.5 TABLET ORAL at 17:15

## 2023-06-26 RX ADMIN — HALOPERIDOL 5 MG: 5 TABLET ORAL at 17:14

## 2023-06-26 RX ADMIN — LITHIUM CARBONATE 450 MG: 450 TABLET, EXTENDED RELEASE ORAL at 21:06

## 2023-06-26 RX ADMIN — CLONAZEPAM 0.5 MG: 0.5 TABLET ORAL at 09:25

## 2023-06-26 RX ADMIN — PROPRANOLOL HYDROCHLORIDE 10 MG: 10 TABLET ORAL at 20:21

## 2023-06-26 RX ADMIN — MIRTAZAPINE 7.5 MG: 7.5 TABLET, FILM COATED ORAL at 21:06

## 2023-06-26 RX ADMIN — Medication 10 MG: at 21:06

## 2023-06-26 NOTE — PROGRESS NOTES
06/26/23 0817   Admission   Status of Admission 201   Release of Information Signed Yes  (Sister (Chryl Simpler))
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
## 2023-06-26 NOTE — PLAN OF CARE
Problem: Anxiety  Goal: Anxiety is at manageable level  Description: Interventions:  - Assess and monitor patient's anxiety level. - Monitor for signs and symptoms (heart palpitations, chest pain, shortness of breath, headaches, nausea, feeling jumpy, restlessness, irritable, apprehensive). - Collaborate with interdisciplinary team and initiate plan and interventions as ordered.   - [place] patient to unit/surroundings  - Explain treatment plan  - Encourage participation in care  - Encourage verbalization of concerns/fears  - Identify coping mechanisms  - Assist in developing anxiety-reducing skills  - Administer/offer alternative therapies  - Limit or eliminate stimulants  Outcome: Progressing

## 2023-06-26 NOTE — PLAN OF CARE
Problem: Ineffective Coping  Goal: Identifies ineffective coping skills  Outcome: Progressing  Goal: Identifies healthy coping skills  Outcome: Progressing  Goal: Demonstrates healthy coping skills  Outcome: Progressing     Problem: Risk for Self Injury/Neglect  Goal: Treatment Goal: Remain safe during length of stay, learn and adopt new coping skills, and be free of self-injurious ideation, impulses and acts at the time of discharge  Outcome: Progressing  Goal: Recognize maladaptive responses and adopt new coping mechanisms  Outcome: Progressing     Problem: Depression  Goal: Treatment Goal: Demonstrate behavioral control of depressive symptoms, verbalize feelings of improved mood/affect, and adopt new coping skills prior to discharge  Outcome: Progressing  Goal: Verbalize thoughts and feelings  Description: Interventions:  - Assess and re-assess patient's level of risk   - Engage patient in 1:1 interactions, daily, for a minimum of 15 minutes   - Encourage patient to express feelings, fears, frustrations, hopes   Outcome: Progressing  Goal: Refrain from isolation  Description: Interventions:  - Develop a trusting relationship   - Encourage socialization   Outcome: Progressing     Problem: Anxiety  Goal: Anxiety is at manageable level  Description: Interventions:  - Assess and monitor patient's anxiety level. - Monitor for signs and symptoms (heart palpitations, chest pain, shortness of breath, headaches, nausea, feeling jumpy, restlessness, irritable, apprehensive). - Collaborate with interdisciplinary team and initiate plan and interventions as ordered.   - [place] patient to unit/surroundings  - Explain treatment plan  - Encourage participation in care  - Encourage verbalization of concerns/fears  - Identify coping mechanisms  - Assist in developing anxiety-reducing skills  - Administer/offer alternative therapies  - Limit or eliminate stimulants  Outcome: Progressing     Problem: DISCHARGE PLANNING - CARE MANAGEMENT  Goal: Discharge to post-acute care or home with appropriate resources  Description: INTERVENTIONS:  - Conduct assessment to determine patient/family and health care team treatment goals, and need for post-acute services based on payer coverage, community resources, and patient preferences, and barriers to discharge  - Address psychosocial, clinical, and financial barriers to discharge as identified in assessment in conjunction with the patient/family and health care team  - Arrange appropriate level of post-acute services according to patient’s   needs and preference and payer coverage in collaboration with the physician and health care team  - Communicate with and update the patient/family, physician, and health care team regarding progress on the discharge plan  - Arrange appropriate transportation to post-acute venues  Outcome: Progressing

## 2023-06-26 NOTE — PROGRESS NOTES
06/26/23 0830   Team Meeting   Meeting Type Daily Rounds   Initial Conference Date 06/26/23   Patient/Family Present   Patient Present No   Patient's Family Present No     Daily Rounds Documentation     Team Members Present:   MD Hayder Abreu CRNP Teddy Gals, RN  Joanna Bautista, [id number] [place], W  Karie George, OT    Friday punching plexi glass (tired of being in hospital); security did walk through, and patient given PRN Zyprexa. 6/25 yelling about feeling anxious so PRN Atarax given, and then later drawing on nurse station plexi glass, and then wouldn't give pencil back; security call, and patient posturing, but did return pencil; PRN Inderal given and escorted to quiet room. Attended 4/8 groups Friday. Compliant with medications and meals. Slept.

## 2023-06-26 NOTE — SOCIAL WORK
Phone call placed to patient's father Kasia Ace). He was already aware of patient's behaviors over the weekend. SW discussed how she would like to develop a behavioral management plan; Kasia Ace will bring patient's Chrom book so it can be used as a reward. SW also spoke to him at length about potential financial barriers. SW informed him that she spoke with [employer], and the SSI/SSD application was never completed. SW explained that once he completes the application it will take 4-6 months to start getting benefits. SW informed him that the appointment to apply is scheduled for August, but that she will keep calling to try and get an earlier appointment. SW explained that we won't be able to keep him on the unit until he gets benefits if it is not medically necessary. Kasia Ace expressed that things are tight for them at the moment, but they will take a look at things as well as what patient has saved. He questioned how much group homes cost, and what happens if patient gets kicked out, but SW explained to him that she can't really answer this until we know what agencies end up looking at him for placement. SW reassured him that she will continue to keep him informed. He had no other questions or concerns to report. Phone call ended mutually.

## 2023-06-26 NOTE — NURSING NOTE
Patient is pleasant and cooperative on approach. Denies SI, HI, A/V hallucinations although does endorse anxiety and c/o feeling tired d/t not sleeping well. Reassurance and redirection provided through out the shift for continued safety and patient hovering and standing at nurses station window. 1006 - PRN Inderol 10 mg for anxiety per patient request.    1106 -  Patient reports still feeling anxious on reassessment although is not showing any signs of distress and is calmly pacing the unit.

## 2023-06-26 NOTE — PROGRESS NOTES
Progress Note - Behavioral Health   Lindy Chisholm 21 y.o. male MRN: [id number]  Unit/Bed#: CRUZITO RAMIREZ [facility] / [facility] 127-54 Encounter: [id number]    Assessment/Plan   Principal Problem:    Severe episode of recurrent major depressive disorder, without psychotic features ([address])  Active Problems:    Medical clearance for psychiatric admission    Autism spectrum disorder    Mood disorder ([address])    Anxiety disorder    Rash    Recommended Treatment:   Continue current regiment. Depakote has been stopped and Haldol has been increased to 5 mg tablet PO TWICE a day. Continue with group therapy, milieu therapy and occupational therapy. Case discussed with treatment team.  Risks, benefits and possible side effects of Medications: Risks, benefits, and possible side effects of medications have been explained to the patient, who verbalizes understanding. Discharge planning: social security is pending. Patient is most likely to go to a group home due to risky/violent/aggressive behaviors at home with parents and sister. Progress Toward Goals: regressed behavioral wise, has insight and questionable judgement, patient is fixated on discharge, he was aggressive and acted out over the weekend, security was called on him. Patient seems to superficially know how to cope with anger and anxiety, however he does not apply it because he believes he is acting out justifiably. ------------------------------------------------------------    Subjective: Saleem Mg has been compliant with medications without acute side effects. He does report some fatigue, which he reports is from the Haldol. He is okay with his recent medication readjustment. Patient slept 4 hours last night with sleep disturbances due to racing thoughts, anxiety and feeling "chilly". He did not request extra blankets. Saleem Mg states he is anxious about discharge and is upset that many people on the unit are getting discharged and he is not.  His feelings of anxiety, anger and aggression got worse over the weekend. On 6/23/23, Corinna Rios was instigating the nurses by staring and he punched the plexi glass of the nurse's station. Security was called and he screamed "this feels like a [facility]". He was given 5 mg Zyprexa with relief. On 6/25/23 noon, Samantha Dalal screamed from his room that he had anxiety. He was very focused on going home and felt trapped in the facility. He was given 50 mg Atarax that was effective. On 6/25/23 evening, patient once again stated this facility felt like a [facility] and stated "I am better off going to half-way" and was using curse words. Patient was encouraged to write down his concerns on a piece of paper and express his anger/frustrations there. He stated he did do this with minimal relief. Patient started writing up the nurse's station with a pencil and refused to return the pencil and was potentially using it as a weapon, but he did not harm himself or others. Patient denies homicidal ideations/intents or desire for self harm. He also denied suicidal ideations/plans or feelings of depression. Security was called once again and patient was given 10 mg Inderal PO with relief. Security escorted him to the observation room [place] evening and he has been there since. Patient states physical aggression provides quick relief of symptoms. He denies feeling angry at this time, wanting to punch objects or having violent premonitions. He confirms good energy and concentration. He denies psychomotor agitation/retardation. Patient denies experiences related to hallucinations/delusions. Patient denies overt paranoia. Patient reports good appetite but he did sleep through breakfast today. Patient went to 4 groups on Friday. Patient is consenting to safety on the unit.      Psychiatric Review of Systems:  Behavior over the last 24 hours: improved in the last 24 hours, however behavior has regressed since I personally saw him last, which was Friday  Sleep: not good, 4 hours of sleep with sleep disturbances  Appetite: good  Medication side effects: fatigue (patient suggesting it is from Haldol)  ROS: complete ROS is neg except what is noted above. Vital signs in last 24 hours:  Temp:  [97.8 °F (36.6 °C)] 97.8 °F (36.6 °C)  HR:  [73-88] 88  Resp:  [18] 18  BP: (113-121)/(67-70) 120/67    Mental Status Evaluation:  Appearance:  Alert, seen in bed under the sheets in the dark sleeping, awoken easily and sat up in a hunched position when approached. Well groomed, good hygiene with short hair. His eyes are closed throughout whole interview, wearing pajamas, appears stated age, no acute distress, well developed, well nourished. Behavior:  Calm, cooperative, anxious, eyes closed whole time   Speech:  Delayed response to questions, hortencia, soft, monotone, coherent, clear, fluent   Mood:  "I am anxious and tired"   Affect:  Congruent with mood, stable, appears anxious and tired and more sad than normal. Appropriate to thought content. Affecting not within normal range: has periods of unprompted smiling and flat affect. Thought Process:  Organized, logical, goal directed   Thought Content: Denies delusions or overt paranoia. Denies SI/plans. Denies homicidal ideations/plans. Denies phobias, compulsions, distorted body perceptions. No preoccupation with violence. Not appearing paranoid or hypervigilant. Perceptual disturbances: No reported hallucinations and does not appear to be responding to internal stimuli at this time. Risk Potential: No active or passive suicidal or homicidal ideation was verbalized. High potential for aggression based on behavior over the weekend. Has been put in the observational unit. He reports his anger has subsided, has no violent premonition and no longer feels like punching things.    Cognition: Oriented to self and situation, appears to be of average intelligence and cognition not formally tested   Insight:  Fair    Judgment: Questionable, patient thinks he is okay for discharge, he does not think the facility is helping him and that he is fine       Current Medications:  Current Facility-Administered Medications   Medication Dose Route Frequency Provider Last Rate   • acetaminophen  650 mg Oral Q6H PRN Denise Last, DO     • acetaminophen  650 mg Oral Q4H PRN Denise Last, DO     • acetaminophen  975 mg Oral Q6H PRN Denise Last, DO     • aluminum-magnesium hydroxide-simethicone  30 mL Oral Q4H PRN Denise Last, DO     • ammonium lactate   Topical BID PRN Tammy Giordano PA-C     • benztropine  1 mg Intramuscular Q4H PRN Max 6/day Denise Last, DO     • benztropine  1 mg Oral Q4H PRN Max 6/day Denise Last, DO     • clonazePAM  0.5 mg Oral BID Rosemary Elise MD     • hydrOXYzine HCL  50 mg Oral Q6H PRN Max 4/day Denise Last, DO      Or   • diphenhydrAMINE  50 mg Intramuscular Q6H PRN Denise Last, DO     • diphenhydrAMINE  25 mg Oral Q6H PRN Ruben Adam, DO     • glycerin-hypromellose-  1 drop Both Eyes Q3H PRN Denise Last, DO     • haloperidol  5 mg Oral HS Rosemary Elise MD     • hydrOXYzine HCL  100 mg Oral Q6H PRN Max 4/day Denise Last, DO      Or   • LORazepam  2 mg Intramuscular Q6H PRN Denise Last, DO     • hydrOXYzine HCL  25 mg Oral Q6H PRN Max 4/day Denise Last, DO     • lithium carbonate  450 mg Oral Q12H Trevon Kang MD     • melatonin  10 mg Oral HS Rosemary Elise MD     • mirtazapine  7.5 mg Oral HS Denise Last, DO     • OLANZapine  10 mg Oral Q3H PRN Max 3/day Denise Last, DO      Or   • OLANZapine  10 mg Intramuscular Q3H PRN Max 3/day Denise Last, DO     • OLANZapine  5 mg Oral Q3H PRN Max 6/day Denise Last, DO      Or   • OLANZapine  5 mg Intramuscular Q3H PRN Max 6/day Ciro A Prayson, DO     • OLANZapine  2.5 mg Oral Q3H PRN Max 8/day Ciro A Prayson, DO     • polyethylene glycol  17 g Oral Daily PRN Denise Last, DO     • propranolol  10 mg Oral Q8H PRN Dorrene Skeans, DO     • propranolol  10 mg Oral Q8H PRN Darío Long MD     • senna-docusate sodium  1 tablet Oral Daily PRN Dorrene Skeans, DO     • traZODone  50 mg Oral HS PRN Dorrene Skeans, DO         Behavioral Health Medications: all current active meds have been reviewed. Changes as above. Laboratory results:  I have personally reviewed all pertinent laboratory/tests results. No results found for this or any previous visit (from the past 48 hour(s)). This note has been constructed using a voice recognition system. There may be translation, syntax, or grammatical errors. If you have any questions, please contact the dictating provider.     ISABEL HernandezS

## 2023-06-26 NOTE — PROGRESS NOTES
06/26/23 1100 06/26/23 1400   Activity/Group Checklist   Group Wellness  (stress management) Exercise   Attendance Attended Attended   Attendance Duration (min) 46-60 31-45   Interactions Interacted appropriately Interacted appropriately   Affect/Mood Appropriate Appropriate   Goals Achieved Able to reflect/comment on own behavior;Able to engage in interactions; Able to listen to others;Discussed coping strategies; Identified feelings; Identified resources and support systems  (stated he does not have a support system outside the hospital, pt was encouraged to seek resources for support) Able to listen to others; Able to engage in interactions; Identified feelings; Discussed coping strategies

## 2023-06-27 PROCEDURE — 99232 SBSQ HOSP IP/OBS MODERATE 35: CPT | Performed by: PSYCHIATRY & NEUROLOGY

## 2023-06-27 RX ADMIN — LITHIUM CARBONATE 450 MG: 450 TABLET, EXTENDED RELEASE ORAL at 08:34

## 2023-06-27 RX ADMIN — HALOPERIDOL 5 MG: 5 TABLET ORAL at 08:34

## 2023-06-27 RX ADMIN — MIRTAZAPINE 7.5 MG: 7.5 TABLET, FILM COATED ORAL at 21:09

## 2023-06-27 RX ADMIN — LITHIUM CARBONATE 450 MG: 450 TABLET, EXTENDED RELEASE ORAL at 21:09

## 2023-06-27 RX ADMIN — CLONAZEPAM 0.5 MG: 0.5 TABLET ORAL at 08:34

## 2023-06-27 RX ADMIN — Medication 10 MG: at 21:09

## 2023-06-27 RX ADMIN — CLONAZEPAM 0.5 MG: 0.5 TABLET ORAL at 17:01

## 2023-06-27 RX ADMIN — HALOPERIDOL 5 MG: 5 TABLET ORAL at 17:01

## 2023-06-27 NOTE — PROGRESS NOTES
06/27/23 1100 06/27/23 1415   Activity/Group Checklist   Group Wellness  (mental health awareness) Exercise   Attendance Attended Attended   Attendance Duration (min) 46-60 16-30   Interactions Interacted appropriately Interacted appropriately   Affect/Mood Appropriate Appropriate   Goals Achieved Able to listen to others; Able to engage in interactions; Identified feelings; Discussed coping strategies Able to engage in interactions; Able to listen to others;Discussed coping strategies

## 2023-06-27 NOTE — NURSING NOTE
Pt is visible in the milieu intermittently. He consumed 100 % of dinner and had evening snack. Pt c/o anxiety and received scheduled klonopin. Took his medications without incidence. Pt listened to radio as a coping skill. Pt attended Evening group. No behavior issues.

## 2023-06-27 NOTE — PROGRESS NOTES
06/27/23 0830   Team Meeting   Meeting Type Daily Rounds   Initial Conference Date 06/27/23   Patient/Family Present   Patient Present No   Patient's Family Present No     Daily Rounds Documentation     Team Members Present:   DO Martha Duong CRNP Darroll Presume, RN  Jacinta Lam, [facility]  [facility], BO    Haldol increased. Inderal PRN 2x for anxiety yesterday. Behaviorally okay. Attended 4/9 groups. Compliant with medications and meals. Slept. SW working on developing a behavioral plan.

## 2023-06-27 NOTE — NURSING NOTE
Pt reported that Inderal given for anxiety was helpful and pt was calm and sitting in patient lounge. Inderal was effective.

## 2023-06-27 NOTE — PROGRESS NOTES
Progress Note - Behavioral Health   Teresa White 21 y.o. male MRN: [id number]  [facility] Encounter: [id number]    Assessment/Plan   Principal Problem:    Severe episode of recurrent major depressive disorder, without psychotic features ([address]  Active Problems:    Medical clearance for psychiatric admission    Autism spectrum disorder    Mood disorder ([address])    Anxiety disorder    Rash    Recommended Treatment:   Continue current regiment. Haldol increased to 5 mg tablet PO Haldol TWICE a day instead of once since yesterday. Continue with group therapy, milieu therapy and occupational therapy. Case discussed with treatment team.  Risks, benefits and possible side effects of Medications: Risks, benefits, and possible side effects of medications have been explained to the patient, who verbalizes understanding. Progress Toward Goals: slow improvement, has insight, questionable judgement, patient believes he is ready for discharge despite setbacks related to aggressive behavior/acting out. Patient seems to superficially know how to cope with anger and anxiety, however he does not apply it because he believes he is acting out justifiably    ------------------------------------------------------------    Subjective: Odilon Nath has been compliant with medications without acute side effects. Patient is expressing anxiety about discharge. He states "I will be here for a year based on some statistics and that worries me". Lashay Razo remains fixated on discharge and expresses same concerns about feeling trapped and feeling as if he was not told the extent of how an EAC is run. He expected "more outside time and community integration". Patient confirms feelings of depression. Patient denies loss of interest, decreased concentration, psychomotor changes, feelings of guilt, decreased energy, loss in appetite or suicidal ideation/plans. He denies homicidal ideation/plans. He denies delusions or hallucinations. Patient needed PRNs for anxiety yesterday. Patient denies paranoia. Patient is out of the observational unit. He is no longer expressing anger,aggression, violent premonitions. Patient has a new roommate and is content with the change and has no concerns. Patient is attending groups and enjoying them. Odilon Nath is consenting for safety on the unit. Psychiatric Review of Systems:  Behavior over the last 24 hours: unchanged, decreased anger  Sleep: good, improved   Appetite: good  Medication side effects: denies  ROS: complete ROS is neg except what is noted above     Vital signs in last 24 hours:  Temp:  [97.6 °F (36.4 °C)-97.7 °F (36.5 °C)] 97.7 °F (36.5 °C)  HR:  [60-82] 82  Resp:  [17-18] 18  BP: (113-122)/(65-80) 122/75    Mental Status Evaluation:  Appearance:  Alert, sitting on bed in hunched position. Well groomed, good hygiene with short hair. Lacks eye contact stares past you, wearing pajamas, appears stated age, no acute distress, well developed, well nourished. Behavior:  Calm, cooperative, anxious, lacks eye contact  Speech:  Delayed response to questions, hortencia, soft, monotone, coherent, clear, fluent  Mood:  "I am anxious"  Affect:  Congruent with mood, stable, appears anxious. Appropriate to thought content. Affecting not within normal range: has periods of unprompted smiling and flat affect. Smiled widely when asked about suicidal ideations/plans but denied. Thought Process:  Organized, logical, goal directed  Thought Content: Denies delusions or overt paranoia. Denies SI/plans. Denies homicidal ideations/plans. Denies phobias, compulsions, distorted body perceptions. No preoccupation with violence. Not appearing paranoid or hypervigilant. Perceptual disturbances: No reported hallucinations and does not appear to be responding to internal stimuli at this time. Risk Potential: No active or passive suicidal or homicidal ideation was verbalized.  High potential for aggression based on behavior over the weekend. Has been put in the observational unit, but got out yesterday. He reports his anger has subsided, has no violent premonition and no longer feels like punching things.   Cognition: Oriented to self and situation, appears to be of average intelligence and cognition not formally tested  Insight:  Fair   Judgment: Questionable, patient thinks he is okay for discharge, he does not think the facility is helping him and that he is fine    Current Medications:  Current Facility-Administered Medications   Medication Dose Route Frequency Provider Last Rate   • acetaminophen  650 mg Oral Q6H PRN Clearnce Roll, DO     • acetaminophen  650 mg Oral Q4H PRN Clearnce Roll, DO     • acetaminophen  975 mg Oral Q6H PRN Clearnce Roll, DO     • aluminum-magnesium hydroxide-simethicone  30 mL Oral Q4H PRN Clearnce Roll, DO     • ammonium lactate   Topical BID PRN Monie Cross PA-C     • benztropine  1 mg Intramuscular Q4H PRN Max 6/day Clearnce Roll, DO     • benztropine  1 mg Oral Q4H PRN Max 6/day Clearnce Roll, DO     • clonazePAM  0.5 mg Oral BID Niranjan Bailon MD     • hydrOXYzine HCL  50 mg Oral Q6H PRN Max 4/day Clearnce Roll, DO      Or   • diphenhydrAMINE  50 mg Intramuscular Q6H PRN Clearnce Roll, DO     • diphenhydrAMINE  25 mg Oral Q6H PRN Enzo Pastel, DO     • glycerin-hypromellose-  1 drop Both Eyes Q3H PRN Clearnce Roll, DO     • haloperidol  5 mg Oral BID Niranjan Bailon MD     • hydrOXYzine HCL  100 mg Oral Q6H PRN Max 4/day Clearnce Roll, DO      Or   • LORazepam  2 mg Intramuscular Q6H PRN Clearnce Roll, DO     • hydrOXYzine HCL  25 mg Oral Q6H PRN Max 4/day Clearnce Roll, DO     • lithium carbonate  450 mg Oral Q12H Mary Joaquin MD     • melatonin  10 mg Oral HS Niranjan Bailon MD     • mirtazapine  7.5 mg Oral HS Ciro Watters, DO     • OLANZapine  10 mg Oral Q3H PRN Max 3/day Clearnce Roll, DO      Or   • OLANZapine  10 mg Intramuscular Q3H PRN Max 3/day Mark Shell, DO     • OLANZapine  5 mg Oral Q3H PRN Max 6/day Mark Shell, DO      Or   • OLANZapine  5 mg Intramuscular Q3H PRN Max 6/day Mark Shell, DO     • OLANZapine  2.5 mg Oral Q3H PRN Max 8/day Mark Shell, DO     • polyethylene glycol  17 g Oral Daily PRN Mark Shell, DO     • propranolol  10 mg Oral Q8H PRN Mark Shell, DO     • propranolol  10 mg Oral Q8H PRN Sharon Ballard MD     • senna-docusate sodium  1 tablet Oral Daily PRN Mark Shell, DO     • traZODone  50 mg Oral HS PRN Mark Shell, DO         Behavioral Health Medications: all current active meds have been reviewed. Changes as above. Laboratory results:  I have personally reviewed all pertinent laboratory/tests results. No results found for this or any previous visit (from the past 48 hour(s)). This note has been constructed using a voice recognition system. There may be translation, syntax, or grammatical errors. If you have any questions, please contact the dictating provider.     SARAH Kohler

## 2023-06-27 NOTE — PLAN OF CARE
Problem: Anxiety  Goal: Anxiety is at manageable level  Description: Interventions:  - Assess and monitor patient's anxiety level. - Monitor for signs and symptoms (heart palpitations, chest pain, shortness of breath, headaches, nausea, feeling jumpy, restlessness, irritable, apprehensive). - Collaborate with interdisciplinary team and initiate plan and interventions as ordered.   - [place] patient to unit/surroundings  - Explain treatment plan  - Encourage participation in care  - Encourage verbalization of concerns/fears  - Identify coping mechanisms  - Assist in developing anxiety-reducing skills  - Administer/offer alternative therapies  - Limit or eliminate stimulants  Outcome: Progressing

## 2023-06-27 NOTE — NURSING NOTE
Pt is medication and meal compliant. Pt is visible in the milieu and social with select peers. Pt at times stands at nurses station needing redirection and accepts. Pt otherwise attends select groups and makes needs known. Pt can be inappropriate at times with select comments regarding vulgar music, but accepts redirection with explanation without further issue. Pt offers no complaints. Will continue to monitor.

## 2023-06-27 NOTE — NURSING NOTE
Pt c/o anxiety and being stressed from being here. Pt did try his coping skills and remained calm and in control. Pt given Inderal for Generalized anxiety at 2021. Will monitor for effectiveness.

## 2023-06-28 PROCEDURE — 99232 SBSQ HOSP IP/OBS MODERATE 35: CPT | Performed by: PSYCHIATRY & NEUROLOGY

## 2023-06-28 RX ADMIN — HALOPERIDOL 5 MG: 5 TABLET ORAL at 08:30

## 2023-06-28 RX ADMIN — LITHIUM CARBONATE 450 MG: 450 TABLET, EXTENDED RELEASE ORAL at 21:05

## 2023-06-28 RX ADMIN — LITHIUM CARBONATE 450 MG: 450 TABLET, EXTENDED RELEASE ORAL at 08:30

## 2023-06-28 RX ADMIN — HALOPERIDOL 5 MG: 5 TABLET ORAL at 17:04

## 2023-06-28 RX ADMIN — CLONAZEPAM 0.5 MG: 0.5 TABLET ORAL at 08:30

## 2023-06-28 RX ADMIN — MIRTAZAPINE 7.5 MG: 7.5 TABLET, FILM COATED ORAL at 21:05

## 2023-06-28 RX ADMIN — PROPRANOLOL HYDROCHLORIDE 10 MG: 10 TABLET ORAL at 21:04

## 2023-06-28 RX ADMIN — Medication 10 MG: at 21:05

## 2023-06-28 RX ADMIN — CLONAZEPAM 0.5 MG: 0.5 TABLET ORAL at 17:04

## 2023-06-28 NOTE — PROGRESS NOTES
06/28/23 1204   Team Meeting   Meeting Type Escalated Tx Team Meeting   Initial Conference Date 06/28/23   Team Members Present   Team Members Present Physician;Nurse;   Physician Team Member Dr. Tiny Healy DO   Nursing Team Member Satish Delgado, RN   Social Work Team Member Matti Peres [place]   Patient/Family Present   Patient Present Yes   Patient's Family Present No     Patient and team met to finalize the Behavioral Management Plan SW and patient developed yesterday. Patient able to independently recall what is expected of him in order to earn a reward each day. The reward will be 1 hour to use his Chromebook daily during electronic time. Patient's identified triggers were also reviewed, and how staff can support him when he is feeling distressed. Patient did not seem as invested in the [employer] today as he did yesterday. He was smiling inappropriately at times, and also shaking, which he reported was due to being cold; however, he appeared to be doing it voluntarily. He did this same thing during session yesterday, but stated it was anxiety. Patient and team signed the plan; patient's Humberto Flores being dropped off this evening.

## 2023-06-28 NOTE — PLAN OF CARE
Problem: Ineffective Coping  Goal: Demonstrates healthy coping skills  Outcome: Progressing     Problem: Risk for Self Injury/Neglect  Goal: Treatment Goal: Remain safe during length of stay, learn and adopt new coping skills, and be free of self-injurious ideation, impulses and acts at the time of discharge  Outcome: Progressing     Problem: Depression  Goal: Verbalize thoughts and feelings  Description: Interventions:  - Assess and re-assess patient's level of risk   - Engage patient in 1:1 interactions, daily, for a minimum of 15 minutes   - Encourage patient to express feelings, fears, frustrations, hopes   Outcome: Progressing     Problem: Depression  Goal: Verbalize thoughts and feelings  Description: Interventions:  - Assess and re-assess patient's level of risk   - Engage patient in 1:1 interactions, daily, for a minimum of 15 minutes   - Encourage patient to express feelings, fears, frustrations, hopes   Outcome: Progressing  Goal: Refrain from isolation  Description: Interventions:  - Develop a trusting relationship   - Encourage socialization   Outcome: Progressing     Problem: Anxiety  Goal: Anxiety is at manageable level  Description: Interventions:  - Assess and monitor patient's anxiety level. - Monitor for signs and symptoms (heart palpitations, chest pain, shortness of breath, headaches, nausea, feeling jumpy, restlessness, irritable, apprehensive). - Collaborate with interdisciplinary team and initiate plan and interventions as ordered.   - [place] patient to unit/surroundings  - Explain treatment plan  - Encourage participation in care  - Encourage verbalization of concerns/fears  - Identify coping mechanisms  - Assist in developing anxiety-reducing skills  - Administer/offer alternative therapies  - Limit or eliminate stimulants  Outcome: Progressing

## 2023-06-28 NOTE — PROGRESS NOTES
06/28/23 0830   Team Meeting   Meeting Type Daily Rounds   Initial Conference Date 06/28/23   Patient/Family Present   Patient Present No   Patient's Family Present No     Daily Rounds Documentation     Team Members Present:   DO Joaquim Arthur CRNP Saddie Axon, FRANCOIS  [employer] [id number], Pharm. D    Childlike and attention seeks; played vulgar music during spirituality. Helped SW develop his behavior management plan. Reports feeling anxious. Attended 4/9 groups. Compliant with medications and meals. Slept.

## 2023-06-28 NOTE — NURSING NOTE
Pt is visible in the milieu social with select peers. Pt watched TV in [facility] before dinner. He consumed 100 % of dinner. Took his medications without incidence. Denied all psychiatric symptoms. Pt attended [employer] group. Pt took a long nap after dinner. Pt able to make needs known. Pt offers no complaints. No behavior issues.

## 2023-06-28 NOTE — SOCIAL WORK
Phone call received from patient's mother; she will be dropping his [employer] off after work today.

## 2023-06-28 NOTE — PROGRESS NOTES
Progress Note - Behavioral Health   Cristela Tate 21 y.o. male MRN: [id number]  [facility] Encounter: [id number]    Assessment/Plan   Principal Problem:    Severe episode of recurrent major depressive disorder, without psychotic features ([address])  Active Problems:    Autism spectrum disorder    Mood disorder ([address])    Anxiety disorder    Medical clearance for psychiatric admission    Rash      Subjective: Patient was seen, chart was reviewed, and case was discussed with entire team.   Patient seen in room lying in bed. Patient does sit up and acknowledge writer. Patient is focused on discharge. Patient feels anxious and annoyed that he is still in the hospital.  Patient reports he did have a bad week he talks of violent outbursts such as punching the walls over the last weekend. Patient does seem internally preoccupied. She has odd social cues. His affect is bizarre with inappropriate drawnout smiling. Patient seems to like to draw attention to himself. He was shaking his arms and upper torso, but said he was cold. In these movements would stop when he had purposeful movement. Behavioral plan is in the works for the patient. He has been medication compliant. Psychiatric Review of Systems:  Behavior over the last 24 hours: unchanged  Sleep: normal  Appetite: adequate  Medication side effects: none verbalized  Medical ROS: Complete review of systems is negative except as noted above.             Vitals:  Vitals:    06/28/23 0738   BP: 101/60   Pulse: 64   Resp: 16   Temp: 98.4 °F (36.9 °C)   SpO2: 98%       Mental Status Exam:    Appearance:  alert, appears stated age and casually dressed   Behavior:  calm, cooperative and Anxious, childlike, immature, entitled, attention seeking   Speech:  normal rate and normal volume   Mood:  anxious   Affect:  Anxious, bizarre, inappropriate smiling   Thought Process:  goal directed   Thought Content:  no verbalized delusions or overt paranoia Perceptual Disturbances: Patient denies any auditory or visual hallucinations however he does seem internally preoccupied. Risk Potential: Suicidal ideation - None at present  Homicidal ideation - None at present  Potential for aggression - Yes, due to violent behavior   Sensorium:  oriented to person and place   Memory:  recent memory grossly intact   Consciousness:  alert and awake   Attention/Concentration: decreased concentration and decreased attention span   Insight:  limited   Judgment: limited   Gait/Station: normal gait/station   Motor Activity: no abnormal movements     Progress Toward Goals: Progressing slowly    Recommended Treatment: Continue with pharmacotherapy, group therapy, milieu therapy and occupational therapy. Continue frequent safety checks and vitals per unit protocol. Continue with medical management as indicated. Continue coordinating with case management regarding disposition  1. Continue current medications and treatments for now. 2.  Discharge planning      Risks, benefits and possible side effects of Medications: Risks, benefits, and possible side effects of medications have previously been explained. No new medications at this time.       Current Medications:  Current Facility-Administered Medications   Medication Dose Route Frequency Provider Last Rate   • acetaminophen  650 mg Oral Q6H PRN Giuseppe Quinonez, DO     • acetaminophen  650 mg Oral Q4H PRN Giuseppe Quinonez, DO     • acetaminophen  975 mg Oral Q6H PRN Giuseppe Quinonez, DO     • aluminum-magnesium hydroxide-simethicone  30 mL Oral Q4H PRN Giuseppe Quinonez, DO     • ammonium lactate   Topical BID PRN Sabiha Baker PA-C     • benztropine  1 mg Intramuscular Q4H PRN Max 6/day Giuseppe Quinonez, DO     • benztropine  1 mg Oral Q4H PRN Max 6/day Giuseppe Quinonez, DO     • clonazePAM  0.5 mg Oral BID Lidia Amaral MD     • hydrOXYzine HCL  50 mg Oral Q6H PRN Max 4/day Giuseppe Quinonez, DO      Or   • diphenhydrAMINE  50 mg Intramuscular Q6H PRN Isidro Rank, DO     • diphenhydrAMINE  25 mg Oral Q6H PRN Merritt Lara, DO     • glycerin-hypromellose-  1 drop Both Eyes Q3H PRN Isidro Rank, DO     • haloperidol  5 mg Oral BID Sandy Corral MD     • hydrOXYzine HCL  100 mg Oral Q6H PRN Max 4/day Isidro Rank, DO      Or   • LORazepam  2 mg Intramuscular Q6H PRN Isidro Rank, DO     • hydrOXYzine HCL  25 mg Oral Q6H PRN Max 4/day Isidro Rank, DO     • lithium carbonate  450 mg Oral Q12H Anita Patterson MD     • melatonin  10 mg Oral HS Sandy Corral MD     • mirtazapine  7.5 mg Oral HS Isidro Rank, DO     • OLANZapine  10 mg Oral Q3H PRN Max 3/day Isidro Rank, DO      Or   • OLANZapine  10 mg Intramuscular Q3H PRN Max 3/day Isidro Rank, DO     • OLANZapine  5 mg Oral Q3H PRN Max 6/day Isidro Rank, DO      Or   • OLANZapine  5 mg Intramuscular Q3H PRN Max 6/day Isidro Rank, DO     • OLANZapine  2.5 mg Oral Q3H PRN Max 8/day Isidro Rank, DO     • polyethylene glycol  17 g Oral Daily PRN Isidro Rank, DO     • propranolol  10 mg Oral Q8H PRN Isidro Rank, DO     • propranolol  10 mg Oral Q8H PRN Sandy Corral MD     • senna-docusate sodium  1 tablet Oral Daily PRN Isidro Rank, DO     • traZODone  50 mg Oral HS PRN Isidro Rank, DO         Behavioral Health Medications:   all current active meds have been reviewed. Laboratory results:  I have personally reviewed all pertinent laboratory/tests results. No results found for this or any previous visit (from the past 48 hour(s)).          Isidro Rank, DO 06/28/23

## 2023-06-28 NOTE — PROGRESS NOTES
Pt is more visible on the unit today. He has been intrusive at the desk at times but is easily redirected by staff. Patient is medication and meal compliant. Attending approx. half of the groups on the unit. SW, myself and covering physician reviewed his new treatment plan with the patient. Patient is agreeable to this new plan and did sign it. No issues or concerns noted. Will continue with q 7 min checks.

## 2023-06-29 PROCEDURE — 99232 SBSQ HOSP IP/OBS MODERATE 35: CPT | Performed by: PSYCHIATRY & NEUROLOGY

## 2023-06-29 RX ADMIN — LITHIUM CARBONATE 450 MG: 450 TABLET, EXTENDED RELEASE ORAL at 21:09

## 2023-06-29 RX ADMIN — CLONAZEPAM 0.5 MG: 0.5 TABLET ORAL at 17:19

## 2023-06-29 RX ADMIN — LITHIUM CARBONATE 450 MG: 450 TABLET, EXTENDED RELEASE ORAL at 08:57

## 2023-06-29 RX ADMIN — HALOPERIDOL 5 MG: 5 TABLET ORAL at 17:19

## 2023-06-29 RX ADMIN — MIRTAZAPINE 7.5 MG: 7.5 TABLET, FILM COATED ORAL at 21:09

## 2023-06-29 RX ADMIN — CLONAZEPAM 0.5 MG: 0.5 TABLET ORAL at 08:57

## 2023-06-29 RX ADMIN — HALOPERIDOL 5 MG: 5 TABLET ORAL at 08:57

## 2023-06-29 RX ADMIN — Medication 10 MG: at 21:09

## 2023-06-29 NOTE — NURSING NOTE
Bill Palmer has been awake, alert, and visible intermittently out in the milieu. Pt napping in room prior to supper. Pt ate 100% supper. Pt denies any depression, anxiety, a/v hallucinations, and has not verbalized any delusions. Pt attended and participated in evening game group and interacted well with peers. No behavioral issues but still smiles inappropriately and is incongruent at times. Pt attended and participated in evening wrap up group and had evening snack with peers. Compliant with scheduled meds. Continue to monitor/assess for any changes.

## 2023-06-29 NOTE — PROGRESS NOTES
Progress Note - Behavioral Health   Vern Ameya 21 y.o. male MRN: [id number]  Unit/Bed#: [facility] 030-94 Encounter: [id number]    Assessment/Plan   Principal Problem:    Severe episode of recurrent major depressive disorder, without psychotic features ([address])  Active Problems:    Autism spectrum disorder    Mood disorder ([address])    Anxiety disorder    Medical clearance for psychiatric admission    Rash      Subjective: Patient was seen, chart was reviewed, and case was discussed with entire team.   She is seen in room lying in bed. She does pull covers off of his head to acknowledge writer. Patient is overall calm and cooperative. He is scant and superficial in conversation. Does exhibit an inappropriate smile much of the time. Patient does understand his behavioral plan. He is not sure if his Chromebook. Patient still looks for attention and intrusive at the nurses station and needs to be redirected. Patient is medication compliant. Psychiatric Review of Systems:  Behavior over the last 24 hours: unchanged  Sleep: normal  Appetite: adequate  Medication side effects: none verbalized  Medical ROS: Complete review of systems is negative except as noted above. Vitals:  Vitals:    06/29/23 0730   BP: 108/70   Pulse: 75   Resp: 18   Temp: 98.1 °F (36.7 °C)   SpO2: 100%       Mental Status Exam:    Appearance:  alert, appears stated age, casually dressed and appropriate grooming and hygiene   Behavior:  calm, cooperative and Anxious, childlike, immature, entitled, attention seeking. Speech:  normal rate and normal volume   Mood:  Anxious   Affect:  Anxious, bizarre inappropriate smiling and laughter   Thought Process:  illogical, goal directed   Thought Content:  no verbalized delusions or overt paranoia   Perceptual Disturbances: no reported hallucinations and He does appear to be internally distracted though.    Risk Potential: Suicidal ideation - None at present  Homicidal ideation - None at present  Potential for aggression - Yes, due to history of violence   Sensorium:  oriented to person and place   Memory:  recent and remote memory: unable to assess due to lack of cooperation   Consciousness:  alert and awake   Attention/Concentration: attention span and concentration: unable to assess due to lack of cooperation   Insight:  limited   Judgment: limited   Gait/Station: normal gait/station, normal balance   Motor Activity: no abnormal movements     Progress Toward Goals: Progressing slowly    Recommended Treatment: Continue with pharmacotherapy, group therapy, milieu therapy and occupational therapy. Continue frequent safety checks and vitals per unit protocol. Continue with medical management as indicated. Continue coordinating with case management regarding disposition  1. Continue current medication treatments for now. 2.  Discharge planning      Risks, benefits and possible side effects of Medications: Risks, benefits, and possible side effects of medications have previously been explained. No new medications at this time.       Current Medications:  Current Facility-Administered Medications   Medication Dose Route Frequency Provider Last Rate   • acetaminophen  650 mg Oral Q6H PRN Chyrel Michel, DO     • acetaminophen  650 mg Oral Q4H PRN Chyrel Michel, DO     • acetaminophen  975 mg Oral Q6H PRN Chyrel Michel, DO     • aluminum-magnesium hydroxide-simethicone  30 mL Oral Q4H PRN Chyrel Michel, DO     • ammonium lactate   Topical BID PRN Inrgid Lopez PA-C     • benztropine  1 mg Intramuscular Q4H PRN Max 6/day Chyrel Michel, DO     • benztropine  1 mg Oral Q4H PRN Max 6/day Chyrel Michel, DO     • clonazePAM  0.5 mg Oral BID Raghu Washington MD     • hydrOXYzine HCL  50 mg Oral Q6H PRN Max 4/day Chyrel Michel, DO      Or   • diphenhydrAMINE  50 mg Intramuscular Q6H PRN Chyrel Michel, DO     • diphenhydrAMINE  25 mg Oral Q6H PRN Roxene Pancake, DO     • glycerin-hypromellose-  1 drop Both Eyes Q3H PRN [place] Heaps, DO     • haloperidol  5 mg Oral BID Rella Frankel, MD     • hydrOXYzine HCL  100 mg Oral Q6H PRN Max 4/day [place] Heaps, DO      Or   • LORazepam  2 mg Intramuscular Q6H PRN [place] Heaps, DO     • hydrOXYzine HCL  25 mg Oral Q6H PRN Max 4/day [place] Heaps, DO     • lithium carbonate  450 mg Oral Q12H Reinier Maya MD     • melatonin  10 mg Oral HS Rella Frankel, MD     • mirtazapine  7.5 mg Oral HS [place] Heaps, DO     • OLANZapine  10 mg Oral Q3H PRN Max 3/day [place] Heaps, DO      Or   • OLANZapine  10 mg Intramuscular Q3H PRN Max 3/day [place] Heaps, DO     • OLANZapine  5 mg Oral Q3H PRN Max 6/day [place] Heaps, DO      Or   • OLANZapine  5 mg Intramuscular Q3H PRN Max 6/day [place] Heaps, DO     • OLANZapine  2.5 mg Oral Q3H PRN Max 8/day [place] Heaps, DO     • polyethylene glycol  17 g Oral Daily PRN [place] Heaps, DO     • propranolol  10 mg Oral Q8H PRN [place] Heaps, DO     • propranolol  10 mg Oral Q8H PRN Rella Frankel, MD     • senna-docusate sodium  1 tablet Oral Daily PRN [place] Heaps, DO     • traZODone  50 mg Oral HS PRN [place] Heaps, DO         Behavioral Health Medications:   all current active meds have been reviewed. Laboratory results:  I have personally reviewed all pertinent laboratory/tests results. No results found for this or any previous visit (from the past 48 hour(s)).          [place] Heaps, DO 06/29/23

## 2023-06-29 NOTE — PROGRESS NOTES
06/29/23 1100 06/29/23 1400   Activity/Group Checklist   Group Wellness Exercise   Attendance Did not attend Did not attend

## 2023-06-29 NOTE — PLAN OF CARE
Problem: Ineffective Coping  Goal: Demonstrates healthy coping skills  Outcome: Progressing     Problem: Risk for Self Injury/Neglect  Goal: Treatment Goal: Remain safe during length of stay, learn and adopt new coping skills, and be free of self-injurious ideation, impulses and acts at the time of discharge  Outcome: Progressing     Problem: Depression  Goal: Treatment Goal: Demonstrate behavioral control of depressive symptoms, verbalize feelings of improved mood/affect, and adopt new coping skills prior to discharge  Outcome: Progressing  Goal: Verbalize thoughts and feelings  Description: Interventions:  - Assess and re-assess patient's level of risk   - Engage patient in 1:1 interactions, daily, for a minimum of 15 minutes   - Encourage patient to express feelings, fears, frustrations, hopes   Outcome: Progressing  Goal: Refrain from isolation  Description: Interventions:  - Develop a trusting relationship   - Encourage socialization   Outcome: Progressing     Problem: Anxiety  Goal: Anxiety is at manageable level  Description: Interventions:  - Assess and monitor patient's anxiety level. - Monitor for signs and symptoms (heart palpitations, chest pain, shortness of breath, headaches, nausea, feeling jumpy, restlessness, irritable, apprehensive). - Collaborate with interdisciplinary team and initiate plan and interventions as ordered.   - [place] patient to unit/surroundings  - Explain treatment plan  - Encourage participation in care  - Encourage verbalization of concerns/fears  - Identify coping mechanisms  - Assist in developing anxiety-reducing skills  - Administer/offer alternative therapies  - Limit or eliminate stimulants  Outcome: Progressing

## 2023-06-29 NOTE — NURSING NOTE
Prn Inderal effective for relief from anxiety. Pt sleeping at present, arouses easily. Will continue to monitor/assess for any changes.

## 2023-06-29 NOTE — NURSING NOTE
David Torres has been awake, alert, and visible intermittently out in the milieu. Pt went out on the deck with staff and peers for fresh air group. Pt less intrusive at nurse's station. Pt ate 100% supper. Pt smiles inappropriately at times, is easily distracted in conversation, and offers delayed responses at times. Pt attended and participated in evening nursing (Bingo) group, wrap up group, and had evening snack. No behavioral issues. Continue to monitor/assess for any changes.

## 2023-06-29 NOTE — NURSING NOTE
Pt is medication and meal compliant. Pt is visible in the milieu and social with select peers. Pt denies s/s and offers no complaints. Pt needing slight redirection regarding appropriate answers and verbalized understanding with apology. Pt otherwise appropriate during shift and offers no complaints. Will continue to monitor.

## 2023-06-29 NOTE — NURSING NOTE
Becky Herring requested prn Inderal for "generalized anxiety". Inderal 10 mg po prn given at 2104 for anxiety. Will monitor/assess for effectiveness.

## 2023-06-29 NOTE — PROGRESS NOTES
06/29/23 0830   Team Meeting   Meeting Type Daily Rounds   Initial Conference Date 06/29/23   Patient/Family Present   Patient Present No   Patient's Family Present No     Daily Rounds Documentation     Team Members Present:   DO Jovanni Golden, ANA Ornelas, RN  Yosi Pedro, [facility]  Charito Pastrana, W  Shantelle Mixon, OT    Inderal PRN for anxiety given. No behavioral issues. Behavioral Management Plan implemented. Attended 4/7 groups. Compliant with medications and meals. Slept.

## 2023-06-30 PROCEDURE — 99232 SBSQ HOSP IP/OBS MODERATE 35: CPT | Performed by: PSYCHIATRY & NEUROLOGY

## 2023-06-30 RX ADMIN — Medication 10 MG: at 21:14

## 2023-06-30 RX ADMIN — HALOPERIDOL 5 MG: 5 TABLET ORAL at 08:41

## 2023-06-30 RX ADMIN — HALOPERIDOL 5 MG: 5 TABLET ORAL at 17:13

## 2023-06-30 RX ADMIN — BENZTROPINE MESYLATE 1 MG: 1 TABLET ORAL at 20:10

## 2023-06-30 RX ADMIN — ACETAMINOPHEN 975 MG: 325 TABLET ORAL at 12:04

## 2023-06-30 RX ADMIN — LITHIUM CARBONATE 450 MG: 450 TABLET, EXTENDED RELEASE ORAL at 08:42

## 2023-06-30 RX ADMIN — CLONAZEPAM 0.5 MG: 0.5 TABLET ORAL at 17:13

## 2023-06-30 RX ADMIN — LITHIUM CARBONATE 450 MG: 450 TABLET, EXTENDED RELEASE ORAL at 21:15

## 2023-06-30 RX ADMIN — MIRTAZAPINE 7.5 MG: 7.5 TABLET, FILM COATED ORAL at 21:15

## 2023-06-30 RX ADMIN — CLONAZEPAM 0.5 MG: 0.5 TABLET ORAL at 08:41

## 2023-06-30 NOTE — PROGRESS NOTES
06/30/23 0830   Team Meeting   Meeting Type Daily Rounds   Initial Conference Date 06/30/23   Patient/Family Present   Patient Present No   Patient's Family Present No     Daily Rounds Documentation     Team Members Present:   DO Joaquim Arthur CRNP Saddie Axon, FRANCOIS  [facility], [id number], ROB Bailey, Pharm. D    Redirected in spirituality due to inappropriate language, and did apologize. Visible. Attended 3/9 groups. Compliant with medications and meals. Slept.

## 2023-06-30 NOTE — PLAN OF CARE
Problem: Ineffective Coping  Goal: Demonstrates healthy coping skills  Outcome: Progressing     Problem: Risk for Self Injury/Neglect  Goal: Treatment Goal: Remain safe during length of stay, learn and adopt new coping skills, and be free of self-injurious ideation, impulses and acts at the time of discharge  Outcome: Progressing     Problem: Depression  Goal: Treatment Goal: Demonstrate behavioral control of depressive symptoms, verbalize feelings of improved mood/affect, and adopt new coping skills prior to discharge  Outcome: Progressing  Goal: Verbalize thoughts and feelings  Description: Interventions:  - Assess and re-assess patient's level of risk   - Engage patient in 1:1 interactions, daily, for a minimum of 15 minutes   - Encourage patient to express feelings, fears, frustrations, hopes   Outcome: Progressing  Goal: Refrain from isolation  Description: Interventions:  - Develop a trusting relationship   - Encourage socialization   Outcome: Progressing     Problem: Anxiety  Goal: Anxiety is at manageable level  Description: Interventions:  - Assess and monitor patient's anxiety level. - Monitor for signs and symptoms (heart palpitations, chest pain, shortness of breath, headaches, nausea, feeling jumpy, restlessness, irritable, apprehensive). - Collaborate with interdisciplinary team and initiate plan and interventions as ordered.   - [place] patient to unit/surroundings  - Explain treatment plan  - Encourage participation in care  - Encourage verbalization of concerns/fears  - Identify coping mechanisms  - Assist in developing anxiety-reducing skills  - Administer/offer alternative therapies  - Limit or eliminate stimulants  Outcome: Progressing
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
## 2023-06-30 NOTE — PROGRESS NOTES
06/30/23 1100 06/30/23 1400   Activity/Group Checklist   Group Wellness  (goals/ triggers/ coping skills) Other (Comment)  (recreation)   Attendance Attended Did not attend   Attendance Duration (min) 46-60  --    Interactions Interacted appropriately  --    Affect/Mood Appropriate  --    Goals Achieved Able to listen to others; Able to engage in interactions; Discussed coping strategies; Identified relapse prevention strategies; Identified triggers  (pt was able to id coping skills but not community resources, goal is to discharge)  --

## 2023-06-30 NOTE — SOCIAL WORK
ATIYA spent over an hour on hold for [employer]; ATIYA was calling to see if a sooner appointment is available for patient to apply for benefits. Call ended because SW could not wait any longer. Patient informed that SW will try again next week.

## 2023-06-30 NOTE — NURSING NOTE
Patient is compliant with medication and meals Patient attended 3 groups out of 9 yesterday. Patient is fixated on discharge   Will continue to monitor and chart his progress. ..

## 2023-07-01 PROCEDURE — 99232 SBSQ HOSP IP/OBS MODERATE 35: CPT | Performed by: PHYSICIAN ASSISTANT

## 2023-07-01 RX ORDER — ATROPINE SULFATE 10 MG/ML
1 SOLUTION/ DROPS OPHTHALMIC 3 TIMES DAILY PRN
Status: DISCONTINUED | OUTPATIENT
Start: 2023-07-01 | End: 2023-08-11 | Stop reason: HOSPADM

## 2023-07-01 RX ADMIN — HALOPERIDOL 5 MG: 5 TABLET ORAL at 16:54

## 2023-07-01 RX ADMIN — ATROPINE SULFATE 1 DROP: 10 SOLUTION OPHTHALMIC at 16:56

## 2023-07-01 RX ADMIN — CLONAZEPAM 0.5 MG: 0.5 TABLET ORAL at 16:55

## 2023-07-01 RX ADMIN — BENZTROPINE MESYLATE 1 MG: 1 TABLET ORAL at 10:56

## 2023-07-01 RX ADMIN — Medication 10 MG: at 21:33

## 2023-07-01 RX ADMIN — MIRTAZAPINE 7.5 MG: 7.5 TABLET, FILM COATED ORAL at 21:33

## 2023-07-01 RX ADMIN — LITHIUM CARBONATE 450 MG: 450 TABLET, EXTENDED RELEASE ORAL at 08:32

## 2023-07-01 RX ADMIN — HALOPERIDOL 5 MG: 5 TABLET ORAL at 08:32

## 2023-07-01 RX ADMIN — LITHIUM CARBONATE 450 MG: 450 TABLET, EXTENDED RELEASE ORAL at 21:33

## 2023-07-01 RX ADMIN — CLONAZEPAM 0.5 MG: 0.5 TABLET ORAL at 08:32

## 2023-07-01 NOTE — NURSING NOTE
Pt stated that prn Cogentin "It helped a little but I need more". Prn Cogentin effective at 2110. No drooling noted at this time. Pt encouraged pt to speak to his doctor further about this also. Will monitor/assess for any changes.
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
## 2023-07-01 NOTE — NURSING NOTE
Patient is compliant with medication and meals. Patient  Has child like behavior most of the time. He does attend groups.  He does attend groups once in a while

## 2023-07-01 NOTE — NURSING NOTE
Mary Brewer has been awake, alert, and visible intermittently out in the milieu. Pt went out on the deck with staff and peers for fresh air group. Pt ate 100% supper. Pt complaining of "drooling" this shift and fixated on this. Writer explained to pt that his doctor is aware of his drooling and monitoring him. Pt continues to be preoccupied and easily distracted in conversation and expressions incongruent. Smiles inappropriately at times. Pt attended and participated in evening group, wrap up group, and evening snack. No behavioral issues. Compliant with scheduled meds. Continue to monitor/assess for any changes.

## 2023-07-01 NOTE — NURSING NOTE
David Torres came to nurse's station holding a towel near his mouth and stated that he needs prn medication for drooling. Cogentin 1 mg po prn given at 2010. Continue to monitor/assess for effectiveness.

## 2023-07-02 PROCEDURE — 99232 SBSQ HOSP IP/OBS MODERATE 35: CPT | Performed by: PHYSICIAN ASSISTANT

## 2023-07-02 RX ADMIN — MIRTAZAPINE 7.5 MG: 7.5 TABLET, FILM COATED ORAL at 21:40

## 2023-07-02 RX ADMIN — HALOPERIDOL 5 MG: 5 TABLET ORAL at 21:44

## 2023-07-02 RX ADMIN — CLONAZEPAM 0.5 MG: 0.5 TABLET ORAL at 21:43

## 2023-07-02 RX ADMIN — Medication 10 MG: at 21:38

## 2023-07-02 RX ADMIN — HALOPERIDOL 5 MG: 5 TABLET ORAL at 16:54

## 2023-07-02 RX ADMIN — CLONAZEPAM 0.5 MG: 0.5 TABLET ORAL at 16:53

## 2023-07-02 RX ADMIN — HALOPERIDOL 5 MG: 5 TABLET ORAL at 08:55

## 2023-07-02 RX ADMIN — ATROPINE SULFATE 1 DROP: 10 SOLUTION OPHTHALMIC at 10:30

## 2023-07-02 RX ADMIN — LITHIUM CARBONATE 450 MG: 450 TABLET, EXTENDED RELEASE ORAL at 09:00

## 2023-07-02 RX ADMIN — CLONAZEPAM 0.5 MG: 0.5 TABLET ORAL at 08:55

## 2023-07-02 RX ADMIN — LITHIUM CARBONATE 450 MG: 450 TABLET, EXTENDED RELEASE ORAL at 21:39

## 2023-07-02 RX ADMIN — ATROPINE SULFATE 1 DROP: 10 SOLUTION OPHTHALMIC at 21:42

## 2023-07-02 NOTE — NURSING NOTE
In report this afternoon, it was reported that pt. c/o drooling and requested Cogentin and it was given. At [address] he came to the window smiling and asked for another Cogentin for drooling and bubbles on his lips. Prior to his request writer was paying attention to him ans NO such issues were noted. Writer told him this and he started to make himself drool as he started smiling again. At 1656 he was given Isopto Atropine gtts (1) for same. Childish behavior continues. Q 7 minute patient checks maintained.

## 2023-07-02 NOTE — PROGRESS NOTES
[facility] Group Note     07/02/23 1100   Activity/Group Checklist   Group Life Skills  (Anger/Anxiety/Depression Board Game)   Attendance Attended   Attendance Duration (min) Greater than 60   Interactions Interacted appropriately   Affect/Mood Appropriate;Bright  (Focused)   Goals Achieved Identified feelings; Identified triggers; Discussed coping strategies; Able to listen to others; Able to engage in interactions; Able to reflect/comment on own behavior;Able to self-disclose; Able to recieve feedback; Able to give feedback to another
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
## 2023-07-02 NOTE — NURSING NOTE
Patient is compliant with medication and meals. He does attend some groups . Patient continues to have child like behavior. Yves Mcmillan  and is focused on discharge

## 2023-07-03 PROCEDURE — 99232 SBSQ HOSP IP/OBS MODERATE 35: CPT | Performed by: PSYCHIATRY & NEUROLOGY

## 2023-07-03 RX ADMIN — CLONAZEPAM 0.5 MG: 0.5 TABLET ORAL at 08:29

## 2023-07-03 RX ADMIN — LITHIUM CARBONATE 450 MG: 450 TABLET, EXTENDED RELEASE ORAL at 08:29

## 2023-07-03 RX ADMIN — HALOPERIDOL 5 MG: 5 TABLET ORAL at 17:10

## 2023-07-03 RX ADMIN — MIRTAZAPINE 7.5 MG: 7.5 TABLET, FILM COATED ORAL at 21:18

## 2023-07-03 RX ADMIN — HALOPERIDOL 5 MG: 5 TABLET ORAL at 08:29

## 2023-07-03 RX ADMIN — Medication 10 MG: at 21:17

## 2023-07-03 RX ADMIN — CLONAZEPAM 0.5 MG: 0.5 TABLET ORAL at 17:09

## 2023-07-03 RX ADMIN — ATROPINE SULFATE 1 DROP: 10 SOLUTION OPHTHALMIC at 08:30

## 2023-07-03 RX ADMIN — ATROPINE SULFATE 1 DROP: 10 SOLUTION OPHTHALMIC at 17:27

## 2023-07-03 NOTE — NURSING NOTE
Moshe Ceballos requested prn medication for sialiorrhea. Atropine 1% gtts 1 gtt sublingual prn given at 1727. Will monitor/assess for effectiveness.
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
## 2023-07-03 NOTE — NURSING NOTE
Patient is compliant with medication and meal Patient continues to have child like behavior . Sunny Medina He does attend groups and is some what social with peers. He is also fixated on discharge.

## 2023-07-03 NOTE — PROGRESS NOTES
07/03/23 0830   Team Meeting   Meeting Type Daily Rounds   Initial Conference Date 07/03/23   Patient/Family Present   Patient Present No   Patient's Family Present No     Daily Rounds Documentation     Team Members Present:   Dr. Sandra Andre, DO Dr. Evelyne Bella, [address] [facility], [address] [facility], [facility]  Shayna Goins, FRANCOIS Duarte. Joyce PRN multiple times for drooling; however, we suspect this is a behavior-Atropine drops ordered instead. No aggression. Attended 6/9 groups on Friday. Compliant with medications and meals. Slept.

## 2023-07-03 NOTE — PROGRESS NOTES
[facility] Group Note     07/03/23 1100   Activity/Group Checklist   Group Personal control  (Music and Lyrics)   Attendance Attended   Attendance Duration (min) 46-60   Interactions Interacted appropriately   Affect/Mood Appropriate  (more reserved than previous encounters)   Goals Achieved Discussed coping strategies; Able to listen to others; Able to engage in interactions; Able to reflect/comment on own behavior;Able to recieve feedback; Able to give feedback to another

## 2023-07-03 NOTE — PLAN OF CARE
Problem: Ineffective Coping  Goal: Demonstrates healthy coping skills  Outcome: Progressing     Problem: Risk for Self Injury/Neglect  Goal: Treatment Goal: Remain safe during length of stay, learn and adopt new coping skills, and be free of self-injurious ideation, impulses and acts at the time of discharge  Outcome: Progressing     Problem: Depression  Goal: Treatment Goal: Demonstrate behavioral control of depressive symptoms, verbalize feelings of improved mood/affect, and adopt new coping skills prior to discharge  Outcome: Progressing  Goal: Verbalize thoughts and feelings  Description: Interventions:  - Assess and re-assess patient's level of risk   - Engage patient in 1:1 interactions, daily, for a minimum of 15 minutes   - Encourage patient to express feelings, fears, frustrations, hopes   Outcome: Progressing  Goal: Refrain from isolation  Description: Interventions:  - Develop a trusting relationship   - Encourage socialization   Outcome: Progressing     Problem: Anxiety  Goal: Anxiety is at manageable level  Description: Interventions:  - Assess and monitor patient's anxiety level. - Monitor for signs and symptoms (heart palpitations, chest pain, shortness of breath, headaches, nausea, feeling jumpy, restlessness, irritable, apprehensive). - Collaborate with interdisciplinary team and initiate plan and interventions as ordered.   - [place] patient to unit/surroundings  - Explain treatment plan  - Encourage participation in care  - Encourage verbalization of concerns/fears  - Identify coping mechanisms  - Assist in developing anxiety-reducing skills  - Administer/offer alternative therapies  - Limit or eliminate stimulants  Outcome: Progressing

## 2023-07-03 NOTE — SOCIAL WORK
Phone call placed to [employer]; ATIYA able to get an earlier appointment for patient; appointment to apply for benefits will now be 7/25/2023 at 9:00AM.    Message received from patient's supports coordinator stating that their are two agencies with male vacancies that she would like to refer patient to. She requested that SW have patient sign ROIs on her behalf so she can send his information. ATIYA met with patient an informed him of this; he signed the ROIs. ATIYA also informed him of his new SSA appointment; patient excited for both. [facility]

## 2023-07-03 NOTE — NURSING NOTE
[employer] with his childish behavior. Again this afternoon he was reported drooling and wanting Cogentin as be presented with a big smile on his face and he talked with writer. No drooling noted except when he appears at the window of the office, with a big smile on his face. He was given 1 gtt of Isopto Atropine. Vivek and meal compliant,visible and social. Q 7 minute patient checks maintained.

## 2023-07-03 NOTE — PROGRESS NOTES
07/03/23 0940   Team Meeting   Meeting Type Tx Team Meeting   Initial Conference Date 07/03/23   Next Conference Date 07/17/23   Team Members Present   Team Members Present Physician;Nurse;   Physician Team Member Dr. Manisha Avalos DO   Nursing Team Member Kofi Eric, RN   Social Work Team Member Pamela Napoles [place]   Patient/Family Present   Patient Present Yes   Patient's Family Present No     Patient was present for his treatment team meeting prepared with a completed self-assessment. He was pleasant, calm, and attentive; affect was congruent. Patient's eye contract was fair. He was dressed appropriately, and appeared well groomed. Patient was able to share his self-assessment independently. Something he learned while attending groups last week was patience. One goal he accomplished this past week was managing his anger; SW acknowledged that his behaviors were much improved last week. SW asked about his Chromebook, and patient expressed that he told his parents to not bring it in.  SW advised patient to think of another reward. One recovery-centered goal he is still working on is managing his anxiety. Something challenging he dealt with last week was "Social Security not getting back to me."  SW informed patient that she will try and reach them again today. Psychiatric symptoms he experienced included: depression, passive SI, anxiety, low self-esteem, and negative thinking. Coping skills he used to manage challenges and symptoms included: music, breathing, walking, and grounding exercises. Self-care included: meditation, compliance with medications, and showering. Patient was able to list his medications, and reported some drooling. Patient informed that the medications he is on don't typically cause drooling. Patient expressed that he used Atropine drops, and that it helped him. He denied having any medical issues.       Patient asked if his reward could be walking the block, and was reminded that off unit passes have not been approved yet. Patient also continues to ask about transferring and discharge. SW reminded patient that he needs to show 30 days on consistent safe behaviors. SW also informed patient that he needs the waiver, which she will follow-up on today. Patient was receptive to feedback. No further questions or concerns reported by patient. Patient's group attendance has been satisfactory.

## 2023-07-04 PROCEDURE — 99232 SBSQ HOSP IP/OBS MODERATE 35: CPT | Performed by: NURSE PRACTITIONER

## 2023-07-04 RX ADMIN — HALOPERIDOL 5 MG: 5 TABLET ORAL at 17:11

## 2023-07-04 RX ADMIN — CLONAZEPAM 0.5 MG: 0.5 TABLET ORAL at 08:47

## 2023-07-04 RX ADMIN — CLONAZEPAM 0.5 MG: 0.5 TABLET ORAL at 17:11

## 2023-07-04 RX ADMIN — HALOPERIDOL 5 MG: 5 TABLET ORAL at 08:47

## 2023-07-04 RX ADMIN — ATROPINE SULFATE 1 DROP: 10 SOLUTION OPHTHALMIC at 18:05

## 2023-07-04 RX ADMIN — HYDROXYZINE HYDROCHLORIDE 50 MG: 50 TABLET, FILM COATED ORAL at 19:50

## 2023-07-04 RX ADMIN — Medication 10 MG: at 21:06

## 2023-07-04 RX ADMIN — LITHIUM CARBONATE 450 MG: 450 TABLET, EXTENDED RELEASE ORAL at 08:47

## 2023-07-04 RX ADMIN — ATROPINE SULFATE 1 DROP: 10 SOLUTION OPHTHALMIC at 08:49

## 2023-07-04 RX ADMIN — MIRTAZAPINE 7.5 MG: 7.5 TABLET, FILM COATED ORAL at 21:06

## 2023-07-04 NOTE — NURSING NOTE
Patient is compliant with medication and meals. Patient  Skips breakfast  Once in a while. He continues to have  Child like behavior.  And is fixated on discharge

## 2023-07-04 NOTE — PLAN OF CARE
Problem: Ineffective Coping  Goal: Demonstrates healthy coping skills  Outcome: Progressing     Problem: Risk for Self Injury/Neglect  Goal: Treatment Goal: Remain safe during length of stay, learn and adopt new coping skills, and be free of self-injurious ideation, impulses and acts at the time of discharge  Outcome: Progressing     Problem: Depression  Goal: Verbalize thoughts and feelings  Description: Interventions:  - Assess and re-assess patient's level of risk   - Engage patient in 1:1 interactions, daily, for a minimum of 15 minutes   - Encourage patient to express feelings, fears, frustrations, hopes   Outcome: Progressing  Goal: Refrain from isolation  Description: Interventions:  - Develop a trusting relationship   - Encourage socialization   Outcome: Progressing     Problem: Anxiety  Goal: Anxiety is at manageable level  Description: Interventions:  - Assess and monitor patient's anxiety level. - Monitor for signs and symptoms (heart palpitations, chest pain, shortness of breath, headaches, nausea, feeling jumpy, restlessness, irritable, apprehensive). - Collaborate with interdisciplinary team and initiate plan and interventions as ordered.   - [place] patient to unit/surroundings  - Explain treatment plan  - Encourage participation in care  - Encourage verbalization of concerns/fears  - Identify coping mechanisms  - Assist in developing anxiety-reducing skills  - Administer/offer alternative therapies  - Limit or eliminate stimulants  Outcome: Progressing

## 2023-07-04 NOTE — NURSING NOTE
Micaela Garcia has been awake, alert, and visible intermittently out in the milieu. Pt went out on the deck with staff and peers for fresh air group. Pt ate 100% supper. Pt denies any depression, anxiety, a/v hallucinations, and has not verbalized any delusions. Polite and cooperative. Pt remains easily distracted and delayed in responses to questions asked by writer in conversation. Pt attended and participated in evening wrap up group and had snack after with peers. Compliant with scheduled meds but refused scheduled Lithobid "Because of the side effects". Writer asked pt what his side effects were and pt stated his increased saliva and hand tremors. Writer encouraged pt to also speak to his doctor about this tomorrow. No behavioral issues. Continue to monitor/assess for any changes.

## 2023-07-04 NOTE — PROGRESS NOTES
Progress Note - Behavioral Health   Ramona Turpin 21 y.o. male MRN: [id number]  [facility] Encounter: [id number]    Assessment/Plan   Principal Problem:    Severe episode of recurrent major depressive disorder, without psychotic features ([address])  Active Problems:    Medical clearance for psychiatric admission    Autism spectrum disorder    Mood disorder ([address])    Anxiety disorder    Rash      Subjective:Patient was seen today for continuation of care, records reviewed and  patient was discussed with the morning case review team.    Patient in bed and difficult to arouse during conversation. Provider continued to keep waking patient throughout the conversation to ask questions. He reports that he has no depression, but is anxious because he would like discharge. States he lives with parents at home. He denies auditory and visual hallucinations and denies any suicidal or homicidal ideation. He reports that he refused his lithium due to the drooling and tremor side effects. Upon evaluation, patient does have b/l hand tremors. This provider did try to speak with the patient more about these concerns, but patient continued to fall asleep during conversation. This provider did verify that patient refused his lithium on the evening of 7/3/2023. He did take it on the morning of 7/4/2023. No drooling was noted at this time while patient was in bed, and no other involuntary movements were noted upon evaluation. Staff did report that they have seen patient drool at times, however. Atropine drops have been offered to patient as needed for the drooling. He has had no behavioral disturbances on the unit and no overt delusions were noted. Lithium Level on 6/21 was therapeutic at 0.7.     Psychiatric Review of Systems:    Sleep: hypersomnia  Appetite: normal  Medication side effects: Yes - B/L hand tremors, reported hypersalivation   ROS: all other systems are negative    Vitals:  Vitals:    07/04/23 0718   BP: 122/71   Pulse: 63   Resp: 18   Temp: (!) 97 °F (36.1 °C)   SpO2: 95%       Mental Status Evaluation:    Appearance:  age appropriate, casually dressed   Behavior:  difficult to assess, due to continually needing to awaken from sleep. Speech:  slow, scant   Mood:  "anxious about discharge"   Affect:  generally euthymic   Thought Process:  goal directed   Associations intact associations   Thought Content:  no overt delusions   Perceptual Disturbances: denies auditory or visual hallucinations when asked   Risk Potential: Suicidal ideation - None at present  Homicidal ideation - None at present  Potential for aggression - No   Sensorium:  oriented to person and place   Memory:  recent memory intact   Consciousness:  sedated   Attention: attention span and concentration appear shorter than expected for age   Insight:  limited   Judgment: limited   Gait/Station: in bed   Motor Activity: abnormal movement noted: mild hand tremor present     Laboratory results:    I have personally reviewed all pertinent laboratory/tests results.   Most Recent Labs:   Lab Results   Component Value Date    WBC 6.74 06/03/2023    RBC 5.22 06/03/2023    HGB 15.6 06/03/2023    HCT 49.0 06/03/2023     06/03/2023    RDW 13.1 06/03/2023    NEUTROABS 3.01 06/03/2023    SODIUM 140 05/31/2023    K 4.4 05/31/2023     05/31/2023    CO2 29 05/31/2023    BUN 11 05/31/2023    CREATININE 0.72 05/31/2023    GLUC 89 05/31/2023    GLUF 89 05/31/2023    CALCIUM 9.3 05/31/2023    AST 24 05/31/2023    ALT 43 05/31/2023    ALKPHOS 98 05/31/2023    TP 7.0 05/31/2023    ALB 4.4 05/31/2023    TBILI 0.52 05/31/2023    CHOLESTEROL 208 (H) 05/27/2023    HDL 39 (L) 05/27/2023    TRIG 190 (H) 05/27/2023    LDLCALC 131 (H) 05/27/2023    NONHDLC 169 05/27/2023    VALPROICTOT 106 (H) 05/31/2023    LITHIUM 0.7 06/21/2023    [id number] 1.637 05/27/2023    HGBA1C 5.4 05/27/2023     05/27/2023       Progress Toward Goals:     Patient is progressing towards goals of inpatient psychiatric treatment by continued medication compliance and is attending therapeutic modalities on the milieu. However, the patient continues to require inpatient psychiatric hospitalization for continued medication management and titration to optimize symptom reduction, improve sleep hygiene, and demonstrate adequate self-care. Recommended Treatment:   1. All current active medications have been reviewed  2. Encourage group therapy, milieu therapy and occupational therapy  3. Behavioral Health checks every 7 minutes  4. Discharge planning remains ongoing  5. Safety checks and vitals per unit protocol  6. Continue with SLIM medical management as indicated  7.    Continue medications as ordered    Continue current medications:  Current Facility-Administered Medications   Medication Dose Route Frequency Provider Last Rate   • acetaminophen  650 mg Oral Q6H PRN Donnis Screen, DO     • acetaminophen  650 mg Oral Q4H PRN Donnis Screen, DO     • acetaminophen  975 mg Oral Q6H PRN Donnis Screen, DO     • aluminum-magnesium hydroxide-simethicone  30 mL Oral Q4H PRN Donnis Screen, DO     • ammonium lactate   Topical BID PRN Laurie Kelley PA-C     • atropine  1 drop Sublingual TID PRN Jannette Encarnacion PA-C     • benztropine  1 mg Intramuscular Q4H PRN Max 6/day Donnis Screen, DO     • benztropine  1 mg Oral Q4H PRN Max 6/day Donnis Screen, DO     • clonazePAM  0.5 mg Oral BID Adriana Jolly MD     • hydrOXYzine HCL  50 mg Oral Q6H PRN Max 4/day Donnis Screen, DO      Or   • diphenhydrAMINE  50 mg Intramuscular Q6H PRN Donnis Screen, DO     • diphenhydrAMINE  25 mg Oral Q6H PRN Michael Emanuel DO     • glycerin-hypromellose-  1 drop Both Eyes Q3H PRN Donnis Screen, DO     • haloperidol  5 mg Oral BID Adriana Jolly MD     • hydrOXYzine HCL  100 mg Oral Q6H PRN Max 4/day Donnis Screen, DO      Or   • LORazepam  2 mg Intramuscular Q6H PRN Alfonso Valdez, DO     • hydrOXYzine HCL  25 mg Oral Q6H PRN Max 4/day Alfonso Valdez, DO     • lithium carbonate  450 mg Oral Q12H Berta Olivier MD     • melatonin  10 mg Oral HS Aiyana Wilson MD     • mirtazapine  7.5 mg Oral HS Alfonso Valdez, DO     • OLANZapine  10 mg Oral Q3H PRN Max 3/day Alfonso Valdez, DO      Or   • OLANZapine  10 mg Intramuscular Q3H PRN Max 3/day Alfonso Valdez, DO     • OLANZapine  5 mg Oral Q3H PRN Max 6/day Alfonso Valdez, DO      Or   • OLANZapine  5 mg Intramuscular Q3H PRN Max 6/day Alfonso Valdez, DO     • OLANZapine  2.5 mg Oral Q3H PRN Max 8/day Alfonso Valdez, DO     • polyethylene glycol  17 g Oral Daily PRN Alfonso Valdez, DO     • propranolol  10 mg Oral Q8H PRN Alfonso Valdez, DO     • propranolol  10 mg Oral Q8H PRN Aiyana Wilson MD     • senna-docusate sodium  1 tablet Oral Daily PRN Alfonso Valdez, DO     • traZODone  50 mg Oral HS PRN Alfonso Valdez, DO         Risks / Benefits of Treatment:     Risks, benefits, and possible side effects of medications explained to patient. Patient has limited understanding of risks and benefits of treatment at this time, but agrees to take medications as prescribed. Counseling / Coordination of Care:     Patient's progress reviewed with nursing staff. Medications, treatment progress and treatment plan reviewed with patient. Supportive counseling provided to the patient.           ANA Nicolas

## 2023-07-04 NOTE — NURSING NOTE
Prn Atropine sublingual gtts. Effective for decrease in hyprersalivation at 1827. Continue to monitor/assess for any changes.

## 2023-07-05 PROCEDURE — 99232 SBSQ HOSP IP/OBS MODERATE 35: CPT | Performed by: PSYCHIATRY & NEUROLOGY

## 2023-07-05 RX ORDER — BENZTROPINE MESYLATE 0.5 MG/1
0.5 TABLET ORAL 2 TIMES DAILY
Status: DISCONTINUED | OUTPATIENT
Start: 2023-07-05 | End: 2023-07-07

## 2023-07-05 RX ADMIN — CLONAZEPAM 0.5 MG: 0.5 TABLET ORAL at 17:15

## 2023-07-05 RX ADMIN — ATROPINE SULFATE 1 DROP: 10 SOLUTION OPHTHALMIC at 09:35

## 2023-07-05 RX ADMIN — HALOPERIDOL 5 MG: 5 TABLET ORAL at 17:15

## 2023-07-05 RX ADMIN — LITHIUM CARBONATE 450 MG: 450 TABLET, EXTENDED RELEASE ORAL at 21:36

## 2023-07-05 RX ADMIN — Medication 10 MG: at 21:36

## 2023-07-05 RX ADMIN — LITHIUM CARBONATE 450 MG: 450 TABLET, EXTENDED RELEASE ORAL at 08:42

## 2023-07-05 RX ADMIN — CLONAZEPAM 0.5 MG: 0.5 TABLET ORAL at 08:42

## 2023-07-05 RX ADMIN — MIRTAZAPINE 7.5 MG: 7.5 TABLET, FILM COATED ORAL at 21:36

## 2023-07-05 RX ADMIN — HALOPERIDOL 5 MG: 5 TABLET ORAL at 08:42

## 2023-07-05 RX ADMIN — ALUMINUM HYDROXIDE, MAGNESIUM HYDROXIDE, AND SIMETHICONE 30 ML: 200; 200; 20 SUSPENSION ORAL at 21:59

## 2023-07-05 RX ADMIN — ATROPINE SULFATE 1 DROP: 10 SOLUTION OPHTHALMIC at 17:16

## 2023-07-05 RX ADMIN — BENZTROPINE MESYLATE 0.5 MG: 0.5 TABLET ORAL at 17:15

## 2023-07-05 NOTE — PROGRESS NOTES
07/05/23 2435   Team Meeting   Meeting Type Daily Rounds   Initial Conference Date 07/05/23   Patient/Family Present   Patient Present No   Patient's Family Present No       Daily Rounds  Janene MONTGOMERY, Holter DO, Cecilia MONTGOMERY, Faustine Kawasaki, Teles RN, Bao CARRILLO  Case reviewed. Fixated on drooling and discharge. Refused Lithium. C/o drooling and tremors. Does have mild tremor observed. Atarax prn received for 3/4 anxiety; effective. Atropine drops given. May be adding a small dose of Cogentin. 3/6 groups.

## 2023-07-05 NOTE — PROGRESS NOTES
[facility] Group Note     07/05/23 1115   Activity/Group Checklist   Group Wellness  (Movement - [employer])   Attendance Attended   Attendance Duration (min) 46-60   Interactions Interacted appropriately   Affect/Mood Appropriate;Calm  (Unfocused and lethargic at times)   Goals Achieved Discussed coping strategies; Able to listen to others; Able to engage in interactions; Able to reflect/comment on own behavior;Able to recieve feedback; Able to give feedback to another

## 2023-07-05 NOTE — NURSING NOTE
Rashida Soliman was compliant with scheduled meds but refused scheduled Lithobid. Pt stated "Because of the side effects, drooling, and hand tremors."  Writer again encouraged pt to speak to his doctor about this. No drooling and no hand tremors noted at the time. Will continue to monitor/assess for any changes.

## 2023-07-05 NOTE — NURSING NOTE
Savanna Conway requested prn Atropine sublingual gtts for increased salivation and 1 gtt sublingual given at 1805. Continue to monitor/assess for any changes.
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
## 2023-07-05 NOTE — NURSING NOTE
Pt is medication and meal compliant. Pt is visible in the milieu and social with select peers or watches TV with others. Pt reports " drooling", for which atropine drops prn were given as ordered. No excess saliva noted since administration. Pt otherwise denies s/s, attends select groups and makes needs known. Will continue to monitor.

## 2023-07-05 NOTE — PROGRESS NOTES
Progress Note - Behavioral Health   Argentina Bejarano 21 y.o. male MRN: [id number]  Unit/Bed#: CRUZITO RAMIREZ [facility] 915-83 Encounter: [id number]    Assessment/Plan   Principal Problem:    Severe episode of recurrent major depressive disorder, without psychotic features ([address])  Active Problems:    Autism spectrum disorder    Mood disorder ([address])    Anxiety disorder    Medical clearance for psychiatric admission    Rash      Subjective: Patient was seen, chart was reviewed, and case was discussed with entire team.   Patient seen in room lying in bed with covers over his head. Patient did sit up and acknowledge writer. He does brighten on approach with a inappropriate smile. Patient is mostly focused on discharge. He acknowledges having depression and anxiety and low self-esteem. Patient has not had any behavioral issues on this unit recently. Patient continues to say he drools however this writer sees no evidence of drooling. Patient does have a small tremor bilaterally. Patient apparently had been refusing his lithium the last 2 nights. Encouraged patient to continue lithium. Patient is agreeable to take Cogentin 0.5 mg twice a day to help with possibly tremors and possible drooling. Appetite and sleep are adequate. Psychiatric Review of Systems:  Behavior over the last 24 hours: unchanged  Sleep: normal  Appetite: adequate  Medication side effects: none verbalized  Medical ROS: Complete review of systems is negative except as noted above. Vitals:  Vitals:    07/04/23 1501   Pulse: 91   Resp: 17   SpO2: 99%       Mental Status Exam:    Appearance:  alert, appears stated age, casually dressed and appropriate grooming and hygiene   Behavior:  calm, mostly cooperative and Anxious, childlike, immature.    Speech:  normal rate and normal volume   Mood:  Anxious   Affect:  Bizarre    inappropriate smiling    Thought Process:  illogical, goal directed   Thought Content:  no verbalized delusions or overt paranoia Perceptual Disturbances: no reported hallucinations and He does appear to be internally distracted though. Risk Potential: Suicidal ideation - None at present  Homicidal ideation - None at present  Potential for aggression - Yes, due to history of violence   Sensorium:  oriented to person and place   Memory:  recent and remote memory: unable to assess due to lack of cooperation   Consciousness:  alert and awake   Attention/Concentration: attention span and concentration: unable to assess due to lack of cooperation   Insight:  limited   Judgment: limited   Gait/Station: normal gait/station, normal balance   Motor Activity: no abnormal movements               Progress Toward Goals: Progressing    Recommended Treatment: Continue with pharmacotherapy, group therapy, milieu therapy and occupational therapy. Continue frequent safety checks and vitals per unit protocol. Continue with medical management as indicated. Continue coordinating with case management regarding disposition  1. We will add Cogentin 0.5 mg twice daily.   2.  Discharge planning      Risks, benefits and possible side effects of Medications: Risks, benefits, and possible side effects of medications have been explained to the patient, who verbalizes understanding      Current Medications:  Current Facility-Administered Medications   Medication Dose Route Frequency Provider Last Rate   • acetaminophen  650 mg Oral Q6H PRN Cam Urena, DO     • acetaminophen  650 mg Oral Q4H PRN Cam Urena, DO     • acetaminophen  975 mg Oral Q6H PRN Cam Urena, DO     • aluminum-magnesium hydroxide-simethicone  30 mL Oral Q4H PRN Cam Urena, DO     • ammonium lactate   Topical BID PRN Abhijit Easley PA-C     • atropine  1 drop Sublingual TID PRN Jesus Good PA-C     • benztropine  1 mg Intramuscular Q4H PRN Max 6/day Cam Urena, DO     • benztropine  1 mg Oral Q4H PRN Max 6/day Ciro Watters DO     • clonazePAM  0.5 mg Oral BID Jerry Santos MD     • hydrOXYzine HCL  50 mg Oral Q6H PRN Max 4/day Viola Alexandr, DO      Or   • diphenhydrAMINE  50 mg Intramuscular Q6H PRN Viola Alexandr, DO     • diphenhydrAMINE  25 mg Oral Q6H PRN Chen Enciso, DO     • glycerin-hypromellose-  1 drop Both Eyes Q3H PRN Viola Alexandr, DO     • haloperidol  5 mg Oral BID Jerry Santos MD     • hydrOXYzine HCL  100 mg Oral Q6H PRN Max 4/day Viola Alexandr, DO      Or   • LORazepam  2 mg Intramuscular Q6H PRN Viola Alexandr, DO     • hydrOXYzine HCL  25 mg Oral Q6H PRN Max 4/day Viola Alexandr, DO     • lithium carbonate  450 mg Oral Q12H Rashad Velázquez MD     • melatonin  10 mg Oral HS Jerry Santos MD     • mirtazapine  7.5 mg Oral HS Viola Alexandr, DO     • OLANZapine  10 mg Oral Q3H PRN Max 3/day Viola Alexandr, DO      Or   • OLANZapine  10 mg Intramuscular Q3H PRN Max 3/day Viola Alexandr, DO     • OLANZapine  5 mg Oral Q3H PRN Max 6/day Viola Alexandr, DO      Or   • OLANZapine  5 mg Intramuscular Q3H PRN Max 6/day Viola Alexandr, DO     • OLANZapine  2.5 mg Oral Q3H PRN Max 8/day Viola Alexandr, DO     • polyethylene glycol  17 g Oral Daily PRN Viola Alexandr, DO     • propranolol  10 mg Oral Q8H PRN Viola Alexandr, DO     • propranolol  10 mg Oral Q8H PRN Jerry Santos MD     • senna-docusate sodium  1 tablet Oral Daily PRN Viola Alexandr, DO     • traZODone  50 mg Oral HS PRN Viola Alexnadr, DO         Behavioral Health Medications:   all current active meds have been reviewed. Laboratory results:  I have personally reviewed all pertinent laboratory/tests results. No results found for this or any previous visit (from the past 48 hour(s)).          Chiaraa Alexandr, DO 07/05/23

## 2023-07-05 NOTE — NURSING NOTE
Bill Estela requested prn med for anxiety #3/4 and scored #18 on Pavon anxiety scale at 1950. Atarax 50 mg po prn given at [address]. Pt remains fixated on discharge. Will monitor/assess for effectiveness.

## 2023-07-05 NOTE — NURSING NOTE
Prn Atarax effective for decrease in anxiety. Pt stated that "It helped."  Pt attended and participated in evening nursing group, wrap up group, and had evening snack with peers. No behavioral issues. Pt rests in room at intervals. Pt continues to be easily distracted and delayed in responses in conversation with writer. Smiles inappropriately at times. Continue to monitor/assess for any changes.

## 2023-07-05 NOTE — PLAN OF CARE
Problem: Ineffective Coping  Goal: Identifies ineffective coping skills  Outcome: Progressing     Problem: Depression  Goal: Verbalize thoughts and feelings  Description: Interventions:  - Assess and re-assess patient's level of risk   - Engage patient in 1:1 interactions, daily, for a minimum of 15 minutes   - Encourage patient to express feelings, fears, frustrations, hopes   Outcome: Progressing  Goal: Refrain from isolation  Description: Interventions:  - Develop a trusting relationship   - Encourage socialization   Outcome: Progressing     Problem: Anxiety  Goal: Anxiety is at manageable level  Description: Interventions:  - Assess and monitor patient's anxiety level. - Monitor for signs and symptoms (heart palpitations, chest pain, shortness of breath, headaches, nausea, feeling jumpy, restlessness, irritable, apprehensive). - Collaborate with interdisciplinary team and initiate plan and interventions as ordered.   - [place] patient to unit/surroundings  - Explain treatment plan  - Encourage participation in care  - Encourage verbalization of concerns/fears  - Identify coping mechanisms  - Assist in developing anxiety-reducing skills  - Administer/offer alternative therapies  - Limit or eliminate stimulants  Outcome: Progressing

## 2023-07-06 PROCEDURE — 99232 SBSQ HOSP IP/OBS MODERATE 35: CPT | Performed by: STUDENT IN AN ORGANIZED HEALTH CARE EDUCATION/TRAINING PROGRAM

## 2023-07-06 RX ADMIN — Medication 10 MG: at 21:13

## 2023-07-06 RX ADMIN — LITHIUM CARBONATE 450 MG: 450 TABLET, EXTENDED RELEASE ORAL at 21:13

## 2023-07-06 RX ADMIN — ATROPINE SULFATE 1 DROP: 10 SOLUTION OPHTHALMIC at 15:48

## 2023-07-06 RX ADMIN — HALOPERIDOL 5 MG: 5 TABLET ORAL at 17:23

## 2023-07-06 RX ADMIN — ATROPINE SULFATE 1 DROP: 10 SOLUTION OPHTHALMIC at 09:29

## 2023-07-06 RX ADMIN — CLONAZEPAM 0.5 MG: 0.5 TABLET ORAL at 17:23

## 2023-07-06 RX ADMIN — HALOPERIDOL 5 MG: 5 TABLET ORAL at 08:20

## 2023-07-06 RX ADMIN — MIRTAZAPINE 7.5 MG: 7.5 TABLET, FILM COATED ORAL at 21:13

## 2023-07-06 RX ADMIN — BENZTROPINE MESYLATE 0.5 MG: 0.5 TABLET ORAL at 17:23

## 2023-07-06 RX ADMIN — BENZTROPINE MESYLATE 0.5 MG: 0.5 TABLET ORAL at 08:20

## 2023-07-06 RX ADMIN — LITHIUM CARBONATE 450 MG: 450 TABLET, EXTENDED RELEASE ORAL at 08:20

## 2023-07-06 RX ADMIN — CLONAZEPAM 0.5 MG: 0.5 TABLET ORAL at 08:20

## 2023-07-06 NOTE — NURSING NOTE
Savanna Conway has been awake, alert, and visible intermittently out in the milieu. Pt went out on the deck with staff and peers for fresh air group. Pt ate 100% supper. Pt requested prn Atropine gtt for "drooling" and Atropine 1% 1 gtt sublingual given at 1716. Pt continues with being easily distracted,  inappropriate smiling, and delayed responses to questions asked by Pallaiv Meng. Pleasant, polite, and cooperative. Pt attended and participated in evening nursing group, wrap up group, and had evening snack.l  Compliant with scheduled meds. Pt requested prn Mylanta for indigestion and Mylanta 30 ml po given at 2159 and effective. Continue to monitor/assess for any changes.

## 2023-07-06 NOTE — NURSING NOTE
Pt is medication and meal compliant. Pt is visible in the milieu intermittently and brightens on approach with otherwise flat affect. Pt is appropriate in conversation and denies s/s currently. Pt offers no complaints. Will continue to monitor.

## 2023-07-06 NOTE — PLAN OF CARE
Problem: Ineffective Coping  Goal: Demonstrates healthy coping skills  Outcome: Progressing     Problem: Risk for Self Injury/Neglect  Goal: Treatment Goal: Remain safe during length of stay, learn and adopt new coping skills, and be free of self-injurious ideation, impulses and acts at the time of discharge  Outcome: Progressing     Problem: Depression  Goal: Treatment Goal: Demonstrate behavioral control of depressive symptoms, verbalize feelings of improved mood/affect, and adopt new coping skills prior to discharge  Outcome: Progressing  Goal: Verbalize thoughts and feelings  Description: Interventions:  - Assess and re-assess patient's level of risk   - Engage patient in 1:1 interactions, daily, for a minimum of 15 minutes   - Encourage patient to express feelings, fears, frustrations, hopes   Outcome: Progressing  Goal: Refrain from isolation  Description: Interventions:  - Develop a trusting relationship   - Encourage socialization   Outcome: Progressing     Problem: Anxiety  Goal: Anxiety is at manageable level  Description: Interventions:  - Assess and monitor patient's anxiety level. - Monitor for signs and symptoms (heart palpitations, chest pain, shortness of breath, headaches, nausea, feeling jumpy, restlessness, irritable, apprehensive). - Collaborate with interdisciplinary team and initiate plan and interventions as ordered.   - [place] patient to unit/surroundings  - Explain treatment plan  - Encourage participation in care  - Encourage verbalization of concerns/fears  - Identify coping mechanisms  - Assist in developing anxiety-reducing skills  - Administer/offer alternative therapies  - Limit or eliminate stimulants  Outcome: Progressing

## 2023-07-06 NOTE — PROGRESS NOTES
Progress Note - Behavioral Health   Mildred Almaguer 21 y.o. male MRN: [id number]  Unit/Bed#: [facility] 027-59 Encounter: [id number]    All documentation, nursing notes, labs, and vitals reviewed. The patient's medication reconciliation chart was also analyzed for medication adherence. I personally evaluated Mildred Almaguer and discussed current care with treatment team. No acute events overnight. Jeffery Putnam was pleasant on approach. He is notably childlike and regressed at times. He describes his mood as "good" today. He reports adequate sleep and stable appetite. He denies SI/HI on examination today. His energy and motivation are stable. He is without anhedonia or crying spells. Jeffery Putnam denies pathologic anxious or nervousness. No recent panic symptoms or extreme tension/feeling on-edge. He does voice distress and frustration regarding his hospitalization but responds well to supportive therapy and cognitive reframing. Jeffery Putnam speaks to future plans of ultimately "attending a trade school" and "working in construction". During today's examination, Jeffery Putnam does not exhibit objective evidence of dante/hypomania or psychosis. Jeffery Putnam is not currently irritable, pressured in speech, labile, or pathologically expansive in mood. Jeffery Putnam is without perceptual disturbances, such as A/V hallucinations, paranoia, ideas of reference, or delusional beliefs. Jeffery Putnam continues to endorse sialorrhea which did not respond to atropine. Cogentin was started within the last 24 hours and will likely need to be optimized as treatment progresses. He offers no further concerns.        Mental Status Evaluation:    Appearance:  age appropriate, casually dressed, dressed appropriately   Behavior:  pleasant, cooperative, calm   Speech:  normal rate, normal volume, normal pitch   Mood:  "good"   Affect:  blunted   Thought Process:  concrete, poverty of thought   Associations: concrete associations   Thought Content:  no overt delusions   Perceptual Disturbances: no auditory hallucinations, no visual hallucinations   Risk Potential: Suicidal ideation - None at present  Homicidal ideation - None at present  Potential for aggression - No   Sensorium:  oriented to person, place and time/date   Memory:  recent and remote memory: unable to assess due to lack of cooperation   Consciousness:  alert and awake    Attention: attention span and concentration appear shorter than expected for age   Insight:  fair and improving   Judgment: fair and improving   Gait/Station: normal gait/station   Motor Activity: no abnormal movements       Assessment:     Principal Problem:    Severe episode of recurrent major depressive disorder, without psychotic features ([address])  Active Problems:    Medical clearance for psychiatric admission    Autism spectrum disorder    Mood disorder ([address])    Anxiety disorder    Rash      Plan/Recommended Treatment:     - Continue with pharmacotherapy, group therapy, milieu therapy and occupational therapy. - Risks/benefits/alternatives to treatment discussed and Cher Galeana continues to verbalize understanding   - No psychopharmacologic changes necessary at this juncture, continue scheduled psychotropic agents at current doses (see below)   - Will consider further optimization of psychotropic medication regimen as hospital course progresses   - Continue to assess for adverse medication side effects.  - Medical management as per SLIM recs  - Encourage Cher Galeana to participate in nonverbal forms of therapy including journaling and art/music therapy  - Continue precautionary Q7-minute safety checks. - Continue to engage case management/SW to assist with collateral information, discharge planning, and the implementation of an individualized, patient-centered plan of care.   - The patient will be maintained on the following medications:    Current Facility-Administered Medications   Medication Dose Route Frequency Provider Last Rate   • acetaminophen  650 mg Oral Q6H PRN Reta Ray, DO     • acetaminophen  650 mg Oral Q4H PRN Reta Ray, DO     • acetaminophen  975 mg Oral Q6H PRN Reta Ray, DO     • aluminum-magnesium hydroxide-simethicone  30 mL Oral Q4H PRN Reta Ray, DO     • ammonium lactate   Topical BID PRN Nelia Bowles PA-C     • atropine  1 drop Sublingual TID PRN Donnie Hillman PA-C     • benztropine  1 mg Intramuscular Q4H PRN Max 6/day Reta Ray, DO     • benztropine  0.5 mg Oral BID Reta Ray, DO     • benztropine  1 mg Oral Q4H PRN Max 6/day Reta Ray, DO     • clonazePAM  0.5 mg Oral BID Shereen Cabrera MD     • hydrOXYzine HCL  50 mg Oral Q6H PRN Max 4/day Reta Ray, DO      Or   • diphenhydrAMINE  50 mg Intramuscular Q6H PRN Reta Ray, DO     • diphenhydrAMINE  25 mg Oral Q6H PRN Pamela Lawrence, DO     • glycerin-hypromellose-  1 drop Both Eyes Q3H PRN Reta Ray, DO     • haloperidol  5 mg Oral BID Shereen Cabrera MD     • hydrOXYzine HCL  100 mg Oral Q6H PRN Max 4/day Reta Ray, DO      Or   • LORazepam  2 mg Intramuscular Q6H PRN Reta Ray, DO     • hydrOXYzine HCL  25 mg Oral Q6H PRN Max 4/day Reta Ray, DO     • lithium carbonate  450 mg Oral Q12H Gardenia Hedrick MD     • melatonin  10 mg Oral HS Shereen Cabrera MD     • mirtazapine  7.5 mg Oral HS Reta Ray, DO     • OLANZapine  10 mg Oral Q3H PRN Max 3/day Reta Ray, DO      Or   • OLANZapine  10 mg Intramuscular Q3H PRN Max 3/day Reta Ray, DO     • OLANZapine  5 mg Oral Q3H PRN Max 6/day Reta Ray, DO      Or   • OLANZapine  5 mg Intramuscular Q3H PRN Max 6/day Reta Ray, DO     • OLANZapine  2.5 mg Oral Q3H PRN Max 8/day Ciro Watters, DO     • polyethylene glycol  17 g Oral Daily PRN Reta Ray, DO     • propranolol  10 mg Oral Q8H PRN Reta Ray, DO     • propranolol  10 mg Oral Q8H PRN Charlotte Olivasy Lexi Olivares MD     • senna-docusate sodium  1 tablet Oral Daily PRN Mick Owusu DO     • traZODone  50 mg Oral HS PRN Mick Owusu DO

## 2023-07-07 PROCEDURE — 99232 SBSQ HOSP IP/OBS MODERATE 35: CPT | Performed by: PSYCHIATRY & NEUROLOGY

## 2023-07-07 RX ORDER — BENZTROPINE MESYLATE 1 MG/1
1 TABLET ORAL 2 TIMES DAILY
Status: DISCONTINUED | OUTPATIENT
Start: 2023-07-07 | End: 2023-08-01

## 2023-07-07 RX ADMIN — MIRTAZAPINE 7.5 MG: 7.5 TABLET, FILM COATED ORAL at 21:40

## 2023-07-07 RX ADMIN — BENZTROPINE MESYLATE 0.5 MG: 0.5 TABLET ORAL at 08:24

## 2023-07-07 RX ADMIN — LITHIUM CARBONATE 450 MG: 450 TABLET, EXTENDED RELEASE ORAL at 08:24

## 2023-07-07 RX ADMIN — CLONAZEPAM 0.5 MG: 0.5 TABLET ORAL at 17:22

## 2023-07-07 RX ADMIN — ATROPINE SULFATE 1 DROP: 10 SOLUTION OPHTHALMIC at 08:25

## 2023-07-07 RX ADMIN — CLONAZEPAM 0.5 MG: 0.5 TABLET ORAL at 08:24

## 2023-07-07 RX ADMIN — BENZTROPINE MESYLATE 1 MG: 1 TABLET ORAL at 17:23

## 2023-07-07 RX ADMIN — LITHIUM CARBONATE 450 MG: 450 TABLET, EXTENDED RELEASE ORAL at 21:39

## 2023-07-07 RX ADMIN — HALOPERIDOL 5 MG: 5 TABLET ORAL at 08:25

## 2023-07-07 RX ADMIN — Medication 10 MG: at 21:39

## 2023-07-07 RX ADMIN — HALOPERIDOL 5 MG: 5 TABLET ORAL at 17:22

## 2023-07-07 NOTE — NURSING NOTE
Silas Jeter continues rslopnt is compliant  With mediation and meals He continues  To harp on being discharge. Joya Mckeon did attend 4 groups  Yesterday.  He does keep to himself a lot

## 2023-07-07 NOTE — PROGRESS NOTES
Progress Note - Behavioral Health   Laureen Mora 21 y.o. male MRN: [id number]  [facility] Encounter: [id number]    Assessment/Plan   Principal Problem:    Severe episode of recurrent major depressive disorder, without psychotic features ([address])  Active Problems:    Autism spectrum disorder    Mood disorder ([address])    Anxiety disorder    Medical clearance for psychiatric admission    Rash      Subjective: Patient was seen, chart was reviewed, and case was discussed with entire team.     Patient seen out the hallway today. He is overall cooperative. He gives a long stare at this writer. Did not brighten this time. Continues to be childlike. He seems frustrated but mostly about his hospitalization still. He feels he is drooling and tremors are improving however he does not exhibit evidence of drooling. He does go to groups. He reports he is sleeping and eating well. Patient has been medication compliant. Psychiatric Review of Systems:  Behavior over the last 24 hours: unchanged  Sleep: normal  Appetite: adequate  Medication side effects: none verbalized  Medical ROS: Complete review of systems is negative except as noted above.             Vitals:  Vitals:    07/07/23 0710   BP: 110/63   Pulse: 89   Resp: 18   Temp: 97.7 °F (36.5 °C)   SpO2: 96%       Mental Status Exam:    Appearance:  alert, appears stated age, casually dressed, appropriate grooming and hygiene and Longstanding stare   Behavior:  calm and cooperative   Speech:  normal rate and normal volume   Mood:  Frustrated   Affect:  blunted   Thought Process:  [place]   Thought Content:  no verbalized delusions or overt paranoia   Perceptual Disturbances: no reported hallucinations and does not appear to be responding to internal stimuli at this time   Risk Potential: Suicidal ideation - None at present  Homicidal ideation - None at present  Potential for aggression - Not at present   Sensorium:  oriented to person, place and time/date Memory:  recent and remote memory: unable to assess due to lack of cooperation   Consciousness:  alert and awake   Attention/Concentration: attention span and concentration appear shorter than expected for age   Insight:  fair   Judgment: fair   Gait/Station: normal gait/station   Motor Activity: Fine tremors bilaterally in hands     Progress Toward Goals: Progressing    Recommended Treatment: Continue with pharmacotherapy, group therapy, milieu therapy and occupational therapy. Continue frequent safety checks and vitals per unit protocol. Continue with medical management as indicated. Continue coordinating with case management regarding disposition  1. Will increase Cogentin to 1 mg twice daily. 2.  Discharge planning      Risks, benefits and possible side effects of Medications: Risks, benefits, and possible side effects of medications have previously been explained. No new medications at this time.       Current Medications:  Current Facility-Administered Medications   Medication Dose Route Frequency Provider Last Rate   • acetaminophen  650 mg Oral Q6H PRN Lenny Sierras, DO     • acetaminophen  650 mg Oral Q4H PRN Lenny Sierras, DO     • acetaminophen  975 mg Oral Q6H PRN Lenny Sierras, DO     • aluminum-magnesium hydroxide-simethicone  30 mL Oral Q4H PRN Lenny Sierras, DO     • ammonium lactate   Topical BID PRN Roddy Loja PA-C     • atropine  1 drop Sublingual TID PRN Lanie Gonzalez PA-C     • benztropine  1 mg Intramuscular Q4H PRN Max 6/day Lenny Sierras, DO     • benztropine  0.5 mg Oral BID Lenny Sierras, DO     • benztropine  1 mg Oral Q4H PRN Max 6/day Lenny Sierras, DO     • clonazePAM  0.5 mg Oral BID Enzo Kim MD     • hydrOXYzine HCL  50 mg Oral Q6H PRN Max 4/day Lenny Sierras, DO      Or   • diphenhydrAMINE  50 mg Intramuscular Q6H PRN Lenny Sierras, DO     • diphenhydrAMINE  25 mg Oral Q6H PRN Ade Cat DO     • glycerin-hypromellose-  1 drop Both Eyes Q3H PRN Ashley Pop, DO     • haloperidol  5 mg Oral BID Mary Tavares MD     • hydrOXYzine HCL  100 mg Oral Q6H PRN Max 4/day Ashley Pop, DO      Or   • LORazepam  2 mg Intramuscular Q6H PRN Ashley Pop, DO     • hydrOXYzine HCL  25 mg Oral Q6H PRN Max 4/day Ashley Pop, DO     • lithium carbonate  450 mg Oral Q12H Joanette Halsted, MD     • melatonin  10 mg Oral HS Mary Tavares MD     • mirtazapine  7.5 mg Oral HS Ashley Pop, DO     • OLANZapine  10 mg Oral Q3H PRN Max 3/day Ashley Pop, DO      Or   • OLANZapine  10 mg Intramuscular Q3H PRN Max 3/day Ashley Pop, DO     • OLANZapine  5 mg Oral Q3H PRN Max 6/day Ashley Pop, DO      Or   • OLANZapine  5 mg Intramuscular Q3H PRN Max 6/day Ashley Pop, DO     • OLANZapine  2.5 mg Oral Q3H PRN Max 8/day Ashley Pop, DO     • polyethylene glycol  17 g Oral Daily PRN Ashley Pop, DO     • propranolol  10 mg Oral Q8H PRN Ashley Pop, DO     • propranolol  10 mg Oral Q8H PRN Mary Tavares MD     • senna-docusate sodium  1 tablet Oral Daily PRN Ashley Pop, DO     • traZODone  50 mg Oral HS PRN Ashley Pop, DO         Behavioral Health Medications:   all current active meds have been reviewed. Laboratory results:  I have personally reviewed all pertinent laboratory/tests results. No results found for this or any previous visit (from the past 48 hour(s)).          Ashley Pop, DO 07/07/23

## 2023-07-07 NOTE — SOCIAL WORK
SW met with patient for an individual session. Patient was initially dramatically shaking, and reported feeling anxious. Shaking not addressed because this has been an attention seeking behavior of his, and did completely cease after a few minutes into the session. He was otherwise calm and pleasant, but easily distracted and displayed a lot of catastrophic thinking patterns. He had a flat affect, and eye contact was fair. He was dressed appropriately, and appeared well groomed. He spoke about feeling like he will be "stuck here forever," and feelings of hopelessness. SW encouraged patient to reframe his thoughts, by focusing on the the good news we received earlier in the week; however, he struggled to do so. Focus changed to some positive psychology exercises. Patient came prepared with his assignment on identifying his strengths, and was able to speak about how his strengths can help him work towards discharge. Some of the strengths he identified were areas of need, which was gently explained to him. Praise was given to him for showing improved control over the last few weeks. Patient's behavioral plan has not been utilized because his reward was never brought in; patient interested in earning a Dr. Rosario Gathers as his new reward; SW will discuss with the team.  Patient states that he has been able to manage his behaviors because he wants to discharge. Patient was then encouraged to identify things he is grateful for; his responses started out superficial, but did improve as he went on. Patient seemed less fixated on possible negative outcomes regarding the group home by the end of session.

## 2023-07-07 NOTE — NURSING NOTE
Samantha Dalal has been awake, alert, and visible intermittently out in the milieu. Pt ate 100% supper. Pt continues to be easily distracted and delayed with his responses in conversation and has difficulty focusing and staying on topic. Pt smiles inappropriately and is incongruent in affect. Pt complained of "drooling" and requested prn medication. Atropine 1% 1 gtt prn sublingual given at [phone] and pt stated that "It helped a little."  Pt watches tv in dining room with peers at times and rests in room. Pt attended and participated in evening group, wrap up group, and had evening snack. No behavioral issues. Compliant with scheduled meds. Continue to monitor/assess for any changes.

## 2023-07-07 NOTE — PROGRESS NOTES
07/07/23 0830   Team Meeting   Meeting Type Daily Rounds   Initial Conference Date 07/07/23   Patient/Family Present   Patient Present No   Patient's Family Present No     Daily Rounds Documentation     Team Members Present:   Dr. Ezio Philippe, DO Dr. Harshad Menjivar, DO  Manasa Jacobo, RN  Holger Mcmahan, [address], [facility]  [place], [place] D. Easily distracted. Immature. Atropine drops utilized 2x. Overall behaviors have been much improved. Attended 4/9 groups. Compliant with medications and meals. Slept.

## 2023-07-08 PROCEDURE — 99232 SBSQ HOSP IP/OBS MODERATE 35: CPT | Performed by: PHYSICIAN ASSISTANT

## 2023-07-08 RX ADMIN — BENZTROPINE MESYLATE 1 MG: 1 TABLET ORAL at 08:15

## 2023-07-08 RX ADMIN — LITHIUM CARBONATE 450 MG: 450 TABLET, EXTENDED RELEASE ORAL at 21:13

## 2023-07-08 RX ADMIN — LITHIUM CARBONATE 450 MG: 450 TABLET, EXTENDED RELEASE ORAL at 08:15

## 2023-07-08 RX ADMIN — MIRTAZAPINE 7.5 MG: 7.5 TABLET, FILM COATED ORAL at 21:14

## 2023-07-08 RX ADMIN — CLONAZEPAM 0.5 MG: 0.5 TABLET ORAL at 17:04

## 2023-07-08 RX ADMIN — CLONAZEPAM 0.5 MG: 0.5 TABLET ORAL at 08:15

## 2023-07-08 RX ADMIN — HALOPERIDOL 5 MG: 5 TABLET ORAL at 17:04

## 2023-07-08 RX ADMIN — BENZTROPINE MESYLATE 1 MG: 1 TABLET ORAL at 17:04

## 2023-07-08 RX ADMIN — HALOPERIDOL 5 MG: 5 TABLET ORAL at 08:15

## 2023-07-08 RX ADMIN — Medication 10 MG: at 21:13

## 2023-07-08 NOTE — PLAN OF CARE
Problem: Ineffective Coping  Goal: Demonstrates healthy coping skills  Outcome: Progressing     Problem: Depression  Goal: Verbalize thoughts and feelings  Description: Interventions:  - Assess and re-assess patient's level of risk   - Engage patient in 1:1 interactions, daily, for a minimum of 15 minutes   - Encourage patient to express feelings, fears, frustrations, hopes   Outcome: Progressing  Goal: Refrain from isolation  Description: Interventions:  - Develop a trusting relationship   - Encourage socialization   Outcome: Progressing     Problem: Anxiety  Goal: Anxiety is at manageable level  Description: Interventions:  - Assess and monitor patient's anxiety level. - Monitor for signs and symptoms (heart palpitations, chest pain, shortness of breath, headaches, nausea, feeling jumpy, restlessness, irritable, apprehensive). - Collaborate with interdisciplinary team and initiate plan and interventions as ordered.   - [place] patient to unit/surroundings  - Explain treatment plan  - Encourage participation in care  - Encourage verbalization of concerns/fears  - Identify coping mechanisms  - Assist in developing anxiety-reducing skills  - Administer/offer alternative therapies  - Limit or eliminate stimulants  Outcome: Progressing

## 2023-07-08 NOTE — PROGRESS NOTES
07/08/23 1000   Activity/Group Checklist   Group Other (Comment)  (Group Art Therapy, Open studio social group)   Attendance Attended   Attendance Duration (min) 46-60   Interactions Interacted appropriately   Affect/Mood Appropriate   Goals Achieved Able to engage in interactions

## 2023-07-08 NOTE — PROGRESS NOTES
Progress Note - Behavioral Health     Lamar Garcia 21 y.o. male MRN: [id number]  Unit/Bed#: [facility] 787-57 Encounter: [id number]      Lamar Garcia was seen for continuing care and reviewed with treatment team.  Pt was calm and cooperative for interview, with intermittent and misplaced smile at times. Eye contact was a little better today, but still tends to stare past the provider at times. He remains impaired of insight and fixed on discharge, and reported feeling "Anxious to get out of here."  He presently denies depression, SI, HI, or psychotic symptoms and reported good sleep and appetite--to which nursing concurred. His favorite group therapeutic activity on unit is the art group and he intends to participate in groups again today. He stated that he feels able to keep control and that he would continue his medications after point of future discharge. He states he is okay with going to a group home in the future. He presently reports a SE of drooling but states it is only slight today. Per EMR and floor team, Pt has remained calm, cooperative, and medication compliant on unit, and also less attention seeking through this weekend. He has been intermittently visible in the milieu and participated in a few therapeutic activities thus far, with appropriate behavior among peers. No aggressive outbursts reported.     Sleep:Good  Appetite: Good   Medication side effects: As per HPI    ROS: As per HPI, (All else negative)    Labs/Tests: Reviewed    Mental Status Evaluation:  Appearance:  Dressed, Fair hygiene, Partial eye contact, but a little better than yesterday, can appear to stare beyond the interviewer   Behavior:  Calm, cooperative, pleasant   Speech:  Clear, normal rate and volume, not very spontaneous   Mood:  Anxious   Affect:  Constricted   Thought Process:  Organized, Goal directed, [place], Fixed on discharge   Associations: [place] associations   Thought Content:  No verbalized delusions   Perceptual Disturbances: Pt denies any hallucinations or paranoia and does not appear to be responding to internal stimuli   Risk Potential: Pt presently denies SI or HI    Sensorium:  Self, birthday, Place, Day of the week, Month, Year   Memory:  short term memory grossly intact   Consciousness:  alert, awake   Attention: Fair   Insight:  Poor   Judgment: Limited   Gait/Station: Normal gait/station   Motor Activity: No abnormal movements     Vitals:    07/07/23 1510 07/08/23 0722 07/08/23 1500 07/09/23 0700   BP: 121/76 111/61 107/55 106/67   BP Location: Right arm Left arm Left arm Right arm   Pulse: 79 73 100 92   Resp: 18 18 20 19   Temp: 97.5 °F (36.4 °C) 97.7 °F (36.5 °C) 98.9 °F (37.2 °C) (!) 97.2 °F (36.2 °C)   TempSrc: Temporal Temporal Temporal Skin   SpO2: 97% 97%  98%   Weight:  82.9 kg (182 lb 12.8 oz)     Height:           Admission on 05/25/2023   Component Date Value   • WBC 05/27/2023 6.14    • RBC 05/27/2023 4.87    • Hemoglobin 05/27/2023 14.9    • Hematocrit 05/27/2023 45.2    • MCV 05/27/2023 93    • MCH 05/27/2023 30.6    • MCHC 05/27/2023 33.0    • RDW 05/27/2023 12.9    • MPV 05/27/2023 9.9    • Platelets 51/82/6878 224    • nRBC 05/27/2023 0    • Neutrophils Relative 05/27/2023 38 (L)    • Immat GRANS % 05/27/2023 0    • Lymphocytes Relative 05/27/2023 45 (H)    • Monocytes Relative 05/27/2023 14 (H)    • Eosinophils Relative 05/27/2023 2    • Basophils Relative 05/27/2023 1    • Neutrophils Absolute 05/27/2023 2.33    • Immature Grans Absolute 05/27/2023 0.00    • Lymphocytes Absolute 05/27/2023 2.82    • Monocytes Absolute 05/27/2023 0.83    • Eosinophils Absolute 05/27/2023 0.12    • Basophils Absolute 05/27/2023 0.04    • Sodium 05/27/2023 140    • Potassium 05/27/2023 4.1    • Chloride 05/27/2023 103    • CO2 05/27/2023 26    • ANION GAP 05/27/2023 11    • BUN 05/27/2023 15    • Creatinine 05/27/2023 0.66    • Glucose 05/27/2023 88    • Calcium 05/27/2023 9.4    • AST 05/27/2023 25    • ALT 05/27/2023 38    • Alkaline Phosphatase 05/27/2023 89    • Total Protein 05/27/2023 7.1    • Albumin 05/27/2023 4.7    • Total Bilirubin 05/27/2023 0.70    • eGFR 05/27/2023 140    • Cholesterol 05/27/2023 208 (H)    • Triglycerides 05/27/2023 190 (H)    • HDL, Direct 05/27/2023 39 (L)    • LDL Calculated 05/27/2023 131 (H)    • Non-HDL-Chol (CHOL-HDL) 05/27/2023 169    • TSH 3RD GENERATON 05/27/2023 1.637    • Valproic Acid, Total 05/27/2023 122 (H)    • Hemoglobin A1C 05/27/2023 5.4    • EAG 05/27/2023 108    • Ventricular Rate 05/26/2023 62    • Atrial Rate 05/26/2023 62    • ND Interval 05/26/2023 152    • QRSD Interval 05/26/2023 88    • QT Interval 05/26/2023 368    • QTC Interval 05/26/2023 373    • P Axis 05/26/2023 68    • QRS Axis 05/26/2023 56    • T Wave Axis 05/26/2023 47    • Valproic Acid, Total 05/28/2023 119 (H)    • Valproic Acid, Total 05/31/2023 106 (H)    • Sodium 05/31/2023 140    • Potassium 05/31/2023 4.4    • Chloride 05/31/2023 102    • CO2 05/31/2023 29    • ANION GAP 05/31/2023 9    • BUN 05/31/2023 11    • Creatinine 05/31/2023 0.72    • Glucose 05/31/2023 89    • Glucose, Fasting 05/31/2023 89    • Calcium 05/31/2023 9.3    • AST 05/31/2023 24    • ALT 05/31/2023 43    • Alkaline Phosphatase 05/31/2023 98    • Total Protein 05/31/2023 7.0    • Albumin 05/31/2023 4.4    • Total Bilirubin 05/31/2023 0.52    • eGFR 05/31/2023 135    • WBC 05/31/2023 7.08    • RBC 05/31/2023 4.95    • Hemoglobin 05/31/2023 15.2    • Hematocrit 05/31/2023 45.6    • MCV 05/31/2023 92    • MCH 05/31/2023 30.7    • MCHC 05/31/2023 33.3    • RDW 05/31/2023 12.8    • MPV 05/31/2023 9.7    • Platelets 24/14/3669 227    • nRBC 05/31/2023 0    • Neutrophils Relative 05/31/2023 34 (L)    • Immat GRANS % 05/31/2023 0    • Lymphocytes Relative 05/31/2023 48 (H)    • Monocytes Relative 05/31/2023 15 (H)    • Eosinophils Relative 05/31/2023 2    • Basophils Relative 05/31/2023 1    • Neutrophils Absolute 05/31/2023 2.39    • Immature Grans Absolute 05/31/2023 0.01    • Lymphocytes Absolute 05/31/2023 3.38    • Monocytes Absolute 05/31/2023 1.09    • Eosinophils Absolute 05/31/2023 0.17    • Basophils Absolute 05/31/2023 0.04    • WBC 06/03/2023 6.74    • RBC 06/03/2023 5.22    • Hemoglobin 06/03/2023 15.6    • Hematocrit 06/03/2023 49.0    • MCV 06/03/2023 94    • MCH 06/03/2023 29.9    • MCHC 06/03/2023 31.8    • RDW 06/03/2023 13.1    • MPV 06/03/2023 11.1    • Platelets 72/51/4897 167    • nRBC 06/03/2023 0    • Neutrophils Relative 06/03/2023 45    • Immat GRANS % 06/03/2023 0    • Lymphocytes Relative 06/03/2023 45 (H)    • Monocytes Relative 06/03/2023 8    • Eosinophils Relative 06/03/2023 2    • Basophils Relative 06/03/2023 0    • Neutrophils Absolute 06/03/2023 3.01    • Immature Grans Absolute 06/03/2023 0.02    • Lymphocytes Absolute 06/03/2023 3.00    • Monocytes Absolute 06/03/2023 0.53    • Eosinophils Absolute 06/03/2023 0.16    • Basophils Absolute 06/03/2023 0.02    • Lithium Lvl 06/09/2023 0.4 (L)    • Lyme Total Antibodies 06/09/2023 <0.2    • Lithium Lvl 06/14/2023 0.7    • Lithium Lvl 06/21/2023 0.7        Progress Toward Goals:  Based on today's interview and review of prior notes, Pt is making gradual progress. Continue the present medication regimen      Assessment/Plan   Principal Problem:    Severe episode of recurrent major depressive disorder, without psychotic features ([address])  Active Problems:    Medical clearance for psychiatric admission    Autism spectrum disorder    Mood disorder ([address])    Anxiety disorder    Rash      Recommended Treatment: Continue with pharmacotherapy, group therapy, milieu therapy and occupational therapy.   The patient will be maintained on the following medications:  Current Facility-Administered Medications   Medication Dose Route Frequency Provider Last Rate   • acetaminophen  650 mg Oral Q6H PRN Chyrel Michel, DO     • acetaminophen  650 mg Oral Q4H PRN Clearnce Roll, DO     • acetaminophen  975 mg Oral Q6H PRN Clearnce Roll, DO     • aluminum-magnesium hydroxide-simethicone  30 mL Oral Q4H PRN Clearnce Roll, DO     • ammonium lactate   Topical BID PRN Monie Cross PA-C     • atropine  1 drop Sublingual TID PRN Inna Rose PA-C     • benztropine  1 mg Intramuscular Q4H PRN Max 6/day Clearnce Roll, DO     • benztropine  1 mg Oral Q4H PRN Max 6/day Clearnce Roll, DO     • benztropine  1 mg Oral BID Clearnce Roll, DO     • clonazePAM  0.5 mg Oral BID Niranjan Bailon MD     • hydrOXYzine HCL  50 mg Oral Q6H PRN Max 4/day Clearnce Roll, DO      Or   • diphenhydrAMINE  50 mg Intramuscular Q6H PRN Clearnce Roll, DO     • diphenhydrAMINE  25 mg Oral Q6H PRN Enzo Pastel, DO     • glycerin-hypromellose-  1 drop Both Eyes Q3H PRN Clearnce Roll, DO     • haloperidol  5 mg Oral BID Niranjan Bailon MD     • hydrOXYzine HCL  100 mg Oral Q6H PRN Max 4/day Clearnce Roll, DO      Or   • LORazepam  2 mg Intramuscular Q6H PRN Clearnce Roll, DO     • hydrOXYzine HCL  25 mg Oral Q6H PRN Max 4/day Clearnce Roll, DO     • lithium carbonate  450 mg Oral Q12H Mary Joaquin MD     • melatonin  10 mg Oral HS Niranjan Bailon MD     • mirtazapine  7.5 mg Oral HS Clearnce Roll, DO     • OLANZapine  10 mg Oral Q3H PRN Max 3/day Clearnce Roll, DO      Or   • OLANZapine  10 mg Intramuscular Q3H PRN Max 3/day Clearnce Roll, DO     • OLANZapine  5 mg Oral Q3H PRN Max 6/day Clearnce Roll, DO      Or   • OLANZapine  5 mg Intramuscular Q3H PRN Max 6/day Clearnce Roll, DO     • OLANZapine  2.5 mg Oral Q3H PRN Max 8/day Ciro A Prayson, DO     • polyethylene glycol  17 g Oral Daily PRN Clearnce Roll, DO     • propranolol  10 mg Oral Q8H PRN Clearnce Roll, DO     • propranolol  10 mg Oral Q8H PRN Niranjan Bailon MD     • senna-docusate sodium  1 tablet Oral Daily PRN Kathi Oakley DO     • traZODone  50 mg Oral HS PRN Christo Carbajal,          Risks, benefits and possible side effects of Medications:   Risks, benefits, and possible side effects of medications explained to patient and patient verbalizes understanding

## 2023-07-08 NOTE — PROGRESS NOTES
Progress Note - Behavioral Health     Julius Mcgill 21 y.o. male MRN: [id number]  [facility]72-86 Encounter: [id number]      Julius Mcgill was seen for continuing care and reviewed with treatment team.  Pt was in the milieu and cooperative for interview. He stared ahead but somewhat down and reported feeling "Anxious--when I'm gonna get out" of the [facility]. He admitted to being "A little bit depressed" for the same reason and wants to attend a wedding on 9/16/2023. When asked about future plans he reported wanting to go to trade school and work in construction. He presently denies SI, HI, or psychotic Sxs, and reported sleeping and eating well -- to which nursing concurred on the latter two elements. He demonstrated an inappropriate smile at times and appeared internally preoccupied but no overt RIS. Pt denied any medication SE to this provider when asked. Per EMR and floor team, the Pt has been calm and medication compliant, without recent aggressive outbursts. However, his self control is tenuous and seems entirely motivated by desire for discharge. He has demonstrated childish and attention seeking behaviors-- ie shaking was noted to be one of them. He has been attending some therapeutic groups and his behavior has been noted to improve during them.     Sleep:Good  Appetite: Good   Medication side effects: None per Pt    ROS: No complaints per Pt, (All ROS Negative)    Labs/Tests: Reviewed    Mental Status Evaluation:  Appearance:  Dressed, Fair hygiene, partial eye contact, mostly stares downward as looking at my neck and seems to look past me    Behavior:  Calm, cooperative, pleasant   Speech:  Clear, normal rate and volume, not very spontaneous but answers questions readily   Mood:  Anxious, Depressed   Affect:  Constricted   Thought Process:  Organized, Goal directed, [place], fixed on discharge   Associations: [place] associations   Thought Content:  No verbalized delusions Perceptual Disturbances: Pt denies any hallucinations or paranoia and does not appear to be responding to internal stimuli   Risk Potential: Pt presently denies SI or HI    Sensorium:  Self, birthday, Place, Day of the week, Month, Year   Memory:  short term memory grossly intact   Consciousness:  alert, awake   Attention: Fair   Insight:  Poor   Judgment: Limited   Gait/Station: Normal gait/station   Motor Activity: No abnormal movements     Vitals:    07/06/23 1516 07/07/23 0710 07/07/23 1510 07/08/23 0722   BP: 109/66 110/63 121/76 111/61   BP Location: Right arm Left arm Right arm Left arm   Pulse: 80 89 79 73   Resp: 16 18 18 18   Temp: 97.5 °F (36.4 °C) 97.7 °F (36.5 °C) 97.5 °F (36.4 °C) 97.7 °F (36.5 °C)   TempSrc: Temporal Temporal Temporal Temporal   SpO2: 98% 96% 97% 97%   Weight:    82.9 kg (182 lb 12.8 oz)   Height:           Admission on 05/25/2023   Component Date Value   • WBC 05/27/2023 6.14    • RBC 05/27/2023 4.87    • Hemoglobin 05/27/2023 14.9    • Hematocrit 05/27/2023 45.2    • MCV 05/27/2023 93    • MCH 05/27/2023 30.6    • MCHC 05/27/2023 33.0    • RDW 05/27/2023 12.9    • MPV 05/27/2023 9.9    • Platelets 50/44/9283 224    • nRBC 05/27/2023 0    • Neutrophils Relative 05/27/2023 38 (L)    • Immat GRANS % 05/27/2023 0    • Lymphocytes Relative 05/27/2023 45 (H)    • Monocytes Relative 05/27/2023 14 (H)    • Eosinophils Relative 05/27/2023 2    • Basophils Relative 05/27/2023 1    • Neutrophils Absolute 05/27/2023 2.33    • Immature Grans Absolute 05/27/2023 0.00    • Lymphocytes Absolute 05/27/2023 2.82    • Monocytes Absolute 05/27/2023 0.83    • Eosinophils Absolute 05/27/2023 0.12    • Basophils Absolute 05/27/2023 0.04    • Sodium 05/27/2023 140    • Potassium 05/27/2023 4.1    • Chloride 05/27/2023 103    • CO2 05/27/2023 26    • ANION GAP 05/27/2023 11    • BUN 05/27/2023 15    • Creatinine 05/27/2023 0.66    • Glucose 05/27/2023 88    • Calcium 05/27/2023 9.4    • AST 05/27/2023 25 • ALT 05/27/2023 38    • Alkaline Phosphatase 05/27/2023 89    • Total Protein 05/27/2023 7.1    • Albumin 05/27/2023 4.7    • Total Bilirubin 05/27/2023 0.70    • eGFR 05/27/2023 140    • Cholesterol 05/27/2023 208 (H)    • Triglycerides 05/27/2023 190 (H)    • HDL, Direct 05/27/2023 39 (L)    • LDL Calculated 05/27/2023 131 (H)    • Non-HDL-Chol (CHOL-HDL) 05/27/2023 169    • TSH 3RD GENERATON 05/27/2023 1.637    • Valproic Acid, Total 05/27/2023 122 (H)    • Hemoglobin A1C 05/27/2023 5.4    • EAG 05/27/2023 108    • Ventricular Rate 05/26/2023 62    • Atrial Rate 05/26/2023 62    • ME Interval 05/26/2023 152    • QRSD Interval 05/26/2023 88    • QT Interval 05/26/2023 368    • QTC Interval 05/26/2023 373    • P Axis 05/26/2023 68    • QRS Axis 05/26/2023 56    • T Wave Axis 05/26/2023 47    • Valproic Acid, Total 05/28/2023 119 (H)    • Valproic Acid, Total 05/31/2023 106 (H)    • Sodium 05/31/2023 140    • Potassium 05/31/2023 4.4    • Chloride 05/31/2023 102    • CO2 05/31/2023 29    • ANION GAP 05/31/2023 9    • BUN 05/31/2023 11    • Creatinine 05/31/2023 0.72    • Glucose 05/31/2023 89    • Glucose, Fasting 05/31/2023 89    • Calcium 05/31/2023 9.3    • AST 05/31/2023 24    • ALT 05/31/2023 43    • Alkaline Phosphatase 05/31/2023 98    • Total Protein 05/31/2023 7.0    • Albumin 05/31/2023 4.4    • Total Bilirubin 05/31/2023 0.52    • eGFR 05/31/2023 135    • WBC 05/31/2023 7.08    • RBC 05/31/2023 4.95    • Hemoglobin 05/31/2023 15.2    • Hematocrit 05/31/2023 45.6    • MCV 05/31/2023 92    • MCH 05/31/2023 30.7    • MCHC 05/31/2023 33.3    • RDW 05/31/2023 12.8    • MPV 05/31/2023 9.7    • Platelets 87/43/3736 227    • nRBC 05/31/2023 0    • Neutrophils Relative 05/31/2023 34 (L)    • Immat GRANS % 05/31/2023 0    • Lymphocytes Relative 05/31/2023 48 (H)    • Monocytes Relative 05/31/2023 15 (H)    • Eosinophils Relative 05/31/2023 2    • Basophils Relative 05/31/2023 1    • Neutrophils Absolute 05/31/2023 2.39    • Immature Grans Absolute 05/31/2023 0.01    • Lymphocytes Absolute 05/31/2023 3.38    • Monocytes Absolute 05/31/2023 1.09    • Eosinophils Absolute 05/31/2023 0.17    • Basophils Absolute 05/31/2023 0.04    • WBC 06/03/2023 6.74    • RBC 06/03/2023 5.22    • Hemoglobin 06/03/2023 15.6    • Hematocrit 06/03/2023 49.0    • MCV 06/03/2023 94    • MCH 06/03/2023 29.9    • MCHC 06/03/2023 31.8    • RDW 06/03/2023 13.1    • MPV 06/03/2023 11.1    • Platelets 25/96/8979 167    • nRBC 06/03/2023 0    • Neutrophils Relative 06/03/2023 45    • Immat GRANS % 06/03/2023 0    • Lymphocytes Relative 06/03/2023 45 (H)    • Monocytes Relative 06/03/2023 8    • Eosinophils Relative 06/03/2023 2    • Basophils Relative 06/03/2023 0    • Neutrophils Absolute 06/03/2023 3.01    • Immature Grans Absolute 06/03/2023 0.02    • Lymphocytes Absolute 06/03/2023 3.00    • Monocytes Absolute 06/03/2023 0.53    • Eosinophils Absolute 06/03/2023 0.16    • Basophils Absolute 06/03/2023 0.02    • Lithium Lvl 06/09/2023 0.4 (L)    • Lyme Total Antibodies 06/09/2023 <0.2    • Lithium Lvl 06/14/2023 0.7    • Lithium Lvl 06/21/2023 0.7        Progress Toward Goals:  Based on today's interview and review of prior notes, Pt is making slow progress. Haloperidol dose was increased 6/26/2023  Continue the present medication regimen unchanged    Assessment/Plan   Principal Problem:    Severe episode of recurrent major depressive disorder, without psychotic features ([address])  Active Problems:    Medical clearance for psychiatric admission    Autism spectrum disorder    Mood disorder ([address])    Anxiety disorder    Rash      Recommended Treatment: Continue with pharmacotherapy, group therapy, milieu therapy and occupational therapy.   The patient will be maintained on the following medications:  Current Facility-Administered Medications   Medication Dose Route Frequency Provider Last Rate   • acetaminophen  650 mg Oral Q6H PRN Anay Galloway Prayson, DO     • acetaminophen  650 mg Oral Q4H PRN Dorrene Skeans, DO     • acetaminophen  975 mg Oral Q6H PRN Dorrene Skeans, DO     • aluminum-magnesium hydroxide-simethicone  30 mL Oral Q4H PRN Dorrene Skeans, DO     • ammonium lactate   Topical BID PRN Aristeo Gómez PA-C     • atropine  1 drop Sublingual TID PRN Coy Monzon PA-C     • benztropine  1 mg Intramuscular Q4H PRN Max 6/day Dorrene Skeans, DO     • benztropine  1 mg Oral Q4H PRN Max 6/day Dorrene Skeans, DO     • benztropine  1 mg Oral BID Dorrene Skeans, DO     • clonazePAM  0.5 mg Oral BID Darío Long MD     • hydrOXYzine HCL  50 mg Oral Q6H PRN Max 4/day Dorrene Skeans, DO      Or   • diphenhydrAMINE  50 mg Intramuscular Q6H PRN Dorrene Skeans, DO     • diphenhydrAMINE  25 mg Oral Q6H PRN Cy Shove, DO     • glycerin-hypromellose-  1 drop Both Eyes Q3H PRN Dorrene Skeans, DO     • haloperidol  5 mg Oral BID Darío Long MD     • hydrOXYzine HCL  100 mg Oral Q6H PRN Max 4/day Dorrene Skeans, DO      Or   • LORazepam  2 mg Intramuscular Q6H PRN Dorrene Skeans, DO     • hydrOXYzine HCL  25 mg Oral Q6H PRN Max 4/day Dorrene Skeans, DO     • lithium carbonate  450 mg Oral Q12H Jayda Staton MD     • melatonin  10 mg Oral HS Darío Long MD     • mirtazapine  7.5 mg Oral HS Dorrene Skeans, DO     • OLANZapine  10 mg Oral Q3H PRN Max 3/day Dorrene Skeans, DO      Or   • OLANZapine  10 mg Intramuscular Q3H PRN Max 3/day Dorrene Skeans, DO     • OLANZapine  5 mg Oral Q3H PRN Max 6/day Dorrene Skeans, DO      Or   • OLANZapine  5 mg Intramuscular Q3H PRN Max 6/day Ciro A Prayson, DO     • OLANZapine  2.5 mg Oral Q3H PRN Max 8/day Ciro A Prayson, DO     • polyethylene glycol  17 g Oral Daily PRN Dorrene Skeans, DO     • propranolol  10 mg Oral Q8H PRN Jas Melara DO     • propranolol  10 mg Oral Q8H PRN Darío Long MD     • senna-docusate sodium  1 tablet Oral Daily PRN Live Sheehan DO     • traZODone  50 mg Oral HS PRN Live Sheehan DO         Risks, benefits and possible side effects of Medications:   Risks, benefits, and possible side effects of medications explained to patient and patient verbalizes understanding

## 2023-07-08 NOTE — NURSING NOTE
Savanna Cnoway has been less intrusive this shift and some inappropriate smiling noted. Med and meal compliant. Q 7 minute patient checks maintained.

## 2023-07-08 NOTE — NURSING NOTE
Patient is compliant with medication and meals Patient  Remains  Paranoid and still has  Child like behavior. . Patient is focused on being   Discharged also

## 2023-07-08 NOTE — NURSING NOTE
Micaela Jose was asleep throughout the night. Respirations easy and non labored. No issues or behaviors. Continue to monitor. Precautions maintained.

## 2023-07-09 VITALS
WEIGHT: 182.8 LBS | RESPIRATION RATE: 19 BRPM | OXYGEN SATURATION: 97 % | BODY MASS INDEX: 25.59 KG/M2 | DIASTOLIC BLOOD PRESSURE: 75 MMHG | SYSTOLIC BLOOD PRESSURE: 120 MMHG | TEMPERATURE: 98.1 F | HEART RATE: 95 BPM | HEIGHT: 71 IN

## 2023-07-09 PROCEDURE — 99232 SBSQ HOSP IP/OBS MODERATE 35: CPT | Performed by: PHYSICIAN ASSISTANT

## 2023-07-09 RX ADMIN — LITHIUM CARBONATE 450 MG: 450 TABLET, EXTENDED RELEASE ORAL at 08:20

## 2023-07-09 RX ADMIN — LITHIUM CARBONATE 450 MG: 450 TABLET, EXTENDED RELEASE ORAL at 21:17

## 2023-07-09 RX ADMIN — HALOPERIDOL 5 MG: 5 TABLET ORAL at 17:16

## 2023-07-09 RX ADMIN — ALUMINUM HYDROXIDE, MAGNESIUM HYDROXIDE, AND SIMETHICONE 30 ML: 200; 200; 20 SUSPENSION ORAL at 09:36

## 2023-07-09 RX ADMIN — BENZTROPINE MESYLATE 1 MG: 1 TABLET ORAL at 08:19

## 2023-07-09 RX ADMIN — CLONAZEPAM 0.5 MG: 0.5 TABLET ORAL at 08:20

## 2023-07-09 RX ADMIN — MIRTAZAPINE 7.5 MG: 7.5 TABLET, FILM COATED ORAL at 21:17

## 2023-07-09 RX ADMIN — Medication 10 MG: at 21:17

## 2023-07-09 RX ADMIN — BENZTROPINE MESYLATE 1 MG: 1 TABLET ORAL at 17:16

## 2023-07-09 RX ADMIN — HALOPERIDOL 5 MG: 5 TABLET ORAL at 08:20

## 2023-07-09 RX ADMIN — CLONAZEPAM 0.5 MG: 0.5 TABLET ORAL at 17:16

## 2023-07-09 NOTE — NURSING NOTE
Carlos Enriqueaugusto Osman requested and was given Mylanta 30 ml's at 8618 for "indigestion". Relief was obtained.

## 2023-07-09 NOTE — NURSING NOTE
Mary Brewer has been visible intermittently in the milieu. Admits to anxiety 2/4. Encouraged to use coping skills. Denies pain, depression and voices. Able to make needs known to staff. Ate 100% of meals. Took medication without difficulty. Did not attend any groups. Still focused on going home. Behavior has been controlled. No issues or behaviors. Continue to monitor. Precautions maintained.

## 2023-07-09 NOTE — PLAN OF CARE
Problem: Ineffective Coping  Goal: Demonstrates healthy coping skills  Outcome: Progressing     Problem: Risk for Self Injury/Neglect  Goal: Treatment Goal: Remain safe during length of stay, learn and adopt new coping skills, and be free of self-injurious ideation, impulses and acts at the time of discharge  Outcome: Progressing     Problem: Depression  Goal: Verbalize thoughts and feelings  Description: Interventions:  - Assess and re-assess patient's level of risk   - Engage patient in 1:1 interactions, daily, for a minimum of 15 minutes   - Encourage patient to express feelings, fears, frustrations, hopes   Outcome: Progressing  Goal: Refrain from isolation  Description: Interventions:  - Develop a trusting relationship   - Encourage socialization   Outcome: Progressing     Problem: Anxiety  Goal: Anxiety is at manageable level  Description: Interventions:  - Assess and monitor patient's anxiety level. - Monitor for signs and symptoms (heart palpitations, chest pain, shortness of breath, headaches, nausea, feeling jumpy, restlessness, irritable, apprehensive). - Collaborate with interdisciplinary team and initiate plan and interventions as ordered.   - [place] patient to unit/surroundings  - Explain treatment plan  - Encourage participation in care  - Encourage verbalization of concerns/fears  - Identify coping mechanisms  - Assist in developing anxiety-reducing skills  - Administer/offer alternative therapies  - Limit or eliminate stimulants  Outcome: Progressing

## 2023-07-09 NOTE — NURSING NOTE
Max Malloy has been awake, alert, and visible intermittently out in the milieu. Pt attended Spirituality group and went out on the deck with staff and peers for fresh air group. Pt ate 100% supper. When writer gave pt his 1800 scheduled meds pt tapped his head on door of his room. Writer instructed pt not to do this and asked pt how he was feeling. Pt smiled inappropriately and stated "Because I want to be discharged. I''m going to be in here for 2 years."  Pt affect/behavior incongruent. Pt fixated on discharge. Writer instructed pt to speak to his doctor and team about when he might be discharged and to utilize his coping skills. Pt then noted playing cards in room with his roommate with good interaction noted. Pt noted to yell out loud x 1 "Fuck" when walking in hallway and was instructed not to do this or use foul language and pt then stopped and went to sit in dining room to converse with tech. Pt remains fixated on dischargel. Pt attended and participated in evening nursing group and completed his self assessment form and was reminded to take this with him to his treatment team.  Pt had evening snack with peers. Compliant with scheduled meds. No further behavioral issues. Continue to monitor/assess for any changes.

## 2023-07-09 NOTE — NURSING NOTE
Nelly Monreal has been awake, alert, and visible intermittently out in the milieu. Pt ate 100% supper. Pt naps at intervals in room. Pt denies any depression but stated that he is anxious to be discharged. Pt continues to be preoccupied, easily distracted,  and delayed in conversation with writer. Pt watched movie in living room with peers for evening group and attended/participated in evening wrap up group, and had snack after with peers. No behavioral issues. Compliant with scheduled meds. Continue to monitor/assess for any changes.

## 2023-07-10 PROCEDURE — 99232 SBSQ HOSP IP/OBS MODERATE 35: CPT | Performed by: PSYCHIATRY & NEUROLOGY

## 2023-07-10 RX ADMIN — CLONAZEPAM 0.5 MG: 0.5 TABLET ORAL at 17:18

## 2023-07-10 RX ADMIN — BENZTROPINE MESYLATE 1 MG: 1 TABLET ORAL at 17:18

## 2023-07-10 RX ADMIN — LITHIUM CARBONATE 450 MG: 450 TABLET, EXTENDED RELEASE ORAL at 08:56

## 2023-07-10 RX ADMIN — Medication 10 MG: at 21:17

## 2023-07-10 RX ADMIN — LITHIUM CARBONATE 450 MG: 450 TABLET, EXTENDED RELEASE ORAL at 21:17

## 2023-07-10 RX ADMIN — BENZTROPINE MESYLATE 1 MG: 1 TABLET ORAL at 08:57

## 2023-07-10 RX ADMIN — MIRTAZAPINE 7.5 MG: 7.5 TABLET, FILM COATED ORAL at 21:18

## 2023-07-10 RX ADMIN — HALOPERIDOL 5 MG: 5 TABLET ORAL at 17:18

## 2023-07-10 RX ADMIN — ALUMINUM HYDROXIDE, MAGNESIUM HYDROXIDE, AND SIMETHICONE 30 ML: 200; 200; 20 SUSPENSION ORAL at 21:47

## 2023-07-10 RX ADMIN — ATROPINE SULFATE 1 DROP: 10 SOLUTION OPHTHALMIC at 19:27

## 2023-07-10 RX ADMIN — CLONAZEPAM 0.5 MG: 0.5 TABLET ORAL at 08:57

## 2023-07-10 RX ADMIN — HALOPERIDOL 5 MG: 5 TABLET ORAL at 08:56

## 2023-07-10 NOTE — NURSING NOTE
Pt is medication compliant and cooperative. Pt is minimally visible in the milieu, is seclusive to his room this morning. Pt denies s/s, offers no complaints.

## 2023-07-10 NOTE — PLAN OF CARE
Problem: Ineffective Coping  Goal: Demonstrates healthy coping skills  Outcome: Progressing     Problem: Risk for Self Injury/Neglect  Goal: Treatment Goal: Remain safe during length of stay, learn and adopt new coping skills, and be free of self-injurious ideation, impulses and acts at the time of discharge  Outcome: Progressing  Goal: Recognize maladaptive responses and adopt new coping mechanisms  Outcome: Progressing     Problem: Depression  Goal: Treatment Goal: Demonstrate behavioral control of depressive symptoms, verbalize feelings of improved mood/affect, and adopt new coping skills prior to discharge  Outcome: Progressing  Goal: Verbalize thoughts and feelings  Description: Interventions:  - Assess and re-assess patient's level of risk   - Engage patient in 1:1 interactions, daily, for a minimum of 15 minutes   - Encourage patient to express feelings, fears, frustrations, hopes   Outcome: Progressing  Goal: Refrain from isolation  Description: Interventions:  - Develop a trusting relationship   - Encourage socialization   Outcome: Progressing     Problem: Anxiety  Goal: Anxiety is at manageable level  Description: Interventions:  - Assess and monitor patient's anxiety level. - Monitor for signs and symptoms (heart palpitations, chest pain, shortness of breath, headaches, nausea, feeling jumpy, restlessness, irritable, apprehensive). - Collaborate with interdisciplinary team and initiate plan and interventions as ordered.   - [place] patient to unit/surroundings  - Explain treatment plan  - Encourage participation in care  - Encourage verbalization of concerns/fears  - Identify coping mechanisms  - Assist in developing anxiety-reducing skills  - Administer/offer alternative therapies  - Limit or eliminate stimulants  Outcome: Progressing

## 2023-07-10 NOTE — PROGRESS NOTES
07/10/23 0830   Team Meeting   Meeting Type Daily Rounds   Initial Conference Date 07/10/23   Patient/Family Present   Patient Present No   Patient's Family Present No     Daily Rounds Documentation     Team Members Present:   MD Dr. Nahid [address] Dr., DO Saddie Axon, RN  [facility], LCSW  Babar Wilkinson, LSW    Atropine drops PRN on Friday. Mylanta PRN . No behaviors, but childlike at times. Attended 4/7 groups on Friday. Compliant with medications and meals. Slept.
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
## 2023-07-10 NOTE — PROGRESS NOTES
Progress Note - Behavioral Health     Raomna Turpin 21 y.o. male MRN: [id number]   Unit/Bed#: [facility] [address] Encounter: [id number]      Subjective: Patient chart reviewed and case discussed with the nurse. The patient was seen in his room today. He reports he is feeling sleepy. Reports also feels very depressed and rates it at 4 on a scale of 0-10 with 10 being the worst.  Reports major trigger for depression is being here in the unit. Denies any suicidal ideation or hopelessness. Reports slept good last night. Appetite has been fine. Energy level has been okay. Denies any auditory or visual hallucination. Did not endorse any paranoia or delusional ideation during the meeting. Reports compliant with the medication and reports occasional drooling. As per the nurse the patient mostly has been labile in the unit and focused on discharge. Compliant with his medication. Mostly seclusive to his room especially this morning. Denied any symptoms to the nurse.           ROS: no complaints, all other systems are negative    Mental Status Evaluation:    Appearance:  casually dressed   Behavior:  cooperative, calm   Speech:  normal rate, normal volume, normal pitch   Mood:  depressed   Affect:  constricted   Thought Process:  linear   Associations: intact associations   Thought Content:  no overt delusions   Perceptual Disturbances: none   Risk Potential: Suicidal ideation - None  Homicidal ideation - None  Potential for aggression - No   Sensorium:  oriented to person, place and time/date   Memory:  recent and remote memory grossly intact   Consciousness:  alert   Attention: poor concentration   Insight:  poor   Judgment: poor   Gait/Station: in bed   Motor Activity: no abnormal movements     Vital signs in last 24 hours:    Temp:  [97.7 °F (36.5 °C)-98.1 °F (36.7 °C)] 97.7 °F (36.5 °C)  HR:  [76-95] 76  Resp:  [18-19] 18  BP: (107-120)/(66-75) 107/66    Laboratory results:   I have personally reviewed all pertinent laboratory/tests results. Most Recent Labs:   Lab Results   Component Value Date    WBC 6.74 06/03/2023    RBC 5.22 06/03/2023    HGB 15.6 06/03/2023    HCT 49.0 06/03/2023     06/03/2023    RDW 13.1 06/03/2023    NEUTROABS 3.01 06/03/2023    SODIUM 140 05/31/2023    K 4.4 05/31/2023     05/31/2023    CO2 29 05/31/2023    BUN 11 05/31/2023    CREATININE 0.72 05/31/2023    GLUC 89 05/31/2023    GLUF 89 05/31/2023    CALCIUM 9.3 05/31/2023    AST 24 05/31/2023    ALT 43 05/31/2023    ALKPHOS 98 05/31/2023    TP 7.0 05/31/2023    ALB 4.4 05/31/2023    TBILI 0.52 05/31/2023    CHOLESTEROL 208 (H) 05/27/2023    HDL 39 (L) 05/27/2023    TRIG 190 (H) 05/27/2023    LDLCALC 131 (H) 05/27/2023    NONHDLC 169 05/27/2023    VALPROICTOT 106 (H) 05/31/2023    LITHIUM 0.7 06/21/2023    CVO3MJEHIYHY 1.637 05/27/2023    HGBA1C 5.4 05/27/2023     05/27/2023       Progress Toward Goals: improving    Assessment/Plan The patient overall denies any concern today though endorses mild depression. No SI or HI. Did not endorse any psychotic symptoms. Plan is to continue with the current medication at this time with no changes. We will continue to monitor the patient for his mood, behavior, safety, sleep and response to meds. Principal Problem:    Severe episode of recurrent major depressive disorder, without psychotic features ([address])  Active Problems:    Medical clearance for psychiatric admission    Autism spectrum disorder    Mood disorder ([address])    Anxiety disorder    Rash    Recommended Treatment:     Planned medication and treatment changes:     All current active medications have been reviewed  Encourage group therapy, milieu therapy and occupational therapy  Behavioral Health checks every 7 minutes  Continue current medications:  Current Facility-Administered Medications   Medication Dose Route Frequency Provider Last Rate   • acetaminophen  650 mg Oral Q6H PRN Isidro Sierra DO     • acetaminophen 650 mg Oral Q4H PRN Jimena Espinal, DO     • acetaminophen  975 mg Oral Q6H PRN Jimena Espinal, DO     • aluminum-magnesium hydroxide-simethicone  30 mL Oral Q4H PRN Jimena Espinal, DO     • ammonium lactate   Topical BID PRN Brandon Gomez PA-C     • atropine  1 drop Sublingual TID PRN Maxine Carreon PA-C     • benztropine  1 mg Intramuscular Q4H PRN Max 6/day Jimena Espinal, DO     • benztropine  1 mg Oral Q4H PRN Max 6/day Jimena Espinal, DO     • benztropine  1 mg Oral BID Jimena Espinal, DO     • clonazePAM  0.5 mg Oral BID Roberto Stephenson MD     • hydrOXYzine HCL  50 mg Oral Q6H PRN Max 4/day Jimena Espinal, DO      Or   • diphenhydrAMINE  50 mg Intramuscular Q6H PRN Jimena Espinal, DO     • diphenhydrAMINE  25 mg Oral Q6H PRN Nancy Thompson, DO     • glycerin-hypromellose-  1 drop Both Eyes Q3H PRN Jimena Espinal, DO     • haloperidol  5 mg Oral BID Roberto Stephenson MD     • hydrOXYzine HCL  100 mg Oral Q6H PRN Max 4/day Jimena Espinal, DO      Or   • LORazepam  2 mg Intramuscular Q6H PRN Jimena Espinal, DO     • hydrOXYzine HCL  25 mg Oral Q6H PRN Max 4/day Jimena E[place]inal, DO     • lithium carbonate  450 mg Oral Q12H Gio Eric MD     • melatonin  10 mg Oral HS Roberto Stephenson MD     • mirtazapine  7.5 mg Oral HS Jimena Espinal, DO     • OLANZapine  10 mg Oral Q3H PRN Max 3/day Jimena Espinal, DO      Or   • OLANZapine  10 mg Intramuscular Q3H PRN Max 3/day Jimena Espinal, DO     • OLANZapine  5 mg Oral Q3H PRN Max 6/day Jimena Espinal, DO      Or   • OLANZapine  5 mg Intramuscular Q3H PRN Max 6/day Jimena Espinal, DO     • OLANZapine  2.5 mg Oral Q3H PRN Max 8/day Ciro Watters, DO     • polyethylene glycol  17 g Oral Daily PRN Jimena Espinal, DO     • propranolol  10 mg Oral Q8H PRN Jimena [place], DO     • propranolol  10 mg Oral Q8H PRN Roberto Stephenson MD     • senna-docusate sodium  1 tablet Oral Daily PRN Jimena Espinal, DO     • traZODone  50 mg Oral HS PRN Mark Neri DO         Risks / Benefits of Treatment:    Risks, benefits, and possible side effects of medications explained to patient and patient verbalizes understanding and agreement for treatment. Counseling / Coordination of Care:    Patient's progress discussed with staff in treatment team meeting. Medications, treatment progress and treatment plan reviewed with patient.     Jay Wells MD 07/10/23

## 2023-07-10 NOTE — PROGRESS NOTES
Dima Laughlin asked pt how he was doing and his response " I don't want to be here". Patient makes this comment each time this facilitator runs group. Facilitator reminded him that he continues to say the same remarks, but woundering what he is doing about it. He is aware what his CM is doing, but nothing specific that he is doing. The group discussed his cursing and how offensive it is and wants to help him curb is way of communicating. Pt stated that " is how I release anger"  His peers spoke of how they feel when he curses and that some of them have been traumatized in the past by cursing. Two group members offered to help him with this and will make positive cards to help him curb cursing. Pt has poor eye contact, stairing at times, slow to respond to questions and lacks motivation.

## 2023-07-10 NOTE — NURSING NOTE
Becky Herring has been awake, alert, and visible intermittently out in the milieu. Pt went out on the deck with staff and peers for fresh air group. Pt ate 100% supper. Pt remains easily distracted, with fleeting eye contact, staring, and delayed responses in conversation with staff. Pt remains incongruent in affect. Pt approached nurse's station smiling inappropriately, and stated bluntly to staff, "I want weed."  Pt informed that this is inappropriate and redirected to use appropriate conversation. Pt remains fixated on discharge. Pt attended and participated in evening group. Pt requested prn medication for "drooling" and Atropine 1% solution 1 gtt sublingual given at 1927 and pt stated, "It helped a little.". Pt had evening snack with peers. Compliant with scheduled meds. Pt requested prn Mylanta for indigestion. Mylanta 30 ml po prn given at 2147. Continue to monitor/assess for any changes.

## 2023-07-11 PROCEDURE — 99232 SBSQ HOSP IP/OBS MODERATE 35: CPT | Performed by: PSYCHIATRY & NEUROLOGY

## 2023-07-11 RX ADMIN — CLONAZEPAM 0.5 MG: 0.5 TABLET ORAL at 17:31

## 2023-07-11 RX ADMIN — LITHIUM CARBONATE 450 MG: 450 TABLET, EXTENDED RELEASE ORAL at 08:18

## 2023-07-11 RX ADMIN — CLONAZEPAM 0.5 MG: 0.5 TABLET ORAL at 08:18

## 2023-07-11 RX ADMIN — MIRTAZAPINE 7.5 MG: 7.5 TABLET, FILM COATED ORAL at 21:23

## 2023-07-11 RX ADMIN — BENZTROPINE MESYLATE 1 MG: 1 TABLET ORAL at 17:32

## 2023-07-11 RX ADMIN — BENZTROPINE MESYLATE 1 MG: 1 TABLET ORAL at 08:18

## 2023-07-11 RX ADMIN — HALOPERIDOL 5 MG: 5 TABLET ORAL at 17:32

## 2023-07-11 RX ADMIN — LITHIUM CARBONATE 450 MG: 450 TABLET, EXTENDED RELEASE ORAL at 21:23

## 2023-07-11 RX ADMIN — Medication 10 MG: at 21:23

## 2023-07-11 RX ADMIN — HALOPERIDOL 5 MG: 5 TABLET ORAL at 08:18

## 2023-07-11 NOTE — NURSING NOTE
Pt sleeping at present, arouses easily. Prn Mylanta effective. No further complaints of indigestion.
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
## 2023-07-11 NOTE — PROGRESS NOTES
07/11/23 0830   Team Meeting   Meeting Type Daily Rounds   Initial Conference Date 07/11/23   Patient/Family Present   Patient Present No   Patient's Family Present No     Daily Rounds Documentation     Team Members Present:   MD Anastasia Marie CRNP Timoteo How, RN Maynard Carolina, [address], [place], [place]. D    Remains focused on discharge. Still shouting out inappropriate things at times. Intrusive at the nurses station. Atropine and Mylanta PRN given. Attended 5/9 groups. Compliant with medications and meals. Slept.

## 2023-07-11 NOTE — SOCIAL WORK
Message sent to patient's supports coordinator Lance Cramer) asking her is she has heard from either of the companies considering patient for their male vacancies. E-mail also sent to Maddison Ugalde at [facility] asking if she is going to be involved in patient's case as she still hasn't responded to SW first attempt at reaching out.

## 2023-07-11 NOTE — NURSING NOTE
Patient is compliant with medication and meals. Partient is visible through out the day. .. He is somewhat friendly with select peers. Patient attended 5 groups on Monday. Patient is fixated on discharge.

## 2023-07-11 NOTE — NURSING NOTE
Pt observed resting with eyes closed, respirations regular and unlabored. Rounding q7 min maintained throughout night without incident.

## 2023-07-11 NOTE — NURSING NOTE
Pt approached nurses station standing at window and stated " I need to get out of here, I don't want to be in here anymore". Pt given conversation and positive feedback as well as education on discharge parameters. During conversation pt noted to be shaking, when asked about this pt stopped, laughed and said "its nothing", stood still for a short time and then began doing it again. Pt then stated " I want to be discharged" and turned and walked away from [facility]. Pt currently standing in dining room watching TV with no shaking noted. Will continue to monitor.

## 2023-07-11 NOTE — PROGRESS NOTES
Psychiatry Progress Note [address] 21 y.o. male MRN: [id number]  [facility]-72 Encounter: [id number]  Code Status: Level 1 - Full Code    PCP: Rustam Mccullough DO    Date of Admission:  5/25/2023 1349   Date of Service:  07/11/23    Patient Active Problem List   Diagnosis   • Medical clearance for psychiatric admission   • Autism spectrum disorder   • Mood disorder ([address])   • Severe episode of recurrent major depressive disorder, without psychotic features ([address])   • Anxiety disorder   • Rash         Review of systems: Unremarkable  Diagnosis: Major depression recurrent, anxiety disorder, numbers autistic spectrum disorder   assessment  • Overall Status: Status quo demanding discharge. Has been cursing at times needing frequent redirection from staff.   Appears agitated impulsive with limited insight and judgment laughing inappropriately at times not voicing any suicidal thoughts and no physical aggressive outbursts reported  • certification Statement: The patient will continue to require additional inpatient hospital stay due to potential for suicide and aggressive behaviors as he almost jumped off a high water tower prior to recent admission and has attempted to strangle self in the past    Medications: Depakote 1250 mg a day, melatonin 9 mg at bedtime, Remeron 7.5 mg at bedtime Lithobid 300 mg twice a day Inderal 10 mg every 8 hours as needed for anxiety  Side effects from treatment: None reported  Medication changes   None today   Medication education   Risks side effects benefits and precautions of medications discussed with patient and he did verbalize an understanding about risks for metabolic syndrome from being on neuroleptics and is form tardive dyskinesia etc.    Understanding of medications: Has some understanding   Justification for dual anti-psychotics: Not applicable    Non-pharmacological treatments  • Continue with individual, group, milieu and occupational therapy using recovery principles and psycho-education about accepting illness and the need for treatment. •  to get prior hospital records and contact adoptive family for more records and history  • Continued with assault precautions  • Placed on elopement precautions as it reportedly tried to elope from [facility] once using a student's ID badge  t    Safety  • Safety and communication plan established to target dynamic risk factors discussed above. Discharge Plan   • To the group home with [facility] services once stabilized as parents are refusing to take him back    Interval Progress   Continues to test limits demanding discharge cursing repeatedly needing reminders from staff and was reportedly trying to bang his head on the door 2 days ago duncany in front of staff. Somewhat childish and appropriate impulsive demanding discharge claiming staying here is frustrating as he believes he may be here for 2 years despite reminders that to go home and start looking into accepting him. Not aggressive or agitated or suicidal self-abusive on the unit and not expressing any overt hallucinations or delusions per se.   • Acceptance by patient: Not quite  • Hopefulness in recovery: Being at a group home  • Involved in reintegration process: Going with family members  • Trusting in relationship with psychiatrist: Times  • Sleep: Good  • Appetite: Good  •  Compliance with Medications: Ole Graham  • Group attendance: Some  Significant events: Demanding discharge, gets agitated with inappropriate smiles childish impulsive limited insight    Mental Status Exam  Appearance: age appropriate, dressed appropriately, marginal hygiene, looks younger than stated age still somewhat childish and immature impatient about little wanting to leave   behavior: cooperative, mildly anxious, evasive, inappropriate somewhat cold and aloof with transient smile at times   fspeech: normal rate and volume, fluent, clear, coherent, monotone  Mood: normal  Affect: blunted, constricted, mood-congruent with minimal mood reactivity   thought Process: organized, logical, coherent, goal directed, linear, normal rate of thoughts, concrete  Thought Content: no overt delusions, preoccupied, poverty of thought, paucity of thought, no current suicidal or homicidal thoughts and no plans verbalized. No phobias obsessions or compulsions reported. Perceptual Disturbances: denies auditory hallucinations when asked, does not appear responding to internal stimuli, no visual hallucinations, denies auditory or visual hallucinations when asked  Hx Risk Factors: chronic psychiatric problems, chronic mood disorder, history of suicidal behaviors, history of serious suicide attempts, history of incarceration on charges filed recently which were later dropped for assaulting a peer at the [address] [place] [address] Road: Oriented x 3 spheres and situation   cognition: recent and remote memory grossly intact  Consciousness: alert and awake  Attention: attention span and concentration are age appropriate  Intellect: appears to be of average intelligence   Insight: limited  Judgement: limited  Motor Activity: no abnormal movements     Vitals  Temp:  [97.7 °F (36.5 °C)-97.9 °F (36.6 °C)] 97.9 °F (36.6 °C)  HR:  [70-76] 70  Resp:  [18] 18  BP: (107-123)/(62-66) 123/62  SpO2:  [95 %-97 %] 95 %  No intake or output data in the 24 hours ending 07/11/23 0535    Lab Results:  [address] [facility] Admission Reviewed    Current Facility-Administered Medications   Medication Dose Route Frequency Provider Last Rate   • acetaminophen  650 mg Oral Q6H PRN Adra Violeta, DO     • acetaminophen  650 mg Oral Q4H PRN Adra Violeta, DO     • acetaminophen  975 mg Oral Q6H PRN Adra Violeta, DO     • aluminum-magnesium hydroxide-simethicone  30 mL Oral Q4H PRN Adra Violeta, DO     • ammonium lactate   Topical BID PRN Merlin Ceron PA-C     • atropine 1 drop Sublingual TID PRN Inna Rose PA-C     • benztropine  1 mg Intramuscular Q4H PRN Max 6/day Clearnce Roll, DO     • benztropine  1 mg Oral Q4H PRN Max 6/day Clearnce Roll, DO     • benztropine  1 mg Oral BID Clearnce Roll, DO     • clonazePAM  0.5 mg Oral BID Niranjan Bailon MD     • hydrOXYzine HCL  50 mg Oral Q6H PRN Max 4/day Clearnce Roll, DO      Or   • diphenhydrAMINE  50 mg Intramuscular Q6H PRN Clearnce Roll, DO     • diphenhydrAMINE  25 mg Oral Q6H PRN Enzo Pastel, DO     • glycerin-hypromellose-  1 drop Both Eyes Q3H PRN Clearnce Roll, DO     • haloperidol  5 mg Oral BID Niranjan Bailon MD     • hydrOXYzine HCL  100 mg Oral Q6H PRN Max 4/day Clearnce Roll, DO      Or   • LORazepam  2 mg Intramuscular Q6H PRN Clearnce Roll, DO     • hydrOXYzine HCL  25 mg Oral Q6H PRN Max 4/day Clearnce Roll, DO     • lithium carbonate  450 mg Oral Q12H Mary Joaquin MD     • melatonin  10 mg Oral HS Niranjan Bailon MD     • mirtazapine  7.5 mg Oral HS Clearnce Roll, DO     • OLANZapine  10 mg Oral Q3H PRN Max 3/day Clearnce Roll, DO      Or   • OLANZapine  10 mg Intramuscular Q3H PRN Max 3/day Clearnce Roll, DO     • OLANZapine  5 mg Oral Q3H PRN Max 6/day Clearnce Roll, DO      Or   • OLANZapine  5 mg Intramuscular Q3H PRN Max 6/day Clearnce Roll, DO     • OLANZapine  2.5 mg Oral Q3H PRN Max 8/day Clearnce Roll, DO     • polyethylene glycol  17 g Oral Daily PRN Clearnce Roll, DO     • propranolol  10 mg Oral Q8H PRN Clearnce Roll, DO     • propranolol  10 mg Oral Q8H PRN Niranjan Bailon MD     • senna-docusate sodium  1 tablet Oral Daily PRN Clearnce Roll, DO     • traZODone  50 mg Oral HS PRN Clearnce Roll, DO         Counseling / Coordination of Care: Total floor / unit time spent today 15 minutes.  Greater than 50% of total time was spent with the patient and / or family counseling and / or somewhat receptive to supportive listening and teaching positive coping skills to deal with symptom mangement. Patient's Rights, confidentiality and exceptions to confidentiality, use of automated medical record, [address] [place] [address] staff access to medical record, and consent to treatment reviewed. This note has been dictated and hence there may be problems with punctuation, spelling and formatting and if anyone has any concerns please address them to Dr. Philip Antonio   This note is not shared with patient due to potential for making patient's condition worse by knowing the content of the note.

## 2023-07-12 PROCEDURE — 99232 SBSQ HOSP IP/OBS MODERATE 35: CPT | Performed by: PSYCHIATRY & NEUROLOGY

## 2023-07-12 RX ADMIN — LITHIUM CARBONATE 450 MG: 450 TABLET, EXTENDED RELEASE ORAL at 08:54

## 2023-07-12 RX ADMIN — HALOPERIDOL 5 MG: 5 TABLET ORAL at 08:54

## 2023-07-12 RX ADMIN — MIRTAZAPINE 7.5 MG: 7.5 TABLET, FILM COATED ORAL at 21:07

## 2023-07-12 RX ADMIN — Medication 10 MG: at 21:07

## 2023-07-12 RX ADMIN — ATROPINE SULFATE 1 DROP: 10 SOLUTION OPHTHALMIC at 15:56

## 2023-07-12 RX ADMIN — CLONAZEPAM 0.5 MG: 0.5 TABLET ORAL at 17:11

## 2023-07-12 RX ADMIN — CLONAZEPAM 0.5 MG: 0.5 TABLET ORAL at 08:55

## 2023-07-12 RX ADMIN — BENZTROPINE MESYLATE 1 MG: 1 TABLET ORAL at 17:11

## 2023-07-12 RX ADMIN — HALOPERIDOL 5 MG: 5 TABLET ORAL at 17:11

## 2023-07-12 RX ADMIN — LITHIUM CARBONATE 450 MG: 450 TABLET, EXTENDED RELEASE ORAL at 21:07

## 2023-07-12 RX ADMIN — BENZTROPINE MESYLATE 1 MG: 1 TABLET ORAL at 08:54

## 2023-07-12 NOTE — NURSING NOTE
Patient is compliant with medication and meals . Patient does not really talk to his peers a lot He does  No attend groups most of the time. Patient is focused on discharge.

## 2023-07-12 NOTE — PROGRESS NOTES
Psychiatric Progress Note - Department of [address] 21 y.o. male MRN: [id number]  [facility] [address] Encounter: [id number]    ASSESSMENT & PLAN     Principal Problem:    Severe episode of recurrent major depressive disorder, without psychotic features ([address])  Active Problems:    Medical clearance for psychiatric admission    Autism spectrum disorder    Mood disorder ([address])    Anxiety disorder    Rash      • Continue to promote patient participation in therapeutic milieu. • Continue medical management per medicine. • Continue previously prescribed psychotropic medication regimen; see below. • Continue behavioral health checks q.7 minutes. • Continue vitals per behavioral health unit protocol. • Discharge date per primary team; 201 voluntary commitment status. SUBJECTIVE     Patient evaluated this a.m. for continuity of care. Patient was seen in hallway sitting on couch. Patient was discussed at length with nursing and treatment team. Nursing reported he expressed feeling anxiety and depression, with some questioning of weight gain due to lithium, along with appropriate behavior last night. No acute distress is noted throughout evaluation. Julius Artem denies suicidal/homicidal ideation; contracts for safety. He reports anxiety last night that has improved this morning, describing it as feeling trapped. Patient also reports continued drooling and tremors in his hands that he first noticed around a week ago. He did not voice any function difficulties as a result of the tremors. He denies any delusions or hallucinations, and denies any paranoia. Patient has no other complaints or questions at this time. He did not make any mention of discharge.     PSYCHIATRIC REVIEW OF SYSTEMS     Behavior over the last 24 hours:  unchanged  Sleep: normal  Appetite: normal  Medication side effects: Yes Fine tremors bilateral hands likely due [place]    REVIEW OF SYSTEMS   Review of systems: Drooling No other complaints reported. OBJECTIVE     Vital Signs in Past 24 Hours:  Temp:  [97.3 °F (36.3 °C)-97.6 °F (36.4 °C)] 97.6 °F (36.4 °C)  HR:  [61-95] 61  Resp:  [18] 18  BP: (100-119)/(64-68) 100/68    Intake/Output in Past 24 hours:  No intake/output data recorded. No intake/output data recorded. Laboratory Results:  I have personally reviewed all pertinent laboratory/tests results. Behavioral Health Medications: all current active meds have been reviewed.     Current Facility-Administered Medications   Medication Dose Route Frequency Provider Last Rate   • acetaminophen  650 mg Oral Q6H PRN Catherin Massing, DO     • acetaminophen  650 mg Oral Q4H PRN Catherin Massing, DO     • acetaminophen  975 mg Oral Q6H PRN Catherin Massing, DO     • aluminum-magnesium hydroxide-simethicone  30 mL Oral Q4H PRN Catherin Massing, DO     • ammonium lactate   Topical BID PRN Gabino Hamman, PA-C     • atropine  1 drop Sublingual TID PRN Alba Jerry PA-C     • benztropine  1 mg Intramuscular Q4H PRN Max 6/day Catherin Massing, DO     • benztropine  1 mg Oral Q4H PRN Max 6/day Catherin Massing, DO     • benztropine  1 mg Oral BID Catherin Massing, DO     • clonazePAM  0.5 mg Oral BID Mich Avila MD     • hydrOXYzine HCL  50 mg Oral Q6H PRN Max 4/day Catherin Massing, DO      Or   • diphenhydrAMINE  50 mg Intramuscular Q6H PRN Catherin Massing, DO     • diphenhydrAMINE  25 mg Oral Q6H PRN Kobe Dues, DO     • glycerin-hypromellose-  1 drop Both Eyes Q3H PRN Catherin Massing, DO     • haloperidol  5 mg Oral BID Mich Avila MD     • hydrOXYzine HCL  100 mg Oral Q6H PRN Max 4/day Catherin Massing, DO      Or   • LORazepam  2 mg Intramuscular Q6H PRN Catherin Massing, DO     • hydrOXYzine HCL  25 mg Oral Q6H PRN Max 4/day Catherin Massing, DO     • lithium carbonate  450 mg Oral Q12H Lisa Medley MD     • melatonin  10 mg Oral HS Mich Avila MD     • mirtazapine 7.5 mg Oral HS Dorrene Skeans, DO     • OLANZapine  10 mg Oral Q3H PRN Max 3/day Dorrene Skeans, DO      Or   • OLANZapine  10 mg Intramuscular Q3H PRN Max 3/day Dorrene Skeans, DO     • OLANZapine  5 mg Oral Q3H PRN Max 6/day Dorrene Skeans, DO      Or   • OLANZapine  5 mg Intramuscular Q3H PRN Max 6/day Dorrene Skeans, DO     • OLANZapine  2.5 mg Oral Q3H PRN Max 8/day Ciroliza Jimon, DO     • polyethylene glycol  17 g Oral Daily PRN Dorrene Skeans, DO     • propranolol  10 mg Oral Q8H PRN Dorrene Skeans, DO     • propranolol  10 mg Oral Q8H PRN Darío Long MD     • senna-docusate sodium  1 tablet Oral Daily PRN Dorrene Skeans, DO     • traZODone  50 mg Oral HS PRN Dorrene Skeans, DO         Risks, benefits and possible side effects of Medications:   Possible bilateral fine hand tremors due lithium    Dual Antipsychotic Rationale: N/A    Mental Status Evaluation:  Appearance:  Age appropriate, neatly groomed and neatly dressed, sitting calmly on couch, looks younger than stated age   Behavior:  Polite, cooperative, slightly anxious   Speech:  Delayed speech, normal rhythm, monotone   Mood:  "Okay. Anxiety makes me feel trapped"   Affect:  Constricted   Language No evidence of aphasia or dysarthria, coherent, clear   Thought Process:  Logical, organized, linear, concrete   Thought Content:  Reports no delusions.  Reports no obsessions or compulsions   Perceptual Disturbances: Denies auditory or visual hallucinations, no appearance of responding to internal stimuli   Risk Potential: Reports no suicidal or homicidal intentions or plans   Sensorium:  Oriented to person place and situation   Cognition:  Recent and gross memory grossly intact   Consciousness:  Alert, awake    Attention: Attention span and concentration were age appropriate   Insight:  Limited   Judgment: Limited   Intellect    Gait/Station: Normal gait/station   Motor Activity: Bilateral fine hand tremor, L > R, no other abnormal movements     Memory: Short and long term memory grossly intact     Progress Toward Goals: Some, as evidenced by their participation (or lack thereof) in individual, social and therapeutic milieu in addition to adherence to their medication regimen. Patient Acceptance: Not fully present  Patient Understanding: Good understanding of current medications  Patient Involvement in Reintegration Process: Attending some of the groups, communicates with family  Patient's Therapeutic Relationship with Psychiatrist: Evidence of some trust due to willingness to share regarding symptoms  Patient's Hopefulness in Recovery: Being at a group home    Recommended Treatment:   See above for assessment and plan. Inpatient Psychiatric Certification: Based upon physical, mental and social evaluations, I certify that inpatient psychiatric services are medically necessary for this patient for a duration of >2 midnights for the treatment of Severe episode of recurrent major depressive disorder, without psychotic features ([address])      Counseling/Coordination of Care    Total unit time spent today was 15 minutes. Greater than 50% of total time was spent with the patient and/or patient's relatives and/or coordination of patient's care. Patient's rights, confidentiality, exceptions to confidentiality, use of electronic medical record including appropriate staff access to medical record regarding behavioral health services and consent to treatment were reviewed. Note Share: This note was not shared with the patient due to reasonable likelihood of causing patient harm     This note has been constructed using a voice recognition system. There may be translation, syntax, or grammatical errors. If you have any questions, please contact the dictating provider.     Cherrie Yates, MS3  [facility]

## 2023-07-12 NOTE — PROGRESS NOTES
07/12/23 0830   Team Meeting   Meeting Type Daily Rounds   Initial Conference Date 07/12/23   Patient/Family Present   Patient Present No   Patient's Family Present No     Daily Rounds Documentation     Team Members Present:   MD Dr. [address] Dr[address] DO Mirian Kenner, RN  Khoaelvia Tranos, [address], [facility]    Reported some depression and anxiety. Behaviors okay. Attended 4/8 groups. Compliant with medications and meals. Slept.

## 2023-07-12 NOTE — SOCIAL WORK
SW and patient attempted to reach patient's support's coordinator; we left her a message requesting a call back with an update on the referrals she sent.

## 2023-07-12 NOTE — NURSING NOTE
Bill Palmer has been somewhat visible on the unit,but also spent a lot of time in his bed. He is med and meal compliant. He did stand at the window asking about his med's and will they make him gain weight. He did ask more then one nurse. Q 7 minute patient checks maintained.

## 2023-07-12 NOTE — PLAN OF CARE
Problem: Ineffective Coping  Goal: Demonstrates healthy coping skills  Outcome: Progressing     Problem: Risk for Self Injury/Neglect  Goal: Treatment Goal: Remain safe during length of stay, learn and adopt new coping skills, and be free of self-injurious ideation, impulses and acts at the time of discharge  Outcome: Progressing     Problem: Depression  Goal: Treatment Goal: Demonstrate behavioral control of depressive symptoms, verbalize feelings of improved mood/affect, and adopt new coping skills prior to discharge  Outcome: Progressing  Goal: Verbalize thoughts and feelings  Description: Interventions:  - Assess and re-assess patient's level of risk   - Engage patient in 1:1 interactions, daily, for a minimum of 15 minutes   - Encourage patient to express feelings, fears, frustrations, hopes   Outcome: Progressing  Goal: Refrain from isolation  Description: Interventions:  - Develop a trusting relationship   - Encourage socialization   Outcome: Progressing     Problem: Anxiety  Goal: Anxiety is at manageable level  Description: Interventions:  - Assess and monitor patient's anxiety level. - Monitor for signs and symptoms (heart palpitations, chest pain, shortness of breath, headaches, nausea, feeling jumpy, restlessness, irritable, apprehensive). - Collaborate with interdisciplinary team and initiate plan and interventions as ordered.   - [place] patient to unit/surroundings  - Explain treatment plan  - Encourage participation in care  - Encourage verbalization of concerns/fears  - Identify coping mechanisms  - Assist in developing anxiety-reducing skills  - Administer/offer alternative therapies  - Limit or eliminate stimulants  Outcome: Progressing

## 2023-07-13 PROCEDURE — 99232 SBSQ HOSP IP/OBS MODERATE 35: CPT | Performed by: PSYCHIATRY & NEUROLOGY

## 2023-07-13 RX ORDER — PROPRANOLOL HYDROCHLORIDE 10 MG/1
10 TABLET ORAL 2 TIMES DAILY
Status: DISCONTINUED | OUTPATIENT
Start: 2023-07-13 | End: 2023-07-19

## 2023-07-13 RX ADMIN — BENZTROPINE MESYLATE 1 MG: 1 TABLET ORAL at 08:19

## 2023-07-13 RX ADMIN — CLONAZEPAM 0.5 MG: 0.5 TABLET ORAL at 17:13

## 2023-07-13 RX ADMIN — Medication 10 MG: at 21:17

## 2023-07-13 RX ADMIN — LITHIUM CARBONATE 450 MG: 450 TABLET, EXTENDED RELEASE ORAL at 21:16

## 2023-07-13 RX ADMIN — LITHIUM CARBONATE 450 MG: 450 TABLET, EXTENDED RELEASE ORAL at 08:19

## 2023-07-13 RX ADMIN — CLONAZEPAM 0.5 MG: 0.5 TABLET ORAL at 08:19

## 2023-07-13 RX ADMIN — ALUMINUM HYDROXIDE, MAGNESIUM HYDROXIDE, AND SIMETHICONE 30 ML: 200; 200; 20 SUSPENSION ORAL at 10:43

## 2023-07-13 RX ADMIN — HYDROXYZINE HYDROCHLORIDE 50 MG: 50 TABLET, FILM COATED ORAL at 08:41

## 2023-07-13 RX ADMIN — HALOPERIDOL 5 MG: 5 TABLET ORAL at 08:19

## 2023-07-13 RX ADMIN — MIRTAZAPINE 7.5 MG: 7.5 TABLET, FILM COATED ORAL at 21:17

## 2023-07-13 RX ADMIN — BENZTROPINE MESYLATE 1 MG: 1 TABLET ORAL at 17:12

## 2023-07-13 RX ADMIN — HALOPERIDOL 5 MG: 5 TABLET ORAL at 17:13

## 2023-07-13 RX ADMIN — PROPRANOLOL HYDROCHLORIDE 10 MG: 10 TABLET ORAL at 21:16

## 2023-07-13 NOTE — PLAN OF CARE
Problem: Ineffective Coping  Goal: Demonstrates healthy coping skills  Outcome: Progressing     Problem: Risk for Self Injury/Neglect  Goal: Treatment Goal: Remain safe during length of stay, learn and adopt new coping skills, and be free of self-injurious ideation, impulses and acts at the time of discharge  Outcome: Progressing  Goal: Recognize maladaptive responses and adopt new coping mechanisms  Outcome: Not Progressing     Problem: Depression  Goal: Treatment Goal: Demonstrate behavioral control of depressive symptoms, verbalize feelings of improved mood/affect, and adopt new coping skills prior to discharge  Outcome: Progressing  Goal: Verbalize thoughts and feelings  Description: Interventions:  - Assess and re-assess patient's level of risk   - Engage patient in 1:1 interactions, daily, for a minimum of 15 minutes   - Encourage patient to express feelings, fears, frustrations, hopes   Outcome: Progressing  Goal: Refrain from isolation  Description: Interventions:  - Develop a trusting relationship   - Encourage socialization   Outcome: Progressing     Problem: Anxiety  Goal: Anxiety is at manageable level  Description: Interventions:  - Assess and monitor patient's anxiety level. - Monitor for signs and symptoms (heart palpitations, chest pain, shortness of breath, headaches, nausea, feeling jumpy, restlessness, irritable, apprehensive). - Collaborate with interdisciplinary team and initiate plan and interventions as ordered.   - [place] patient to unit/surroundings  - Explain treatment plan  - Encourage participation in care  - Encourage verbalization of concerns/fears  - Identify coping mechanisms  - Assist in developing anxiety-reducing skills  - Administer/offer alternative therapies  - Limit or eliminate stimulants  Outcome: Progressing

## 2023-07-13 NOTE — NURSING NOTE
Pt given prn atarax 50mg for report of anxiety and score of 18 on the [facility] anxiety scale. Pt returned to room to rest in bed. Will continue to monitor.

## 2023-07-13 NOTE — PROGRESS NOTES
07/13/23 0830   Team Meeting   Meeting Type Daily Rounds   Initial Conference Date 07/13/23   Patient/Family Present   Patient Present No   Patient's Family Present No     Daily Rounds Documentation     Team Members Present:   MD Dr. Monique Pyle, DO Dr. Josee Ji, Mireille Gunter, FRANCOIS Mancini, VIRGILIOW  Keenan Fairbanks, VIRGILIOW    No behaviors. Visible. Pleasant. Atropine PRN utilized. Attended 4/7 groups. Compliant with medications and meals. Slept.

## 2023-07-13 NOTE — NURSING NOTE
Pt is medication and meal compliant. Pt is visible in the milieu and social with select peers when approached, but otherwise keeps to self and rests in bed. Pt denies s/s and states " I'm just ready to get out of here". Pt at times standing at nurses station staring with inappropriate smiling, but redirectable from behavior. Pt offers no other complaints. Will continue to monitor.

## 2023-07-13 NOTE — PROGRESS NOTES
Psychiatric Progress Note - Department of [address] 21 y.o. male MRN: [id number]  [facility]13-91 Encounter: [id number]    ASSESSMENT & PLAN     Principal Problem:    Severe episode of recurrent major depressive disorder, without psychotic features ([address])  Active Problems:    Medical clearance for psychiatric admission    Autism spectrum disorder    Mood disorder ([address])    Anxiety disorder    Rash      • Continue to promote patient participation in therapeutic milieu. • Continue medical management per medicine. • Continue previously prescribed psychotropic medication regimen; see below. • Continue behavioral health checks q7 minutes. • Continue vitals per behavioral health unit protocol. • Discharge date per primary team; 201 voluntary commitment status. • Will consider scheduled propanolol for lithium induced bilateral mild hand tremor    SUBJECTIVE     Patient evaluated this a.m. for continuity of care. He was sitting comfortably in bed. Patient was discussed at length with nursing and treatment team. Nursing team stated he was observed to have some inappropriate laughter at times. No acute distress is noted throughout evaluation. Joselakshmi Siegel denies suicidal/homicidal ideation; contracts for safety. Patient states he continues to have a feeling of anxiety similar to yesterday, describing it as "feeling trapped" without a change. He reports requiring a dose of hydroxyzine in the a.m. He once again complains of the drooling which he describes as most notable in the middle of the day, and continuing tremors in both hands. He notes the tremors affect his ability to write, but not with other activities of daily living such as using a fork or a cup. He has no other concerns or questions at this time. He denies any hallucinations or illusions. He was noted to have a smile during the evaluation this morning, showing more emotion than yesterday.     PSYCHIATRIC REVIEW OF SYSTEMS     Behavior over the last 24 hours:  unchanged  Sleep: normal  Appetite: normal  Medication side effects: Yes, bilateral hand tremors likely due [place]    REVIEW OF SYSTEMS   Review of systems: Drooling, no other contributory findings at this time    OBJECTIVE     Vital Signs in Past 24 Hours:  Temp:  [97.4 °F (36.3 °C)-97.5 °F (36.4 °C)] 97.5 °F (36.4 °C)  HR:  [76-82] 76  Resp:  [18] 18  BP: (114-124)/(58-66) 114/58    Intake/Output in Past 24 hours:  No intake/output data recorded. No intake/output data recorded. Laboratory Results:  I have personally reviewed all pertinent laboratory/tests results. Behavioral Health Medications: all current active meds have been reviewed.     Current Facility-Administered Medications   Medication Dose Route Frequency Provider Last Rate   • acetaminophen  650 mg Oral Q6H PRN Luci Elijah, DO     • acetaminophen  650 mg Oral Q4H PRN Luci Elijah, DO     • acetaminophen  975 mg Oral Q6H PRN Luci Elijah, DO     • aluminum-magnesium hydroxide-simethicone  30 mL Oral Q4H PRN Luci Nava, DO     • ammonium lactate   Topical BID PRN Jacek Rodriguez PA-C     • atropine  1 drop Sublingual TID PRN Marisol Domingo PA-C     • benztropine  1 mg Intramuscular Q4H PRN Max 6/day Luci Elijah, DO     • benztropine  1 mg Oral Q4H PRN Max 6/day Luci Elijah, DO     • benztropine  1 mg Oral BID Luci Elijah, DO     • clonazePAM  0.5 mg Oral BID Angela Jimenes MD     • hydrOXYzine HCL  50 mg Oral Q6H PRN Max 4/day Luci Elijah DO      Or   • diphenhydrAMINE  50 mg Intramuscular Q6H PRN Luci Nava DO     • diphenhydrAMINE  25 mg Oral Q6H PRN Charley Chatterjee DO     • glycerin-hypromellose-  1 drop Both Eyes Q3H PRN Luci Nava, DO     • haloperidol  5 mg Oral BID Angela Jimenes MD     • hydrOXYzine HCL  100 mg Oral Q6H PRN Max 4/day Luci Nava, DO      Or   • LORazepam  2 mg Intramuscular Q6H PRN Corrinne Emory Prayson, DO     • hydrOXYzine HCL  25 mg Oral Q6H PRN Max 4/day Cielo Purpura, DO     • lithium carbonate  450 mg Oral Q12H Christopher Méndez MD     • melatonin  10 mg Oral HS Jarocho Thompson MD     • mirtazapine  7.5 mg Oral HS Cielo Purpura, DO     • OLANZapine  10 mg Oral Q3H PRN Max 3/day Cielo Purpura, DO      Or   • OLANZapine  10 mg Intramuscular Q3H PRN Max 3/day Cielo Purpura, DO     • OLANZapine  5 mg Oral Q3H PRN Max 6/day Cielo Purpura, DO      Or   • OLANZapine  5 mg Intramuscular Q3H PRN Max 6/day Cielo Purpura, DO     • OLANZapine  2.5 mg Oral Q3H PRN Max 8/day Cielo Purpura, DO     • polyethylene glycol  17 g Oral Daily PRN Cielo Purpura, DO     • propranolol  10 mg Oral Q8H PRN Cielo Purpura, DO     • propranolol  10 mg Oral Q8H PRN Jarocho Thompson MD     • senna-docusate sodium  1 tablet Oral Daily PRN Cielo Purpura, DO     • traZODone  50 mg Oral HS PRN Cielo Purpura, DO         Risks, benefits and possible side effects of Medications:   Discussed the hand tremors with patient, along with drooling    Dual Antipsychotic Rationale: N/A    Mental Status Evaluation:  Appearance:  Age appropriate, neatly dressed, some dishevelment due to waking up from nap, looks younger than stated age   Behavior:  Polite, cooperative, anxious   Speech:  Delayed speech rate, normal rhythm, monotone   Mood:  "In the middle.  Anxious"   Affect:  constricted   Language Coherent, clear, no evidence of aphasia or dysarthria   Thought Process:  Logical, linear, organized, concrete   Thought Content:  Reports no experience of delusions, denies any obsessions or compulsions   Perceptual Disturbances: Reports no auditory or visual hallucinations, denies illusions, does not appear to be responding to internal stimuli   Risk Potential: Denies any suicidal or homicidal ideations or any plans   Sensorium:  Oriented to person, place, time, situation   Cognition:  recent and remote memory grossly intact   Consciousness:  Alert, awake    Attention: attention span and concentration were age appropriate   Insight:  limited   Judgment: limited   Intellect    Gait/Station: Did not assess, patient laying back in bed   Motor Activity: Bilateral fine hand tremors, L > R, no other abnormal motor movements     Memory: Short and long term memory grossly intact     Progress Toward Goals: Some, as evidenced by their participation (or lack thereof) in individual, social and therapeutic milieu in addition to adherence to their medication regimen. Patient Acceptance: Not complete at this time  Patient Understanding: Good understanding of current medications  Patient Involvement in Reintegration Process: Attending some group activities, continued involvement with family  Patient's Therapeutic Relationship with Psychiatrist: Some trust evidenced by willingness to discuss symptoms and concerns    Recommended Treatment:   See above for assessment and plan. Inpatient Psychiatric Certification: Based upon physical, mental and social evaluations, I certify that inpatient psychiatric services are medically necessary for this patient for a duration of >2 midnights for the treatment of Severe episode of recurrent major depressive disorder, without psychotic features ([address])      Counseling/Coordination of Care    Total unit time spent today was 15 minutes. Greater than 50% of total time was spent with the patient and/or patient's relatives and/or coordination of patient's care. Patient's rights, confidentiality, exceptions to confidentiality, use of electronic medical record including appropriate staff access to medical record regarding behavioral health services and consent to treatment were reviewed. Note Share: This note was not shared with the patient due to reasonable likelihood of causing patient harm     This note has been constructed using a voice recognition system.     There may be translation, syntax, or grammatical errors. If you have any questions, please contact the dictating provider.     Loretta Carr, MS3  [place]

## 2023-07-13 NOTE — NURSING NOTE
Fede Walton has been awake, alert, and visible intermittently out in the milieu. Pt requested prn Atropine 1% gtts and 1 gtt sublingual given at 1556 for drooling and effective. Pt went out on the deck with staff and peers for fresh air group. Pt social with select peers. Pt ate 100% supper. Pt continues to be preoccupied, stares, has inappropriate smile at times, and is delayed in verbal responses in conversation with staff. No behavioral issues. Pt stated that he is worried about being accepted into the group home. Pt attended and participated in evening group, wrap up group, and had snack with peers. Compliant with scheduled meds. Continue to monitor/assess for any changes.

## 2023-07-14 PROCEDURE — 99232 SBSQ HOSP IP/OBS MODERATE 35: CPT | Performed by: PSYCHIATRY & NEUROLOGY

## 2023-07-14 RX ADMIN — LITHIUM CARBONATE 450 MG: 450 TABLET, EXTENDED RELEASE ORAL at 21:20

## 2023-07-14 RX ADMIN — CLONAZEPAM 0.5 MG: 0.5 TABLET ORAL at 17:18

## 2023-07-14 RX ADMIN — Medication 10 MG: at 21:20

## 2023-07-14 RX ADMIN — BENZTROPINE MESYLATE 1 MG: 1 TABLET ORAL at 17:18

## 2023-07-14 RX ADMIN — ATROPINE SULFATE 1 DROP: 10 SOLUTION OPHTHALMIC at 19:47

## 2023-07-14 RX ADMIN — MIRTAZAPINE 7.5 MG: 7.5 TABLET, FILM COATED ORAL at 21:20

## 2023-07-14 RX ADMIN — BENZTROPINE MESYLATE 1 MG: 1 TABLET ORAL at 08:45

## 2023-07-14 RX ADMIN — PROPRANOLOL HYDROCHLORIDE 10 MG: 10 TABLET ORAL at 21:20

## 2023-07-14 RX ADMIN — LITHIUM CARBONATE 450 MG: 450 TABLET, EXTENDED RELEASE ORAL at 08:44

## 2023-07-14 RX ADMIN — HALOPERIDOL 5 MG: 5 TABLET ORAL at 17:18

## 2023-07-14 RX ADMIN — CLONAZEPAM 0.5 MG: 0.5 TABLET ORAL at 08:45

## 2023-07-14 RX ADMIN — HALOPERIDOL 5 MG: 5 TABLET ORAL at 08:45

## 2023-07-14 RX ADMIN — ATROPINE SULFATE 1 DROP: 10 SOLUTION OPHTHALMIC at 10:52

## 2023-07-14 NOTE — PROGRESS NOTES
Psychiatric Progress Note - Department of [address] 21 y.o. male MRN: [id number]  [facility]24-24 Encounter: [id number]    ASSESSMENT & PLAN     Principal Problem:    Severe episode of recurrent major depressive disorder, without psychotic features ([address])  Active Problems:    Medical clearance for psychiatric admission    Autism spectrum disorder    Mood disorder ([address])    Anxiety disorder    Rash      • Continue to promote patient participation in therapeutic milieu. • Continue medical management per medicine. • Continue previously prescribed psychotropic medication regimen; see below. • Continue behavioral health checks q7 minutes. • Continue vitals per behavioral health unit protocol. • Discharge date per primary team; 201 voluntary commitment status. • Monitor for any changes to mild fine hand tremors and for side effects of propanolol    SUBJECTIVE     Patient evaluated this a.m. for continuity of care. Patient was sitting comfortably on couch. Patient was discussed at length with nursing and treatment team. Nursing stated he was observed to have an inappropriate smile and a desire to "get out of here". No acute distress is noted throughout evaluation. Jesenia Ruiz denies suicidal/homicidal ideation; contracts for safety. He continues to report feeling anxiety similar to yesterday, rating it 5/10 without noticeable change. Patient reports not needing additional medications for his anxiety this morning. He continues to complain of tremors in his bilateral hands. He describes having issues writing neatly due to the tremors but otherwise notes it does not affect his functioning. He reports taking propanolol but has not noticed any changes with the tremors. Patient continues to note drooling, for which he says the benztropine has helped. He denies any experience of hallucinations or delusions at this time.  He was noted to have a smile multiple times throughout the evaluation this morning, showing similar emotion to yesterday. PSYCHIATRIC REVIEW OF SYSTEMS     Behavior over the last 24 hours:  unchanged  Sleep: normal  Appetite: normal, though he reports skipping breakfast this morning  Medication side effects: Yes, bilateral fine hand tremors likely due to lithium    REVIEW OF SYSTEMS   Review of systems: Drooling, denies other symptoms    OBJECTIVE     Vital Signs in Past 24 Hours:  Temp:  [97.1 °F (36.2 °C)-97.5 °F (36.4 °C)] 97.1 °F (36.2 °C)  HR:  [69-87] 69  Resp:  [16-18] 16  BP: (108-121)/(66-87) 108/66    Intake/Output in Past 24 hours:  No intake/output data recorded. No intake/output data recorded. Laboratory Results:  I have personally reviewed all pertinent laboratory/tests results. Behavioral Health Medications: all current active meds have been reviewed.     Current Facility-Administered Medications   Medication Dose Route Frequency Provider Last Rate   • acetaminophen  650 mg Oral Q6H PRN Viola Alexandr, DO     • acetaminophen  650 mg Oral Q4H PRN Viola Alexandr, DO     • acetaminophen  975 mg Oral Q6H PRN Viola Alexandr, DO     • aluminum-magnesium hydroxide-simethicone  30 mL Oral Q4H PRN Viola Alexandr, DO     • ammonium lactate   Topical BID PRN Ross Sheffield PA-C     • atropine  1 drop Sublingual TID PRN Leesa Bonilla PA-C     • benztropine  1 mg Intramuscular Q4H PRN Max 6/day Viola Alexandr, DO     • benztropine  1 mg Oral Q4H PRN Max 6/day Viola Alexandr, DO     • benztropine  1 mg Oral BID Viola Alexandr, DO     • clonazePAM  0.5 mg Oral BID Jerry Santos MD     • hydrOXYzine HCL  50 mg Oral Q6H PRN Max 4/day Viola Alexandr, DO      Or   • diphenhydrAMINE  50 mg Intramuscular Q6H PRN Viola Alexandr, DO     • diphenhydrAMINE  25 mg Oral Q6H PRN Chen Enciso, DO     • glycerin-hypromellose-  1 drop Both Eyes Q3H PRN Viola Alexandr, DO     • haloperidol  5 mg Oral BID Jerry Santos MD     • hydrOXYzine HCL  100 mg Oral Q6H PRN Max 4/day Romayne Duster, DO      Or   • LORazepam  2 mg Intramuscular Q6H PRN Romayne Duster, DO     • hydrOXYzine HCL  25 mg Oral Q6H PRN Max 4/day Romayne Duster, DO     • lithium carbonate  450 mg Oral Q12H Steve Keene MD     • melatonin  10 mg Oral HS Jonah Huffman MD     • mirtazapine  7.5 mg Oral HS Romayne Duster, DO     • OLANZapine  10 mg Oral Q3H PRN Max 3/day Romayne Duster, DO      Or   • OLANZapine  10 mg Intramuscular Q3H PRN Max 3/day Romayne Duster, DO     • OLANZapine  5 mg Oral Q3H PRN Max 6/day Romayne Duster, DO      Or   • OLANZapine  5 mg Intramuscular Q3H PRN Max 6/day Romayne Duster, DO     • OLANZapine  2.5 mg Oral Q3H PRN Max 8/day Romayne Duster, DO     • polyethylene glycol  17 g Oral Daily PRN Romayne Duster, DO     • propranolol  10 mg Oral Q8H PRN Romayne Duster, DO     • propranolol  10 mg Oral Q8H PRN Jonah Huffman MD     • propranolol  10 mg Oral BID Jonah Huffman MD     • senna-docusate sodium  1 tablet Oral Daily PRN Romayne Duster, DO     • traZODone  50 mg Oral HS PRN Romayne Duster, DO         Risks, benefits and possible side effects of Medications:   Discussed the bilateral fine hand tremors and drooling with patient, discussed benztropine and propanolol benefits    Dual Antipsychotic Rationale: N/A     Mental Status Evaluation:  Appearance:  Age appropriate, neatly dressed, neatly groomed, looks younger than stated age, bilateral fine hand tremors R > L   Behavior:  Cooperative, calm, polite, not making eye contact   Speech:  Delayed speech rate, normal rhythm, monotone   Mood:  "Fine.  Anxious rated 5/10"   Affect:  Anxiety, constricted   Language No evidence of aphasia or dysarthria, coherent, clear   Thought Process:  Logical, linear, concrete, organized   Thought Content:  Denies any experience of delusions, denies obsessions or compulsions   Perceptual Disturbances: Denies experiencing any auditory, visual, tactile hallucinations, denies any illusions   Risk Potential: Denies any suicidal or homicidal ideations, denies any suicidal or homicidal plans   Sensorium:  Oriented to person, place, time, situation   Cognition:  recent and remote memory grossly intact   Consciousness:  alert    Attention: attention span appeared shorter than expected for age since unable to list all digits of phone number, concentration appeared expected for age   Insight:  limited   Judgment: limited   Intellect Successful in calculating quarters in a dollar fifty, successful in reading and spelling   Gait/Station: normal gait/station   Motor Activity: Bilateral fine hand tremors, R > L, no other abnormal motor activity     Memory: Short and long term memory appears grossly intact     Progress Toward Goals: Some, as evidenced by their participation (or lack thereof) in individual, social and therapeutic milieu in addition to adherence to their medication regimen. Patient Acceptance: Not full acceptance  Patient Understanding: Understands which medications he is taking, but does not believe all are necessary  Patient Involvement in Reintegration Process: Consistently attends some groups daily, keeps in touch with family  Patient's Therapeutic Relationship with Psychiatrist: Some trust due to continued willingness to discuss symptoms or concerns    Recommended Treatment:   See above for assessment and plan. Inpatient Psychiatric Certification: Based upon physical, mental and social evaluations, I certify that inpatient psychiatric services are medically necessary for this patient for a duration of >2 midnights for the treatment of Severe episode of recurrent major depressive disorder, without psychotic features ([address])      Counseling/Coordination of Care    Total unit time spent today was 15 minutes.  Greater than 50% of total time was spent with the patient and/or patient's relatives and/or coordination of patient's care.     Patient's rights, confidentiality, exceptions to confidentiality, use of electronic medical record including appropriate staff access to medical record regarding behavioral health services and consent to treatment were reviewed. Note Share: This note was not shared with the patient due to reasonable likelihood of causing patient harm     This note has been constructed using a voice recognition system. There may be translation, syntax, or grammatical errors. If you have any questions, please contact the dictating provider.     Domitila Tucker, MS3  [place]

## 2023-07-14 NOTE — NURSING NOTE
Moshe Ceballos has been awake, alert, and visible intermittently out in the milieu. Pt went out on the deck with staff and peers for fresh air group. Pt remains focused on discharge. Pt stated, "I'm going to be in here till I'm 52years old."  Pt stares, is easily distracted, preoccupied, and smiles inappropriately at times. Pt attended and participated in evening group, wrap up group, and had snack with peers after. Compliant with scheduled meds. Continue to monitor/assess for any changes.

## 2023-07-14 NOTE — PROGRESS NOTES
07/14/23 0830   Team Meeting   Meeting Type Daily Rounds   Initial Conference Date 07/14/23   Patient/Family Present   Patient Present No   Patient's Family Present No     Daily Rounds Documentation     Team Members Present:   Dr. Clide Bosworth, MD Dr. Lucetta Simmers, RN  Larry De Santiago, [facility]  Dennis Chand, [facility]    Inderal added for restlessness. No behaviors. Atarax PRN given for anxiety. Attended 5/7 groups. Compliant with medications and meals. Slept.

## 2023-07-14 NOTE — NURSING NOTE
Pt is medication and meal compliant with lunch consuming 75%, but refused breakfast. Pt visible intermittently in milieu or resting in bed. Pt denies s/s and remains fixated on discharge. Pt at times staring into nurses station needing redirection, but compliant. Propranolol held with morning medications as per parameters. Pt offers no complaints. Will continue to monitor.

## 2023-07-15 PROCEDURE — 99232 SBSQ HOSP IP/OBS MODERATE 35: CPT | Performed by: NURSE PRACTITIONER

## 2023-07-15 RX ADMIN — ATROPINE SULFATE 1 DROP: 10 SOLUTION OPHTHALMIC at 20:28

## 2023-07-15 RX ADMIN — MIRTAZAPINE 7.5 MG: 7.5 TABLET, FILM COATED ORAL at 21:08

## 2023-07-15 RX ADMIN — HALOPERIDOL 5 MG: 5 TABLET ORAL at 17:16

## 2023-07-15 RX ADMIN — ATROPINE SULFATE 1 DROP: 10 SOLUTION OPHTHALMIC at 14:08

## 2023-07-15 RX ADMIN — CLONAZEPAM 0.5 MG: 0.5 TABLET ORAL at 08:35

## 2023-07-15 RX ADMIN — HALOPERIDOL 5 MG: 5 TABLET ORAL at 08:35

## 2023-07-15 RX ADMIN — Medication 10 MG: at 21:08

## 2023-07-15 RX ADMIN — LITHIUM CARBONATE 450 MG: 450 TABLET, EXTENDED RELEASE ORAL at 21:08

## 2023-07-15 RX ADMIN — PROPRANOLOL HYDROCHLORIDE 10 MG: 10 TABLET ORAL at 21:08

## 2023-07-15 RX ADMIN — PROPRANOLOL HYDROCHLORIDE 10 MG: 10 TABLET ORAL at 08:35

## 2023-07-15 RX ADMIN — LITHIUM CARBONATE 450 MG: 450 TABLET, EXTENDED RELEASE ORAL at 08:35

## 2023-07-15 RX ADMIN — BENZTROPINE MESYLATE 1 MG: 1 TABLET ORAL at 08:35

## 2023-07-15 RX ADMIN — BENZTROPINE MESYLATE 1 MG: 1 TABLET ORAL at 17:16

## 2023-07-15 RX ADMIN — CLONAZEPAM 0.5 MG: 0.5 TABLET ORAL at 17:16

## 2023-07-15 NOTE — PLAN OF CARE
Problem: Ineffective Coping  Goal: Identifies ineffective coping skills  Outcome: Not Progressing     Problem: Depression  Goal: Verbalize thoughts and feelings  Description: Interventions:  - Assess and re-assess patient's level of risk   - Engage patient in 1:1 interactions, daily, for a minimum of 15 minutes   - Encourage patient to express feelings, fears, frustrations, hopes   Outcome: Progressing  Goal: Refrain from isolation  Description: Interventions:  - Develop a trusting relationship   - Encourage socialization   Outcome: Progressing     Problem: Anxiety  Goal: Anxiety is at manageable level  Description: Interventions:  - Assess and monitor patient's anxiety level. - Monitor for signs and symptoms (heart palpitations, chest pain, shortness of breath, headaches, nausea, feeling jumpy, restlessness, irritable, apprehensive). - Collaborate with interdisciplinary team and initiate plan and interventions as ordered.   - [place] patient to unit/surroundings  - Explain treatment plan  - Encourage participation in care  - Encourage verbalization of concerns/fears  - Identify coping mechanisms  - Assist in developing anxiety-reducing skills  - Administer/offer alternative therapies  - Limit or eliminate stimulants  Outcome: Progressing

## 2023-07-15 NOTE — PROGRESS NOTES
Progress Note - Behavioral Health   Lindy Chisholm 21 y.o. male MRN: [id number]  [facility] Encounter: [id number]    The patient was seen for continuing care and reviewed with treatment team.    Severe episode of recurrent major depressive disorder, without psychotic features ([address])    Vital signs in last 24 hours:  Temp:  [97.7 °F (36.5 °C)-97.8 °F (36.6 °C)] 97.7 °F (36.5 °C)  HR:  [72-84] 84  Resp:  [17-18] 17  BP: (116-131)/(61-80) 116/67    Mental Status Evaluation:    Appearance Adequate hygiene and grooming   Behavior cooperative   Mood anxious and depressed   Speech Increased latency of response   Affect inappropriate and incongruent   Thought Processes Goal directed and coherent   Thought Content Does not verbalize delusional material   Perceptual Disturbances Denies hallucinations and does not appear to be responding to internal stimuli   Risk Potential Suicidal/Homicidal Ideation - No evidence of suicidal or homicidal ideation and patient does not verbalize suicidal or homicidal ideation  Risk of Violence - No evidence of risk for violence found on assessment  Risk of Self Mutilation - No evidence of risk for self mutilation found on assessment   Associations concrete associations   Sensorium oriented to person, place, time/date and situation   Cognition/Memory recent and remote memory grossly intact   Consciousness alert and awake   Attention/Concentration attention span and concentration appear shorter than expected for age   Insight limited   Judgement limited   Muscle Strength and Gait/Station normal muscle strength and normal muscle tone, normal gait/station and normal balance   Motor Activity no abnormal movements       Progress Toward Goals: Patient observed walking in [place]. Patient is calm and cooperative on approach. Patient smiles inappropriately and reports depressed mood and anxiety. He denies any suicidal thoughts or homicidal thoughts.   He denies any auditory hallucinations. Reports he is sleeping and eating well. Reports he has good daytime energy. Does complain of drooling and weight gain with current medication regimen. He is compliant with atropine drops. Patient is medication compliant in general.  No new issues or concerns reported by staff. Staff report he has been compliant with behavioral plan and is less intrusive recently. Recommended Treatment: Continue with pharmacotherapy, group therapy, milieu therapy and occupational therapy.   The patient will be maintained on the following medications:  Current Facility-Administered Medications   Medication Dose Route Frequency Provider Last Rate   • acetaminophen  650 mg Oral Q6H PRN Carlos Bernardo, DO     • acetaminophen  650 mg Oral Q4H PRN Carlos Bernardo, DO     • acetaminophen  975 mg Oral Q6H PRN Carlos Bernardo, DO     • aluminum-magnesium hydroxide-simethicone  30 mL Oral Q4H PRN Carlos Bernardo, DO     • ammonium lactate   Topical BID PRN Uri BeverageIVAN     • atropine  1 drop Sublingual TID PRN Chestermaxwell Calloway PA-C     • benztropine  1 mg Intramuscular Q4H PRN Max 6/day Carlos Bernardo, DO     • benztropine  1 mg Oral Q4H PRN Max 6/day Carlos Bernardo, DO     • benztropine  1 mg Oral BID Carlos Bernardo, DO     • clonazePAM  0.5 mg Oral BID Elba Harry MD     • hydrOXYzine HCL  50 mg Oral Q6H PRN Max 4/day Carlosfallon Hansonter, DO      Or   • diphenhydrAMINE  50 mg Intramuscular Q6H PRN Carlos Bernardo, DO     • diphenhydrAMINE  25 mg Oral Q6H PRN Monica Slates, DO     • glycerin-hypromellose-  1 drop Both Eyes Q3H PRN Carlos Bernardo, DO     • haloperidol  5 mg Oral BID Elba Harry MD     • hydrOXYzine HCL  100 mg Oral Q6H PRN Max 4/day Carlosfallon Hansonter, DO      Or   • LORazepam  2 mg Intramuscular Q6H PRN Carlos Bernardo, DO     • hydrOXYzine HCL  25 mg Oral Q6H PRN Max 4/day Carlos Bernardo, DO     • lithium carbonate  450 mg Oral Q12H [facility] Edd ALVARENGA Anival Byrd MD     • melatonin  10 mg Oral HS Raghu Washington MD     • mirtazapine  7.5 mg Oral HS Chyrel Michel, DO     • OLANZapine  10 mg Oral Q3H PRN Max 3/day Chyrel Michel, DO      Or   • OLANZapine  10 mg Intramuscular Q3H PRN Max 3/day Chyrel Michel, DO     • OLANZapine  5 mg Oral Q3H PRN Max 6/day Chyrel Michel, DO      Or   • OLANZapine  5 mg Intramuscular Q3H PRN Max 6/day Chyrel Michel, DO     • OLANZapine  2.5 mg Oral Q3H PRN Max 8/day Chyrel Michel, DO     • polyethylene glycol  17 g Oral Daily PRN Chyrel Michel, DO     • propranolol  10 mg Oral Q8H PRN Chyrel Michel, DO     • propranolol  10 mg Oral Q8H PRN Raghu Washington MD     • propranolol  10 mg Oral BID Raghu Washington MD     • senna-docusate sodium  1 tablet Oral Daily PRN Chyrel Michel, DO     • traZODone  50 mg Oral HS PRN Chyrel Michel, DO         Severe episode of recurrent major depressive disorder, without psychotic features ([address])

## 2023-07-15 NOTE — NURSING NOTE
Patient slept well throughout the night. Appears to be resting comfortably. Eyes closed and chest movements noted.

## 2023-07-15 NOTE — PLAN OF CARE
Problem: Ineffective Coping  Goal: Identifies healthy coping skills  Outcome: Progressing     Problem: Risk for Self Injury/Neglect  Goal: Treatment Goal: Remain safe during length of stay, learn and adopt new coping skills, and be free of self-injurious ideation, impulses and acts at the time of discharge  Outcome: Progressing     Problem: Depression  Goal: Verbalize thoughts and feelings  Description: Interventions:  - Assess and re-assess patient's level of risk   - Engage patient in 1:1 interactions, daily, for a minimum of 15 minutes   - Encourage patient to express feelings, fears, frustrations, hopes   Outcome: Progressing  Goal: Refrain from isolation  Description: Interventions:  - Develop a trusting relationship   - Encourage socialization   Outcome: Progressing     Problem: Anxiety  Goal: Anxiety is at manageable level  Description: Interventions:  - Assess and monitor patient's anxiety level. - Monitor for signs and symptoms (heart palpitations, chest pain, shortness of breath, headaches, nausea, feeling jumpy, restlessness, irritable, apprehensive). - Collaborate with interdisciplinary team and initiate plan and interventions as ordered.   - [place] patient to unit/surroundings  - Explain treatment plan  - Encourage participation in care  - Encourage verbalization of concerns/fears  - Identify coping mechanisms  - Assist in developing anxiety-reducing skills  - Administer/offer alternative therapies  - Limit or eliminate stimulants  Outcome: Progressing     Problem: Prexisting or High Potential for Compromised Skin Integrity  Goal: Skin integrity is maintained or improved  Description: INTERVENTIONS:  - Identify patients at risk for skin breakdown  - Assess and monitor skin integrity  - Assess and monitor nutrition and hydration status  - Monitor labs   - Assess for incontinence   - Turn and reposition patient  - Assist with mobility/ambulation  - Relieve pressure over bony prominences  - Avoid friction and shearing  - Provide appropriate hygiene as needed including keeping skin clean and dry  - Evaluate need for skin moisturizer/barrier cream  - Collaborate with interdisciplinary team   - Patient/family teaching  - Consider wound care consult   Outcome: Completed

## 2023-07-15 NOTE — NURSING NOTE
Patient is constricted w/ intense stare. Visible and social. Appetite and hygiene is excellent. Medication compliant. C/o excessive saliva. PRN atropine gtts administered at 1408. Reports it is moderately effective. Superficial in conversation about his hobbies (music and hiking). Insight remains poor. No behavioral issues.

## 2023-07-15 NOTE — NURSING NOTE
Pt is present in the milieu social with staff and select peers. He consumed 100 % of dinner and had evening snack. Took his medications without incidence. PRN atropine drops given at 1947 for drooling and was effective. Pt is polite and brightens on approach. Pt fixated on discharge and said "if they keep me here for 6 months I'm going to get creative and make a gun to blow my brains out."  Pt at times staring into nurses station needing redirection and cooperative. Pt offers no complaints. Pt attended all evening groups. No behavioral issues.

## 2023-07-15 NOTE — PROGRESS NOTES
07/15/23 1000   Activity/Group Checklist   Group Other (Comment)   Attendance Attended   Attendance Duration (min) Greater than 60   Interactions Interacted appropriately   Affect/Mood Appropriate   Goals Achieved Able to listen to others; Able to engage in interactions

## 2023-07-16 VITALS
OXYGEN SATURATION: 99 % | BODY MASS INDEX: 25.65 KG/M2 | TEMPERATURE: 97.6 F | HEIGHT: 71 IN | WEIGHT: 183.2 LBS | RESPIRATION RATE: 18 BRPM | DIASTOLIC BLOOD PRESSURE: 69 MMHG | SYSTOLIC BLOOD PRESSURE: 117 MMHG | HEART RATE: 76 BPM

## 2023-07-16 PROCEDURE — 99232 SBSQ HOSP IP/OBS MODERATE 35: CPT | Performed by: NURSE PRACTITIONER

## 2023-07-16 RX ADMIN — BENZTROPINE MESYLATE 1 MG: 1 TABLET ORAL at 09:06

## 2023-07-16 RX ADMIN — BENZTROPINE MESYLATE 1 MG: 1 TABLET ORAL at 17:11

## 2023-07-16 RX ADMIN — PROPRANOLOL HYDROCHLORIDE 10 MG: 10 TABLET ORAL at 09:06

## 2023-07-16 RX ADMIN — HALOPERIDOL 5 MG: 5 TABLET ORAL at 17:11

## 2023-07-16 RX ADMIN — HALOPERIDOL 5 MG: 5 TABLET ORAL at 09:06

## 2023-07-16 RX ADMIN — CLONAZEPAM 0.5 MG: 0.5 TABLET ORAL at 17:11

## 2023-07-16 RX ADMIN — LITHIUM CARBONATE 450 MG: 450 TABLET, EXTENDED RELEASE ORAL at 09:06

## 2023-07-16 RX ADMIN — ALUMINUM HYDROXIDE, MAGNESIUM HYDROXIDE, AND SIMETHICONE 30 ML: 200; 200; 20 SUSPENSION ORAL at 23:25

## 2023-07-16 RX ADMIN — CLONAZEPAM 0.5 MG: 0.5 TABLET ORAL at 09:06

## 2023-07-16 RX ADMIN — Medication 10 MG: at 21:27

## 2023-07-16 RX ADMIN — LITHIUM CARBONATE 450 MG: 450 TABLET, EXTENDED RELEASE ORAL at 21:27

## 2023-07-16 RX ADMIN — MIRTAZAPINE 7.5 MG: 7.5 TABLET, FILM COATED ORAL at 21:27

## 2023-07-16 RX ADMIN — PROPRANOLOL HYDROCHLORIDE 10 MG: 10 TABLET ORAL at 21:26

## 2023-07-16 NOTE — NURSING NOTE
Patient denies anxiety, depression, SI/HI/AH/VH. Patient is visible out on the milieu sociable with staff and select peers. Patient has a constricted affect. Patient periodiocally walks up to nurses' station staring at staff, and requires redirection. Patient complains of excessive saliva and requested prn atropine drops. Prn atropine drops administered at 2028 and was effective. Medication complaint. Meal compliant, patient ate 100 percent of his dinner. Patient encouraged to make needs known. Safety checks ongoing.

## 2023-07-16 NOTE — NURSING NOTE
Patient is constricted and inappropriate for age. Awake later in morning. Refused breakfast. Ate 100% of lunch. Patient is well groomed. Medication compliant. Attended fresh air. No behavioral issues. Assault and elopement precautions maintained.

## 2023-07-16 NOTE — PLAN OF CARE
Problem: Ineffective Coping  Goal: Identifies healthy coping skills  Outcome: Progressing     Problem: Risk for Self Injury/Neglect  Goal: Treatment Goal: Remain safe during length of stay, learn and adopt new coping skills, and be free of self-injurious ideation, impulses and acts at the time of discharge  Outcome: Progressing     Problem: Depression  Goal: Verbalize thoughts and feelings  Description: Interventions:  - Assess and re-assess patient's level of risk   - Engage patient in 1:1 interactions, daily, for a minimum of 15 minutes   - Encourage patient to express feelings, fears, frustrations, hopes   Outcome: Progressing  Goal: Refrain from isolation  Description: Interventions:  - Develop a trusting relationship   - Encourage socialization   Outcome: Progressing     Problem: Anxiety  Goal: Anxiety is at manageable level  Description: Interventions:  - Assess and monitor patient's anxiety level. - Monitor for signs and symptoms (heart palpitations, chest pain, shortness of breath, headaches, nausea, feeling jumpy, restlessness, irritable, apprehensive). - Collaborate with interdisciplinary team and initiate plan and interventions as ordered.   - [place] patient to unit/surroundings  - Explain treatment plan  - Encourage participation in care  - Encourage verbalization of concerns/fears  - Identify coping mechanisms  - Assist in developing anxiety-reducing skills  - Administer/offer alternative therapies  - Limit or eliminate stimulants  Outcome: Progressing

## 2023-07-16 NOTE — PROGRESS NOTES
Progress Note - Behavioral Health   Cher Galeana 21 y.o. male MRN: [id number]  Unit/Bed#: [facility] 628-50 Encounter: [id number]    The patient was seen for continuing care and reviewed with treatment team.    Severe episode of recurrent major depressive disorder, without psychotic features ([address])    Vital signs in last 24 hours:  Temp:  [97.5 °F (36.4 °C)-97.9 °F (36.6 °C)] 97.9 °F (36.6 °C)  HR:  [68-78] 78  Resp:  [18-19] 19  BP: (118-123)/(58-68) 120/58    Mental Status Evaluation:    Appearance Adequate hygiene and grooming   Behavior cooperative   Mood euthymic   Speech Sparse and Increased latency of response   Affect mood-congruent   Thought Processes Goal directed and coherent   Thought Content Does not verbalize delusional material   Perceptual Disturbances Denies hallucinations and does not appear to be responding to internal stimuli   Risk Potential Suicidal/Homicidal Ideation - No evidence of suicidal or homicidal ideation and patient does not verbalize suicidal or homicidal ideation  Risk of Violence - No evidence of risk for violence found on assessment  Risk of Self Mutilation - No evidence of risk for self mutilation found on assessment   Associations concrete associations   Sensorium oriented to person, place and time/date   Cognition/Memory recent and remote memory grossly intact   Consciousness alert and awake   Attention/Concentration attention span and concentration appear shorter than expected for age   Insight limited   Judgement limited   Muscle Strength and Gait/Station Observed resting in bed   Motor Activity no abnormal movements       Progress Toward Goals: Patient observed resting in bed. Patient is sleeping on approach. Awakens easily to voice but has some difficulty staying awake. States he feels tired after taking his medications. Denies any depression or anxiety today. Denies any auditory hallucinations and no overt delusions.   States he slept okay last night and his appetite has been normal.  Mild tremor is noted as patient lifts blankets. Staff note that patient can be inappropriate at times and is generally attention seeking no major issues or concerns overnight with just 1 instance patient had his face and mouth pressed against the nurses station requiring redirection. Recommended Treatment: Continue with pharmacotherapy, group therapy, milieu therapy and occupational therapy.   The patient will be maintained on the following medications:  Current Facility-Administered Medications   Medication Dose Route Frequency Provider Last Rate   • acetaminophen  650 mg Oral Q6H PRN Giuseppe Devi, DO     • acetaminophen  650 mg Oral Q4H PRN Giuseppe Devi, DO     • acetaminophen  975 mg Oral Q6H PRN Giuseppe Devi, DO     • aluminum-magnesium hydroxide-simethicone  30 mL Oral Q4H PRN Giuseppe Quinonez, DO     • ammonium lactate   Topical BID PRN Sabiha Baker PA-C     • atropine  1 drop Sublingual TID PRN Curtis Lam PA-C     • benztropine  1 mg Intramuscular Q4H PRN Max 6/day Giuseppe Quinonez, DO     • benztropine  1 mg Oral Q4H PRN Max 6/day Giuseppe Thomaster, DO     • benztropine  1 mg Oral BID Giuseppe Thomaster, DO     • clonazePAM  0.5 mg Oral BID Lidia Amaral MD     • hydrOXYzine HCL  50 mg Oral Q6H PRN Max 4/day Giuseppe Quinonez, DO      Or   • diphenhydrAMINE  50 mg Intramuscular Q6H PRN Giuseppe Quinonez, DO     • diphenhydrAMINE  25 mg Oral Q6H PRN Angela Reyes DO     • glycerin-hypromellose-  1 drop Both Eyes Q3H PRN Giuseppe Quinonez, DO     • haloperidol  5 mg Oral BID Lidia Amaral MD     • hydrOXYzine HCL  100 mg Oral Q6H PRN Max 4/day Giuseppe Quinonez, DO      Or   • LORazepam  2 mg Intramuscular Q6H PRN Giuseppe Quinonez, DO     • hydrOXYzine HCL  25 mg Oral Q6H PRN Max 4/day Giuseppe Devi, DO     • lithium carbonate  450 mg Oral Q12H Brittney Zuluaga MD     • melatonin  10 mg Oral HS Lidia Amaral MD     • mirtazapine  7.5 mg Oral HS Ayse Maxi, DO     • OLANZapine  10 mg Oral Q3H PRN Max 3/day Ayse German, DO      Or   • OLANZapine  10 mg Intramuscular Q3H PRN Max 3/day Ayse German, DO     • OLANZapine  5 mg Oral Q3H PRN Max 6/day Ayse German, DO      Or   • OLANZapine  5 mg Intramuscular Q3H PRN Max 6/day Ayse German, DO     • OLANZapine  2.5 mg Oral Q3H PRN Max 8/day Ciro Watters, DO     • polyethylene glycol  17 g Oral Daily PRN Ayse German, DO     • propranolol  10 mg Oral Q8H PRN Ayse German, DO     • propranolol  10 mg Oral Q8H PRN Kevin Fontana MD     • propranolol  10 mg Oral BID Kevin Fontana MD     • senna-docusate sodium  1 tablet Oral Daily PRN Ayse German, DO     • traZODone  50 mg Oral HS PRN Ayse German, DO         Severe episode of recurrent major depressive disorder, without psychotic features ([address])

## 2023-07-17 PROCEDURE — 99232 SBSQ HOSP IP/OBS MODERATE 35: CPT | Performed by: PSYCHIATRY & NEUROLOGY

## 2023-07-17 RX ADMIN — LITHIUM CARBONATE 450 MG: 450 TABLET, EXTENDED RELEASE ORAL at 08:39

## 2023-07-17 RX ADMIN — HALOPERIDOL 5 MG: 5 TABLET ORAL at 17:34

## 2023-07-17 RX ADMIN — LITHIUM CARBONATE 450 MG: 450 TABLET, EXTENDED RELEASE ORAL at 22:12

## 2023-07-17 RX ADMIN — HALOPERIDOL 5 MG: 5 TABLET ORAL at 08:39

## 2023-07-17 RX ADMIN — Medication 10 MG: at 22:11

## 2023-07-17 RX ADMIN — ATROPINE SULFATE 1 DROP: 10 SOLUTION OPHTHALMIC at 10:14

## 2023-07-17 RX ADMIN — CLONAZEPAM 0.5 MG: 0.5 TABLET ORAL at 08:40

## 2023-07-17 RX ADMIN — BENZTROPINE MESYLATE 1 MG: 1 TABLET ORAL at 08:39

## 2023-07-17 RX ADMIN — CLONAZEPAM 0.5 MG: 0.5 TABLET ORAL at 17:34

## 2023-07-17 RX ADMIN — MIRTAZAPINE 7.5 MG: 7.5 TABLET, FILM COATED ORAL at 22:11

## 2023-07-17 RX ADMIN — ATROPINE SULFATE 1 DROP: 10 SOLUTION OPHTHALMIC at 18:43

## 2023-07-17 RX ADMIN — PROPRANOLOL HYDROCHLORIDE 10 MG: 10 TABLET ORAL at 08:39

## 2023-07-17 RX ADMIN — PROPRANOLOL HYDROCHLORIDE 10 MG: 10 TABLET ORAL at 22:11

## 2023-07-17 RX ADMIN — BENZTROPINE MESYLATE 1 MG: 1 TABLET ORAL at 17:34

## 2023-07-17 NOTE — NURSING NOTE
Patient attention seeking and inappropriate for age. While eating meal patient abruptly speaking profanities. Peers irritated and asking peer to stop. Patient redirectable. After meal patient requested routine evening medications. Patient stated "I'm going to crush this and snort it." Patient informed comment was inappropriate and then swallowed medications. Encouraged to find activity to keep himself occupied. Requested radio and then sat in patient lounge listening to music. No further inappropriate comments noted.

## 2023-07-17 NOTE — PROGRESS NOTES
07/17/23 0830   Team Meeting   Meeting Type Daily Rounds   Initial Conference Date 07/17/23   Patient/Family Present   Patient Present No   Patient's Family Present No     Daily Rounds Documentation     Team Members Present:   MD Dr. Malena Prieto, RN  Nabor Castro, [address] [facility], [facility]    Child-like behaviors to seek attention, but no aggression. Attended 4/7 groups. Compliant with medications and meals. Slept.

## 2023-07-17 NOTE — PLAN OF CARE
Problem: Ineffective Coping  Goal: Identifies healthy coping skills  Outcome: Progressing  Goal: Demonstrates healthy coping skills  Outcome: Progressing     Problem: Risk for Self Injury/Neglect  Goal: Treatment Goal: Remain safe during length of stay, learn and adopt new coping skills, and be free of self-injurious ideation, impulses and acts at the time of discharge  Outcome: Progressing     Problem: Depression  Goal: Verbalize thoughts and feelings  Description: Interventions:  - Assess and re-assess patient's level of risk   - Engage patient in 1:1 interactions, daily, for a minimum of 15 minutes   - Encourage patient to express feelings, fears, frustrations, hopes   Outcome: Progressing  Goal: Refrain from isolation  Description: Interventions:  - Develop a trusting relationship   - Encourage socialization   Outcome: Progressing     Problem: Anxiety  Goal: Anxiety is at manageable level  Description: Interventions:  - Assess and monitor patient's anxiety level. - Monitor for signs and symptoms (heart palpitations, chest pain, shortness of breath, headaches, nausea, feeling jumpy, restlessness, irritable, apprehensive). - Collaborate with interdisciplinary team and initiate plan and interventions as ordered.   - [place] patient to unit/surroundings  - Explain treatment plan  - Encourage participation in care  - Encourage verbalization of concerns/fears  - Identify coping mechanisms  - Assist in developing anxiety-reducing skills  - Administer/offer alternative therapies  - Limit or eliminate stimulants  Outcome: Progressing

## 2023-07-17 NOTE — NURSING NOTE
Patient is constricted and attention seeking. Visible on milieu and minimally social w/ peers. Appetite and hygiene are excellent. PRN atropine gtts administered at 1014 for c/o drooling. Attending most groups. Patient singing song lyrics during lunch. Needed redirection for cursing and inappropriate racial remarks. It appears patient's need for attention occurs around meal time when all of his peers are present. Q 7 minute continuous rounding in place.

## 2023-07-17 NOTE — PROGRESS NOTES
Psychiatric Progress Note - Department of [address] 21 y.o. male MRN: [id number]  [facility]53-60 Encounter: [id number]    ASSESSMENT & PLAN     Principal Problem:    Severe episode of recurrent major depressive disorder, without psychotic features ([address])  Active Problems:    Medical clearance for psychiatric admission    Autism spectrum disorder    Mood disorder ([address])    Anxiety disorder    Rash      • Continue to promote patient participation in therapeutic milieu. • Continue medical management per medicine. • Continue previously prescribed psychotropic medication regimen; see below. • Continue behavioral health checks q7 minutes. • Continue vitals per behavioral health unit protocol. • Discharge date per primary team; 201 voluntary commitment status. • Monitor for potential Lithium side effect of bilateral hand tremors along with monitoring vitals while on propanolol    SUBJECTIVE     Patient evaluated this a.m. for continuity of care. Patient just finished with his morning shower and was walking comfortably around unit. Patient was discussed at length with nursing and treatment team. Nursing stated over the weekend he was attention seeking, exhibiting behaviors such as pressing his face against the window and cursing. No acute distress is noted throughout evaluation. Odilon Zhao denies suicidal/homicidal ideation; contracts for safety. He reports continued experience of the drooling, with usage of atropine drops alleviating the symptoms somewhat. The patient also states that the bilateral hand tremors are improved, with the propanolol helping. He states the tremors are not impeding his daily functioning. Patient has no other concerns or complaints at this time. He denies any experience of delusions or hallucinations today. He is noted to smile at times throughout the interview.     PSYCHIATRIC REVIEW OF SYSTEMS     Behavior over the last 24 hours: unchanged  Sleep: normal  Appetite: normal  Medication side effects: Yes, possible side effect of lithium fine hand tremor discussed    REVIEW OF SYSTEMS   Review of systems: Drooling, no other symptoms at this time    OBJECTIVE     Vital Signs in Past 24 Hours:  Temp:  [97.5 °F (36.4 °C)-97.6 °F (36.4 °C)] 97.5 °F (36.4 °C)  HR:  [74-88] 74  Resp:  [17-18] 17  BP: (110-121)/(51-70) 110/51    Intake/Output in Past 24 hours:  No intake/output data recorded. No intake/output data recorded. Laboratory Results:  I have personally reviewed all pertinent laboratory/tests results. Behavioral Health Medications: all current active meds have been reviewed.     Current Facility-Administered Medications   Medication Dose Route Frequency Provider Last Rate   • acetaminophen  650 mg Oral Q6H PRN [place], DO     • acetaminophen  650 mg Oral Q4H PRN [place], DO     • acetaminophen  975 mg Oral Q6H PRN [place], DO     • aluminum-magnesium hydroxide-simethicone  30 mL Oral Q4H PRN [place], DO     • ammonium lactate   Topical BID PRN Joel Sanchez PA-C     • atropine  1 drop Sublingual TID PRN Charito Escobedo PA-C     • benztropine  1 mg Intramuscular Q4H PRN Max 6/day [place], DO     • benztropine  1 mg Oral Q4H PRN Max 6/day [place], DO     • benztropine  1 mg Oral BID [place], DO     • clonazePAM  0.5 mg Oral BID Mary Stiles MD     • hydrOXYzine HCL  50 mg Oral Q6H PRN Max 4/day [place], DO      Or   • diphenhydrAMINE  50 mg Intramuscular Q6H PRN [place], DO     • diphenhydrAMINE  25 mg Oral Q6H PRN Laisha Dues, DO     • glycerin-hypromellose-  1 drop Both Eyes Q3H PRN [place], DO     • haloperidol  5 mg Oral BID Mary Stiles MD     • hydrOXYzine HCL  100 mg Oral Q6H PRN Max 4/day [place], DO      Or   • LORazepam  2 mg Intramuscular Q6H PRN [place], DO     • hydrOXYzine HCL  25 mg Oral Q6H PRN Max 4/day Ge Bleak, DO     • lithium carbonate  450 mg Oral Q12H Denis Perez MD     • melatonin  10 mg Oral HS Anastacio Whittington MD     • mirtazapine  7.5 mg Oral HS Ge Bleak, DO     • OLANZapine  10 mg Oral Q3H PRN Max 3/day Ge Bleak, DO      Or   • OLANZapine  10 mg Intramuscular Q3H PRN Max 3/day Ge Bleak, DO     • OLANZapine  5 mg Oral Q3H PRN Max 6/day Ge Bleak, DO      Or   • OLANZapine  5 mg Intramuscular Q3H PRN Max 6/day Ge Bleak, DO     • OLANZapine  2.5 mg Oral Q3H PRN Max 8/day Ge Bleak, DO     • polyethylene glycol  17 g Oral Daily PRN Ge Bleak, DO     • propranolol  10 mg Oral Q8H PRN Ge Bleak, DO     • propranolol  10 mg Oral Q8H PRN Anastacoi Whittington MD     • propranolol  10 mg Oral BID Anastacio Whittington MD     • senna-docusate sodium  1 tablet Oral Daily PRN Ge Bleak, DO     • traZODone  50 mg Oral HS PRN Ge Bleak, DO         Risks, benefits and possible side effects of Medications:   Discussed the benefit of propanolol and atropine, discussed potential lithium side effect of fine hand tremors    Dual Antipsychotic Rationale: N/A    Mental Status Evaluation:  Appearance:  Appropriately dressed, appropriately groomed, slightly messy hair as just took morning shower, looks younger than stated age, bilateral fine hand tremors R = L   Behavior:  Cooperative, polite, making eye contact throughout evaluation   Speech:  Delayed speech, normal rhythm, monotone   Mood:  "Anxious, rated 7/10, feeling trapped, wanting to get out of here"   Affect:  Anxiety, constricted   Language No evidence dysphagia or dysarthria, coherent   Thought Process:  Logical, concrete, organized   Thought Content:  Denies experience of delusions or experience of obsessions and/or compulsions at this time   Perceptual Disturbances: Denies any auditory, visual hallucinations and denies experiencing any illusions.  No evidence of response to internal stimuli   Risk Potential: Denies any suicidal ideation or homicidal ideation. Denies any plans for suicide or homicide. Sensorium:  Oriented to person, place, situation   Cognition:  Recent and remote memory appear grossly intact   Consciousness:  alert    Attention: attention span appeared shorter than expected for age due to not fully repeating digits of number   Insight:  limited   Judgment: limited   Intellect Successful in reading   Gait/Station: normal gait/station walking around unit this morning   Motor Activity: Bilateral fine tremors R = L, no other abnormal motor activities noted at this time     Memory: Short and long term memory appears grossly intact     Progress Toward Goals: Some, as evidenced by their participation (or lack thereof) in individual, social and therapeutic milieu in addition to adherence to their medication regimen. Patient Acceptance: Not fully present  Patient Understanding: Good understanding of symptoms and current medications, but does not see need for all his current medications  Patient Involvement in Reintegration Process: Attends some groups daily, involvement with family  Patient's Therapeutic Relationship with Psychiatrist: Some trust with willingness to discuss experiences, symptoms and concerns     Recommended Treatment:   See above for assessment and plan. Inpatient Psychiatric Certification: Based upon physical, mental and social evaluations, I certify that inpatient psychiatric services are medically necessary for this patient for a duration of >2 midnights for the treatment of Severe episode of recurrent major depressive disorder, without psychotic features ([address])      Counseling/Coordination of Care    Total unit time spent today was 15 minutes. Greater than 50% of total time was spent with the patient and/or patient's relatives and/or coordination of patient's care.      Patient's rights, confidentiality, exceptions to confidentiality, use of electronic medical record including appropriate staff access to medical record regarding behavioral health services and consent to treatment were reviewed. Note Share: This note was not shared with the patient due to reasonable likelihood of causing patient harm     This note has been constructed using a voice recognition system. There may be translation, syntax, or grammatical errors. If you have any questions, please contact the dictating provider.     Cheryl Rasmussen, MS3  [facility]

## 2023-07-17 NOTE — NURSING NOTE
Patient came to the nurses station and requested Mylanta 30 ml at this time. Reported an upset stomach and discomfort as well. Patient returned immediately back to bed after taking the Mylanta.

## 2023-07-17 NOTE — NURSING NOTE
Mylanta given an hour ago was effective, No further stomach issues reported. Patient resting comfortably in bed at this time.

## 2023-07-17 NOTE — PROGRESS NOTES
07/17/23 5089   Team Meeting   Meeting Type Tx Team Meeting   Initial Conference Date 07/17/23   Next Conference Date 07/31/23   Team Members Present   Team Members Present Physician;Nurse;   Physician Team Member Dr. Chaz Jama MD   Nursing Team Member Amanda Peña, RN   Social Work Team Member Pamela Napoles [place]   Other (Discipline and Name) Norris Verdigris of Service [address]   Patient/Family Present   Patient Present Yes   Patient's Family Present No     Patient was present for his treatment team meeting prepared with a completed self-assessment. He presented as calm, flat, and attentive; affect was congruent and eye contact was fair. He was dressed appropriately, and appeared well groomed. He was able to share his self-assessment independently. Something he learned while attending groups last week was positives. One goal he accomplished last week was managing his anxiety a little better. One recovery-centered goal he is still working on is his anger; team informed patient that he has done much better with this over the last few weeks. Something challenging he dealt with last week was waiting to get accepted by a group home. Psychiatric symptoms he still experiences includes depression and anxiety. Other challenges include low self-esteem and negative thinking. Coping skills he utilized to manage his challenges included music, grounding, and walking. Self-care included: music, grounding, and showering. Patient was able to identify his medications, and reported that he is still drooling, and having hand tremors. He denied having any medical issues. Laisha Callejas informed patient that Flo has several openings, but one of the openings he is not appropriate for. She confirmed that he is willing to reside in a home outside of [place] county, and he agreed that this is correct.   She informed SW and patient that she wants to send his info to [facility] as well, and asked patient to sign the MICHAEL she is going to send over. She confirmed that there is nothing this SW can do to assist at this time. Patient voiced that he is still fearful that he will never get out, but encouraged to not given up hope as he has more options than most people have. Zo Dawn informed patient that she doesn't think it will take too long. Patient had no questions or additional concerns to report. Patient encouraged to keep up the good work.

## 2023-07-17 NOTE — SOCIAL WORK
Patient signed MICHAEL for [address]; MICHAEL sent back to patient's supports coordinator, Kenneth Lutz.

## 2023-07-18 PROCEDURE — 99232 SBSQ HOSP IP/OBS MODERATE 35: CPT | Performed by: PSYCHIATRY & NEUROLOGY

## 2023-07-18 RX ADMIN — CLONAZEPAM 0.5 MG: 0.5 TABLET ORAL at 08:12

## 2023-07-18 RX ADMIN — MIRTAZAPINE 7.5 MG: 7.5 TABLET, FILM COATED ORAL at 21:31

## 2023-07-18 RX ADMIN — BENZTROPINE MESYLATE 1 MG: 1 TABLET ORAL at 17:31

## 2023-07-18 RX ADMIN — ATROPINE SULFATE 1 DROP: 10 SOLUTION OPHTHALMIC at 20:02

## 2023-07-18 RX ADMIN — Medication 10 MG: at 21:31

## 2023-07-18 RX ADMIN — LITHIUM CARBONATE 450 MG: 450 TABLET, EXTENDED RELEASE ORAL at 08:13

## 2023-07-18 RX ADMIN — LITHIUM CARBONATE 450 MG: 450 TABLET, EXTENDED RELEASE ORAL at 21:31

## 2023-07-18 RX ADMIN — HALOPERIDOL 5 MG: 5 TABLET ORAL at 08:12

## 2023-07-18 RX ADMIN — HALOPERIDOL 5 MG: 5 TABLET ORAL at 17:31

## 2023-07-18 RX ADMIN — BENZTROPINE MESYLATE 1 MG: 1 TABLET ORAL at 08:12

## 2023-07-18 RX ADMIN — ATROPINE SULFATE 1 DROP: 10 SOLUTION OPHTHALMIC at 11:02

## 2023-07-18 RX ADMIN — PROPRANOLOL HYDROCHLORIDE 10 MG: 10 TABLET ORAL at 21:31

## 2023-07-18 RX ADMIN — CLONAZEPAM 0.5 MG: 0.5 TABLET ORAL at 17:31

## 2023-07-18 NOTE — PROGRESS NOTES
Psychiatric Progress Note - Department of [address] 21 y.o. male MRN: [id number]  [facility] 193-58 Encounter: [id number]    ASSESSMENT & PLAN     Principal Problem:    Severe episode of recurrent major depressive disorder, without psychotic features ([address])  Active Problems:    Medical clearance for psychiatric admission    Autism spectrum disorder    Mood disorder ([address])    Anxiety disorder    Rash      • Continue to promote patient participation in therapeutic milieu. • Continue medical management per medicine. • Continue previously prescribed psychotropic medication regimen; see below. • Continue behavioral health checks q7 minutes. • Continue vitals per behavioral health unit protocol. • Discharge date per primary team; 201 voluntary commitment status. • Monitor progress regarding bilateral fine hand tremors, monitor vitals for side effects of propanolol    SUBJECTIVE     Patient evaluated this a.m. for continuity of care. Patient was seen in room while he was sitting comfortably in bed. Patient was discussed at length with nursing and treatment team. Nursing reported attention seeking behavior yesterday with singing song lyrics containing vulgar language and racial slurs. No acute distress is noted throughout evaluation. Marta Mejia denies suicidal/homicidal ideation; contracts for safety. Patient continues to report anxiety attributing it to feeling trapped, and he continues to anticipate discharge from the hospital. He reports he did not require anything for his anxiety yesterday or so far today. He states he is feeling down due to not having freedom in the hospital. He states the drooling has continued and requested atropine drops yesterday which only partially helped. He notes the bilateral hand tremors have improved since taking propanolol. He reported the tremors do not affect his activities such as writing. He has no other complaints at this time.  He denies any experience of hallucinations or delusions at this time. PSYCHIATRIC REVIEW OF SYSTEMS     Behavior over the last 24 hours:  unchanged  Sleep: normal  Appetite: normal  Medication side effects: Yes, likely bilateral fine hand tremors due to lithium side effect    REVIEW OF SYSTEMS   Review of systems: Drooling, other systems were negative    OBJECTIVE     Vital Signs in Past 24 Hours:  Temp:  [97.6 °F (36.4 °C)-97.8 °F (36.6 °C)] 97.6 °F (36.4 °C)  HR:  [72-86] 73  Resp:  [17-18] 18  BP: (108-125)/(59-73) 108/68    Intake/Output in Past 24 hours:  No intake/output data recorded. No intake/output data recorded. Laboratory Results:  I have personally reviewed all pertinent laboratory/tests results. Behavioral Health Medications: all current active meds have been reviewed.     Current Facility-Administered Medications   Medication Dose Route Frequency Provider Last Rate   • acetaminophen  650 mg Oral Q6H PRN Denise Last, DO     • acetaminophen  650 mg Oral Q4H PRN Denise Last, DO     • acetaminophen  975 mg Oral Q6H PRN Denise Last, DO     • aluminum-magnesium hydroxide-simethicone  30 mL Oral Q4H PRN Denise Last, DO     • ammonium lactate   Topical BID PRN Tammy Giordano PA-C     • atropine  1 drop Sublingual TID PRN Cornelius Garcia PA-C     • benztropine  1 mg Intramuscular Q4H PRN Max 6/day Denise Last, DO     • benztropine  1 mg Oral Q4H PRN Max 6/day Denise Last, DO     • benztropine  1 mg Oral BID Denise Last, DO     • clonazePAM  0.5 mg Oral BID Rosemary Elise MD     • hydrOXYzine HCL  50 mg Oral Q6H PRN Max 4/day Denise Last, DO      Or   • diphenhydrAMINE  50 mg Intramuscular Q6H PRN Denise Last, DO     • diphenhydrAMINE  25 mg Oral Q6H PRN Ruben Adam, DO     • glycerin-hypromellose-  1 drop Both Eyes Q3H PRN Denise Last, DO     • haloperidol  5 mg Oral BID Rosemary Elise MD     • hydrOXYzine HCL  100 mg Oral Q6H PRN Max 4/day Donnis Screen, DO      Or   • LORazepam  2 mg Intramuscular Q6H PRN Donnis Screen, DO     • hydrOXYzine HCL  25 mg Oral Q6H PRN Max 4/day Donnis Screen, DO     • lithium carbonate  450 mg Oral Q12H Thalia Christian MD     • melatonin  10 mg Oral HS Adriana Jolly MD     • mirtazapine  7.5 mg Oral HS Donnis Screen, DO     • OLANZapine  10 mg Oral Q3H PRN Max 3/day Donnis Screen, DO      Or   • OLANZapine  10 mg Intramuscular Q3H PRN Max 3/day Donnis Screen, DO     • OLANZapine  5 mg Oral Q3H PRN Max 6/day Donnis Screen, DO      Or   • OLANZapine  5 mg Intramuscular Q3H PRN Max 6/day Donnis Screen, DO     • OLANZapine  2.5 mg Oral Q3H PRN Max 8/day Donnis Screen, DO     • polyethylene glycol  17 g Oral Daily PRN Donnis Screen, DO     • propranolol  10 mg Oral Q8H PRN Donnis Screen, DO     • propranolol  10 mg Oral Q8H PRN Adriana Jolly MD     • propranolol  10 mg Oral BID Adriana Jolly MD     • senna-docusate sodium  1 tablet Oral Daily PRN Donnis Screen, DO     • traZODone  50 mg Oral HS PRN Donnis Screen, DO         Risks, benefits and possible side effects of Medications:   Discussed the benefits of propanolol and atropine drops, noted the likely side effects of lithium with the bilateral fine hand tremors    Dual Antipsychotic Rationale: N/A    Mental Status Evaluation:  Appearance:  Appropriately groomed, appropriately dressed, looks younger than stated age, sometimes smiling during interview   Behavior:  Polite, cooperative, sometimes avoiding eye contact,    Speech:  Delayed speech, normal rhythm, monotone   Mood:  "Anxious, rated 5/10, wanting to get out of here, down due to not having freedom"   Affect:  Anxiety, constricted   Language No evidence of dysphagia or dysarthria, coherent, appropriate volume   Thought Process:  Logical, organized, concrete   Thought Content:  Denies any experience of delusions or experience of obsessions and/or compulsions. Perceptual Disturbances: Denies experience of auditory, visual hallucinations and denies illusions. Does not appear to be responding to internal stimuli   Risk Potential: Denies any suicidal or homicidal ideations. Denies any suicidal or homicidal plans. Sensorium:  Orineted to person, place, time, situation   Cognition:  recent and remote memory appears grossly intact   Consciousness:  Awake, alert    Attention: attention span appeared shorter than expected for age due to failure to repeat phone number   Insight:  limited   Judgment: limited   Intellect Successful in reading   Gait/Station: Did not assess as pt lying in bed   Motor Activity: Bilateral fine hand tremors L > R, no other abnormal movements noted at this time     Memory: Short and long term memory appear grossly intact, able to recall previous discussions and initial hospitalization reasons     Progress Toward Goals: Some, as evidenced by their participation (or lack thereof) in individual, social and therapeutic milieu in addition to adherence to their medication regimen. Patient Acceptance: Not fully accepting of need for treatment  Patient Understanding: Aware of medications and symptoms but does not understand need for all medications, does not fully understand plan for next steps  Patient Involvement in Reintegration Process: Attends some groups, keeps in touch with family  Patient's Therapeutic Relationship with Psychiatrist: Some evidence of trust with willingness to relate symptoms or concerns, willingness to maintain treatment regimen    Recommended Treatment:   See above for assessment and plan.     Inpatient Psychiatric Certification: Based upon physical, mental and social evaluations, I certify that inpatient psychiatric services are medically necessary for this patient for a duration of >2 midnights for the treatment of Severe episode of recurrent major depressive disorder, without psychotic features [facility])      Counseling/Coordination of Care    Total unit time spent today was 15 minutes. Greater than 50% of total time was spent with the patient and/or patient's relatives and/or coordination of patient's care. Patient's rights, confidentiality, exceptions to confidentiality, use of electronic medical record including appropriate staff access to medical record regarding behavioral health services and consent to treatment were reviewed. Note Share: This note was not shared with the patient due to reasonable likelihood of causing patient harm     This note has been constructed using a voice recognition system. There may be translation, syntax, or grammatical errors. If you have any questions, please contact the dictating provider.     Radha Tenorio, MS3  [facility]

## 2023-07-18 NOTE — NURSING NOTE
Patient visible on the unit. Patient cooperative. No behavioral issues. Denied any needs. Went to evening groups.

## 2023-07-18 NOTE — PROGRESS NOTES
07/18/23 0830   Team Meeting   Meeting Type Daily Rounds   Initial Conference Date 07/18/23   Patient/Family Present   Patient Present No   Patient's Family Present No     Daily Rounds Documentation     Team Members Present:   Dr. Ana Duncan MD [facility], [address] [place], [place]  Ruma Mahajan, RN  Gill Bautista, RN    Rapping loudly, and using some inappropriate language. No aggression. Atropine drops PRN utilized. Attended 6/9 groups. Compliant with medications and meals. Slept.

## 2023-07-18 NOTE — NURSING NOTE
Pt remains safe on the unit. Pt denied depression, anxiety and all psych sx today. Pt used his nail clippers today. Atropine drops given at 1102 for excessive salivation. Will continue to monitor via 7 min checks.

## 2023-07-18 NOTE — NURSING NOTE
Chantelle Montague has been awake, alert, and visible intermittently out in the milieu. Pt ate 100% supper. Pt rests quietly in room at intervals. Pt denies any depression, anxiety, a/v hallucinations, and has not verbalized any delusions. Pt remains easily distracted, delayed in verbal responses, and stares in conversation. No behavioral issues. Pt attended and participated in evening nursing group, wrap up group, and had snack with peers after. Compliant with scheduled meds. Continue to monitor/assess for any behavioral changes.

## 2023-07-18 NOTE — SOCIAL WORK
SW and patient met privately for a psychotherapy session. Patient presented as calm, content, and attentive; affect was congruent, and eye contact was fair. He voiced that he feels anxious, but did not present as anxious; he rated his anxiety 3/4. He rated his depression 5/10. He denied thoughts of suicide or self-harm. He was dressed appropriately, and appeared well groomed. Patient was primarily focused on discharge again, and making comments about how he will be here until October or 2024. SW addressed patient's catastrophic thinking tendencies, and identified other times he has done this. Patient able to provide an example of when he has done this in the past.  He expressed that he thinks this way because negative things tend to happen to him. SW spoke to him about how this has become a defense mechanism for him, which was able to somewhat understand. We discussed ways in which he can re-frame his thoughts by focusing on facts rather than opinions. Patient encourage to focus on the progress he has made here, and what this ATIYA and his supports coordinator are doing on his behalf to get him back to the community. Patient acknowledged that thinking this way would likely make him feel better, but then towards the end of session asked about being transferred; however, appeared to do this to get a reaction from SW. Patient was able to be redirected back to the topic at hand. Patient denied that anything else has been bothering him or having other negative/irrational thoughts.   Lastly, patient asked about visiting with family, and we agreed to discuss this with the rest of the treatment team.

## 2023-07-19 PROCEDURE — 99232 SBSQ HOSP IP/OBS MODERATE 35: CPT | Performed by: PSYCHIATRY & NEUROLOGY

## 2023-07-19 RX ORDER — PROPRANOLOL HYDROCHLORIDE 10 MG/1
10 TABLET ORAL 3 TIMES DAILY
Status: DISCONTINUED | OUTPATIENT
Start: 2023-07-19 | End: 2023-08-04

## 2023-07-19 RX ADMIN — BENZTROPINE MESYLATE 1 MG: 1 TABLET ORAL at 08:50

## 2023-07-19 RX ADMIN — CLONAZEPAM 0.5 MG: 0.5 TABLET ORAL at 08:50

## 2023-07-19 RX ADMIN — ACETAMINOPHEN 975 MG: 325 TABLET ORAL at 20:43

## 2023-07-19 RX ADMIN — MIRTAZAPINE 7.5 MG: 7.5 TABLET, FILM COATED ORAL at 21:26

## 2023-07-19 RX ADMIN — PROPRANOLOL HYDROCHLORIDE 10 MG: 10 TABLET ORAL at 08:49

## 2023-07-19 RX ADMIN — PROPRANOLOL HYDROCHLORIDE 10 MG: 10 TABLET ORAL at 21:26

## 2023-07-19 RX ADMIN — CLONAZEPAM 0.5 MG: 0.5 TABLET ORAL at 17:15

## 2023-07-19 RX ADMIN — LITHIUM CARBONATE 450 MG: 450 TABLET, EXTENDED RELEASE ORAL at 08:49

## 2023-07-19 RX ADMIN — HALOPERIDOL 5 MG: 5 TABLET ORAL at 17:15

## 2023-07-19 RX ADMIN — LITHIUM CARBONATE 450 MG: 450 TABLET, EXTENDED RELEASE ORAL at 21:26

## 2023-07-19 RX ADMIN — ALUMINUM HYDROXIDE, MAGNESIUM HYDROXIDE, AND SIMETHICONE 30 ML: 200; 200; 20 SUSPENSION ORAL at 22:32

## 2023-07-19 RX ADMIN — PROPRANOLOL HYDROCHLORIDE 10 MG: 10 TABLET ORAL at 17:14

## 2023-07-19 RX ADMIN — ATROPINE SULFATE 1 DROP: 10 SOLUTION OPHTHALMIC at 10:45

## 2023-07-19 RX ADMIN — ATROPINE SULFATE 1 DROP: 10 SOLUTION OPHTHALMIC at 16:07

## 2023-07-19 RX ADMIN — HALOPERIDOL 5 MG: 5 TABLET ORAL at 08:50

## 2023-07-19 RX ADMIN — Medication 10 MG: at 21:26

## 2023-07-19 RX ADMIN — BENZTROPINE MESYLATE 1 MG: 1 TABLET ORAL at 17:15

## 2023-07-19 NOTE — NURSING NOTE
Prn atropine drops administered as ordered for complaint of drooling. Will continue to monitor drooling.

## 2023-07-19 NOTE — NURSING NOTE
Pt is medication and meal compliant consuming 100% of lunch, but refused breakfast sleeping instead. Pt denies s/s and remains fixated on discharged in conversation. Pt at times smiling inappropriately and laughing while standing at nurses station, but easily redirectable. Pt rests in bed intermittently, watches Tv and attends select groups. Pt offers no new complaints.

## 2023-07-19 NOTE — PROGRESS NOTES
07/19/23 0830   Team Meeting   Meeting Type Daily Rounds   Initial Conference Date 07/19/23   Patient/Family Present   Patient Present No   Patient's Family Present No     Daily Rounds Documentation     Team Members Present:   MD Dr. Jem Sen, RN  Silvia Cee, [facility], [address] [place] [address]. D    No behaviors. Atropine drops PRN 2x. Easily distracted. Attended 4/7 groups. Compliant with medications and meals. Slept.

## 2023-07-19 NOTE — PROGRESS NOTES
Psychiatric Progress Note - Department of [address] 21 y.o. male MRN: [id number]  [facility] [address] Encounter: [id number]    ASSESSMENT & PLAN     Principal Problem:    Severe episode of recurrent major depressive disorder, without psychotic features ([address])  Active Problems:    Medical clearance for psychiatric admission    Autism spectrum disorder    Mood disorder ([address])    Anxiety disorder    Rash      • Continue to promote patient participation in therapeutic milieu. • Continue medical management per medicine. • Continue previously prescribed psychotropic medication regimen; see below. • Continue behavioral health checks q7 minutes. • Continue vitals per behavioral health unit protocol. • Discharge date per primary team; 201 voluntary commitment status. • Monitor the likely lithium side effect of bilateral hand tremors, vitals for propanolol side effects    SUBJECTIVE     Patient evaluated this a.m. for continuity of care. He was just waking up and was lying comfortably on bed. Patient was discussed at length with nursing and treatment team. Pharmacy reported a discussion with the patient regarding his symptoms. They discussed how drooling is likely not a side effect from his medications, while the hand tremors could be due to lithium or haloperidol. No acute distress is noted throughout evaluation. Cristela Tate denies suicidal/homicidal ideation; contracts for safety. He reports feeling okay today rating 5/10 and denies any anxiety at this time. He continues to report drooling, and stated he used atropine twice yesterday which helped a little. He also continues to report the bilateral hand tremors, but states they do not prevent him from using his hands such as using utensils when eating or writing. Today he states feeling sleepy, attributing it to haloperidol. He describes needing to nap for a few hours but feeling awake after that.  He reports no suicidal or homicidal ideation, and no suicidal or homicidal plans. He denies any experience of auditory or visual hallucinations, and denies any illusions. PSYCHIATRIC REVIEW OF SYSTEMS     Behavior over the last 24 hours:  unchanged  Sleep: hypersomnia, slept longer today but did sleep well  Appetite: poor, did not eat breakfast and endorsed little appetite  Medication side effects: Yes, discussed the likely side effects of lithium with the fine bilateral hand tremors, somnolence potentially due to haloperidol    REVIEW OF SYSTEMS   Review of systems: Drooling and somnolence, no other symptoms reported    OBJECTIVE     Vital Signs in Past 24 Hours:  Temp:  [97.7 °F (36.5 °C)] 97.7 °F (36.5 °C)  HR:  [88-91] 88  Resp:  [18] 18  BP: (114-121)/(68-76) 114/76    Intake/Output in Past 24 hours:  No intake/output data recorded. No intake/output data recorded. Laboratory Results:  I have personally reviewed all pertinent laboratory/tests results. Behavioral Health Medications: all current active meds have been reviewed.     Current Facility-Administered Medications   Medication Dose Route Frequency Provider Last Rate   • acetaminophen  650 mg Oral Q6H PRN Dorrene Skeans, DO     • acetaminophen  650 mg Oral Q4H PRN Dorrene Skeans, DO     • acetaminophen  975 mg Oral Q6H PRN Dorrene Skeans, DO     • aluminum-magnesium hydroxide-simethicone  30 mL Oral Q4H PRN Dorrene Skeans, DO     • ammonium lactate   Topical BID PRN Aristeo Gómez PA-C     • atropine  1 drop Sublingual TID PRN Coy Mnozon PA-C     • benztropine  1 mg Intramuscular Q4H PRN Max 6/day Dorrene Skeans, DO     • benztropine  1 mg Oral Q4H PRN Max 6/day Dorrene Skeans, DO     • benztropine  1 mg Oral BID Dorrene Skeans, DO     • clonazePAM  0.5 mg Oral BID Darío Long MD     • hydrOXYzine HCL  50 mg Oral Q6H PRN Max 4/day Dorrene Skeans, DO      Or   • diphenhydrAMINE  50 mg Intramuscular Q6H PRN Dorrene Skeans, DO • diphenhydrAMINE  25 mg Oral Q6H PRN Chen Enciso, DO     • glycerin-hypromellose-  1 drop Both Eyes Q3H PRN Viola Alexandr, DO     • haloperidol  5 mg Oral BID Jerry Santos MD     • hydrOXYzine HCL  100 mg Oral Q6H PRN Max 4/day Viola Alexandr, DO      Or   • LORazepam  2 mg Intramuscular Q6H PRN Viola Alexandr, DO     • hydrOXYzine HCL  25 mg Oral Q6H PRN Max 4/day Viola Alexandr, DO     • lithium carbonate  450 mg Oral Q12H Rashad Velázquez MD     • melatonin  10 mg Oral HS Jerry Santos MD     • mirtazapine  7.5 mg Oral HS Viola Alexandr, DO     • OLANZapine  10 mg Oral Q3H PRN Max 3/day Viola Alexandr, DO      Or   • OLANZapine  10 mg Intramuscular Q3H PRN Max 3/day Viola Alexandr, DO     • OLANZapine  5 mg Oral Q3H PRN Max 6/day Viola Alexandr, DO      Or   • OLANZapine  5 mg Intramuscular Q3H PRN Max 6/day Viola Alexandr, DO     • OLANZapine  2.5 mg Oral Q3H PRN Max 8/day Viola Alexandr, DO     • polyethylene glycol  17 g Oral Daily PRN Viola Alexandr, DO     • propranolol  10 mg Oral Q8H PRN Viola Alexandr, DO     • propranolol  10 mg Oral Q8H PRN Jerry Santos MD     • propranolol  10 mg Oral BID Jerry Santos MD     • senna-docusate sodium  1 tablet Oral Daily PRN Viola Alexandr, DO     • traZODone  50 mg Oral HS PRN Viola Alexandr, DO         Risks, benefits and possible side effects of Medications:   Discussed benefits of atropine and propanolol, discussed possible side effects of lithium and haloperidol    Dual Antipsychotic Rationale: N/A    Mental Status Evaluation:  Appearance:  Neatly dressed, hair slightly messy due to just waking up, appears younger than stated age, little smiling today   Behavior:  Cooperative, polite, lack of eye contact, yawning   Speech:  Delayed speech, normal rhythm, normal volume, monotone   Mood:  "Okay, 5/10, anxious"   Affect:  Anxious, constricted   Language No evidence of dysarthria or dysphagia, coherent Thought Process:  Logical, concrete, organized   Thought Content:  Reports no experience of delusions, denies experience of obsessions or compulsions   Perceptual Disturbances: Denies auditory or visual hallucinations, denies experience of illusions   Risk Potential: Denies suicidal or homicidal ideations, denies suicidal or homicidal plans   Sensorium:  Oriented to person, place, situation   Cognition:  recent and remote memory appears grossly intact   Consciousness:  Alert, sleepy but arousable    Attention: attention span appeared shorter than expected for age, could repeat some digits   Insight:  limited   Judgment: limited   Intellect Successful subtraction, division   Gait/Station: DId not assess, patient lying down   Motor Activity: BIlateral fine hand tremors L > R, no other abnormal movements noted     Memory: Short and long term memory appear grossly intact     Progress Toward Goals: Some, as evidenced by their participation (or lack thereof) in individual, social and therapeutic milieu in addition to adherence to their medication regimen. Patient Acceptance: Not fully accepting of need for medications  Patient Understanding: Understands symptoms and need for some medications, does not see need for others  Patient Involvement in Reintegration Process: Attends some groups, keeps in touch with family  Patient's Therapeutic Relationship with Psychiatrist: Some trust as willing to discuss and share symptoms or concerns    Recommended Treatment:   See above for assessment and plan. Inpatient Psychiatric Certification: Based upon physical, mental and social evaluations, I certify that inpatient psychiatric services are medically necessary for this patient for a duration of >2 midnights for the treatment of Severe episode of recurrent major depressive disorder, without psychotic features ([address])      Counseling/Coordination of Care    Total unit time spent today was 15 minutes.  Greater than 50% of total time was spent with the patient and/or patient's relatives and/or coordination of patient's care. Patient's rights, confidentiality, exceptions to confidentiality, use of electronic medical record including appropriate staff access to medical record regarding behavioral health services and consent to treatment were reviewed. Note Share: This note was not shared with the patient due to reasonable likelihood of causing patient harm     This note has been constructed using a voice recognition system. There may be translation, syntax, or grammatical errors. If you have any questions, please contact the dictating provider.     Mahogany Gresham, MS3  [facility]

## 2023-07-19 NOTE — PLAN OF CARE
Problem: Ineffective Coping  Goal: Identifies ineffective coping skills  Outcome: Progressing  Goal: Identifies healthy coping skills  Outcome: Progressing  Goal: Demonstrates healthy coping skills  Outcome: Progressing     Problem: Depression  Goal: Treatment Goal: Demonstrate behavioral control of depressive symptoms, verbalize feelings of improved mood/affect, and adopt new coping skills prior to discharge  Outcome: Progressing  Goal: Verbalize thoughts and feelings  Description: Interventions:  - Assess and re-assess patient's level of risk   - Engage patient in 1:1 interactions, daily, for a minimum of 15 minutes   - Encourage patient to express feelings, fears, frustrations, hopes   Outcome: Progressing  Goal: Refrain from isolation  Description: Interventions:  - Develop a trusting relationship   - Encourage socialization   Outcome: Progressing     Problem: Anxiety  Goal: Anxiety is at manageable level  Description: Interventions:  - Assess and monitor patient's anxiety level. - Monitor for signs and symptoms (heart palpitations, chest pain, shortness of breath, headaches, nausea, feeling jumpy, restlessness, irritable, apprehensive). - Collaborate with interdisciplinary team and initiate plan and interventions as ordered.   - [place] patient to unit/surroundings  - Explain treatment plan  - Encourage participation in care  - Encourage verbalization of concerns/fears  - Identify coping mechanisms  - Assist in developing anxiety-reducing skills  - Administer/offer alternative therapies  - Limit or eliminate stimulants  Outcome: Progressing

## 2023-07-20 PROCEDURE — 99232 SBSQ HOSP IP/OBS MODERATE 35: CPT | Performed by: PSYCHIATRY & NEUROLOGY

## 2023-07-20 RX ADMIN — HALOPERIDOL 5 MG: 5 TABLET ORAL at 17:47

## 2023-07-20 RX ADMIN — LITHIUM CARBONATE 450 MG: 450 TABLET, EXTENDED RELEASE ORAL at 08:33

## 2023-07-20 RX ADMIN — PROPRANOLOL HYDROCHLORIDE 10 MG: 10 TABLET ORAL at 21:26

## 2023-07-20 RX ADMIN — HALOPERIDOL 5 MG: 5 TABLET ORAL at 08:33

## 2023-07-20 RX ADMIN — CLONAZEPAM 0.5 MG: 0.5 TABLET ORAL at 17:47

## 2023-07-20 RX ADMIN — BENZTROPINE MESYLATE 1 MG: 1 TABLET ORAL at 17:46

## 2023-07-20 RX ADMIN — LITHIUM CARBONATE 450 MG: 450 TABLET, EXTENDED RELEASE ORAL at 21:27

## 2023-07-20 RX ADMIN — Medication 10 MG: at 21:27

## 2023-07-20 RX ADMIN — CLONAZEPAM 0.5 MG: 0.5 TABLET ORAL at 08:33

## 2023-07-20 RX ADMIN — MIRTAZAPINE 7.5 MG: 7.5 TABLET, FILM COATED ORAL at 21:27

## 2023-07-20 RX ADMIN — PROPRANOLOL HYDROCHLORIDE 10 MG: 10 TABLET ORAL at 17:46

## 2023-07-20 RX ADMIN — BENZTROPINE MESYLATE 1 MG: 1 TABLET ORAL at 08:33

## 2023-07-20 NOTE — SOCIAL WORK
SW and patient met privately for patient's virtual meet and greet with [facility]. Patient presented as pleasant, calm, and attentive; affect was flat, and eye contact was good. Patient was appropriate, well dressed, and well groomed. The  of [employer] and his lead worker, Efren Amin were present on the call. Patient's supports coordinator Christel Tai) was also present on the call. We were informed that the home is in [facility] [place], that it is currently empty, and that it can house 3 individuals. Each individual will have their own room. The company does offer a behavioral support person; however, patient likely won't need one. Patient asked about his ability to go out alone, and Christel Tai expressed that patient should be able to have some alone time. Patient expressed interest in the home, and smiled the entire meeting. We are going to have a virtual tour of the home tomorrow at 11:30AM.  SW asked to inquire if patient would be able to come for an in-person tour; they even stated that they could provide the transport. SW expressed her only concern, which is that it [facility] which may make it difficult to set him up with a psychiatrist and therapist; Sunil Grajeda and Christel Tai will discuss this further at another time. SW informed that typically [employer] uses [employer] [facility]. Meeting ended mutually; patient expressed to SW that he would be able to make a decision about the home just by seeing it virtually.

## 2023-07-20 NOTE — NURSING NOTE
Pt is medication and meal compliant. Pt is visible in the milieu and social with select peers. Pt denies s/s, but remains fixated on discharge. Pt states " I'm going to be here until I'm 52years old". Pt redirected and remains smiling and laughing during conversation. Pt at times needing redirection from standing at nurses station window and staring in, but accepting. Pt offers no complaints.

## 2023-07-20 NOTE — NURSING NOTE
Received pt in bed at change of shift with eyes closed; chest movement noted. Continues the same thus this far as per q 7 min room checks. Will continue to monitor behavior, sleeping pattern and any medical issues that may arise.

## 2023-07-20 NOTE — NURSING NOTE
Mary Brewer requested prn medication for headache #7/10. Tylenol 975 mg po prn given at 2043. Will monitor/assess for effectiveness of medication.

## 2023-07-20 NOTE — PROGRESS NOTES
07/20/23 0830   Team Meeting   Meeting Type Daily Rounds   Initial Conference Date 07/20/23   Patient/Family Present   Patient Present No   Patient's Family Present No     Daily Rounds Documentation     Team Members Present:   MD Dr. Sepideh Connor CRNP Joyice Bushman, ROB Concepcion    Atropine drops PRN twice. Tylenol PRN for generalized pain. Denies S/S. No aggression. Attended 4/5 groups. Compliant with medications and meals. Slept.

## 2023-07-20 NOTE — PLAN OF CARE
Problem: Risk for Self Injury/Neglect  Goal: Treatment Goal: Remain safe during length of stay, learn and adopt new coping skills, and be free of self-injurious ideation, impulses and acts at the time of discharge  Outcome: Progressing     Problem: Anxiety  Goal: Anxiety is at manageable level  Description: Interventions:  - Assess and monitor patient's anxiety level. - Monitor for signs and symptoms (heart palpitations, chest pain, shortness of breath, headaches, nausea, feeling jumpy, restlessness, irritable, apprehensive). - Collaborate with interdisciplinary team and initiate plan and interventions as ordered.   - [place] patient to unit/surroundings  - Explain treatment plan  - Encourage participation in care  - Encourage verbalization of concerns/fears  - Identify coping mechanisms  - Assist in developing anxiety-reducing skills  - Administer/offer alternative therapies  - Limit or eliminate stimulants  Outcome: Progressing

## 2023-07-20 NOTE — NURSING NOTE
Franki Specter has been awake, alert, and visible intermittently out in the milieu. Pt ate 100% supper. Some socialization noted with select peers. Pt watches tv in dining room and naps at intervals in room. Pt denies any depression, anxiety, a/v hallucinations, has not verbalized any delusions, but still incongruent, l easily distracted, preoccupied, with inappropriate smile and staring at times. Pt attended and participated in evening nursing group, wrap up group, and had evening snack after. Compliant with scheduled meds. Continue to monitor/assess for any behavioral changes.

## 2023-07-20 NOTE — PROGRESS NOTES
Psychiatric Progress Note - Department of [address] 21 y.o. male MRN: [id number]  [facility] [address] Encounter: [id number]    ASSESSMENT & PLAN     Principal Problem:    Severe episode of recurrent major depressive disorder, without psychotic features ([address])  Active Problems:    Medical clearance for psychiatric admission    Autism spectrum disorder    Mood disorder ([address])    Anxiety disorder    Rash      • Continue to promote patient participation in therapeutic milieu. • Continue medical management per medicine. • Continue previously prescribed psychotropic medication regimen; see below. • Continue behavioral health checks q7 minutes. • Continue vitals per behavioral health unit protocol. • Discharge date per primary team; 201 voluntary commitment status. • Continue to monitor the bilateral fine hand tremors likely due lithium side effects, and monitor vitals for propanolol side effects    SUBJECTIVE     Patient evaluated this a.m. for continuity of care. He woke up easily and was lying in bed comfortably. Patient was discussed at length with nursing and treatment team. Nursing stated he continues to act immature at times, staring at the nurses station for attention. They report he requested Tylenol last night due to a headache. No acute distress is noted throughout evaluation. Lisa Guerra denies suicidal/homicidal ideation; contracts for safety. He reports feeling okay today, rating it 5/10, and states that he continues to feel anxious regarding discharge. He continues to report drooling, and required atropine twice yesterday which he finds did help somewhat. He states the bilateral hand tremors are improved and denies any negative impact of the tremors on his daily functioning such as when picking things up or brushing his teeth. He states the propanolol has helped. Today he reports a similar feeling of fatigue as yesterday, needing to sleep for longer this morning. He states he slept well overall, getting about 6 hours. At the time he had not eaten breakfast and reported not feeling hungry. He denies any suicidal or homicidal plans. He denies experience of auditory and visual hallucinations and denies experience of illusions. He denies any headache at this time. PSYCHIATRIC REVIEW OF SYSTEMS     Behavior over the last 24 hours:  unchanged  Sleep: hypersomnia  Appetite: poor  Medication side effects: Yes, discussed likely lithium side effect of bilateral hand tremors    REVIEW OF SYSTEMS   Review of systems: Drooling and somnolence, no other symptoms reported at this time    OBJECTIVE     Vital Signs in Past 24 Hours:  Temp:  [97.6 °F (36.4 °C)-97.7 °F (36.5 °C)] 97.7 °F (36.5 °C)  HR:  [70-76] 76  Resp:  [18-19] 19  BP: (106-118)/(62-71) 106/62    Intake/Output in Past 24 hours:  No intake/output data recorded. No intake/output data recorded. Laboratory Results:  I have personally reviewed all pertinent laboratory/tests results. Behavioral Health Medications: all current active meds have been reviewed.     Current Facility-Administered Medications   Medication Dose Route Frequency Provider Last Rate   • acetaminophen  650 mg Oral Q6H PRN Reta Ray, DO     • acetaminophen  650 mg Oral Q4H PRN Reta Ray, DO     • acetaminophen  975 mg Oral Q6H PRN Reta Ray, DO     • aluminum-magnesium hydroxide-simethicone  30 mL Oral Q4H PRN Reta Ray, DO     • ammonium lactate   Topical BID PRN Nelia Bowles PA-C     • atropine  1 drop Sublingual TID PRN Donnie Hillman PA-C     • benztropine  1 mg Intramuscular Q4H PRN Max 6/day Reta Ray, DO     • benztropine  1 mg Oral Q4H PRN Max 6/day Reta Ray, DO     • benztropine  1 mg Oral BID Reta Ray, DO     • clonazePAM  0.5 mg Oral BID Shereen Cabrera MD     • hydrOXYzine HCL  50 mg Oral Q6H PRN Max 4/day Reta Ray, DO      Or   • diphenhydrAMINE  50 mg Intramuscular Q6H PRN Greenleaf Heaps, DO     • diphenhydrAMINE  25 mg Oral Q6H PRN Augustin Wyatt, DO     • glycerin-hypromellose-  1 drop Both Eyes Q3H PRN Greenleaf Heaps, DO     • haloperidol  5 mg Oral BID Rella Frankel, MD     • hydrOXYzine HCL  100 mg Oral Q6H PRN Max 4/day Greenleaf Heaps, DO      Or   • LORazepam  2 mg Intramuscular Q6H PRN Greenleaf Heaps, DO     • hydrOXYzine HCL  25 mg Oral Q6H PRN Max 4/day Greenleaf Heaps, DO     • lithium carbonate  450 mg Oral Q12H Reinier Maya MD     • melatonin  10 mg Oral HS Rella Frankel, MD     • mirtazapine  7.5 mg Oral HS Greenleaf Heaps, DO     • OLANZapine  10 mg Oral Q3H PRN Max 3/day Greenleaf Heaps, DO      Or   • OLANZapine  10 mg Intramuscular Q3H PRN Max 3/day Greenleaf Heaps, DO     • OLANZapine  5 mg Oral Q3H PRN Max 6/day Greenleaf Heaps, DO      Or   • OLANZapine  5 mg Intramuscular Q3H PRN Max 6/day Greenleaf Heaps, DO     • OLANZapine  2.5 mg Oral Q3H PRN Max 8/day Greenleaf Heaps, DO     • polyethylene glycol  17 g Oral Daily PRN Greenleaf Heaps, DO     • propranolol  10 mg Oral Q8H PRN Greenleaf Heaps, DO     • propranolol  10 mg Oral Q8H PRN Rella Frankel, MD     • propranolol  10 mg Oral TID Rella Frankel, MD     • senna-docusate sodium  1 tablet Oral Daily PRN Greenleaf Heaps, DO     • traZODone  50 mg Oral HS PRN Greenleaf Heaps, DO         Risks, benefits and possible side effects of Medications:   Discussed benefits of atropine and propanolol with potential side effects of lithium with the bilateral hand tremors    Dual Antipsychotic Rationale: N/A    Mental Status Evaluation:  Appearance:  Neatly dressed, not yet groomed as just woke up, appears younger than stated age   Behavior:  Cooperative, polite, good eye contact other than closing eyes due to fatigue   Speech:  Delayed rate, normal rhythm, normal volume, monotone   Mood:  "Okay, 5/10, anxious about discharge"   Affect:  Anxious, constricted   Language No evidence of dysarthria or dysphagia, coherent   Thought Process:  Logical, linear, concrete   Thought Content:  Denies experience of delusions, denies obsessions or compulsions   Perceptual Disturbances: Denies auditory or visual hallucinations, denies experiencing illusions   Risk Potential: Denies any suicidal or homicidal ideaiton, denies any suicidal or homicidal plans   Sensorium:  Oriented to person, place, situation   Cognition:  recent and remote memory grossly intact   Consciousness:  Sleepy but arousable    Attention: attention span appeared shorter than expected for age though patient was sleepy due to just waking up   Insight:  limited   Judgment: limited   Intellect Successfully performed addition and division   Gait/Station: Did not assess, patient lying in bed   Motor Activity: Bilateral hand tremors, L > R, no other abnomal movements noted     Memory: Short and long term memory appears grossly intact     Progress Toward Goals: Some, as evidenced by their participation (or lack thereof) in individual, social and therapeutic milieu in addition to adherence to their medication regimen. Patient Acceptance: Not fully accepting situation at this time  Patient Understanding: Understands symptoms, but does not fully understand need for certain medications or the current plan for the future  Patient Involvement in Reintegration Process: Attends group activities, keeps in touch with family  Patient's Therapeutic Relationship with Psychiatrist: Some trust as evidenced by willingness to share regarding symptoms and concerns    Recommended Treatment:   See above for assessment and plan.     Inpatient Psychiatric Certification: Based upon physical, mental and social evaluations, I certify that inpatient psychiatric services are medically necessary for this patient for a duration of >2 midnights for the treatment of Severe episode of recurrent major depressive disorder, without psychotic features [facility])      Counseling/Coordination of Care    Total unit time spent today was 15 minutes. Greater than 50% of total time was spent with the patient and/or patient's relatives and/or coordination of patient's care. Patient's rights, confidentiality, exceptions to confidentiality, use of electronic medical record including appropriate staff access to medical record regarding behavioral health services and consent to treatment were reviewed. Note Share: This note was not shared with the patient due to reasonable likelihood of causing patient harm     This note has been constructed using a voice recognition system. There may be translation, syntax, or grammatical errors. If you have any questions, please contact the dictating provider.     Howard Pittman, MS3  [facility]

## 2023-07-20 NOTE — TREATMENT TEAM
07/20/23 1300   Activity/Group Checklist   Group Nursing Education   Attendance Attended   Attendance Duration (min) 31-45   Interactions Interacted appropriately   Affect/Mood Appropriate

## 2023-07-21 PROCEDURE — 99232 SBSQ HOSP IP/OBS MODERATE 35: CPT | Performed by: PSYCHIATRY & NEUROLOGY

## 2023-07-21 RX ADMIN — PROPRANOLOL HYDROCHLORIDE 10 MG: 10 TABLET ORAL at 17:08

## 2023-07-21 RX ADMIN — MIRTAZAPINE 7.5 MG: 7.5 TABLET, FILM COATED ORAL at 21:13

## 2023-07-21 RX ADMIN — BENZTROPINE MESYLATE 1 MG: 1 TABLET ORAL at 08:39

## 2023-07-21 RX ADMIN — LITHIUM CARBONATE 450 MG: 450 TABLET, EXTENDED RELEASE ORAL at 08:39

## 2023-07-21 RX ADMIN — HALOPERIDOL 5 MG: 5 TABLET ORAL at 08:39

## 2023-07-21 RX ADMIN — Medication 10 MG: at 21:12

## 2023-07-21 RX ADMIN — HALOPERIDOL 5 MG: 5 TABLET ORAL at 17:08

## 2023-07-21 RX ADMIN — CLONAZEPAM 0.5 MG: 0.5 TABLET ORAL at 08:39

## 2023-07-21 RX ADMIN — LITHIUM CARBONATE 450 MG: 450 TABLET, EXTENDED RELEASE ORAL at 21:12

## 2023-07-21 RX ADMIN — CLONAZEPAM 0.5 MG: 0.5 TABLET ORAL at 17:07

## 2023-07-21 RX ADMIN — PROPRANOLOL HYDROCHLORIDE 10 MG: 10 TABLET ORAL at 21:13

## 2023-07-21 RX ADMIN — BENZTROPINE MESYLATE 1 MG: 1 TABLET ORAL at 17:07

## 2023-07-21 RX ADMIN — ATROPINE SULFATE 1 DROP: 10 SOLUTION OPHTHALMIC at 10:05

## 2023-07-21 NOTE — PLAN OF CARE
Problem: Ineffective Coping  Goal: Identifies healthy coping skills  Outcome: Progressing     Problem: Risk for Self Injury/Neglect  Goal: Treatment Goal: Remain safe during length of stay, learn and adopt new coping skills, and be free of self-injurious ideation, impulses and acts at the time of discharge  Outcome: Progressing

## 2023-07-21 NOTE — PROGRESS NOTES
07/21/23 0830   Team Meeting   Meeting Type Daily Rounds   Initial Conference Date 07/21/23   Patient/Family Present   Patient Present No   Patient's Family Present No     Daily Rounds Documentation     Team Members Present:   MD Dr. Homer Peña CRNP Hadassah Kirks, RN  Gunjan Víctor, [address] [place] [address]. D    Inderal held due to parameters. No behaviors. Some anxiety. Attended 7/9 groups. Compliant with medications and meals. Slept. Meeting with [address] went well, and has virtual tour today.

## 2023-07-21 NOTE — PROGRESS NOTES
Psychiatric Progress Note - Department of [address] 21 y.o. male MRN: [id number]  [facility] Encounter: [id number]    ASSESSMENT & PLAN     Principal Problem:    Severe episode of recurrent major depressive disorder, without psychotic features ([address])  Active Problems:    Medical clearance for psychiatric admission    Autism spectrum disorder    Mood disorder ([address])    Anxiety disorder    Rash      • Continue to promote patient participation in therapeutic milieu. • Continue medical management per medicine. • Continue previously prescribed psychotropic medication regimen; see below. • Continue behavioral health checks q7 minutes. • Continue vitals per behavioral health unit protocol. • Discharge date per primary team; 201 voluntary commitment status. • Continue monitoring potential lithium side effect of bilateral fine hand tremors, vitals for side effects of propanolol    SUBJECTIVE     Patient evaluated this a.m. for continuity of care. He was sleeping in bed but awoke easily with prompting, sitting comfortably in bed. Patient was discussed at length with nursing and treatment team. Nursing reported he still has an inappropriate smile at times and remains attention seeking. No acute distress is noted throughout evaluation. Odilon Zhao denies suicidal/homicidal ideation; contracts for safety. He continues to report some anxiety regarding staying in the hospital, stating he feels trapped and remaining fixated regarding discharge. He reports feeling excited regarding the virtual tour later today for the home in [facility] He continues to complain of drooling but reports not requiring any atropine drops yesterday. He reports the bilateral fine hand tremors are continuing but denies any negative impact of the tremors on his daily functioning when picking objects up. He states the propanolol has helped.  He once again reports feeling sleepy today and was sleeping in longer due to feeling tired. He has no other questions or concerns at this time. He denies any experience of delusions or hallucinations. PSYCHIATRIC REVIEW OF SYSTEMS     Behavior over the last 24 hours:  unchanged  Sleep: hypersomnia due to sleeping in again for longer  Appetite: poor, due to not feeling hungry  Medication side effects: Yes, discussed possible medication side effect of bilateral fine hand tremors from lithium    REVIEW OF SYSTEMS   Review of systems: Drooling, other systems were negative    OBJECTIVE     Vital Signs in Past 24 Hours:  Temp:  [97.6 °F (36.4 °C)-97.8 °F (36.6 °C)] 97.8 °F (36.6 °C)  HR:  [65-94] 65  Resp:  [18-19] 18  BP: (101-135)/(55-69) 101/55    Intake/Output in Past 24 hours:  No intake/output data recorded. No intake/output data recorded. Laboratory Results:  I have personally reviewed all pertinent laboratory/tests results. Behavioral Health Medications: all current active meds have been reviewed.     Current Facility-Administered Medications   Medication Dose Route Frequency Provider Last Rate   • acetaminophen  650 mg Oral Q6H PRN Denise Last, DO     • acetaminophen  650 mg Oral Q4H PRN Denise Last, DO     • acetaminophen  975 mg Oral Q6H PRN Denise Last, DO     • aluminum-magnesium hydroxide-simethicone  30 mL Oral Q4H PRN Denise Last, DO     • ammonium lactate   Topical BID PRN Tammy Giordano PA-C     • atropine  1 drop Sublingual TID PRN Cornelius Garcia PA-C     • benztropine  1 mg Intramuscular Q4H PRN Max 6/day Denise Last, DO     • benztropine  1 mg Oral Q4H PRN Max 6/day Denise Last, DO     • benztropine  1 mg Oral BID Denise Last, DO     • clonazePAM  0.5 mg Oral BID Rosemary Elise MD     • hydrOXYzine HCL  50 mg Oral Q6H PRN Max 4/day Denise Last, DO      Or   • diphenhydrAMINE  50 mg Intramuscular Q6H PRN Denise Last, DO     • diphenhydrAMINE  25 mg Oral Q6H PRN Ruben Adam DO     • glycerin-hypromellose-  1 drop Both Eyes Q3H PRN Dewaine Mimi, DO     • haloperidol  5 mg Oral BID Jerson Saldana MD     • hydrOXYzine HCL  100 mg Oral Q6H PRN Max 4/day Dewaine Mimi, DO      Or   • LORazepam  2 mg Intramuscular Q6H PRN Dewaine Mimi, DO     • hydrOXYzine HCL  25 mg Oral Q6H PRN Max 4/day Dewaine Mimi, DO     • lithium carbonate  450 mg Oral Q12H Nohemy Kaminski MD     • melatonin  10 mg Oral HS Jerson Saldana MD     • mirtazapine  7.5 mg Oral HS Dewaine Mimi, DO     • OLANZapine  10 mg Oral Q3H PRN Max 3/day Dewaine Mimi, DO      Or   • OLANZapine  10 mg Intramuscular Q3H PRN Max 3/day Dewaine Mimi, DO     • OLANZapine  5 mg Oral Q3H PRN Max 6/day Dewaine Mimi, DO      Or   • OLANZapine  5 mg Intramuscular Q3H PRN Max 6/day Dewaine Mimi, DO     • OLANZapine  2.5 mg Oral Q3H PRN Max 8/day Dewaine Mimi, DO     • polyethylene glycol  17 g Oral Daily PRN Dewaine Mimi, DO     • propranolol  10 mg Oral Q8H PRN Dewaine Mimi, DO     • propranolol  10 mg Oral Q8H PRN Jerson Saldana MD     • propranolol  10 mg Oral TID Jerson Saldana MD     • senna-docusate sodium  1 tablet Oral Daily PRN Dewaine Mimi, DO     • traZODone  50 mg Oral HS PRN Dewaine Mimi, DO         Risks, benefits and possible side effects of Medications:   Discussed benefit of propanolol and the potential side effect of lithium with the bilateral hand tremors    Dual Antipsychotic Rationale: N/A    Mental Status Evaluation:  Appearance:  Neatly dressed, appears younger than stated age, messy hair since just woke up from sleep, not yet groomed as just woke up   Behavior:  Making some eye contact, cooperative, polite   Speech:  Delayed speech, normal rhythm, normal volume, monotone   Mood:  "Anxious, 5/10, feeling trapped and wanting to get out of here"   Affect:  Anxious, constricted   Language No evidence of dysphagia or dysarthria, coherent   Thought Process: Logical, organized, concrete   Thought Content:  Denies experience of delusions, denies experience of obsessions or compulsions   Perceptual Disturbances: Denies experience of visual or auditory hallucinations, denies experience of illusions   Risk Potential: Denies suicidal or homicidal ideations, denies suicidal or homicidal plans   Sensorium:  Oriented to person, place, time, situation   Cognition:  recent and remote memory grossly intact   Consciousness:  Sleepy but awake once aroused    Attention: attention span appeared shorter than expected for age, unable to repeat digits of number   Insight:  limited   Judgment: limited   Intellect Successful with addition, multiplication   Gait/Station: Did not assess, patient lying in bed   Motor Activity: BIlateral fine hand tremors, no other abnormal movements noted     Memory: Short and long term memory appears grossly intact     Progress Toward Goals: Some, as evidenced by their participation (or lack thereof) in individual, social and therapeutic milieu in addition to adherence to their medication regimen. Patient Acceptance: Patient not fully accepting for need for treatment and medications at this time  Patient Understanding: Patient understands the need for some medications but not for all of them  Patient Involvement in Reintegration Process: Patient goes to some groups, social with peers, keeps in contact with family  Patient's Therapeutic Relationship with Psychiatrist: Some trust as evidenced by willingness to share symptoms or concerns     Recommended Treatment:   See above for assessment and plan.     Inpatient Psychiatric Certification: Based upon physical, mental and social evaluations, I certify that inpatient psychiatric services are medically necessary for this patient for a duration of >2 midnights for the treatment of Severe episode of recurrent major depressive disorder, without psychotic features ([address])      Counseling/Coordination of Care    Total unit time spent today was 15 minutes. Greater than 50% of total time was spent with the patient and/or patient's relatives and/or coordination of patient's care. Patient's rights, confidentiality, exceptions to confidentiality, use of electronic medical record including appropriate staff access to medical record regarding behavioral health services and consent to treatment were reviewed. Note Share: This note was not shared with the patient due to reasonable likelihood of causing patient harm     This note has been constructed using a voice recognition system. There may be translation, syntax, or grammatical errors. If you have any questions, please contact the dictating provider.     Maggie Morris, MS3  [address]

## 2023-07-21 NOTE — SOCIAL WORK
SW and patient met privately for his virtual tour with [facility]. Patient presented as slightly anxious, but was calm and attentive; affect was congruent, and eye contact was adequate. Patient has a virtual tour of a home in [place], [place]; he verbalized that he likes the home, and would like to live there. Robert Otoole explained that next they will request the emergency waiver, which she stated wouldn't take to long. She will also discuss room and board with the family, but has also let her team know of potential financial barriers. If all goes accordingly, patient should discharge sometime next month. Patient had no questions or concerns to present though you could tell that he is still a little worried that he won't be out in time for his sister's wedding.

## 2023-07-21 NOTE — NURSING NOTE
Pt is medication and meal compliant. Pt is visible in the milieu at times or resting in bed. Pt denies s/s, remains with inappropriate laughing, but otherwise polite and pleasant in conversation. Pt offers no new complaints and remains fixated on discharge.

## 2023-07-21 NOTE — NURSING NOTE
Paris Stagers has been awake, alert, and visible intermittently out in the milieu. Pt went out on deck with staff and peers for fresh air group. Pt ate 100% supper. Pt continues to stare, laugh inappropriately at times and is delayed in conversation and easily distracted. Pt denies any depression, anxiety, a/v hallucinations and has not verbalized any delusions. Pt attended and participated in evening group, wrap up group, and had snack after with peers. Cooperative with taking scheduled meds. Will monitor/assess for any behavioral changes.

## 2023-07-22 PROCEDURE — 99232 SBSQ HOSP IP/OBS MODERATE 35: CPT | Performed by: NURSE PRACTITIONER

## 2023-07-22 RX ADMIN — PROPRANOLOL HYDROCHLORIDE 10 MG: 10 TABLET ORAL at 16:57

## 2023-07-22 RX ADMIN — PROPRANOLOL HYDROCHLORIDE 10 MG: 10 TABLET ORAL at 08:42

## 2023-07-22 RX ADMIN — LITHIUM CARBONATE 450 MG: 450 TABLET, EXTENDED RELEASE ORAL at 08:42

## 2023-07-22 RX ADMIN — CLONAZEPAM 0.5 MG: 0.5 TABLET ORAL at 08:42

## 2023-07-22 RX ADMIN — MIRTAZAPINE 7.5 MG: 7.5 TABLET, FILM COATED ORAL at 21:23

## 2023-07-22 RX ADMIN — ATROPINE SULFATE 1 DROP: 10 SOLUTION OPHTHALMIC at 10:35

## 2023-07-22 RX ADMIN — HALOPERIDOL 5 MG: 5 TABLET ORAL at 17:00

## 2023-07-22 RX ADMIN — PROPRANOLOL HYDROCHLORIDE 10 MG: 10 TABLET ORAL at 21:28

## 2023-07-22 RX ADMIN — HALOPERIDOL 5 MG: 5 TABLET ORAL at 08:42

## 2023-07-22 RX ADMIN — BENZTROPINE MESYLATE 1 MG: 1 TABLET ORAL at 08:42

## 2023-07-22 RX ADMIN — Medication 10 MG: at 21:23

## 2023-07-22 RX ADMIN — BENZTROPINE MESYLATE 1 MG: 1 TABLET ORAL at 17:00

## 2023-07-22 RX ADMIN — LITHIUM CARBONATE 450 MG: 450 TABLET, EXTENDED RELEASE ORAL at 21:23

## 2023-07-22 RX ADMIN — CLONAZEPAM 0.5 MG: 0.5 TABLET ORAL at 17:00

## 2023-07-22 NOTE — NURSING NOTE
Pt is visible in the milieu, out for meals and makes needs known to staff. Pt denies s/s at this time, advised feeling tired. Pt is medication compliant and cooperative with care.

## 2023-07-22 NOTE — NURSING NOTE
Mary Brewer has been at the window of the nursing station a lot this evening. He will just stand and smile and try to read your report sheet. He talked about his wanting to stop his medication and feel he really does not need it. When talking about his parents he would say, "fuck them". It appears he has a low opinion of his parents. He later said that he is the youngest and they treated the others better then him. He also said that he was not very nice to his parents and family. He had to be told a few times to pull up his trousers, as they were slipping down his backside. Again, much inappropriate smiling noted. Q 7 minute patient checks maintained.

## 2023-07-22 NOTE — PLAN OF CARE
Problem: Risk for Self Injury/Neglect  Goal: Treatment Goal: Remain safe during length of stay, learn and adopt new coping skills, and be free of self-injurious ideation, impulses and acts at the time of discharge  Outcome: Progressing     Problem: Anxiety  Goal: Anxiety is at manageable level  Description: Interventions:  - Assess and monitor patient's anxiety level. - Monitor for signs and symptoms (heart palpitations, chest pain, shortness of breath, headaches, nausea, feeling jumpy, restlessness, irritable, apprehensive). - Collaborate with interdisciplinary team and initiate plan and interventions as ordered.   - [place] patient to unit/surroundings  - Explain treatment plan  - Encourage participation in care  - Encourage verbalization of concerns/fears  - Identify coping mechanisms  - Assist in developing anxiety-reducing skills  - Administer/offer alternative therapies  - Limit or eliminate stimulants  Outcome: Progressing

## 2023-07-22 NOTE — NURSING NOTE
Patient isolative to self in room this evening, behaviors controlled. Patient out for lunch, good appetite, calm, will continue to monitor.

## 2023-07-22 NOTE — PROGRESS NOTES
07/22/23 1000   Activity/Group Checklist   Group Other (Comment)  (Group Art Therapy, Social Group)   Attendance Attended   Attendance Duration (min) 46-60   Interactions Interacted appropriately   Affect/Mood Appropriate

## 2023-07-22 NOTE — PROGRESS NOTES
Progress Note - Behavioral Health   Lamar Garcia 21 y.o. male MRN: [id number]  Unit/Bed#: [facility] 596-75 Encounter: [id number]      Subjective:     Documentation, nursing notes, medication reconciliation, labs, and vitals reviewed. Patient was seen for continuing care and reviewed with treatment team.  No acute events over the past 24 hours. Per nursing report, Attention-seeking and inappropriate smile continues. No medication changes over the past 24 hours. On evaluation today Yuliya Steen is listening to music during walking group. He is smiling and intensely staring at this writer throughout the interview. Reports that he feels anxious due to fear that he will not be accepted into the group home. No panic attacks. Does not feel depressed. Continues to tolerate current medications with no adverse effects. No self-harming/suicidal ideation, plan, or intent upon direct inquiry. No thoughts to harm others. No agitation or aggression noted. Denies perceptual disturbances, with no delusional or paranoid content elicited. Does not appear overtly manic. Offers no further complaints.        Psychiatric ROS:  Behavior over the last 24 hours: unchanged  Sleep: normal  Appetite: normal  Medication side effects: No   ROS: all other systems are negative      Mental Status Evaluation:    Appearance:  age appropriate, casually dressed, dressed appropriately   Behavior:  pleasant, cooperative   Speech:  normal volume, delayed, monotone   Mood:  anxious   Affect:  constricted, inappropriate smile   Thought Process:  concrete   Associations: concrete associations   Thought Content:  no overt delusions   Perceptual Disturbances: no auditory hallucinations, no visual hallucinations, does not appear responding to internal stimuli   Risk Potential: Suicidal ideation - None at present, contracts for safety on the unit, would talk to staff if not feeling safe on the unit  Homicidal ideation - None at present  Potential for aggression - No   Sensorium:  oriented to person, place and time/date   Memory:  recent and remote memory grossly intact   Consciousness:  alert and awake   Attention/Concentration: attention span and concentration appear shorter than expected for age   Insight:  limited   Judgment: limited   Gait/Station: normal gait/station, normal balance   Motor Activity: abnormal movement noted: mild hand tremor present       Vital signs in last 24 hours:    Temp:  [97.5 °F (36.4 °C)] 97.5 °F (36.4 °C)  HR:  [76-81] 76  Resp:  [18] 18  BP: (113-115)/(66-69) 115/69    Laboratory results: I have personally reviewed all pertinent laboratory/tests results    Results from the past 24 hours: No results found for this or any previous visit (from the past 24 hour(s)). Progress Toward Goals: progressing    Suicide/Homicide Risk Assessment:    Risk of Harm to Self:   Nursing Suicide Risk Assessment Last 24 hours: C-SSRS Risk (Since Last Contact)  Calculated C-SSRS Risk Score (Since Last Contact): No Risk Indicated    Risk of Harm to Others:  Nursing Homicide Risk Assessment: Violence Risk to Others: Denies within past 6 months    Assessment/Plan   Principal Problem:    Severe episode of recurrent major depressive disorder, without psychotic features ([address])  Active Problems:    Medical clearance for psychiatric admission    Autism spectrum disorder    Mood disorder ([address])    Anxiety disorder    Rash      Recommended Treatment:     Planned medication and treatment changes:     All current active medications have been reviewed  Encourage group therapy, milieu therapy and occupational therapy  Behavioral Health checks every 7 minutes  Assault and elopement precautions  Continue with [facility]IM medical management as indicated  Disposition planning ongoing    Continue current medications:    Current Facility-Administered Medications   Medication Dose Route Frequency Provider Last Rate   • acetaminophen  650 mg Oral Q6H PRN Cam Urena DO • acetaminophen  650 mg Oral Q4H PRN Reta Ray, DO     • acetaminophen  975 mg Oral Q6H PRN Reta Ray, DO     • aluminum-magnesium hydroxide-simethicone  30 mL Oral Q4H PRN Reta Ray, DO     • ammonium lactate   Topical BID PRN Nelia Bowles PA-C     • atropine  1 drop Sublingual TID PRN Donnie Hillman PA-C     • benztropine  1 mg Intramuscular Q4H PRN Max 6/day Reta Ray, DO     • benztropine  1 mg Oral Q4H PRN Max 6/day Reta Ray, DO     • benztropine  1 mg Oral BID Reta Ray, DO     • clonazePAM  0.5 mg Oral BID Shereen Cabrera MD     • hydrOXYzine HCL  50 mg Oral Q6H PRN Max 4/day Reta Ray, DO      Or   • diphenhydrAMINE  50 mg Intramuscular Q6H PRN Reta Ray, DO     • diphenhydrAMINE  25 mg Oral Q6H PRN Pamela Lawrence, DO     • glycerin-hypromellose-  1 drop Both Eyes Q3H PRN Reta Ray, DO     • haloperidol  5 mg Oral BID Shereen Cabrera MD     • hydrOXYzine HCL  100 mg Oral Q6H PRN Max 4/day Reta Ray, DO      Or   • LORazepam  2 mg Intramuscular Q6H PRN Reta Ray, DO     • hydrOXYzine HCL  25 mg Oral Q6H PRN Max 4/day Reta Ray, DO     • lithium carbonate  450 mg Oral Q12H Gardenia Hedrick MD     • melatonin  10 mg Oral HS Shereen Cabrera MD     • mirtazapine  7.5 mg Oral HS Reta Ray, DO     • OLANZapine  10 mg Oral Q3H PRN Max 3/day Reta Ray, DO      Or   • OLANZapine  10 mg Intramuscular Q3H PRN Max 3/day Reta Ray, DO     • OLANZapine  5 mg Oral Q3H PRN Max 6/day Reta Ray, DO      Or   • OLANZapine  5 mg Intramuscular Q3H PRN Max 6/day Reta Ray, DO     • OLANZapine  2.5 mg Oral Q3H PRN Max 8/day Ciroliza Jimon, DO     • polyethylene glycol  17 g Oral Daily PRN Reta Ray, DO     • propranolol  10 mg Oral Q8H PRN Reta Ray, DO     • propranolol  10 mg Oral Q8H PRN Shereen Cabrera MD     • propranolol  10 mg Oral TID Shereen Cabrera MD • senna-docusate sodium  1 tablet Oral Daily PRN Haile Santos DO     • traZODone  50 mg Oral HS PRN Haile Santos DO           Risks / Benefits of Treatment:    Risks, benefits, and possible side effects of medications explained to patient and patient verbalizes understanding and agreement for treatment. Counseling / Coordination of Care:    Patient's progress discussed with staff in treatment team meeting. Medications, treatment progress and treatment plan reviewed with patient.     Note Share    This note was not shared with the patient due to reasonable likelihood of causing patient harm    ANA Galicia 07/22/23

## 2023-07-23 VITALS
BODY MASS INDEX: 25.65 KG/M2 | HEIGHT: 71 IN | OXYGEN SATURATION: 100 % | WEIGHT: 183.2 LBS | RESPIRATION RATE: 18 BRPM | HEART RATE: 81 BPM | SYSTOLIC BLOOD PRESSURE: 126 MMHG | DIASTOLIC BLOOD PRESSURE: 65 MMHG | TEMPERATURE: 96 F

## 2023-07-23 PROCEDURE — 99232 SBSQ HOSP IP/OBS MODERATE 35: CPT | Performed by: NURSE PRACTITIONER

## 2023-07-23 RX ADMIN — BENZTROPINE MESYLATE 1 MG: 1 TABLET ORAL at 08:48

## 2023-07-23 RX ADMIN — BENZTROPINE MESYLATE 1 MG: 1 TABLET ORAL at 17:14

## 2023-07-23 RX ADMIN — CLONAZEPAM 0.5 MG: 0.5 TABLET ORAL at 17:15

## 2023-07-23 RX ADMIN — Medication 10 MG: at 21:06

## 2023-07-23 RX ADMIN — PROPRANOLOL HYDROCHLORIDE 10 MG: 10 TABLET ORAL at 21:06

## 2023-07-23 RX ADMIN — CLONAZEPAM 0.5 MG: 0.5 TABLET ORAL at 08:48

## 2023-07-23 RX ADMIN — LITHIUM CARBONATE 450 MG: 450 TABLET, EXTENDED RELEASE ORAL at 21:06

## 2023-07-23 RX ADMIN — PROPRANOLOL HYDROCHLORIDE 10 MG: 10 TABLET ORAL at 17:14

## 2023-07-23 RX ADMIN — HALOPERIDOL 5 MG: 5 TABLET ORAL at 08:49

## 2023-07-23 RX ADMIN — HALOPERIDOL 5 MG: 5 TABLET ORAL at 17:15

## 2023-07-23 RX ADMIN — MIRTAZAPINE 7.5 MG: 7.5 TABLET, FILM COATED ORAL at 21:06

## 2023-07-23 RX ADMIN — LITHIUM CARBONATE 450 MG: 450 TABLET, EXTENDED RELEASE ORAL at 08:49

## 2023-07-23 NOTE — NURSING NOTE
Pt is medication compliant and cooperative. Pt is seclusive to his bed this morning. Pt offers no complaints at this time.

## 2023-07-23 NOTE — PLAN OF CARE
Problem: Risk for Self Injury/Neglect  Goal: Treatment Goal: Remain safe during length of stay, learn and adopt new coping skills, and be free of self-injurious ideation, impulses and acts at the time of discharge  Outcome: Progressing  Goal: Recognize maladaptive responses and adopt new coping mechanisms  Outcome: Progressing     Problem: Anxiety  Goal: Anxiety is at manageable level  Description: Interventions:  - Assess and monitor patient's anxiety level. - Monitor for signs and symptoms (heart palpitations, chest pain, shortness of breath, headaches, nausea, feeling jumpy, restlessness, irritable, apprehensive). - Collaborate with interdisciplinary team and initiate plan and interventions as ordered.   - [place] patient to unit/surroundings  - Explain treatment plan  - Encourage participation in care  - Encourage verbalization of concerns/fears  - Identify coping mechanisms  - Assist in developing anxiety-reducing skills  - Administer/offer alternative therapies  - Limit or eliminate stimulants  Outcome: Progressing

## 2023-07-23 NOTE — NURSING NOTE
Fede Walton has been awake, alert, and visible intermittently out in the milieu. Pt went out on the deck with staff and peers for fresh air group. Pt ate 75% supper. Pt rests in room at intervals, watches tv in dining room, and walks around unit at times. Pt still incongruent, smiles inappropriately, is easily distracted, preoccupied, has delayed verbal responses, and stares at times. Pt denies any depression, anxiety, a/v hallucinations, and has not verbalized any delusions. Pt attended and participated in evening wrap up group and had snack after with peers. Cooperative with taking scheduled meds as ordered and without incident. Continue to monitor/assess for any changes in behavior.

## 2023-07-23 NOTE — PROGRESS NOTES
Progress Note - Behavioral Health   Denver Curtis 21 y.o. male MRN: [id number]  Unit/Bed#: [facility] 421-62 Encounter: [id number]      Subjective:     Documentation, nursing notes, medication reconciliation, labs, and vitals reviewed. Patient was seen for continuing care and reviewed with treatment team.  No acute events over the past 24 hours. Per nursing report, Patient remains in behavioral control. No medication changes over the past 24 hours. On evaluation today, Gordo Nowak is lying in bed resting. Reports that his mood is "stable" adding that he is just tired. Continues to tolerate medications with no adverse effects outside of drooling. Atropine effective. Endorses mild anxiety secondary to discharge. Denies depression. No self-harming/suicidal ideation, plan, or intent upon direct inquiry. No thoughts to harm others. No agitation or aggression noted. Denies perceptual disturbances, with no delusional or paranoid content elicited. Does not appear overtly manic. Offers no further complaints.        Psychiatric ROS:  Behavior over the last 24 hours: unchanged  Sleep: normal  Appetite: normal  Medication side effects: No   ROS: all other systems are negative      Mental Status Evaluation:    Appearance:  age appropriate, casually dressed, dressed appropriately, Laying in bed, covered   Behavior:  cooperative, calm   Speech:  normal rate, normal volume, normal pitch   Mood:  anxious   Affect:  constricted   Thought Process:  concrete   Associations: concrete associations   Thought Content:  no overt delusions   Perceptual Disturbances: no auditory hallucinations, no visual hallucinations, does not appear responding to internal stimuli   Risk Potential: Suicidal ideation - None at present  Homicidal ideation - None at present  Potential for aggression - No   Sensorium:  oriented to person, place and time/date   Memory:  recent and remote memory grossly intact   Consciousness:  alert and awake Attention/Concentration: attention span and concentration appear shorter than expected for age   Insight:  limited   Judgment: limited   Gait/Station: in bed   Motor Activity: abnormal movement noted: mild hand tremor present       Vital signs in last 24 hours:    Temp:  [97.5 °F (36.4 °C)] 97.5 °F (36.4 °C)  HR:  [67-77] 77  Resp:  [18] 18  BP: (107-123)/(57-75) 110/68    Laboratory results: I have personally reviewed all pertinent laboratory/tests results    Results from the past 24 hours: No results found for this or any previous visit (from the past 24 hour(s)). Progress Toward Goals: no significant progress today    Suicide/Homicide Risk Assessment:    Risk of Harm to Self:   Nursing Suicide Risk Assessment Last 24 hours: C-SSRS Risk (Since Last Contact)  Calculated C-SSRS Risk Score (Since Last Contact): No Risk Indicated    Risk of Harm to Others:  Nursing Homicide Risk Assessment: Violence Risk to Others: Denies within past 6 months    Assessment/Plan   Principal Problem:    Severe episode of recurrent major depressive disorder, without psychotic features ([address])  Active Problems:    Medical clearance for psychiatric admission    Autism spectrum disorder    Mood disorder (HCC)    Anxiety disorder    Rash      Recommended Treatment:     Planned medication and treatment changes:     All current active medications have been reviewed  Encourage group therapy, milieu therapy and occupational therapy  Behavioral Health checks every 7 minutes  Assault and elopement precautions  Continue with SLIM medical management as indicated  Disposition planning ongoing  Continue current medications:    Current Facility-Administered Medications   Medication Dose Route Frequency Provider Last Rate   • acetaminophen  650 mg Oral Q6H PRN Cielo Purpura, DO     • acetaminophen  650 mg Oral Q4H PRN Cielo Purpura, DO     • acetaminophen  975 mg Oral Q6H PRN Cielo Purpura, DO     • aluminum-magnesium hydroxide-simethicone  30 mL Oral Q4H PRN [facility], DO     • ammonium lactate   Topical BID PRN Andrews Christian PA-C     • atropine  1 drop Sublingual TID PRN Brody Head PA-C     • benztropine  1 mg Intramuscular Q4H PRN Max 6/day [facility], DO     • benztropine  1 mg Oral Q4H PRN Max 6/day [facility], DO     • benztropine  1 mg Oral BID [facility], DO     • clonazePAM  0.5 mg Oral BID Sterling Kerns MD     • hydrOXYzine HCL  50 mg Oral Q6H PRN Max 4/day [facility], DO      Or   • diphenhydrAMINE  50 mg Intramuscular Q6H PRN [facility], DO     • diphenhydrAMINE  25 mg Oral Q6H PRN Eduard Canal, DO     • glycerin-hypromellose-  1 drop Both Eyes Q3H PRN [facility], DO     • haloperidol  5 mg Oral BID Sterling Kerns MD     • hydrOXYzine HCL  100 mg Oral Q6H PRN Max 4/day [facility], DO      Or   • LORazepam  2 mg Intramuscular Q6H PRN [facility], DO     • hydrOXYzine HCL  25 mg Oral Q6H PRN Max 4/day [facility], DO     • lithium carbonate  450 mg Oral Q12H Ana Bolivar MD     • melatonin  10 mg Oral HS Sterling Kerns MD     • mirtazapine  7.5 mg Oral HS [facility], DO     • OLANZapine  10 mg Oral Q3H PRN Max 3/day [facility], DO      Or   • OLANZapine  10 mg Intramuscular Q3H PRN Max 3/day [facility], DO     • OLANZapine  5 mg Oral Q3H PRN Max 6/day [facility], DO      Or   • OLANZapine  5 mg Intramuscular Q3H PRN Max 6/day [facility], DO     • OLANZapine  2.5 mg Oral Q3H PRN Max 8/day [facility], DO     • polyethylene glycol  17 g Oral Daily PRN [facility], DO     • propranolol  10 mg Oral Q8H PRN [facility], DO     • propranolol  10 mg Oral Q8H PRN Sterling Kerns MD     • propranolol  10 mg Oral TID Sterling Kerns MD     • senna-docusate sodium  1 tablet Oral Daily PRN [facility], DO     • traZODone  50 mg Oral HS PRN Ángel Bird, DO           Risks / Benefits of Treatment:    Risks, benefits, and possible side effects of medications explained to patient and patient verbalizes understanding and agreement for treatment. Counseling / Coordination of Care:    Patient's progress discussed with staff in treatment team meeting. Medications, treatment progress and treatment plan reviewed with patient.     Note Share    This note was not shared with the patient due to reasonable likelihood of causing patient harm    ANA Varner 07/23/23

## 2023-07-24 PROCEDURE — 99232 SBSQ HOSP IP/OBS MODERATE 35: CPT | Performed by: PSYCHIATRY & NEUROLOGY

## 2023-07-24 RX ADMIN — CLONAZEPAM 0.5 MG: 0.5 TABLET ORAL at 08:17

## 2023-07-24 RX ADMIN — HALOPERIDOL 5 MG: 5 TABLET ORAL at 08:20

## 2023-07-24 RX ADMIN — LITHIUM CARBONATE 450 MG: 450 TABLET, EXTENDED RELEASE ORAL at 21:13

## 2023-07-24 RX ADMIN — BENZTROPINE MESYLATE 1 MG: 1 TABLET ORAL at 08:17

## 2023-07-24 RX ADMIN — MIRTAZAPINE 7.5 MG: 7.5 TABLET, FILM COATED ORAL at 21:14

## 2023-07-24 RX ADMIN — LITHIUM CARBONATE 450 MG: 450 TABLET, EXTENDED RELEASE ORAL at 08:16

## 2023-07-24 RX ADMIN — BENZTROPINE MESYLATE 1 MG: 1 TABLET ORAL at 17:03

## 2023-07-24 RX ADMIN — PROPRANOLOL HYDROCHLORIDE 10 MG: 10 TABLET ORAL at 17:02

## 2023-07-24 RX ADMIN — Medication 10 MG: at 21:14

## 2023-07-24 RX ADMIN — PROPRANOLOL HYDROCHLORIDE 10 MG: 10 TABLET ORAL at 21:20

## 2023-07-24 RX ADMIN — HALOPERIDOL 5 MG: 5 TABLET ORAL at 17:03

## 2023-07-24 RX ADMIN — CLONAZEPAM 0.5 MG: 0.5 TABLET ORAL at 17:03

## 2023-07-24 NOTE — NURSING NOTE
Savanna Conway maintained on ongoing assault and SAFE precaution without incident on this shift.  Observed laying in bed with eyes closed, breath even and easy.  Q 7 minutes checks implemented.  Behavioral control.  Will continue to monitor

## 2023-07-24 NOTE — PROGRESS NOTES
Psychiatric Progress Note - Department of [address] 21 y.o. male MRN: [id number]  Unit/Bed#: [facility] 073-46 Encounter: [id number]    ASSESSMENT & PLAN     Principal Problem:    Severe episode of recurrent major depressive disorder, without psychotic features ([address])  Active Problems:    Medical clearance for psychiatric admission    Autism spectrum disorder    Mood disorder ([address])    Anxiety disorder    Rash      • Continue to promote patient participation in therapeutic milieu. • Continue medical management per medicine. • Continue previously prescribed psychotropic medication regimen; see below. • Continue behavioral health checks q7 minutes. • Continue vitals per behavioral health unit protocol. • Discharge date per primary team and CM; 201 commitment status. • Continue monitoring bilateral hand tremors and drooling    SUBJECTIVE     Patient evaluated this a.m. for continuity of care. Patient was doing well this morning. He reported feeling "anxious about discharge". He has been keeping busy attending groups, reading, and writing. He has called his friends and family and talked to them on the phone. Patient says he has been sleeping well - he reports it takes him an hour to fall asleep and he wakes up in the middle of the night to use the bathroom but has no complaints. He denies any appetite changes. He denies visual or auditory hallucinations. Patient reports side effects from his medications. He says he has been drooling for some time now and that he has a mild bilateral tremor in both hands since starting lithium. Patient was counseled on taking atropine if needed for drooling and informed that tremor is a common side effect of lithium. Patient was discussed at length with nursing and treatment team. No acute distress is noted throughout evaluation. Berl Tosin denies suicidal/homicidal ideation; contracts for safety.      PSYCHIATRIC REVIEW OF SYSTEMS     Behavior over the last 24 hours:  unchanged  Sleep: normal  Appetite: normal  Medication side effects: Yes, patient reports drooling and bilateral hand tremors    REVIEW OF SYSTEMS   Review of systems: no complaints, bilateral hand tremors and all other systems are negative    OBJECTIVE     Vital Signs in Past 24 Hours:  Temp:  [96 °F (35.6 °C)-97.6 °F (36.4 °C)] 97.6 °F (36.4 °C)  HR:  [68-81] 75  Resp:  [16-18] 16  BP: (100-129)/(53-80) 100/53    Intake/Output in Past 24 hours:  No intake/output data recorded. No intake/output data recorded. Laboratory Results:  I have personally reviewed all pertinent laboratory/tests results. Labs in last 72 hours: No results for input(s): "WBC", "RBC", "HGB", "HCT", "PLT", "RDW", "TOTANEUTABS", "NEUTROABS", "SODIUM", "K", "CL", "CO2", "BUN", "CREATININE", "GLUC", "GLUF", "CALCIUM", "AST", "ALT", "ALKPHOS", "TP", "ALB", "TBILI", "CHOLESTEROL", "HDL", "TRIG", "LDLCALC", "VALPROICTOT", "CARBAMAZEPIN", "LITHIUM", "AMMONIA", "[id number]", "Lindle Mew", "Marvia Likes", "PREGTESTUR", "PREGSERUM", "HCG", "HCGQUANT", "RPR" in the last 72 hours.     Behavioral Health Medications:   current meds:   Current Facility-Administered Medications   Medication Dose Route Frequency   • acetaminophen (TYLENOL) tablet 650 mg  650 mg Oral Q6H PRN   • acetaminophen (TYLENOL) tablet 650 mg  650 mg Oral Q4H PRN   • acetaminophen (TYLENOL) tablet 975 mg  975 mg Oral Q6H PRN   • aluminum-magnesium hydroxide-simethicone (MYLANTA) oral suspension 30 mL  30 mL Oral Q4H PRN   • ammonium lactate (LAC-HYDRIN) 12 % lotion   Topical BID PRN   • atropine (ISOPTO ATROPINE) 1 % ophthalmic solution 1 drop  1 drop Sublingual TID PRN   • benztropine (COGENTIN) injection 1 mg  1 mg Intramuscular Q4H PRN Max 6/day   • benztropine (COGENTIN) tablet 1 mg  1 mg Oral Q4H PRN Max 6/day   • benztropine (COGENTIN) tablet 1 mg  1 mg Oral BID   • clonazePAM (KlonoPIN) tablet 0.5 mg  0.5 mg Oral BID   • hydrOXYzine HCL (ATARAX) tablet 50 mg 50 mg Oral Q6H PRN Max 4/day    Or   • diphenhydrAMINE (BENADRYL) injection 50 mg  50 mg Intramuscular Q6H PRN   • diphenhydrAMINE (BENADRYL) tablet 25 mg  25 mg Oral Q6H PRN   • glycerin-hypromellose- (ARTIFICIAL TEARS) ophthalmic solution 1 drop  1 drop Both Eyes Q3H PRN   • haloperidol (HALDOL) tablet 5 mg  5 mg Oral BID   • hydrOXYzine HCL (ATARAX) tablet 100 mg  100 mg Oral Q6H PRN Max 4/day    Or   • LORazepam (ATIVAN) injection 2 mg  2 mg Intramuscular Q6H PRN   • hydrOXYzine HCL (ATARAX) tablet 25 mg  25 mg Oral Q6H PRN Max 4/day   • lithium carbonate (LITHOBID) CR tablet 450 mg  450 mg Oral Q12H [address]   • melatonin tablet 10 mg  10 mg Oral HS   • mirtazapine (REMERON) tablet 7.5 mg  7.5 mg Oral HS   • OLANZapine (ZyPREXA) tablet 10 mg  10 mg Oral Q3H PRN Max 3/day    Or   • OLANZapine (ZyPREXA) IM injection 10 mg  10 mg Intramuscular Q3H PRN Max 3/day   • OLANZapine (ZyPREXA) tablet 5 mg  5 mg Oral Q3H PRN Max 6/day    Or   • OLANZapine (ZyPREXA) IM injection 5 mg  5 mg Intramuscular Q3H PRN Max 6/day   • OLANZapine (ZyPREXA) tablet 2.5 mg  2.5 mg Oral Q3H PRN Max 8/day   • polyethylene glycol (MIRALAX) packet 17 g  17 g Oral Daily PRN   • propranolol (INDERAL) tablet 10 mg  10 mg Oral Q8H PRN   • propranolol (INDERAL) tablet 10 mg  10 mg Oral Q8H PRN   • propranolol (INDERAL) tablet 10 mg  10 mg Oral TID   • senna-docusate sodium (SENOKOT S) 8.6-50 mg per tablet 1 tablet  1 tablet Oral Daily PRN   • traZODone (DESYREL) tablet 50 mg  50 mg Oral HS PRN   .     Current Facility-Administered Medications   Medication Dose Route Frequency Provider Last Rate   • acetaminophen  650 mg Oral Q6H PRN Donnis Screen, DO     • acetaminophen  650 mg Oral Q4H PRN Donnis Screen, DO     • acetaminophen  975 mg Oral Q6H PRN Tomasa Screen, DO     • aluminum-magnesium hydroxide-simethicone  30 mL Oral Q4H PRN Donnis Screen, DO     • ammonium lactate   Topical BID PRN Laurie Kelley PA-C     • atropine 1 drop Sublingual TID PRN Cher Garcia PA-C     • benztropine  1 mg Intramuscular Q4H PRN Max 6/day Dewaine Mimi, DO     • benztropine  1 mg Oral Q4H PRN Max 6/day Dewaine Mimi, DO     • benztropine  1 mg Oral BID Dewaine Mimi, DO     • clonazePAM  0.5 mg Oral BID Jerson Saldana MD     • hydrOXYzine HCL  50 mg Oral Q6H PRN Max 4/day Dewaine Mimi, DO      Or   • diphenhydrAMINE  50 mg Intramuscular Q6H PRN Dewaine Mimi, DO     • diphenhydrAMINE  25 mg Oral Q6H PRN Mike [place]il[place], DO     • glycerin-hypromellose-  1 drop Both Eyes Q3H PRN Dewaine Mimi, DO     • haloperidol  5 mg Oral BID Jerson Saldana MD     • hydrOXYzine HCL  100 mg Oral Q6H PRN Max 4/day Dewaine Mimi, DO      Or   • LORazepam  2 mg Intramuscular Q6H PRN Dewaine Mimi, DO     • hydrOXYzine HCL  25 mg Oral Q6H PRN Max 4/day Dewaine Mimi, DO     • lithium carbonate  450 mg Oral Q12H Nohemy Kaminski MD     • melatonin  10 mg Oral HS Jerson Saldana MD     • mirtazapine  7.5 mg Oral HS Dewaine Mimi, DO     • OLANZapine  10 mg Oral Q3H PRN Max 3/day Dewaine Mimi, DO      Or   • OLANZapine  10 mg Intramuscular Q3H PRN Max 3/day Dewaine Mimi, DO     • OLANZapine  5 mg Oral Q3H PRN Max 6/day Dewaine Mimi, DO      Or   • OLANZapine  5 mg Intramuscular Q3H PRN Max 6/day Dewaine Mimi, DO     • OLANZapine  2.5 mg Oral Q3H PRN Max 8/day Dewaine Mimi, DO     • polyethylene glycol  17 g Oral Daily PRN Dewaine Mimi, DO     • propranolol  10 mg Oral Q8H PRN Dewaine Mimi, DO     • propranolol  10 mg Oral Q8H PRN Jerson Saldana MD     • propranolol  10 mg Oral TID Jerson Saldana MD     • senna-docusate sodium  1 tablet Oral Daily PRN Dewaine Mimi, DO     • traZODone  50 mg Oral HS PRN Anisa Le,          Risks, benefits and possible side effects of Medications:   Risks, benefits, and possible side effects of medications explained to patient and patient verbalizes understanding. Mental Status Evaluation:  Appearance:  age appropriate and casually dressed, wearing a t-shirt and pajama pants, patient had just showered, improved hygiene    Behavior:  calm, cooperative, rarely makes eye contact, smiled throughout the interview   Speech:  increased latency of response and normal pitch and volume, coherent and fluent   Mood:  anxious, constricted and sad   Affect:  constricted and mood-congruent, smiling at times   Language naming objects and repeating phrases   Thought Process:  concrete, goal directed and logical   Thought Content:  normal and no verbalized phobias or obsessions or delusions or preoccupations   Perceptual Disturbances: None, no AVH, and does not appear to be responding to internal stimuli   Risk Potential: Suicidal Ideations none, Homicidal Ideations none and Potential for Aggression No   Sensorium:  person, place and situation   Cognition:  recent and remote memory grossly intact   Consciousness:  alert and awake    Attention: attention span and concentration were age appropriate   Insight:  limited   Judgment: limited   Intellect    Gait/Station: normal gait/station and normal balance   Motor Activity: abnormal movement noted  tremor since starting lithium as per patient     Memory: Short and long term memory  intact     Progress Toward Goals: progressing, as evidenced by their participation in individual, social and therapeutic milieu in addition to adherence to their medication regimen.   Patient Acceptance: patient seemed to accept treatment and medications  Patient Understanding: patient seemed to understand treatment and medications  Patient Involvement in Reintegration Process: patient is attending groups and keeps in contact with friends and family   Patient's Therapeutic Relationship with Psychiatrist: patient with cooperative during interiew  Patient's Optimism Regarding Recovery: patient is optimistic, eager for discharge    Recommended Treatment:   See above for assessment and plan. Inpatient Psychiatric Certification: I certify that inpatient services are medically necessary for this patient for a duration of greater that 2 midnights for the treatment of Severe episode of recurrent major depressive disorder, without psychotic features ([address])   See H&P and MD Progress Notes for additional information about the patient's course of treatment. Counseling/Coordination of Care    Total unit time spent today was 15 minutes. Greater than 50% of total time was spent with the patient and/or patient's relatives and/or coordination of patient's care. Patient's rights, confidentiality, exceptions to confidentiality, use of electronic medical record including appropriate staff access to medical record regarding behavioral health services and consent to treatment were reviewed. Note Share:      This note was not shared with the patient due to reasonable likelihood of causing patient harm     [employer]

## 2023-07-24 NOTE — NURSING NOTE
Vincenzo Suero has been awake, alert, and visible intermittently out in the milieu. Pt went out on the deck with staff and peers for fresh air group. Pt ate 100% supper. Pt continues to stare, is easily distracted, preoccupied, and delayed in verbal responses in conversation with staff. Pleasant, polite, and cooperative. Incongruent and smiles inappropriately at times. Pt spent time listening to music on radio. Pt attended and participated in evening nursing group and worked on his self assessment and evening wrap up group. Pt had evening snack with peers. Cooperative with taking scheduled meds without incident. Continue to monitor/assess for any behavioral changes.

## 2023-07-24 NOTE — PLAN OF CARE
Problem: Ineffective Coping  Goal: Identifies ineffective coping skills  Outcome: Progressing  Goal: Identifies healthy coping skills  Outcome: Progressing  Goal: Demonstrates healthy coping skills  Outcome: Progressing     Problem: Risk for Self Injury/Neglect  Goal: Treatment Goal: Remain safe during length of stay, learn and adopt new coping skills, and be free of self-injurious ideation, impulses and acts at the time of discharge  Outcome: Progressing  Goal: Recognize maladaptive responses and adopt new coping mechanisms  Outcome: Progressing     Problem: Depression  Goal: Treatment Goal: Demonstrate behavioral control of depressive symptoms, verbalize feelings of improved mood/affect, and adopt new coping skills prior to discharge  Outcome: Progressing  Goal: Verbalize thoughts and feelings  Description: Interventions:  - Assess and re-assess patient's level of risk   - Engage patient in 1:1 interactions, daily, for a minimum of 15 minutes   - Encourage patient to express feelings, fears, frustrations, hopes   Outcome: Progressing  Goal: Refrain from isolation  Description: Interventions:  - Develop a trusting relationship   - Encourage socialization   Outcome: Progressing     Problem: Anxiety  Goal: Anxiety is at manageable level  Description: Interventions:  - Assess and monitor patient's anxiety level. - Monitor for signs and symptoms (heart palpitations, chest pain, shortness of breath, headaches, nausea, feeling jumpy, restlessness, irritable, apprehensive). - Collaborate with interdisciplinary team and initiate plan and interventions as ordered.   - [place] patient to unit/surroundings  - Explain treatment plan  - Encourage participation in care  - Encourage verbalization of concerns/fears  - Identify coping mechanisms  - Assist in developing anxiety-reducing skills  - Administer/offer alternative therapies  - Limit or eliminate stimulants  Outcome: Progressing

## 2023-07-24 NOTE — PLAN OF CARE
Patient has progressed significantly since admission; his behaviors are much improved. He still has some mild depression, but is not longer suicidal nor does he have thoughts to self-harm. He does present as anxious, but it is mostly situational.  He attends groups, complies with medication regime, and engages well with the team.  He is appropriate for discharge, and was recently accepted into [address] [place] [address] for housing; discharge will likely occur next month. Problem: Ineffective Coping  Goal: Identifies ineffective coping skills  Outcome: Adequate for Discharge  Goal: Identifies healthy coping skills  Outcome: Adequate for Discharge  Goal: Demonstrates healthy coping skills  Outcome: Adequate for Discharge     Problem: Depression  Goal: Verbalize thoughts and feelings  Description: Interventions:  - Assess and re-assess patient's level of risk   - Engage patient in 1:1 interactions, daily, for a minimum of 15 minutes   - Encourage patient to express feelings, fears, frustrations, hopes   Outcome: Adequate for Discharge  Goal: Refrain from isolation  Description: Interventions:  - Develop a trusting relationship   - Encourage socialization   Outcome: Adequate for Discharge     Problem: Anxiety  Goal: Anxiety is at manageable level  Description: Interventions:  - Assess and monitor patient's anxiety level. - Monitor for signs and symptoms (heart palpitations, chest pain, shortness of breath, headaches, nausea, feeling jumpy, restlessness, irritable, apprehensive). - Collaborate with interdisciplinary team and initiate plan and interventions as ordered.   - [place] patient to unit/surroundings  - Explain treatment plan  - Encourage participation in care  - Encourage verbalization of concerns/fears  - Identify coping mechanisms  - Assist in developing anxiety-reducing skills  - Administer/offer alternative therapies  - Limit or eliminate stimulants  Outcome: Progressing     Problem: Individualized Interventions  Goal: Attend and participate in 50% of unit activities, including therapeutic, recreational, and educational groups weekly. Outcome: Adequate for Discharge  Goal: Attend biweekly treatment team meetings, and come prepared with a completed self-assessment. Outcome: Adequate for Discharge  Goal: Engage with  at least twice weekly, and in individual psychotherapy as able to.   Outcome: Adequate for Discharge     Problem: DISCHARGE PLANNING - CARE MANAGEMENT  Goal: Discharge to post-acute care or home with appropriate resources  Description: INTERVENTIONS:  - Conduct assessment to determine patient/family and health care team treatment goals, and need for post-acute services based on payer coverage, community resources, and patient preferences, and barriers to discharge  - Address psychosocial, clinical, and financial barriers to discharge as identified in assessment in conjunction with the patient/family and health care team  - Arrange appropriate level of post-acute services according to patient’s   needs and preference and payer coverage in collaboration with the physician and health care team  - Communicate with and update the patient/family, physician, and health care team regarding progress on the discharge plan  - Arrange appropriate transportation to post-acute venues  Outcome: Progressing

## 2023-07-24 NOTE — PROGRESS NOTES
07/24/23 1210   Team Meeting   Meeting Type Tx Team Meeting   Initial Conference Date 07/24/23   Next Conference Date 08/23/23   Team Members Present   Team Members Present Physician;Nurse;   Physician Team Member ANA Turcios   Nursing Team Member Kaitlin Bautista RN   Social Work Team Member Elva Cedillo, [place]   Patient/Family Present   Patient Present Yes   Patient's Family Present No     Patient and team met for a 30 day treatment plan review. Patient was polite, calm, and flat; affect was congruent, and eye contact was fair. Patient denied thoughts of SI/self-harm. He rated his depression a 5/10, which he feels is close to  baseline. She still presents as anxious, but it is mostly situational.  His behaviors have been much improved, which he was praised for. Patient had to be reminded that he was accepted by [facility], and immediately started smiling. Patient explained to him that Kenneth Lutz is working on getting the waiver, and then she will work on getting his aftercare set up, but that he should be discharged well before the wedding. Goal related to risk for self-injury neglect has bee completed. Patient is doing well with group attendance, medication compliance, and engagement with staff. Patient and team signed the treatment plan.

## 2023-07-24 NOTE — NURSING NOTE
Pt is visible in the milieu, out for meals and then resorts back to his room. Pt makes needs known to staff. Pt appears sad, but denies depression at this time. Pt is medication compliant and cooperative.

## 2023-07-24 NOTE — PROGRESS NOTES
07/24/23 0900   Team Meeting   Meeting Type Daily Rounds   Initial Conference Date 07/24/23   Team Members Present   Team Members Present Physician;Nurse;;; Other (Discipline and Name)   Physician Team Member Naseem Desir Team Member [facility] Management Team Member Viet Orellana   Social Work Team Member Dulce Thompson   Other (Discipline and Name) Gerarda Bosworth   Patient/Family Present   Patient Present No   Patient's Family Present No   Attended 2/7 groups on Friday  No PRN's over weekend  Fixated on D/C  Bizarre but redirectable  No income, applying for SSI this week, patient has some savings. [facility] aware.

## 2023-07-25 PROCEDURE — 99232 SBSQ HOSP IP/OBS MODERATE 35: CPT | Performed by: PSYCHIATRY & NEUROLOGY

## 2023-07-25 RX ADMIN — LITHIUM CARBONATE 450 MG: 450 TABLET, EXTENDED RELEASE ORAL at 21:03

## 2023-07-25 RX ADMIN — LITHIUM CARBONATE 450 MG: 450 TABLET, EXTENDED RELEASE ORAL at 08:32

## 2023-07-25 RX ADMIN — CLONAZEPAM 0.5 MG: 0.5 TABLET ORAL at 17:03

## 2023-07-25 RX ADMIN — MIRTAZAPINE 7.5 MG: 7.5 TABLET, FILM COATED ORAL at 21:04

## 2023-07-25 RX ADMIN — Medication 10 MG: at 21:03

## 2023-07-25 RX ADMIN — PROPRANOLOL HYDROCHLORIDE 10 MG: 10 TABLET ORAL at 21:04

## 2023-07-25 RX ADMIN — ATROPINE SULFATE 1 DROP: 10 SOLUTION OPHTHALMIC at 16:55

## 2023-07-25 RX ADMIN — PROPRANOLOL HYDROCHLORIDE 10 MG: 10 TABLET ORAL at 17:03

## 2023-07-25 RX ADMIN — HALOPERIDOL 5 MG: 5 TABLET ORAL at 17:03

## 2023-07-25 RX ADMIN — CLONAZEPAM 0.5 MG: 0.5 TABLET ORAL at 08:32

## 2023-07-25 RX ADMIN — BENZTROPINE MESYLATE 1 MG: 1 TABLET ORAL at 17:03

## 2023-07-25 RX ADMIN — BENZTROPINE MESYLATE 1 MG: 1 TABLET ORAL at 08:32

## 2023-07-25 RX ADMIN — HALOPERIDOL 5 MG: 5 TABLET ORAL at 08:32

## 2023-07-25 RX ADMIN — ATROPINE SULFATE 1 DROP: 10 SOLUTION OPHTHALMIC at 08:33

## 2023-07-25 NOTE — NURSING NOTE
C/o drooling, atropine solution given sublingual at 1655. Will reassess the atropine for effectiveness.

## 2023-07-25 NOTE — PROGRESS NOTES
07/25/23 0830   Team Meeting   Meeting Type Daily Rounds   Initial Conference Date 07/25/23   Patient/Family Present   Patient Present No   Patient's Family Present No     Daily Rounds Documentation     Team Members Present:   MD Dr. Irineo Downing, Laxmi Wu, RN  Frankie Miller, [address] [place] [address]. D    No behaviors. Excited that he is accepted by [facility]. Pleasant and cooperative. Attended 5/9 groups. Compliant with medications and meals. Slept. Appointment with [employer] today to apply for benefits.

## 2023-07-25 NOTE — SOCIAL WORK
Phone call placed to patient's father; message left requesting a return phone call ASAP. Return call received from patient's father Zita Reddy). ATIYA informed him of the appointment she and patient had with [facility] today, and their recommendation regarding the shared bank account. Zita Reddy expressed that he would work on this right away, and look into a [place] [employer] account. We also spoke about patient's recent acceptance into [facility]; he has not heard from Bhupendra Smith (supports coordinator) regarding what room and board will cost.  ATIYA then sent an e-mail connecting him, [employer]) so they can discuss. ATIYA also provided an update on how patient has been doing on the unit. No questions or concerns presented by Zita Reddy; phone call ended mutually.

## 2023-07-25 NOTE — NURSING NOTE
Erna Mayer has been awake, alert, and visible intermittently out in the milieu. Pt went out on deck with staff and peers for fresh air group. Pt ate 50% supper. Pt spoke of looking forward to going to group home when discharged and smiles appropriately when speaking of this. Some socialization noted with select peers. Pt denies any depression, anxiety, a/v hallucinations, and has not verbalized any delusions. Still delayed in verbal responses, stares, and incongruent at times. Pt attended and participated in evening group, wrap up group, and had snack with peers. Cooperative with taking scheduled meds as ordered and without incident. Continue to monitor/assess for any changes in behavior.

## 2023-07-25 NOTE — PROGRESS NOTES
Psychiatric Progress Note - Department of [address] 21 y.o. male MRN: [id number]  Unit/Bed#: [facility] 502-06 Encounter: [id number]    ASSESSMENT & PLAN     Principal Problem:    Severe episode of recurrent major depressive disorder, without psychotic features ([address])  Active Problems:    Medical clearance for psychiatric admission    Autism spectrum disorder    Mood disorder ([address])    Anxiety disorder    Rash    • Continue to promote patient participation in therapeutic milieu. • Continue medical management per medicine. • Continue previously prescribed psychotropic medication regimen; see below. • Continue behavioral health checks q7 minutes. • Continue vitals per behavioral health unit protocol. • Discharge date per primary team and CM (per social work, expected next month), patient accepted at [facility] on 7/24; 201 commitment status. • Continue preparing for [facility]  • Continue monitoring bilateral hand tremors and drooling. SUBJECTIVE     Patient evaluated this a.m. for continuity of care. Per treatment team, patient had no behavioral issues yesterday. He reported being bored. He attended 5/9 groups yesterday. He was very excited about being accepted to [facility]. He was also social with and was walking with a peer. Patient had a social security meeting today. This morning, patient reported feeling anxious, particularly about money. Patient had just had his social security meeting and, per patient, he has to wait 4 months before getting money. Patient slept well last night, waking up twice to use the bathroom. He denies any issues with appetite - he said he ate his breakfast. Per report, patient ate 75% of his meal. Patient said he attended groups yesterday, read, and listened to music. He called his mom yesterday and enjoyed talking to her. Patient continues to inappropriately smile during interview.  He claims he is anxious and nervous, but smiles and chuckles as he's saying so. He scratched his arm during the interview but denies any itchiness - he says it's a "nervous thing". When asked why he's nervous, patient said he is "nervous to get out of here". Patient continues to report drooling and mild bilateral hand tremor. He said that the tremor doesn't affect his ability to write or do any other activities. Patient feels as though he's progressing with his treatment and is optimistic about recovery. He denies auditory or visual hallucinations, as well as suicidal or homicidal ideation, plan, or intent. Patient was discussed at length with nursing and treatment team. No acute distress is noted throughout evaluation. Makenzie Love denies suicidal/homicidal ideation; contracts for safety. PSYCHIATRIC REVIEW OF SYSTEMS     Behavior over the last 24 hours:  unchanged  Sleep: normal  Appetite: normal  Medication side effects: Yes, drooling and mild bilateral hand tremor    REVIEW OF SYSTEMS   Review of systems: no complaints, mild bilateral hand tremor and all other systems are negative    OBJECTIVE     Vital Signs in Past 24 Hours:  Temp:  [97.6 °F (36.4 °C)] 97.6 °F (36.4 °C)  HR:  [70-91] 77  Resp:  [17-18] 17  BP: (106-134)/(66-81) 106/66    Intake/Output in Past 24 hours:  No intake/output data recorded. No intake/output data recorded. Laboratory Results:  I have personally reviewed all pertinent laboratory/tests results. Labs in last 72 hours: No results for input(s): "WBC", "RBC", "HGB", "HCT", "PLT", "RDW", "TOTANEUTABS", "NEUTROABS", "SODIUM", "K", "CL", "CO2", "BUN", "CREATININE", "GLUC", "GLUF", "CALCIUM", "AST", "ALT", "ALKPHOS", "TP", "ALB", "TBILI", "CHOLESTEROL", "HDL", "TRIG", "LDLCALC", "VALPROICTOT", "CARBAMAZEPIN", "LITHIUM", "AMMONIA", "[id number]", "Cristobal Gravel", "Maddison Pian", "PREGTESTUR", "PREGSERUM", "HCG", "HCGQUANT", "RPR" in the last 72 hours.     Behavioral Health Medications: continue current psychiatric medications.     Current Facility-Administered Medications   Medication Dose Route Frequency Provider Last Rate   • acetaminophen  650 mg Oral Q6H PRN Ayse Maxi, DO     • acetaminophen  650 mg Oral Q4H PRN Ayse Maxi, DO     • acetaminophen  975 mg Oral Q6H PRN Ayse Maxi, DO     • aluminum-magnesium hydroxide-simethicone  30 mL Oral Q4H PRN Ayse Maxi, DO     • ammonium lactate   Topical BID PRN Rose Cadet PA-C     • atropine  1 drop Sublingual TID PRN Kaushik Greene PA-C     • benztropine  1 mg Intramuscular Q4H PRN Max 6/day Ayse Maxi, DO     • benztropine  1 mg Oral Q4H PRN Max 6/day Ayse Maxi, DO     • benztropine  1 mg Oral BID Ayse Maxi, DO     • clonazePAM  0.5 mg Oral BID Kevin Fontana MD     • hydrOXYzine HCL  50 mg Oral Q6H PRN Max 4/day Ayse Fryes, DO      Or   • diphenhydrAMINE  50 mg Intramuscular Q6H PRN Ayse Fryes, DO     • diphenhydrAMINE  25 mg Oral Q6H PRN Terederick Godfrey, DO     • glycerin-hypromellose-  1 drop Both Eyes Q3H PRN Ayse Fryes, DO     • haloperidol  5 mg Oral BID Kevin Fontana MD     • hydrOXYzine HCL  100 mg Oral Q6H PRN Max 4/day Ayse German, DO      Or   • LORazepam  2 mg Intramuscular Q6H PRN Ayse Fryes, DO     • hydrOXYzine HCL  25 mg Oral Q6H PRN Max 4/day Ayse German, DO     • lithium carbonate  450 mg Oral Q12H Zakia Chase MD     • melatonin  10 mg Oral HS Kevin Fontana MD     • mirtazapine  7.5 mg Oral HS Ayse Maxi, DO     • OLANZapine  10 mg Oral Q3H PRN Max 3/day Ayse Fryes, DO      Or   • OLANZapine  10 mg Intramuscular Q3H PRN Max 3/day Ayse Maxi, DO     • OLANZapine  5 mg Oral Q3H PRN Max 6/day Ayes German, DO      Or   • OLANZapine  5 mg Intramuscular Q3H PRN Max 6/day Ciro Watters DO     • OLANZapine  2.5 mg Oral Q3H PRN Max 8/day Ciro Watters DO     • polyethylene glycol  17 g Oral Daily PRN Maida Cerise Janene, DO     • propranolol  10 mg Oral Q8H PRN Clearnce Roll, DO     • propranolol  10 mg Oral Q8H PRN Niranjan Bailon MD     • propranolol  10 mg Oral TID Niranjan Bailon MD     • senna-docusate sodium  1 tablet Oral Daily PRN Clearnce Roll, DO     • traZODone  50 mg Oral HS PRN Clearnce Roll, DO         Risks, benefits and possible side effects of Medications:   Risks, benefits, and possible side effects of medications explained to patient and patient verbalizes understanding. Mental Status Evaluation:  Appearance:  age appropriate, casually dressed, disheveled and wearing a black shirt, pajama bottoms, and socks. His hair was uncombed.    Behavior:  calm, cooperative, inappropriate smiling throughout the interview, rare eye contact, scratching left arm   Speech:  increased latency of response and normal pitch and volume, coherent, fluent   Mood:  anxious, constricted and nervous   Affect:  constricted, mood-incongruent and smiling at random times   Language naming objects and repeating phrases   Thought Process:  concrete, goal directed and logical   Thought Content:  normal and no verbalized phobias, preoccupations, delusions, or obsesions   Perceptual Disturbances: None and no auditory/visual hallucinations, does not appear to be responding to internal stimuli   Risk Potential: Suicidal Ideations none, Homicidal Ideations none and Potential for Aggression No   Sensorium:  person, place, time/date and situation   Cognition:  recent and remote memory grossly intact   Consciousness:  alert and awake    Attention: attention span and concentration were age appropriate   Insight:  limited   Judgment: limited   Intellect    Gait/Station: normal gait/station and normal balance   Motor Activity: abnormal movement noted  tremor mild and bilateral; doesn't affect ADLs     Memory: Short and long term memory  intact     Progress Toward Goals: progressing, as evidenced by their participation in individual, social and therapeutic milieu in addition to adherence to their medication regimen. Patient Acceptance: patient seemed to accept treatment and medications   Patient Understanding: patient seemed to understand treatment and medication  Patient Involvement in Reintegration Process: patient attended 5/9 groups, he keeps in contact with family, he is social and visible  Patient's Therapeutic Relationship with Psychiatrist: patient was cooperative during interview  Patient's Optimism Regarding Recovery: patient reports being optimistic about recovery, he is excited about being accepted to [facility]    Recommended Treatment:   See above for assessment and plan. Inpatient Psychiatric Certification: I certify that inpatient services are medically necessary for this patient for a duration of greater that 2 midnights for the treatment of Severe episode of recurrent major depressive disorder, without psychotic features ([address])    See H&P and MD Progress Notes for additional information about the patient's course of treatment. Counseling/Coordination of Care    Total unit time spent today ws greater than 15 minutes. Greater than 50% of total time was spent with the patient and/or patient's relatives and/or coordination of patient's care. Patient's rights, confidentiality, exceptions to confidentiality, use of electronic medical record including appropriate staff access to medical record regarding behavioral health services and consent to treatment were reviewed. Note Share:      This note was not shared with the patient due to reasonable likelihood of causing patient harm       [employer] 6yo male with no significant past medical history, presents with c/o dizziness and weakness x 2 months. As per mother at bedside, patient has complained of persistent dizziness, described as "the room is spinning", throughout the day for most days x 2 months. Patient also endorses nausea and experiencing a falling sensation. Mother brought patient to his pediatrician several times over the past 2 months, and was treated for seasonal allergies. Patient exhibited fever, upper back pain, and red spots on his vision today, so mother decided to seek further medical evaluation at [facility] ED.

## 2023-07-25 NOTE — PLAN OF CARE
Problem: Risk for Self Injury/Neglect  Goal: Treatment Goal: Remain safe during length of stay, learn and adopt new coping skills, and be free of self-injurious ideation, impulses and acts at the time of discharge  Outcome: Progressing     Problem: Depression  Goal: Treatment Goal: Demonstrate behavioral control of depressive symptoms, verbalize feelings of improved mood/affect, and adopt new coping skills prior to discharge  Outcome: Progressing     Problem: Anxiety  Goal: Anxiety is at manageable level  Description: Interventions:  - Assess and monitor patient's anxiety level. - Monitor for signs and symptoms (heart palpitations, chest pain, shortness of breath, headaches, nausea, feeling jumpy, restlessness, irritable, apprehensive). - Collaborate with interdisciplinary team and initiate plan and interventions as ordered.   - [place] patient to unit/surroundings  - Explain treatment plan  - Encourage participation in care  - Encourage verbalization of concerns/fears  - Identify coping mechanisms  - Assist in developing anxiety-reducing skills  - Administer/offer alternative therapies  - Limit or eliminate stimulants  Outcome: Progressing

## 2023-07-25 NOTE — NURSING NOTE
Pt is accepting of medications and consumed 75% of breakfast and 100% of lunch. Pt is pleasant in conversation and fixated on discharge. Pt reports " I cant wait to get out of here, light at the end of the tunnel". Pt attends select groups and is social with select peers. Prn atropine administered with morning medication.

## 2023-07-25 NOTE — SOCIAL WORK
SW and patient met privately for patient's phone appointment with [employer] to apply for SSI benefits. Patient was calm and appropriate throughout the call, and able to answer most questions independently. We were informed that the claim is going to be submitted under [facility] as they consider this hospital his residence. Once he gets to his [facility] he and a staff from the home need to call [place] [employer] to update his address. Additionally, we were advised that his name be taken off of the [facility] account as he won't qualify due to the amount of funds in the account; they recommend that his parents create an [employer] account for him if they want to save money for him. Patient and SW informed that it will take about 4 months for this application to be fully processed and approved. Patient was concerned that he would have to wait in the hospital for it to be approved, but SW reassured him that this is not the case.

## 2023-07-26 PROCEDURE — 99232 SBSQ HOSP IP/OBS MODERATE 35: CPT | Performed by: PSYCHIATRY & NEUROLOGY

## 2023-07-26 RX ADMIN — PROPRANOLOL HYDROCHLORIDE 10 MG: 10 TABLET ORAL at 21:09

## 2023-07-26 RX ADMIN — PROPRANOLOL HYDROCHLORIDE 10 MG: 10 TABLET ORAL at 17:17

## 2023-07-26 RX ADMIN — CLONAZEPAM 0.5 MG: 0.5 TABLET ORAL at 09:33

## 2023-07-26 RX ADMIN — BENZTROPINE MESYLATE 1 MG: 1 TABLET ORAL at 17:18

## 2023-07-26 RX ADMIN — ATROPINE SULFATE 1 DROP: 10 SOLUTION OPHTHALMIC at 09:34

## 2023-07-26 RX ADMIN — LITHIUM CARBONATE 450 MG: 450 TABLET, EXTENDED RELEASE ORAL at 09:33

## 2023-07-26 RX ADMIN — Medication 10 MG: at 21:09

## 2023-07-26 RX ADMIN — HALOPERIDOL 5 MG: 5 TABLET ORAL at 09:33

## 2023-07-26 RX ADMIN — LITHIUM CARBONATE 450 MG: 450 TABLET, EXTENDED RELEASE ORAL at 21:09

## 2023-07-26 RX ADMIN — BENZTROPINE MESYLATE 1 MG: 1 TABLET ORAL at 09:33

## 2023-07-26 RX ADMIN — CLONAZEPAM 0.5 MG: 0.5 TABLET ORAL at 17:18

## 2023-07-26 RX ADMIN — HALOPERIDOL 5 MG: 5 TABLET ORAL at 17:18

## 2023-07-26 RX ADMIN — MIRTAZAPINE 7.5 MG: 7.5 TABLET, FILM COATED ORAL at 21:09

## 2023-07-26 NOTE — NURSING NOTE
Patient was visible in the milieu with minimal peer interaction. Report anxiety, denies all other psych s/s. Routine klonopin was given for anxiety. No behaviors noted. Had 100% for dinner. Attended 2/3 evening groups. Took all his medications. Safety checks ongoing.

## 2023-07-26 NOTE — PROGRESS NOTES
07/26/23 0859   Team Meeting   Meeting Type Daily Rounds   Team Members Present   Team Members Present Physician;Nurse;   Physician Team Member Preston Bolivar, [place]   Nursing Team Member Francy   Care Management Team Member Larisa   Patient/Family Present   Patient Present No   Patient's Family Present No     Pt social with peers, visible in milieu. Compliant with meds and meals. Continue Haldol, Lithium, Remeron and Cogentin. Pt attended 4/8 groups. Discharge to be determined. Continue to monitor.

## 2023-07-26 NOTE — PLAN OF CARE
Problem: Risk for Self Injury/Neglect  Goal: Treatment Goal: Remain safe during length of stay, learn and adopt new coping skills, and be free of self-injurious ideation, impulses and acts at the time of discharge  Outcome: Progressing     Problem: Depression  Goal: Treatment Goal: Demonstrate behavioral control of depressive symptoms, verbalize feelings of improved mood/affect, and adopt new coping skills prior to discharge  Outcome: Progressing  Goal: Refrain from isolation  Description: Interventions:  - Develop a trusting relationship   - Encourage socialization   Outcome: Progressing     Problem: Anxiety  Goal: Anxiety is at manageable level  Description: Interventions:  - Assess and monitor patient's anxiety level. - Monitor for signs and symptoms (heart palpitations, chest pain, shortness of breath, headaches, nausea, feeling jumpy, restlessness, irritable, apprehensive). - Collaborate with interdisciplinary team and initiate plan and interventions as ordered.   - [place] patient to unit/surroundings  - Explain treatment plan  - Encourage participation in care  - Encourage verbalization of concerns/fears  - Identify coping mechanisms  - Assist in developing anxiety-reducing skills  - Administer/offer alternative therapies  - Limit or eliminate stimulants  Outcome: Progressing

## 2023-07-26 NOTE — NURSING NOTE
Pt is accepting of medications, however Inderal held for BP below parameters. Pt consumed 0% of breakfast and 100% of lunch. Prn atropine given with morning medications. Pt is polite, pleasant and denies s/s. Pt watching TV or resting in bed. Pt offers no complaints only reporting " just waiting for my release date".

## 2023-07-26 NOTE — PROGRESS NOTES
Psychiatric Progress Note - Department of [address] 21 y.o. male MRN: [id number]  Unit/Bed#: CRUZITO RAMIREZ [facility] [address] Encounter: [id number]    ASSESSMENT & PLAN     Principal Problem:    Severe episode of recurrent major depressive disorder, without psychotic features ([address])  Active Problems:    Medical clearance for psychiatric admission    Autism spectrum disorder    Mood disorder ([address])    Anxiety disorder    Rash      • Continue to promote patient participation in therapeutic milieu. • Continue medical management per medicine. • Continue previously prescribed psychotropic medication regimen; see below. • Continue behavioral health checks q7 minutes. • Continue vitals per behavioral health unit protocol. • Discharge date per primary team and CM (per social work, expected next month), patient accepted at [facility] on 7/24; 201 commitment status. • Continue preparing for [facility]  • Continue monitoring bilateral hand tremors and drooling    SUBJECTIVE     Patient evaluated this a.m. for continuity of care. Per treatment team, patient had no behavioral issues yesterday. He attended 4/8 groups and slept well. This morning, patient reported feeling tired, saying that it was too early for him to feel anxious. He reports that his anxiety has been getting moderately better in the time that he's been here. Patient reports attending groups, walking around, writing in his journal about his time here, and calling his mother and sister on the phone where they talked about his discharge. He denies any appetite changes - he says that he ate all of his dinner last night. Patient had not yet had breakfast.    Patient reports that his tremor and drooling have been the same. He denies any other signs or symptoms. He denies any auditory or visual hallucinations, suicidal or homicidal ideation, plan or intent.  He did not inappropriately smile or chuckle during the interview today, possibly because he was too tired, but maintained his increased speech latency and lack of eye contact. Patient was discussed at length with nursing and treatment team. No acute distress is noted throughout evaluation. Kerrie Santiago denies suicidal/homicidal ideation; contracts for safety. PSYCHIATRIC REVIEW OF SYSTEMS     Behavior over the last 24 hours:  unchanged  Sleep: normal, woke up twice to use the bathroom  Appetite: normal, ate his dinner  Medication side effects: Yes, drooling and mild bilateral hand tremor    REVIEW OF SYSTEMS   Review of systems: no complaints, midl bilateral hand tremor and all other systems are negative    OBJECTIVE     Vital Signs in Past 24 Hours:  Temp:  [97.6 °F (36.4 °C)-97.7 °F (36.5 °C)] 97.7 °F (36.5 °C)  HR:  [62-79] 62  Resp:  [16-18] 16  BP: (107-122)/(55-65) 107/55    Intake/Output in Past 24 hours:  No intake/output data recorded. No intake/output data recorded. Laboratory Results:  Labs in last 72 hours: No results for input(s): "WBC", "RBC", "HGB", "HCT", "PLT", "RDW", "TOTANEUTABS", "Lujean Lager", "SODIUM", "K", "CL", "CO2", "BUN", "CREATININE", "GLUC", "GLUF", "CALCIUM", "AST", "ALT", "ALKPHOS", "TP", "ALB", "TBILI", "CHOLESTEROL", "HDL", "TRIG", "LDLCALC", "VALPROICTOT", "CARBAMAZEPIN", "LITHIUM", "AMMONIA", "[id number]", "Ismael Andes", "Joanell Marking", "PREGTESTUR", "PREGSERUM", "HCG", "HCGQUANT", "RPR" in the last 72 hours. Behavioral Health Medications: all current active meds have been reviewed and continue current psychiatric medications.     Current Facility-Administered Medications   Medication Dose Route Frequency Provider Last Rate   • acetaminophen  650 mg Oral Q6H PRN Orval Royer, DO     • acetaminophen  650 mg Oral Q4H PRN Orval Royer, DO     • acetaminophen  975 mg Oral Q6H PRN Orval Royer, DO     • aluminum-magnesium hydroxide-simethicone  30 mL Oral Q4H PRN Ortatiana Linton, DO     • ammonium lactate   Topical BID PRN Demetrice Leon Valdemar Bro PA-C     • atropine  1 drop Sublingual TID PRN Inna Rose PA-C     • benztropine  1 mg Intramuscular Q4H PRN Max 6/day Clearnce Roll, DO     • benztropine  1 mg Oral Q4H PRN Max 6/day Clearnce Roll, DO     • benztropine  1 mg Oral BID Clearnce Roll, DO     • clonazePAM  0.5 mg Oral BID Niranjan Bailon MD     • hydrOXYzine HCL  50 mg Oral Q6H PRN Max 4/day Clearnce Roll, DO      Or   • diphenhydrAMINE  50 mg Intramuscular Q6H PRN Clearnce Roll, DO     • diphenhydrAMINE  25 mg Oral Q6H PRN Enzo Pastel, DO     • glycerin-hypromellose-  1 drop Both Eyes Q3H PRN Clearnce Roll, DO     • haloperidol  5 mg Oral BID Niranjan Bailon MD     • hydrOXYzine HCL  100 mg Oral Q6H PRN Max 4/day Clearnce Roll, DO      Or   • LORazepam  2 mg Intramuscular Q6H PRN Clearnce Roll, DO     • hydrOXYzine HCL  25 mg Oral Q6H PRN Max 4/day Clearnce Roll, DO     • lithium carbonate  450 mg Oral Q12H Mary Joaquin MD     • melatonin  10 mg Oral HS Niranjan Bailon MD     • mirtazapine  7.5 mg Oral HS Clearnce Roll, DO     • OLANZapine  10 mg Oral Q3H PRN Max 3/day Clearnce Roll, DO      Or   • OLANZapine  10 mg Intramuscular Q3H PRN Max 3/day Clearnce Roll, DO     • OLANZapine  5 mg Oral Q3H PRN Max 6/day Clearnce Roll, DO      Or   • OLANZapine  5 mg Intramuscular Q3H PRN Max 6/day Clearnce Roll, DO     • OLANZapine  2.5 mg Oral Q3H PRN Max 8/day Clearnce Roll, DO     • polyethylene glycol  17 g Oral Daily PRN Clearnce Roll, DO     • propranolol  10 mg Oral Q8H PRN Clearnce Roll, DO     • propranolol  10 mg Oral Q8H PRN Niranjan Bailon MD     • propranolol  10 mg Oral TID Niranjan Bailon MD     • senna-docusate sodium  1 tablet Oral Daily PRN Clearnce Roll, DO     • traZODone  50 mg Oral HS PRN Clearnce Roll, DO         Risks, benefits and possible side effects of Medications:   Risks, benefits, and possible side effects of medications explained to patient and patient verbalizes understanding. Mental Status Evaluation:  Appearance:  age appropriate, casually dressed, disheveled and wearing a white t-shirt, PJ bottoms, patient just woke up and looked very tired. Patient had minimal eye contact   Behavior:  calm, cooperative   Speech:  increased latency of response and normal pitch and volume, coherent, fluent   Mood:  anxious   Affect:  tired, anxious, mood-congruent   Language naming objects and repeating phrases   Thought Process:  concrete, goal directed and logical   Thought Content:  normal and no verbalized phobias, precoccupations, delusions, obsessions   Perceptual Disturbances: None and no auditory/visual hallucinations, does not appear to be responding to internal stimuli   Risk Potential: Suicidal Ideations none, Homicidal Ideations none and Potential for Aggression No   Sensorium:  person, place and situation   Cognition:  recent and remote memory grossly intact   Consciousness:  awake and drowsy    Attention: attention span and concentration were age appropriate   Insight:  limited   Judgment: limited   Intellect    Gait/Station: did not assess, patient was in bed   Motor Activity: abnormal movements noted: mild bilateral hand tremor     Memory: Short and long term memory  intact     Progress Toward Goals: progressing, as evidenced by their participation in individual, social and therapeutic milieu in addition to adherence to their medication regimen. Patient feels as though his anxiety has gotten moderately better.   Patient Acceptance: patient seemed to accept treatment and medications   Patient Understanding: patient seemed to understand treatment and medications  Patient Involvement in Reintegration Process: patient attended 4/8 groups, spoke to his mother and sister on the phone yesterday  Patient's Therapeutic Relationship with Psychiatrist: patient remains cooperative during interview  Patient's Optimism Regarding Recovery: patient reports being optimistic about recovery    Recommended Treatment:   See above for assessment and plan. Inpatient Psychiatric Certification: I certify that inpatient services are medically necessary for this patient for a duration of greater that 2 midnights for the treatment of Severe episode of recurrent major depressive disorder, without psychotic features ([address])    See H&P and MD Progress Notes for additional information about the patient's course of treatment. Counseling/Coordination of Care    Total unit time spent today ws greater than 15 minutes. Greater than 50% of total time was spent with the patient and/or patient's relatives and/or coordination of patient's care. Patient's rights, confidentiality, exceptions to confidentiality, use of electronic medical record including appropriate staff access to medical record regarding behavioral health services and consent to treatment were reviewed. Note Share:      This note was not shared with the patient due to reasonable likelihood of causing patient harm     [employer]

## 2023-07-27 PROCEDURE — 99232 SBSQ HOSP IP/OBS MODERATE 35: CPT | Performed by: PSYCHIATRY & NEUROLOGY

## 2023-07-27 RX ADMIN — LITHIUM CARBONATE 450 MG: 450 TABLET, EXTENDED RELEASE ORAL at 21:24

## 2023-07-27 RX ADMIN — BENZTROPINE MESYLATE 1 MG: 1 TABLET ORAL at 17:14

## 2023-07-27 RX ADMIN — PROPRANOLOL HYDROCHLORIDE 10 MG: 10 TABLET ORAL at 21:24

## 2023-07-27 RX ADMIN — BENZTROPINE MESYLATE 1 MG: 1 TABLET ORAL at 08:26

## 2023-07-27 RX ADMIN — CLONAZEPAM 0.5 MG: 0.5 TABLET ORAL at 08:26

## 2023-07-27 RX ADMIN — PROPRANOLOL HYDROCHLORIDE 10 MG: 10 TABLET ORAL at 17:14

## 2023-07-27 RX ADMIN — Medication 10 MG: at 21:25

## 2023-07-27 RX ADMIN — CLONAZEPAM 0.5 MG: 0.5 TABLET ORAL at 17:14

## 2023-07-27 RX ADMIN — LITHIUM CARBONATE 450 MG: 450 TABLET, EXTENDED RELEASE ORAL at 08:25

## 2023-07-27 RX ADMIN — HALOPERIDOL 5 MG: 5 TABLET ORAL at 08:26

## 2023-07-27 RX ADMIN — HALOPERIDOL 5 MG: 5 TABLET ORAL at 17:14

## 2023-07-27 RX ADMIN — MIRTAZAPINE 7.5 MG: 7.5 TABLET, FILM COATED ORAL at 21:25

## 2023-07-27 RX ADMIN — ATROPINE SULFATE 1 DROP: 10 SOLUTION OPHTHALMIC at 10:53

## 2023-07-27 NOTE — PROGRESS NOTES
07/27/23 0830   Team Meeting   Meeting Type Daily Rounds   Initial Conference Date 07/27/23   Patient/Family Present   Patient Present No   Patient's Family Present No     Daily Rounds Documentation     Team Members Present:   MD Dr. [address] DO Diana Baptiste, Vega Cesar, RN  Keith Keene, [facility]    Inderal held due to parameters. Atropine PRN given once. No behavioral issues. Attended 4/7 groups. Compliant with medications and meals. Slept.

## 2023-07-27 NOTE — PROGRESS NOTES
Psychiatric Progress Note - Department of [address] 21 y.o. male MRN: [id number]  Unit/Bed#: CRUZITO RAMIREZ [facility] 416-68 Encounter: [id number]    ASSESSMENT & PLAN     Principal Problem:    Severe episode of recurrent major depressive disorder, without psychotic features ([address])  Active Problems:    Medical clearance for psychiatric admission    Autism spectrum disorder    Mood disorder ([address])    Anxiety disorder    Rash    • Continue to promote patient participation in therapeutic milieu. • Continue medical management per medicine. • Continue previously prescribed psychotropic medication regimen; see below. • Continue behavioral health checks q7 minutes. • Continue vitals per behavioral health unit protocol. • Discharge date per primary team and CM (per social work, expected next month), patient accepted at [facility] on 7/24; 201 commitment status. • Continue preparing for [facility]  • Continue monitoring bilateral hand tremors and drooling    SUBJECTIVE     Patient evaluated this a.m. for continuity of care. Per treatment team, patient had no behavioral issues yesterday. He attended 4/7 groups and is excited for discharge. This morning, patient reported feeling "okay" and "not anxious". He says things are "same old". Patient has been keeping busy attending groups, exercising by doing push-ups, and writing in his journal. He reported showering yesterday. Patient didn't call his family yesterday. He denies any changes in appetite or sleep. He slept well last night. He denies any feelings of anger/aggression towards anyone. He denies any bizarre thoughts or auditory/visual hallucination. No over psychotic symptoms. No suicidal/homicidal ideation, plan or intent. Patient's mild bilateral hand tremor and drooling is unchanged. He denies any new signs or symptoms. Patient says that everything is going well for him "for the most part".  He says that he doesn't want to be here and is excited to leave. Patient continued to inappropriately smile and chuckle throughout the interview. He maintained his increased speech latency and lack of eye contact. Patient was discussed at length with nursing and treatment team. No acute distress is noted throughout evaluation. Argentina Bejarano denies suicidal/homicidal ideation; contracts for safety. PSYCHIATRIC REVIEW OF SYSTEMS     Behavior over the last 24 hours:  unchanged  Sleep: normal, woke up twice to use the bathroom  Appetite: normal, ate his breakfast  Medication side effects: Yes, drooling and mild bilateral hand tremor    REVIEW OF SYSTEMS   Review of systems: no complaints, mild bilateral hand tremor and all other systems are negative    OBJECTIVE     Vital Signs in Past 24 Hours:  Temp:  [97.5 °F (36.4 °C)-97.6 °F (36.4 °C)] 97.5 °F (36.4 °C)  HR:  [71-76] 71  Resp:  [18] 18  BP: (107-118)/(63-76) 107/63    Intake/Output in Past 24 hours:  No intake/output data recorded. No intake/output data recorded. Laboratory Results:  I have personally reviewed all pertinent laboratory/tests results. Labs in last 72 hours: No results for input(s): "WBC", "RBC", "HGB", "HCT", "PLT", "RDW", "TOTANEUTABS", "NEUTROABS", "SODIUM", "K", "CL", "CO2", "BUN", "CREATININE", "GLUC", "GLUF", "CALCIUM", "AST", "ALT", "ALKPHOS", "TP", "ALB", "TBILI", "CHOLESTEROL", "HDL", "TRIG", "LDLCALC", "VALPROICTOT", "CARBAMAZEPIN", "LITHIUM", "AMMONIA", "[id number]", "Teola Ildefonso", "[place]", "PREGTESTUR", "PREGSERUM", "HCG", "HCGQUANT", "RPR" in the last 72 hours.     Behavioral Health Medications:   current meds:   Current Facility-Administered Medications   Medication Dose Route Frequency   • acetaminophen (TYLENOL) tablet 650 mg  650 mg Oral Q6H PRN   • acetaminophen (TYLENOL) tablet 650 mg  650 mg Oral Q4H PRN   • acetaminophen (TYLENOL) tablet 975 mg  975 mg Oral Q6H PRN   • aluminum-magnesium hydroxide-simethicone (MYLANTA) oral suspension 30 mL  30 mL Oral Q4H PRN   • ammonium lactate (LAC-HYDRIN) 12 % lotion   Topical BID PRN   • atropine (ISOPTO ATROPINE) 1 % ophthalmic solution 1 drop  1 drop Sublingual TID PRN   • benztropine (COGENTIN) injection 1 mg  1 mg Intramuscular Q4H PRN Max 6/day   • benztropine (COGENTIN) tablet 1 mg  1 mg Oral Q4H PRN Max 6/day   • benztropine (COGENTIN) tablet 1 mg  1 mg Oral BID   • clonazePAM (KlonoPIN) tablet 0.5 mg  0.5 mg Oral BID   • hydrOXYzine HCL (ATARAX) tablet 50 mg  50 mg Oral Q6H PRN Max 4/day    Or   • diphenhydrAMINE (BENADRYL) injection 50 mg  50 mg Intramuscular Q6H PRN   • diphenhydrAMINE (BENADRYL) tablet 25 mg  25 mg Oral Q6H PRN   • glycerin-hypromellose- (ARTIFICIAL TEARS) ophthalmic solution 1 drop  1 drop Both Eyes Q3H PRN   • haloperidol (HALDOL) tablet 5 mg  5 mg Oral BID   • hydrOXYzine HCL (ATARAX) tablet 100 mg  100 mg Oral Q6H PRN Max 4/day    Or   • LORazepam (ATIVAN) injection 2 mg  2 mg Intramuscular Q6H PRN   • hydrOXYzine HCL (ATARAX) tablet 25 mg  25 mg Oral Q6H PRN Max 4/day   • lithium carbonate (LITHOBID) CR tablet 450 mg  450 mg Oral Q12H WALLY   • melatonin tablet 10 mg  10 mg Oral HS   • mirtazapine (REMERON) tablet 7.5 mg  7.5 mg Oral HS   • OLANZapine (ZyPREXA) tablet 10 mg  10 mg Oral Q3H PRN Max 3/day    Or   • OLANZapine (ZyPREXA) IM injection 10 mg  10 mg Intramuscular Q3H PRN Max 3/day   • OLANZapine (ZyPREXA) tablet 5 mg  5 mg Oral Q3H PRN Max 6/day    Or   • OLANZapine (ZyPREXA) IM injection 5 mg  5 mg Intramuscular Q3H PRN Max 6/day   • OLANZapine (ZyPREXA) tablet 2.5 mg  2.5 mg Oral Q3H PRN Max 8/day   • polyethylene glycol (MIRALAX) packet 17 g  17 g Oral Daily PRN   • propranolol (INDERAL) tablet 10 mg  10 mg Oral Q8H PRN   • propranolol (INDERAL) tablet 10 mg  10 mg Oral Q8H PRN   • propranolol (INDERAL) tablet 10 mg  10 mg Oral TID   • senna-docusate sodium (SENOKOT S) 8.6-50 mg per tablet 1 tablet  1 tablet Oral Daily PRN   • traZODone (DESYREL) tablet 50 mg  50 mg Oral HS PRN   .     Current Facility-Administered Medications   Medication Dose Route Frequency Provider Last Rate   • acetaminophen  650 mg Oral Q6H PRN Kathlean Nakai, DO     • acetaminophen  650 mg Oral Q4H PRN Kathlean Nakai, DO     • acetaminophen  975 mg Oral Q6H PRN Kathlean Nakai, DO     • aluminum-magnesium hydroxide-simethicone  30 mL Oral Q4H PRN Kathlean Nakai, DO     • ammonium lactate   Topical BID PRN Ozzy Sexton PA-C     • atropine  1 drop Sublingual TID PRN Artem Hernandez PA-C     • benztropine  1 mg Intramuscular Q4H PRN Max 6/day Kathljaylan Nakai, DO     • benztropine  1 mg Oral Q4H PRN Max 6/day Kathlean Nakai, DO     • benztropine  1 mg Oral BID Kathlean Nakai, DO     • clonazePAM  0.5 mg Oral BID Armen Tran MD     • hydrOXYzine HCL  50 mg Oral Q6H PRN Max 4/day Katmarlene Nakai, DO      Or   • diphenhydrAMINE  50 mg Intramuscular Q6H PRN Katmarlene Nakai, DO     • diphenhydrAMINE  25 mg Oral Q6H PRN Nahumayde [place], DO     • glycerin-hypromellose-  1 drop Both Eyes Q3H PRN Nishant Nakai, DO     • haloperidol  5 mg Oral BID Armen Tran MD     • hydrOXYzine HCL  100 mg Oral Q6H PRN Max 4/day Katmarlene Nakai, DO      Or   • LORazepam  2 mg Intramuscular Q6H PRN Katmarlene Nakai, DO     • hydrOXYzine HCL  25 mg Oral Q6H PRN Max 4/day Kathlean Nakai, DO     • lithium carbonate  450 mg Oral Q12H Penny Rojas MD     • melatonin  10 mg Oral HS Armen Tran MD     • mirtazapine  7.5 mg Oral HS Kathlean Nakai, DO     • OLANZapine  10 mg Oral Q3H PRN Max 3/day Kathlean Nakai, DO      Or   • OLANZapine  10 mg Intramuscular Q3H PRN Max 3/day Kathlean Nakai, DO     • OLANZapine  5 mg Oral Q3H PRN Max 6/day Kathlean Nakai, DO      Or   • OLANZapine  5 mg Intramuscular Q3H PRN Max 6/day Ciro Watters DO     • OLANZapine  2.5 mg Oral Q3H PRN Max 8/day Ciro Watters DO     • polyethylene glycol  17 g Oral Daily PRN Adonica Dakins Janene,      • propranolol  10 mg Oral Q8H PRN Cielo Purpura, DO     • propranolol  10 mg Oral Q8H PRN Jarocho Thompson MD     • propranolol  10 mg Oral TID Jarocho Thompson MD     • senna-docusate sodium  1 tablet Oral Daily PRN Cielo Purpura, DO     • traZODone  50 mg Oral HS PRN Cielo Purpura, DO         Risks, benefits and possible side effects of Medications:   Risks, benefits, and possible side effects of medications explained to patient and patient verbalizes understanding. Mental Status Evaluation:  Appearance:  age appropriate, casually dressed, disheveled and dressed in a blue long sleeve t-shirt, PJ bottoms, with uncombed hair. Appropriate hygiene. Rare eye contact   Behavior:  calm, cooperative, inappropriate laughter and chuckling   Speech:  increased latency of response and normal pitch and volume, coherent, fluent   Mood:  reports feeling "okay", sad   Affect:  mood-congruent   Language naming objects and repeating phrases   Thought Process:  concrete, goal directed and logical   Thought Content:  normal and no verbalized phobias, preoccuptions, delusions, obsessions   Perceptual Disturbances: None and no AVH, does not appear to be responding to internal stimuli   Risk Potential: Suicidal Ideations none, Homicidal Ideations none and Potential for Aggression No   Sensorium:  person, place, time/date and situation   Cognition:  recent and remote memory grossly intact   Consciousness:  alert and awake    Attention: attention span appeared shorter than expected for age   Insight:  limited   Judgment: limited   Intellect    Gait/Station: normal gait/station and normal balance   Motor Activity: abnormal movement noted  tremor : mild bilateral hand tremor     Memory: Short and long term memory  intact     Progress Toward Goals: progressing, as evidenced by their participation in individual, social and therapeutic milieu in addition to adherence to their medication regimen.  Attended 4/7 groups. Patient Acceptance: patient seemed to accept treatment and medications   Patient Understanding: patient seemed to understand treatment and medications   Patient Involvement in Reintegration Process: Patient attended 4/7 groups yesterday. He is anxious for discharge, he says he doesn't want to be here  Patient's Therapeutic Relationship with Psychiatrist: patient remains cooperative during interview  Patient's Optimism Regarding Recovery: patient reports being optimistic about recovery    Recommended Treatment:   See above for assessment and plan. Inpatient Psychiatric Certification: I certify that inpatient services are medically necessary for this patient for a duration of greater that 2 midnights for the treatment of Severe episode of recurrent major depressive disorder, without psychotic features ([address])    See H&P and MD Progress Notes for additional information about the patient's course of treatment. Counseling/Coordination of Care    Total unit time spent today ws greater than 15 minutes. Greater than 50% of total time was spent with the patient and/or patient's relatives and/or coordination of patient's care. Patient's rights, confidentiality, exceptions to confidentiality, use of electronic medical record including appropriate staff access to medical record regarding behavioral health services and consent to treatment were reviewed. Note Share:      This note was not shared with the patient due to reasonable likelihood of causing patient harm     [employer]

## 2023-07-27 NOTE — PLAN OF CARE
Problem: Ineffective Coping  Goal: Identifies ineffective coping skills  Outcome: Progressing     Problem: Risk for Self Injury/Neglect  Goal: Treatment Goal: Remain safe during length of stay, learn and adopt new coping skills, and be free of self-injurious ideation, impulses and acts at the time of discharge  Outcome: Progressing     Problem: Anxiety  Goal: Anxiety is at manageable level  Description: Interventions:  - Assess and monitor patient's anxiety level. - Monitor for signs and symptoms (heart palpitations, chest pain, shortness of breath, headaches, nausea, feeling jumpy, restlessness, irritable, apprehensive). - Collaborate with interdisciplinary team and initiate plan and interventions as ordered.   - [place] patient to unit/surroundings  - Explain treatment plan  - Encourage participation in care  - Encourage verbalization of concerns/fears  - Identify coping mechanisms  - Assist in developing anxiety-reducing skills  - Administer/offer alternative therapies  - Limit or eliminate stimulants  Outcome: Progressing

## 2023-07-27 NOTE — NURSING NOTE
Vaibhav Pedro has been awake, alert, and visible intermittently out in the milieu. Pt went out on the deck with staff and peers for fresh air group. Pt ate 75% supper. Affect bright, pleasant, cooperative. Pt rests in room and watches tv in dining room with peers at times. Social with staff and walks around unit at intervals. Pt denies any depression, utilized his coping skills for anxiety, denies any a/v hallucinations, and has not verbalized any delusions. Pt still delayed in verbal responses in conversation, stares, preoccupied, and easily distracted. Pt attended and participated in evening nursing group, wrap up group, and had snack with peers after. Cooperative with taking scheduled meds as ordered and without incident. Will continue to monitor/assess for any changes in behavior.

## 2023-07-27 NOTE — NURSING NOTE
Pt is accepting of medications and consumed 100% of both meals. Pt is visible in the milieu and sits with peers, but minimal interaction. Pt smiles and stares into nurses station with delayed response to questions, but appropriate otherwise in conversation and makes needs known. Prn atropine given as ordered with morning medications. Pt offers no further complaints.

## 2023-07-27 NOTE — SOCIAL WORK
E-mail received from [employer] stating that they are still waiting for the waiver to be approved.

## 2023-07-27 NOTE — NURSING NOTE
Odilon Nath has been awake, alert, and visible intermittently out in the milieu. Pt ate 100% supper. Pt rests in room at intervals and watches tv with peers in dining room. Pt utilizes his coping skills for anxiety. Pt attended and participated in evening group, wrap up group, and had snack with peers after. Pt remains delayed in verbal responses, stares, is incongruent, has inappropriate smiling, and is preoccupied. Pt cooperative with taking scheduled meds. Continue to monitor/assess for any changes in behavior.

## 2023-07-28 PROCEDURE — 99232 SBSQ HOSP IP/OBS MODERATE 35: CPT | Performed by: PSYCHIATRY & NEUROLOGY

## 2023-07-28 RX ADMIN — BENZTROPINE MESYLATE 1 MG: 1 TABLET ORAL at 17:23

## 2023-07-28 RX ADMIN — PROPRANOLOL HYDROCHLORIDE 10 MG: 10 TABLET ORAL at 21:12

## 2023-07-28 RX ADMIN — ATROPINE SULFATE 1 DROP: 10 SOLUTION OPHTHALMIC at 17:25

## 2023-07-28 RX ADMIN — CLONAZEPAM 0.5 MG: 0.5 TABLET ORAL at 17:23

## 2023-07-28 RX ADMIN — HALOPERIDOL 5 MG: 5 TABLET ORAL at 17:23

## 2023-07-28 RX ADMIN — LITHIUM CARBONATE 450 MG: 450 TABLET, EXTENDED RELEASE ORAL at 08:05

## 2023-07-28 RX ADMIN — TRAZODONE HYDROCHLORIDE 50 MG: 50 TABLET ORAL at 00:01

## 2023-07-28 RX ADMIN — MIRTAZAPINE 7.5 MG: 7.5 TABLET, FILM COATED ORAL at 21:12

## 2023-07-28 RX ADMIN — PROPRANOLOL HYDROCHLORIDE 10 MG: 10 TABLET ORAL at 17:23

## 2023-07-28 RX ADMIN — CLONAZEPAM 0.5 MG: 0.5 TABLET ORAL at 08:05

## 2023-07-28 RX ADMIN — BENZTROPINE MESYLATE 1 MG: 1 TABLET ORAL at 08:05

## 2023-07-28 RX ADMIN — Medication 10 MG: at 21:12

## 2023-07-28 RX ADMIN — HALOPERIDOL 5 MG: 5 TABLET ORAL at 08:05

## 2023-07-28 RX ADMIN — LITHIUM CARBONATE 450 MG: 450 TABLET, EXTENDED RELEASE ORAL at 21:12

## 2023-07-28 NOTE — PROGRESS NOTES
Psychiatric Progress Note - Department of [address] 21 y.o. male MRN: [id number]  Unit/Bed#: CRUZITO RAMIREZ LLOYD [facility] 369-21 Encounter: [id number]    ASSESSMENT & PLAN     Principal Problem:    Severe episode of recurrent major depressive disorder, without psychotic features ([address])  Active Problems:    Medical clearance for psychiatric admission    Autism spectrum disorder    Mood disorder ([address])    Anxiety disorder    Rash    • Continue to promote patient participation in therapeutic milieu. • Continue medical management per medicine. • Continue previously prescribed psychotropic medication regimen; see below. • Continue behavioral health checks q7 minutes. • Continue vitals per behavioral health unit protocol. • Discharge date per primary team and CM (per social work, expected next month), patient accepted at [facility] on 7/24; 201 commitment status   • Continue preparing for [facility]  • Continue monitoring bilateral hand tremors and drooling    SUBJECTIVE     Patient evaluated this a.m. for continuity of care. Per treatment team, patient had no behavioral issues yesterday or changes in behabior. He continued his pattern of inappropriate smiling and also staring into the nurse's area. He attended 4/8 groups. This morning, patient was woken up for the interview. He reports not getting a lot of sleep last night due to a loud beeping noise. Per treatment team, he got Trazodone at 12:01 to help with sleep. Patient was very drowsy during the interview; his eyes were not fully open. Patient had not yet had breakfast yet but reports no changes in his appetite - he had his dinner last night. Patient also showered and exercised (push-ups) yesterday. In addition to attending groups, he also wrote in his journal and walked around. Patient reports his tremor is improving; his drooling has remained the same. He denies any feelings of anger/aggression towards anyone.  He denies any bizarre thoughts or auditory/visual hallucination. No overtly psychotic symptoms. No suicidal/homicidal ideation, plan or intent. Patient continued to inappropriately smile throughout the interview. He maintained his increased speech latency and lack of eye contact.      Patient was discussed at length with nursing and treatment team. No acute distress is noted throughout evaluation. Cher Galeana denies suicidal/homicidal ideation; contracts for safety. PSYCHIATRIC REVIEW OF SYSTEMS     Behavior over the last 24 hours:  unchanged  Sleep: wasn't able to sleep much due to a loud beeping noise  Appetite: normal  Medication side effects: Yes, drooling and mild bilateral hand tremor    REVIEW OF SYSTEMS   Review of systems: no complaints, mild bilateral hand tremor and all other systems are negative    OBJECTIVE     Vital Signs in Past 24 Hours:  Temp:  [97.6 °F (36.4 °C)-97.8 °F (36.6 °C)] 97.6 °F (36.4 °C)  HR:  [] 80  Resp:  [18] 18  BP: (104-121)/(55-66) 109/66    Intake/Output in Past 24 hours:  No intake/output data recorded. No intake/output data recorded. Laboratory Results:  I have personally reviewed all pertinent laboratory/tests results. Labs in last 72 hours: No results for input(s): "WBC", "RBC", "HGB", "HCT", "PLT", "RDW", "TOTANEUTABS", "NEUTROABS", "SODIUM", "K", "CL", "CO2", "BUN", "CREATININE", "GLUC", "GLUF", "CALCIUM", "AST", "ALT", "ALKPHOS", "TP", "ALB", "TBILI", "CHOLESTEROL", "HDL", "TRIG", "LDLCALC", "VALPROICTOT", "CARBAMAZEPIN", "LITHIUM", "AMMONIA", "[id number]", "[place]", "Skip José Miguel", "PREGTESTUR", "PREGSERUM", "HCG", "HCGQUANT", "RPR" in the last 72 hours. Behavioral Health Medications: all current active meds have been reviewed.     Current Facility-Administered Medications   Medication Dose Route Frequency Provider Last Rate   • acetaminophen  650 mg Oral Q6H PRN Neal Marge, DO     • acetaminophen  650 mg Oral Q4H PRN Ángel Bird, DO     • acetaminophen 975 mg Oral Q6H PRN Cam Luchanell, DO     • aluminum-magnesium hydroxide-simethicone  30 mL Oral Q4H PRN Cam Luchanell, DO     • ammonium lactate   Topical BID PRN Abhijit Easley PA-C     • atropine  1 drop Sublingual TID PRN Jesus Good PA-C     • benztropine  1 mg Intramuscular Q4H PRN Max 6/day Cam Lukes, DO     • benztropine  1 mg Oral Q4H PRN Max 6/day Cam Lukes, DO     • benztropine  1 mg Oral BID Cam Luchanell, DO     • clonazePAM  0.5 mg Oral BID Denver Rohrer, MD     • hydrOXYzine HCL  50 mg Oral Q6H PRN Max 4/day Cam Urena, DO      Or   • diphenhydrAMINE  50 mg Intramuscular Q6H PRN Cam Urena, DO     • diphenhydrAMINE  25 mg Oral Q6H PRN Alessio Lizama, DO     • glycerin-hypromellose-  1 drop Both Eyes Q3H PRN Cam Urena, DO     • haloperidol  5 mg Oral BID Denver Rohrer, MD     • hydrOXYzine HCL  100 mg Oral Q6H PRN Max 4/day Cam Urena, DO      Or   • LORazepam  2 mg Intramuscular Q6H PRN Cam Urena, DO     • hydrOXYzine HCL  25 mg Oral Q6H PRN Max 4/day Cam Luchanell, DO     • lithium carbonate  450 mg Oral Q12H Darío Gould MD     • melatonin  10 mg Oral HS Denver Rohrer, MD     • mirtazapine  7.5 mg Oral HS Cam Urena, DO     • OLANZapine  10 mg Oral Q3H PRN Max 3/day Cam Romel, DO      Or   • OLANZapine  10 mg Intramuscular Q3H PRN Max 3/day Cam Lukes, DO     • OLANZapine  5 mg Oral Q3H PRN Max 6/day Cam Luchanell, DO      Or   • OLANZapine  5 mg Intramuscular Q3H PRN Max 6/day Cam Lukes, DO     • OLANZapine  2.5 mg Oral Q3H PRN Max 8/day Cam Lukes, DO     • polyethylene glycol  17 g Oral Daily PRN Cam Urena, DO     • propranolol  10 mg Oral Q8H PRN Cam Lukes, DO     • propranolol  10 mg Oral Q8H PRN Denver Rohrer, MD     • propranolol  10 mg Oral TID Denver Rohrer, MD     • senna-docusate sodium  1 tablet Oral Daily PRN Cam Urena, DO     • traZODone  50 mg Oral HS PRN Dorrene Skeans, DO         Risks, benefits and possible side effects of Medications:   Risks, benefits, and possible side effects of medications explained to patient and patient verbalizes understanding. Mental Status Evaluation:  Appearance:  age appropriate, casually dressed, disheveled and wearing a blue PJ shirt and PJ bottoms. His hair is uncombed. Rare eye contact. Behavior:  calm, cooperative, drowsy, inappropriate laughter   Speech:  increased latency of response and normal pitch and volume, coherent, fluent   Mood:  anxious, tired   Affect:  mood-congruent   Language naming objects and repeating phrases   Thought Process:  concrete, goal directed and logical   Thought Content:  normal and no verbalized phobias, preoccupations, overt delusions, or obsessions   Perceptual Disturbances: None and no AVH, does not appear to be responding to internal stimuli   Risk Potential: Suicidal Ideations none, Homicidal Ideations none and Potential for Aggression No   Sensorium:  person, place, time/date and situation   Cognition:  recent and remote memory grossly intact   Consciousness:  alert and awake    Attention: attention span appeared shorter than expected for age   Insight:  limited   Judgment: limited   Intellect    Gait/Station: did not assess, patient was in bed   Motor Activity: abnormal movement noted  tremor : mild bilateral hand tremor     Memory: Short and long term memory  intact     Progress Toward Goals: progressing, as evidenced by their participation in individual, social and therapeutic milieu in addition to adherence to their medication regimen. Attended 4/8 groups. Patient Acceptance: patient seemed to accept treatment and medications   Patient Understanding: patient seemed to understand treatment and medications   Patient Involvement in Reintegration Process: Patient attended 4/8 groups yesterday.    Patient's Therapeutic Relationship with Psychiatrist: patient remains cooperative during interview  Patient's Optimism Regarding Recovery: patient reports being optimistic about recovery    Recommended Treatment:   See above for assessment and plan. Inpatient Psychiatric Certification: I certify that inpatient services are medically necessary for this patient for a duration of greater that 2 midnights for the treatment of Severe episode of recurrent major depressive disorder, without psychotic features ([address])    See H&P and MD Progress Notes for additional information about the patient's course of treatment. Counseling/Coordination of Care    Total unit time spent today ws greater than 15 minutes. Greater than 50% of total time was spent with the patient and/or patient's relatives and/or coordination of patient's care. Patient's rights, confidentiality, exceptions to confidentiality, use of electronic medical record including appropriate staff access to medical record regarding behavioral health services and consent to treatment were reviewed. Note Share:      This note was not shared with the patient due to reasonable likelihood of causing patient harm     [employer]

## 2023-07-28 NOTE — NURSING NOTE
Ryan Harding is awake at this time and out of his room to nurses station stating he is having difficulties falling asleep related to next door peer having an alarm that is going off and is disturbing Ryan Harding. Pt medicated with Trazodone 50 mg po at 0001. Will monitor for effectiveness.      0419:  Pt continues to sleep tus this far as per q 7 min safety checks

## 2023-07-28 NOTE — PLAN OF CARE
Problem: Risk for Self Injury/Neglect  Goal: Treatment Goal: Remain safe during length of stay, learn and adopt new coping skills, and be free of self-injurious ideation, impulses and acts at the time of discharge  Outcome: Progressing     Problem: Depression  Goal: Refrain from isolation  Description: Interventions:  - Develop a trusting relationship   - Encourage socialization   Outcome: Progressing     Problem: Anxiety  Goal: Anxiety is at manageable level  Description: Interventions:  - Assess and monitor patient's anxiety level. - Monitor for signs and symptoms (heart palpitations, chest pain, shortness of breath, headaches, nausea, feeling jumpy, restlessness, irritable, apprehensive). - Collaborate with interdisciplinary team and initiate plan and interventions as ordered.   - [place] patient to unit/surroundings  - Explain treatment plan  - Encourage participation in care  - Encourage verbalization of concerns/fears  - Identify coping mechanisms  - Assist in developing anxiety-reducing skills  - Administer/offer alternative therapies  - Limit or eliminate stimulants  Outcome: Progressing

## 2023-07-28 NOTE — PROGRESS NOTES
07/28/23 0830   Team Meeting   Meeting Type Daily Rounds   Initial Conference Date 07/28/23   Patient/Family Present   Patient Present No   Patient's Family Present No     Daily Rounds Documentation     Team Members Present:   MD Dr. Yoselin Combs CRNP Bertina Aris, RN  Buddy Castro, LSW    No behaviors. Cooperative. Atropine and Trazodone PRNs utilized. Attended 4/8 groups. Compliant with medications and meals. Slept. Still waiting for waiver to be approved.

## 2023-07-28 NOTE — PROGRESS NOTES
Progress Note - Behavioral Health     Shadia Hitchcock 21 y.o. male MRN: [id number]   Unit/Bed#: [facility] 235-19 Encounter: [id number]    Documentation, nursing notes, medication reconciliation, labs, and vitals reviewed. Patient was seen for continuing care and reviewed with treatment team. No acute events over the past 24 hours. Per nursing report, patient with side effect of drooling but appears to be exaggerating this, attention seeking at times, made comment about killing self while passing RN station then denied making comment, fixated on discharge. No scheduled medication changes over the past 24 hours. Continues to tolerate current medications with no adverse effects. On evaluation today, Dufm Pulse is found resting in bed. Remains childlike. Smiles inappropriately during evaluation. Reports anxiety due to ongoing hospitalization. Remains preoccupied with discharge. No SI/HI. Denies symptoms of depression. No perceptual disturbances. No overt delusions.      Psychiatric ROS:  Behavior over the last 24 hours: unchanged  Sleep: normal  Appetite: normal  Medication side effects: Yes - hand tremor and hypersalivation   ROS: all other systems are negative    Mental Status Evaluation:    Appearance:  casually dressed, adequate grooming   Behavior:  cooperative, calm, guarded, fair eye contact   Speech:  normal rate and volume, scant   Mood:  euthymic   Affect:  inappropriate smile   Thought Process:  coherent, goal directed, perseverative, concrete   Associations: concrete associations, perseverative   Thought Content:  no overt delusions   Perceptual Disturbances: no auditory hallucinations, no visual hallucinations   Risk Potential: Suicidal ideation - None  Homicidal ideation - None  Potential for aggression - No   Sensorium:  oriented to person, place, time/date and situation   Memory:  recent and remote memory grossly intact   Consciousness:  alert and awake   Attention/Concentration: attention span and concentration appear shorter than expected for age   Insight:  poor   Judgment: limited   Gait/Station: in bed   Motor Activity: abnormal movement noted: mild hand tremor present     Vital signs in last 24 hours:    Temp:  [97.7 °F (36.5 °C)] 97.7 °F (36.5 °C)  HR:  [72-78] 72  Resp:  [18] 18  BP: (108-126)/(56-74) 111/65    Laboratory results: I have personally reviewed all pertinent laboratory/tests results    Results from the past 24 hours: No results found for this or any previous visit (from the past 24 hour(s)).   Most Recent Labs:   Lab Results   Component Value Date    WBC 6.74 06/03/2023    RBC 5.22 06/03/2023    HGB 15.6 06/03/2023    HCT 49.0 06/03/2023     06/03/2023    RDW 13.1 06/03/2023    NEUTROABS 3.01 06/03/2023    SODIUM 140 05/31/2023    K 4.4 05/31/2023     05/31/2023    CO2 29 05/31/2023    BUN 11 05/31/2023    CREATININE 0.72 05/31/2023    GLUC 89 05/31/2023    CALCIUM 9.3 05/31/2023    AST 24 05/31/2023    ALT 43 05/31/2023    ALKPHOS 98 05/31/2023    TP 7.0 05/31/2023    ALB 4.4 05/31/2023    TBILI 0.52 05/31/2023    CHOLESTEROL 208 (H) 05/27/2023    HDL 39 (L) 05/27/2023    TRIG 190 (H) 05/27/2023    LDLCALC 131 (H) 05/27/2023    [address] 169 05/27/2023    VALPROICTOT 106 (H) 05/31/2023    LITHIUM 0.7 06/21/2023    AKA4QNHIMSUZ 1.637 05/27/2023    HGBA1C 5.4 05/27/2023     05/27/2023       Suicide/Homicide Risk Assessment:    Risk of Harm to Self:   Nursing Suicide Risk Assessment Last 24 hours: C-SSRS Risk (Since Last Contact)  Calculated C-SSRS Risk Score (Since Last Contact): No Risk Indicated  Current Specific Risk Factors include: diagnosis of mood disorder, mental illness diagnosis  Protective Factors: no current suicidal ideation, ability to communicate with staff on the unit, able to contract for safety on the unit, taking medications as ordered on the unit  Based on today's assessment, Stephan Vidal presents the following risk of harm to self: low    Risk of Harm to Others:  Nursing Homicide Risk Assessment: Violence Risk to Others: Denies within past 6 months  Current Specific Risk Factors include: poor insight  Protective Factors: no current homicidal ideation, compliant with medications on the unit as ordered, compliant with unit milieu  Based on today's assessment, Bill Palmer presents the following risk of harm to others: low    The following interventions are recommended: behavioral checks every 7 minutes, continued hospitalization on locked unit    Progress Toward Goals: progressing    Assessment/Plan   Principal Problem:    Severe episode of recurrent major depressive disorder, without psychotic features ([address])  Active Problems:    Medical clearance for psychiatric admission    Autism spectrum disorder    Mood disorder ([address] [facility])    Anxiety disorder    Rash      Recommended Treatment:     Planned medication and treatment changes:     All current active medications have been reviewed  Encourage group therapy, milieu therapy and occupational therapy  Behavioral Health checks every 7 minutes  Continue current medications:    Current Facility-Administered Medications   Medication Dose Route Frequency Provider Last Rate   • acetaminophen  650 mg Oral Q6H PRN Dayton Lion, DO     • acetaminophen  650 mg Oral Q4H PRN Dayton Lion, DO     • acetaminophen  975 mg Oral Q6H PRN Dayton Lion, DO     • aluminum-magnesium hydroxide-simethicone  30 mL Oral Q4H PRN Dayton Lion, DO     • ammonium lactate   Topical BID PRN Joel Sanchez PA-C     • atropine  1 drop Sublingual TID PRN Charito Escobedo PA-C     • benztropine  1 mg Intramuscular Q4H PRN Max 6/day Dayton Lion, DO     • benztropine  1 mg Oral Q4H PRN Max 6/day Dayton Lion, DO     • benztropine  1 mg Oral BID Dayton Lion, DO     • clonazePAM  0.5 mg Oral BID Mary Stiles MD     • hydrOXYzine HCL  50 mg Oral Q6H PRN Max 4/day Dayton Lion, DO      Or   • diphenhydrAMINE  50 mg Intramuscular Q6H PRN Lenny Sierras, DO     • diphenhydrAMINE  25 mg Oral Q6H PRN Ade Evens, DO     • glycerin-hypromellose-  1 drop Both Eyes Q3H PRN Lenny Sierras, DO     • haloperidol  5 mg Oral BID Enzo Kim MD     • hydrOXYzine HCL  100 mg Oral Q6H PRN Max 4/day Lenny Sierras, DO      Or   • LORazepam  2 mg Intramuscular Q6H PRN Lenny Sierras, DO     • hydrOXYzine HCL  25 mg Oral Q6H PRN Max 4/day Lenny Sierras, DO     • lithium carbonate  450 mg Oral Q12H Martín Ellsworth MD     • melatonin  10 mg Oral HS Enzo Kim MD     • mirtazapine  7.5 mg Oral HS Lenny Sierras, DO     • OLANZapine  10 mg Oral Q3H PRN Max 3/day Lenny Sierras, DO      Or   • OLANZapine  10 mg Intramuscular Q3H PRN Max 3/day Lenny Sierras, DO     • OLANZapine  5 mg Oral Q3H PRN Max 6/day Lenny Sierras, DO      Or   • OLANZapine  5 mg Intramuscular Q3H PRN Max 6/day Lenny Sierras, DO     • OLANZapine  2.5 mg Oral Q3H PRN Max 8/day Lenny Sierras, DO     • polyethylene glycol  17 g Oral Daily PRN Lenny Sierras, DO     • propranolol  10 mg Oral Q8H PRN Lenny Sierras, DO     • propranolol  10 mg Oral Q8H PRN Enzo Kim MD     • propranolol  10 mg Oral TID Enzo Kim MD     • senna-docusate sodium  1 tablet Oral Daily PRN Lenny Sierras, DO     • traZODone  50 mg Oral HS PRN Lenny Sierras, DO       Risks / Benefits of Treatment:    Risks, benefits, and possible side effects of medications explained to patient and patient verbalizes understanding and agreement for treatment. Counseling / Coordination of Care:    Patient's progress discussed with staff in treatment team meeting. Medications, treatment progress and treatment plan reviewed with patient.     [address], [address] [place] [address] 07/29/23

## 2023-07-28 NOTE — NURSING NOTE
Patient is quiet, smiling and compliant with all medications. Pt is visible at times on unit, sometimes will stand and stare into nurses station. Pt denies s/s and takes all medications without issue.

## 2023-07-29 PROCEDURE — 99232 SBSQ HOSP IP/OBS MODERATE 35: CPT

## 2023-07-29 RX ADMIN — PROPRANOLOL HYDROCHLORIDE 10 MG: 10 TABLET ORAL at 08:45

## 2023-07-29 RX ADMIN — MIRTAZAPINE 7.5 MG: 7.5 TABLET, FILM COATED ORAL at 21:16

## 2023-07-29 RX ADMIN — ATROPINE SULFATE 1 DROP: 10 SOLUTION OPHTHALMIC at 14:01

## 2023-07-29 RX ADMIN — ACETAMINOPHEN 650 MG: 325 TABLET ORAL at 21:18

## 2023-07-29 RX ADMIN — PROPRANOLOL HYDROCHLORIDE 10 MG: 10 TABLET ORAL at 17:23

## 2023-07-29 RX ADMIN — HALOPERIDOL 5 MG: 5 TABLET ORAL at 17:22

## 2023-07-29 RX ADMIN — LITHIUM CARBONATE 450 MG: 450 TABLET, EXTENDED RELEASE ORAL at 08:46

## 2023-07-29 RX ADMIN — CLONAZEPAM 0.5 MG: 0.5 TABLET ORAL at 08:46

## 2023-07-29 RX ADMIN — ATROPINE SULFATE 1 DROP: 10 SOLUTION OPHTHALMIC at 10:08

## 2023-07-29 RX ADMIN — BENZTROPINE MESYLATE 1 MG: 1 TABLET ORAL at 17:22

## 2023-07-29 RX ADMIN — HALOPERIDOL 5 MG: 5 TABLET ORAL at 08:46

## 2023-07-29 RX ADMIN — Medication 10 MG: at 21:17

## 2023-07-29 RX ADMIN — CLONAZEPAM 0.5 MG: 0.5 TABLET ORAL at 17:22

## 2023-07-29 RX ADMIN — BENZTROPINE MESYLATE 1 MG: 1 TABLET ORAL at 08:46

## 2023-07-29 RX ADMIN — LITHIUM CARBONATE 450 MG: 450 TABLET, EXTENDED RELEASE ORAL at 21:18

## 2023-07-29 NOTE — PROGRESS NOTES
Progress Note - Behavioral Health     Florecita Stearns 21 y.o. male MRN: [id number]   Unit/Bed#: [facility] 226-93 Encounter: [id number]    Documentation, nursing notes, medication reconciliation, labs, and vitals reviewed. Patient was seen for continuing care and reviewed with treatment team. No acute events over the past 24 hours. Per nursing report, patient continues to exaggerate side effect of drooling, made statement about acting out last evening, states he is stressed and frustrated related to discharge. No scheduled medication changes over the past 24 hours. Continues to tolerate current medications with no adverse effects. On evaluation today, Greg Kelly remains childlike and scant throughout evaluation. Denies thoughts of acting out today. States he is anxious due to wanting discharge from the hospital. No SI/HI. Denies depressed mood. Denies perceptual disturbances.      Psychiatric ROS:  Behavior over the last 24 hours: unchanged  Sleep: normal  Appetite: normal  Medication side effects: Yes - hand tremor and hypersalivation   ROS: all other systems are negative    Mental Status Evaluation:    Appearance:  casually dressed, adequate grooming   Behavior:  cooperative, calm, guarded, fair eye contact   Speech:  normal rate and volume, scant   Mood:  anxious   Affect:  blunted   Thought Process:  coherent, goal directed, concrete   Associations: concrete associations, perseverative   Thought Content:  no overt delusions   Perceptual Disturbances: no auditory hallucinations, no visual hallucinations   Risk Potential: Suicidal ideation - None  Homicidal ideation - None  Potential for aggression - No   Sensorium:  oriented to person, place, time/date and situation   Memory:  recent and remote memory grossly intact   Consciousness:  alert and awake   Attention/Concentration: attention span and concentration appear shorter than expected for age   Insight:  poor   Judgment: limited   Gait/Station: normal gait/station, normal balance   Motor Activity: abnormal movement noted: mild hand tremor present     Vital signs in last 24 hours:    Temp:  [97.5 °F (36.4 °C)-97.9 °F (36.6 °C)] 97.5 °F (36.4 °C)  HR:  [75-99] 75  Resp:  [16-18] 16  BP: (103-119)/(59-72) 103/72    Laboratory results: I have personally reviewed all pertinent laboratory/tests results    Results from the past 24 hours: No results found for this or any previous visit (from the past 24 hour(s)).   Most Recent Labs:   Lab Results   Component Value Date    WBC 6.74 06/03/2023    RBC 5.22 06/03/2023    HGB 15.6 06/03/2023    HCT 49.0 06/03/2023     06/03/2023    RDW 13.1 06/03/2023    NEUTROABS 3.01 06/03/2023    SODIUM 140 05/31/2023    K 4.4 05/31/2023     05/31/2023    CO2 29 05/31/2023    BUN 11 05/31/2023    CREATININE 0.72 05/31/2023    GLUC 89 05/31/2023    CALCIUM 9.3 05/31/2023    AST 24 05/31/2023    ALT 43 05/31/2023    ALKPHOS 98 05/31/2023    TP 7.0 05/31/2023    ALB 4.4 05/31/2023    TBILI 0.52 05/31/2023    CHOLESTEROL 208 (H) 05/27/2023    HDL 39 (L) 05/27/2023    TRIG 190 (H) 05/27/2023    LDLCALC 131 (H) 05/27/2023    [address] 169 05/27/2023    VALPROICTOT 106 (H) 05/31/2023    LITHIUM 0.7 06/21/2023    HNG2ZZSFDGCT 1.637 05/27/2023    HGBA1C 5.4 05/27/2023     05/27/2023       Suicide/Homicide Risk Assessment:    Risk of Harm to Self:   Nursing Suicide Risk Assessment Last 24 hours: C-SSRS Risk (Since Last Contact)  Calculated C-SSRS Risk Score (Since Last Contact): No Risk Indicated  Current Specific Risk Factors include: diagnosis of mood disorder, mental illness diagnosis, poor reasoning  Protective Factors: no current suicidal ideation, ability to communicate with staff on the unit, able to contract for safety on the unit, taking medications as ordered on the unit  Based on today's assessment, David Torres presents the following risk of harm to self: low    Risk of Harm to Others:  Nursing Homicide Risk Assessment: Violence Risk to Others: Denies within past 6 months  Current Specific Risk Factors include: diagnosis of mood disorder, poor insight  Protective Factors: no current homicidal ideation, compliant with medications on the unit as ordered, compliant with unit milieu  Based on today's assessment, Franki Bucio presents the following risk of harm to others: low    The following interventions are recommended: behavioral checks every 7 minutes, continued hospitalization on locked unit    Progress Toward Goals: progressing    Assessment/Plan   Principal Problem:    Severe episode of recurrent major depressive disorder, without psychotic features ([address] [facility])  Active Problems:    Medical clearance for psychiatric admission    Autism spectrum disorder    Mood disorder ([address] [facility])    Anxiety disorder    Rash      Recommended Treatment:     Planned medication and treatment changes:     All current active medications have been reviewed  Encourage group therapy, milieu therapy and occupational therapy  Behavioral Health checks every 7 minutes  Continue current medications:    Current Facility-Administered Medications   Medication Dose Route Frequency Provider Last Rate   • acetaminophen  650 mg Oral Q6H PRN Dorrene Skeans, DO     • acetaminophen  650 mg Oral Q4H PRN Dorrene Skeans, DO     • acetaminophen  975 mg Oral Q6H PRN Dorrene Skeans, DO     • aluminum-magnesium hydroxide-simethicone  30 mL Oral Q4H PRN Dorrene Skeans, DO     • ammonium lactate   Topical BID PRN Aristeo Gómez PA-C     • atropine  1 drop Sublingual TID PRN Coy Monzon PA-C     • benztropine  1 mg Intramuscular Q4H PRN Max 6/day Dorrene Skeans, DO     • benztropine  1 mg Oral Q4H PRN Max 6/day Dorrene Skeans, DO     • benztropine  1 mg Oral BID Dorrene Skeans, DO     • clonazePAM  0.5 mg Oral BID Darío Long MD     • hydrOXYzine HCL  50 mg Oral Q6H PRN Max 4/day Dorrene Skeans, DO      Or   • diphenhydrAMINE  50 mg Intramuscular Q6H PRN Dorrene Skeans, DO • diphenhydrAMINE  25 mg Oral Q6H PRN Nahumayde [place], DO     • glycerin-hypromellose-  1 drop Both Eyes Q3H PRN Katmarlene Gao, DO     • haloperidol  5 mg Oral BID Armen Tran MD     • hydrOXYzine HCL  100 mg Oral Q6H PRN Max 4/day Katmarlene Naksavanna, DO      Or   • LORazepam  2 mg Intramuscular Q6H PRN Kathlrebecca Nakai, DO     • hydrOXYzine HCL  25 mg Oral Q6H PRN Max 4/day Kathlean Nakai, DO     • lithium carbonate  450 mg Oral Q12H Penny Rojas MD     • melatonin  10 mg Oral HS Armen Tran MD     • mirtazapine  7.5 mg Oral HS Katmarlene Naksavanna, DO     • OLANZapine  10 mg Oral Q3H PRN Max 3/day Kathlrebecca Naksavanna, DO      Or   • OLANZapine  10 mg Intramuscular Q3H PRN Max 3/day Katmarlene Naksavanna, DO     • OLANZapine  5 mg Oral Q3H PRN Max 6/day Kathlrebecca Nakai, DO      Or   • OLANZapine  5 mg Intramuscular Q3H PRN Max 6/day Kathleaariana Nakai, DO     • OLANZapine  2.5 mg Oral Q3H PRN Max 8/day Katalannaheaariana Naksavanna, DO     • polyethylene glycol  17 g Oral Daily PRN Katmarlene Naksavanna, DO     • propranolol  10 mg Oral Q8H PRN Katmarlene Naksavanna, DO     • propranolol  10 mg Oral Q8H PRN Armen Tran MD     • propranolol  10 mg Oral TID Armen Tran MD     • senna-docusate sodium  1 tablet Oral Daily PRN Nishant Naksavanna, DO     • traZODone  50 mg Oral HS PRN Katmarlene Gao, DO       Risks / Benefits of Treatment:    Risks, benefits, and possible side effects of medications explained to patient and patient verbalizes understanding and agreement for treatment. Counseling / Coordination of Care:    Patient's progress discussed with staff in treatment team meeting. Medications, treatment progress and treatment plan reviewed with patient.     Greta Elkinsirn, [address] [place] [address] 07/30/23

## 2023-07-29 NOTE — NURSING NOTE
Savanna Conway has been awake, alert, and visible intermittently out in the milieu. Pt went out on the deck with staff and peers for fresh air group. Pt requested prn Atropine gtt for increased salivation and 1 gtt sublingual given at 1725. Pt stated that "It helped a little."  Pt rests in room at intervals. Some interaction noted when out in milieu with peers. Pt remains fixated on discharge. Pt attended and participated in evening Bingo group, wrap up group, and had snack after with peers. Cooperative with taking scheduled meds and without incident. Continue to monitor/assess for any changes in behavior.

## 2023-07-29 NOTE — NURSING NOTE
Patient laying in bed when this writer approached him to check in. Stated "I think I'm going to act out soon." Spoke of his "stress and frustration" r/t discharge. Patient reassured he was doing well and encouraged to use coping skills. Patient took radio and sat in living room. Behavior remained in controlled.

## 2023-07-29 NOTE — NURSING NOTE
Patient at nurses station staring. Quietly mumbled "I'm gonna kill myself." When asking patient what he said he said, patient smiled and denied making comment.

## 2023-07-29 NOTE — NURSING NOTE
Atropine gtts not effective. Drooling continues but may be exaggerated. Patient talking w/ writer and allowing drool to slide down his face onto clothing while smiling. Drooling on chair during group. Encouraged to drink water and use tissues to wipe his mouth.

## 2023-07-29 NOTE — PLAN OF CARE
Problem: Risk for Self Injury/Neglect  Goal: Treatment Goal: Remain safe during length of stay, learn and adopt new coping skills, and be free of self-injurious ideation, impulses and acts at the time of discharge  Outcome: Progressing     Problem: Depression  Goal: Treatment Goal: Demonstrate behavioral control of depressive symptoms, verbalize feelings of improved mood/affect, and adopt new coping skills prior to discharge  Outcome: Progressing  Goal: Refrain from isolation  Description: Interventions:  - Develop a trusting relationship   - Encourage socialization   Outcome: Progressing

## 2023-07-29 NOTE — NURSING NOTE
Patient is inappropriate for age. Affect is constricted w/ inappropriate smiling. Continues to be attention seeking. Visible on milieu and interacts w/ peers. Appetite and hygiene is excellent. Medication compliant.  No behavioral issues

## 2023-07-30 PROCEDURE — 99232 SBSQ HOSP IP/OBS MODERATE 35: CPT

## 2023-07-30 RX ADMIN — BENZTROPINE MESYLATE 1 MG: 1 TABLET ORAL at 09:05

## 2023-07-30 RX ADMIN — CLONAZEPAM 0.5 MG: 0.5 TABLET ORAL at 17:36

## 2023-07-30 RX ADMIN — Medication 10 MG: at 21:15

## 2023-07-30 RX ADMIN — MIRTAZAPINE 7.5 MG: 7.5 TABLET, FILM COATED ORAL at 21:15

## 2023-07-30 RX ADMIN — LITHIUM CARBONATE 450 MG: 450 TABLET, EXTENDED RELEASE ORAL at 09:05

## 2023-07-30 RX ADMIN — HALOPERIDOL 5 MG: 5 TABLET ORAL at 09:04

## 2023-07-30 RX ADMIN — LITHIUM CARBONATE 450 MG: 450 TABLET, EXTENDED RELEASE ORAL at 21:15

## 2023-07-30 RX ADMIN — BENZTROPINE MESYLATE 1 MG: 1 TABLET ORAL at 17:36

## 2023-07-30 RX ADMIN — CLONAZEPAM 0.5 MG: 0.5 TABLET ORAL at 09:05

## 2023-07-30 RX ADMIN — PROPRANOLOL HYDROCHLORIDE 10 MG: 10 TABLET ORAL at 21:15

## 2023-07-30 RX ADMIN — HALOPERIDOL 5 MG: 5 TABLET ORAL at 17:36

## 2023-07-30 RX ADMIN — PROPRANOLOL HYDROCHLORIDE 10 MG: 10 TABLET ORAL at 17:36

## 2023-07-30 RX ADMIN — PROPRANOLOL HYDROCHLORIDE 10 MG: 10 TABLET ORAL at 09:04

## 2023-07-30 RX ADMIN — ATROPINE SULFATE 1 DROP: 10 SOLUTION OPHTHALMIC at 10:13

## 2023-07-30 NOTE — NURSING NOTE
Patient requested Tylenol 650 mg po prn at this time for complaints of a backache he scored to be a 6 out of 10.

## 2023-07-30 NOTE — NURSING NOTE
Pt came to nurse's station c/o of drooling and requesting atropine drops. PRN atropine 1% gtts administered sublingual @ 1013.

## 2023-07-30 NOTE — PLAN OF CARE
Problem: Ineffective Coping  Goal: Identifies ineffective coping skills  Outcome: Progressing  Goal: Identifies healthy coping skills  Outcome: Progressing  Goal: Demonstrates healthy coping skills  Outcome: Progressing     Problem: Depression  Goal: Treatment Goal: Demonstrate behavioral control of depressive symptoms, verbalize feelings of improved mood/affect, and adopt new coping skills prior to discharge  Outcome: Progressing  Goal: Verbalize thoughts and feelings  Description: Interventions:  - Assess and re-assess patient's level of risk   - Engage patient in 1:1 interactions, daily, for a minimum of 15 minutes   - Encourage patient to express feelings, fears, frustrations, hopes   Outcome: Progressing  Goal: Refrain from isolation  Description: Interventions:  - Develop a trusting relationship   - Encourage socialization   Outcome: Progressing

## 2023-07-30 NOTE — NURSING NOTE
Patient slept well all night. Appears to be resting comfortably.  Eyes closed and chest movements noted

## 2023-07-30 NOTE — NURSING NOTE
Patient is calm and constricted. Fixated on D/C. Visible and interacting w/ peers. Appetite and hygiene is adequate. Medication compliant. Attended spirituality. No inappropriate comments noted. Continual safety monitoring in place.

## 2023-07-31 PROCEDURE — 99232 SBSQ HOSP IP/OBS MODERATE 35: CPT | Performed by: PSYCHIATRY & NEUROLOGY

## 2023-07-31 RX ORDER — HALOPERIDOL 5 MG/1
5 TABLET ORAL
Status: DISCONTINUED | OUTPATIENT
Start: 2023-07-31 | End: 2023-08-11 | Stop reason: HOSPADM

## 2023-07-31 RX ORDER — LANOLIN ALCOHOL/MO/W.PET/CERES
6 CREAM (GRAM) TOPICAL
Status: DISCONTINUED | OUTPATIENT
Start: 2023-07-31 | End: 2023-08-08

## 2023-07-31 RX ADMIN — BENZTROPINE MESYLATE 1 MG: 1 TABLET ORAL at 08:33

## 2023-07-31 RX ADMIN — PROPRANOLOL HYDROCHLORIDE 10 MG: 10 TABLET ORAL at 08:33

## 2023-07-31 RX ADMIN — BENZTROPINE MESYLATE 1 MG: 1 TABLET ORAL at 18:03

## 2023-07-31 RX ADMIN — HALOPERIDOL 5 MG: 5 TABLET ORAL at 08:33

## 2023-07-31 RX ADMIN — ALUMINUM HYDROXIDE, MAGNESIUM HYDROXIDE, AND SIMETHICONE 30 ML: 200; 200; 20 SUSPENSION ORAL at 19:34

## 2023-07-31 RX ADMIN — PROPRANOLOL HYDROCHLORIDE 10 MG: 10 TABLET ORAL at 21:15

## 2023-07-31 RX ADMIN — HALOPERIDOL 5 MG: 5 TABLET ORAL at 21:15

## 2023-07-31 RX ADMIN — PROPRANOLOL HYDROCHLORIDE 10 MG: 10 TABLET ORAL at 18:03

## 2023-07-31 RX ADMIN — LITHIUM CARBONATE 450 MG: 450 TABLET, EXTENDED RELEASE ORAL at 21:15

## 2023-07-31 RX ADMIN — CLONAZEPAM 0.5 MG: 0.5 TABLET ORAL at 18:03

## 2023-07-31 RX ADMIN — CLONAZEPAM 0.5 MG: 0.5 TABLET ORAL at 08:33

## 2023-07-31 RX ADMIN — LITHIUM CARBONATE 450 MG: 450 TABLET, EXTENDED RELEASE ORAL at 08:33

## 2023-07-31 RX ADMIN — MIRTAZAPINE 7.5 MG: 7.5 TABLET, FILM COATED ORAL at 21:15

## 2023-07-31 NOTE — NURSING NOTE
Patient slept well throughout night. Appears to be sleeping comfortably with no signs of distress noted.

## 2023-07-31 NOTE — PROGRESS NOTES
07/31/23 0830   Team Meeting   Meeting Type Daily Rounds   Initial Conference Date 07/31/23   Patient/Family Present   Patient Present No   Patient's Family Present No     Daily Rounds Documentation     Team Members Present:   MD Dr. Ida Erickson Cea, Dr., DO Franck Diaz, ANA Conteh, RN  Seema Collado, ALICE    Inderal held Saturday and Sunday due to parameters. Utilized Atropine for drooling. No behaviors. Attended 1/6 groups on Friday. Compliant with medications and meals. Slept.

## 2023-07-31 NOTE — PROGRESS NOTES
07/31/23 0940   Team Meeting   Meeting Type Tx Team Meeting   Initial Conference Date 07/31/23   Next Conference Date 08/14/23   Team Members Present   Team Members Present Physician;Nurse;; Other (Discipline and Name)   Physician Team Member Dr. Ladd Epley, MD and Dr. Jacquelin Arshad DO   Nursing Team Member Allie Holman RN   Social Work Team Member [place], [place]   Other (Discipline and Name) [facility]   Patient/Family Present   Patient Present Yes   Patient's Family Present No     Patient was present for his treatment team meeting prepared with a completed self-assessment. He presented as calm, flat, and attentive; affect was congruent, and eye contact was minimal.  He denies psych symptoms other than anxiety. He was dressed appropriately, and appeared adequately groomed. Maribeth Mike introduced herself to patient, and explained her role; this was her first time attending a meeting of his. Patient was able to share his self-assessment independently. Something he learned while attending groups last week was positive thinking. One goal he accomplished last week was managing his anger. One recovery-centered goal he is still working on is managing his anxiety. Something challenging he dealt with last week was "being here."  SW informed team that patient is still waiting for the waiver to be approved. Maribeth Mike offered to reach out to patient's supports coordinator for an update. Psychiatric symptoms experienced last week was depression, and anxiety. Other challenges included: low self-esteem and catastrophic thinking. In order to cope he listened to music, paced, and wrote. His self-care included: music, meditation, and grounding exercises. He was able to identify his medications, and requested that he wants to be taken off some because they are making him tired. Dr. Pepe Mckeon agreed to lower his Haldol and move it to .   He also agreed to make Inderal and Melatonin PRNs.  Patient continues to report drooling as a side effect. He has no medical concerns. Patient was praised for how well he continues to do.

## 2023-07-31 NOTE — NURSING NOTE
Patient is calm, constricted, and cooperative. Visible and social w/ peers. Appetite and hygiene are excellent. Medication compliant. Attending groups. Fixated on D/C. Spoke about his goals to "enjoy life" and find a construction job. No inappropriate comments noted. Q 7 minute continual rounding in place.

## 2023-07-31 NOTE — PROGRESS NOTES
Psychiatric Progress Note - Department of [address] 21 y.o. male MRN: [id number]  Unit/Bed#: CRUZITO RAMIREZ [facility] 267-63 Encounter: [id number]    ASSESSMENT & PLAN     Principal Problem:    Severe episode of recurrent major depressive disorder, without psychotic features ([address])  Active Problems:    Medical clearance for psychiatric admission    Autism spectrum disorder    Mood disorder ([address])    Anxiety disorder    Rash    • Continue to promote patient participation in therapeutic milieu. • Continue medical management per medicine. • Continue previously prescribed psychotropic medication regimen; see below. • Continue behavioral health checks q7 minutes. • Continue vitals per behavioral health unit protocol. • Discharge date per primary team and CM (per social work, expected next month), patient accepted at [facility] on 7/24; 201 commitment status. • Continue preparing for [facility]  • Continue monitoring bilateral hand tremors and drooling    SUBJECTIVE     Patient evaluated this a.m. for continuity of care. Per treatment team, patient's Inderal was held over the weekend due to not meeting parameters. Patient attended 1/6 groups over the weekend. He had no behavioral problems, was not making any inappropriate comments, and is fixated on discharge. He received atropine drops over the weekend as well. This morning, patient reported feeling "anxious to get out". He also reports feeling this way over the weekend. Patient has been keeping busy by writing in his journal, walking around, attending groups (3-4, per patient), talking to staff about discharge, and exercising. He didn't talk to friends/family on the phone. Patient expressed that he unhappy here and doesn't think he was meant to come here. He says his previous psychiatrist wanted him to go to [facility] in [place]. He "sort of" feels optimistic about treatment and does feel like he's progressing.  He wants to get off some of his medications, particularly Haldol. He denies any changes in appetite or sleep. He slept well last night. He denies any feelings of anger/aggression towards anyone. He denies any bizarre thoughts or auditory/visual hallucination. No overt psychotic symptoms. No suicidal/homicidal ideation, plan or intent. Patient's mild bilateral hand tremor and drooling is unchanged. He denies any new signs or symptoms. Patient continues to inappropriately smile and chuckle during the interview. Patient has a treatment team meeting today. Patient was discussed at length with nursing and treatment team. No acute distress is noted throughout evaluation. Viv Durham denies suicidal/homicidal ideation; contracts for safety. PSYCHIATRIC REVIEW OF SYSTEMS     Behavior over the last 24 hours:  unchanged  Sleep: normal  Appetite: normal  Medication side effects: Yes, drooling and mild bilateral hand tremor    REVIEW OF SYSTEMS   Review of systems: no complaints, mild bilateral hand tremor and all other systems are negative    OBJECTIVE     Vital Signs in Past 24 Hours:  Temp:  [97.6 °F (36.4 °C)-97.9 °F (36.6 °C)] 97.9 °F (36.6 °C)  HR:  [70-84] 84  Resp:  [17-18] 18  BP: (111-158)/(59-95) 158/95    Intake/Output in Past 24 hours:  No intake/output data recorded. No intake/output data recorded. Laboratory Results:  I have personally reviewed all pertinent laboratory/tests results. Labs in last 72 hours: No results for input(s): "WBC", "RBC", "HGB", "HCT", "PLT", "RDW", "TOTANEUTABS", "NEUTROABS", "SODIUM", "K", "CL", "CO2", "BUN", "CREATININE", "GLUC", "GLUF", "CALCIUM", "AST", "ALT", "ALKPHOS", "TP", "ALB", "TBILI", "CHOLESTEROL", "HDL", "TRIG", "LDLCALC", "VALPROICTOT", "CARBAMAZEPIN", "LITHIUM", "AMMONIA", "[id number]", "[place] Jumper", "Chantel Artist", "PREGTESTUR", "PREGSERUM", "HCG", "HCGQUANT", "RPR" in the last 72 hours.     Behavioral Health Medications: all current active meds have been reviewed.     Current Facility-Administered Medications   Medication Dose Route Frequency Provider Last Rate   • acetaminophen  650 mg Oral Q6H PRN Romayne Duster, DO     • acetaminophen  650 mg Oral Q4H PRN Romayne Duster, DO     • acetaminophen  975 mg Oral Q6H PRN Romayne Duster, DO     • aluminum-magnesium hydroxide-simethicone  30 mL Oral Q4H PRN Romayne Duster, DO     • ammonium lactate   Topical BID PRN Peri Combs PA-C     • atropine  1 drop Sublingual TID PRN Breezy Oates PA-C     • benztropine  1 mg Intramuscular Q4H PRN Max 6/day Romayne Duster, DO     • benztropine  1 mg Oral Q4H PRN Max 6/day Romayne Duster, DO     • benztropine  1 mg Oral BID Romayne Duster, DO     • clonazePAM  0.5 mg Oral BID Jonah Huffman MD     • hydrOXYzine HCL  50 mg Oral Q6H PRN Max 4/day Romayne Duster, DO      Or   • diphenhydrAMINE  50 mg Intramuscular Q6H PRN Romayne Duster, DO     • diphenhydrAMINE  25 mg Oral Q6H PRN Lenora Vasquez, DO     • glycerin-hypromellose-  1 drop Both Eyes Q3H PRN Romayne Duster, DO     • haloperidol  5 mg Oral BID Jonah Huffman MD     • hydrOXYzine HCL  100 mg Oral Q6H PRN Max 4/day Romayne Duster, DO      Or   • LORazepam  2 mg Intramuscular Q6H PRN Romayne Duster, DO     • hydrOXYzine HCL  25 mg Oral Q6H PRN Max 4/day Romayne Duster, DO     • lithium carbonate  450 mg Oral Q12H Steve Keene MD     • melatonin  10 mg Oral HS Jonah Huffman MD     • mirtazapine  7.5 mg Oral HS Romayne Duster, DO     • OLANZapine  10 mg Oral Q3H PRN Max 3/day Romayne Duster, DO      Or   • OLANZapine  10 mg Intramuscular Q3H PRN Max 3/day Romayne Duster, DO     • OLANZapine  5 mg Oral Q3H PRN Max 6/day Romayne Duster, DO      Or   • OLANZapine  5 mg Intramuscular Q3H PRN Max 6/day Ciro Watters, DO     • OLANZapine  2.5 mg Oral Q3H PRN Max 8/day Ciro Watters DO     • polyethylene glycol  17 g Oral Daily PRN Romayne Duster, DO     • propranolol  10 mg Oral Q8H PRN Anisa Le, DO     • propranolol  10 mg Oral Q8H PRN Jerson Saldana MD     • propranolol  10 mg Oral TID Jerson Saldana MD     • senna-docusate sodium  1 tablet Oral Daily PRN Anisa Le, DO     • traZODone  50 mg Oral HS PRN Anisa eL, DO         Risks, benefits and possible side effects of Medications:   Risks, benefits, and possible side effects of medications explained to patient and patient verbalizes understanding. Mental Status Evaluation:  Appearance:  age appropriate, casually dressed, disheveled and wearing pajamas, has uncombed hair. Appropriate hygeine. Rare eye contact   Behavior:  calm, cooperative, inappropriate chulking and smiling at times   Speech:  increased latency of response and normal pitch, normal volume, coherent, fluent   Mood:  anxious   Affect:  mood-congruent   Language naming objects and repeating phrases   Thought Process:  concrete, goal directed and logical   Thought Content:  normal and and no verbalized phobias, preoccupations, delusions, obsessions   Perceptual Disturbances: None and no AVH, does not appear to be responding to internal stimuli   Risk Potential: Suicidal Ideations none, Homicidal Ideations none and Potential for Aggression No   Sensorium:  person, place, time/date and situation   Cognition:  recent and remote memory grossly intact   Consciousness:  alert and awake    Attention: attention span appeared shorter than expected for age   Insight:  limited   Judgment: limited   Intellect    Gait/Station: normal gait/station and normal balance   Motor Activity: abnormal movement noted  tremor : mild bilateral hand tremor     Memory: Short and long term memory  intact     Progress Toward Goals: progressing, as evidenced by their participation  in individual, social and therapeutic milieu in addition to adherence to their medication regimen.   Patient Acceptance: patient wants to leave, doesn't want to be here anymore   Patient Understanding: patient doesn't understand why he has to be here. Wants to get off some of his medications. Patient Involvement in Reintegration Process: patient attend groups over the weekend; he is anxious for discharge, says he doesn't want to be here  Patient's Therapeutic Relationship with Psychiatrist: patient remains cooperative during the interview  Patient's Optimism Regarding Recovery: patient reports being "sort of" optimistic about recovery    Recommended Treatment:   See above for assessment and plan. Inpatient Psychiatric Certification: I certify that inpatient services are medically necessary for this patient for a duration of greater that 2 midnights for the treatment of Severe episode of recurrent major depressive disorder, without psychotic features ([address])   See H&P and MD Progress Notes for additional information about the patient's course of treatment. Counseling/Coordination of Care    Total unit time spent today ws greater than 15 minutes. Greater than 50% of total time was spent with the patient and/or patient's relatives and/or coordination of patient's care. Patient's rights, confidentiality, exceptions to confidentiality, use of electronic medical record including appropriate staff access to medical record regarding behavioral health services and consent to treatment were reviewed. Note Share:      This note was not shared with the patient due to reasonable likelihood of causing patient harm     [employer]

## 2023-07-31 NOTE — TREATMENT TEAM
07/31/23 1300   Activity/Group Checklist   Group Nursing Education   Attendance Attended   Attendance Duration (min) 46-60   Interactions Interacted appropriately   Affect/Mood Constricted   Goals Achieved Identified feelings; Able to listen to others; Able to give feedback to another     Interactions were appropriate.

## 2023-07-31 NOTE — NURSING NOTE
Patient cooperative and pleasant upon approach. Visible with peers all evening in the dining room. No drooling noted. Behaviors controlled and appropriate. Medication complaint. Offered no complaints this shift.

## 2023-08-01 PROCEDURE — 99232 SBSQ HOSP IP/OBS MODERATE 35: CPT | Performed by: PSYCHIATRY & NEUROLOGY

## 2023-08-01 RX ORDER — BENZTROPINE MESYLATE 2 MG/1
2 TABLET ORAL 2 TIMES DAILY
Status: DISCONTINUED | OUTPATIENT
Start: 2023-08-01 | End: 2023-08-11 | Stop reason: HOSPADM

## 2023-08-01 RX ADMIN — ALUMINUM HYDROXIDE, MAGNESIUM HYDROXIDE, AND SIMETHICONE 30 ML: 200; 200; 20 SUSPENSION ORAL at 22:23

## 2023-08-01 RX ADMIN — BENZTROPINE MESYLATE 2 MG: 2 TABLET ORAL at 17:12

## 2023-08-01 RX ADMIN — LITHIUM CARBONATE 450 MG: 450 TABLET, EXTENDED RELEASE ORAL at 21:15

## 2023-08-01 RX ADMIN — CLONAZEPAM 0.5 MG: 0.5 TABLET ORAL at 17:12

## 2023-08-01 RX ADMIN — MIRTAZAPINE 7.5 MG: 7.5 TABLET, FILM COATED ORAL at 21:15

## 2023-08-01 RX ADMIN — BENZTROPINE MESYLATE 1 MG: 1 TABLET ORAL at 08:54

## 2023-08-01 RX ADMIN — PROPRANOLOL HYDROCHLORIDE 10 MG: 10 TABLET ORAL at 21:15

## 2023-08-01 RX ADMIN — LITHIUM CARBONATE 450 MG: 450 TABLET, EXTENDED RELEASE ORAL at 08:54

## 2023-08-01 RX ADMIN — CLONAZEPAM 0.5 MG: 0.5 TABLET ORAL at 08:54

## 2023-08-01 RX ADMIN — HALOPERIDOL 5 MG: 5 TABLET ORAL at 21:15

## 2023-08-01 RX ADMIN — PROPRANOLOL HYDROCHLORIDE 10 MG: 10 TABLET ORAL at 17:12

## 2023-08-01 NOTE — NURSING NOTE
Saleem Mg maintained on ongoing assault, fall and SAFE precaution without incident on this shift.  Awake, alert, suspicious,paranoid.  Attended and participated in 4 out of 8 groups.  Continue to be compliant with meds and snack. At 1934 PRN mylanta 30ml PO rendered for indigestion. Denies depression or anxiety at this time.  No delusion or A/T/V hallucination noted.   No further complaint of indigestion. Behavior control.

## 2023-08-01 NOTE — PROGRESS NOTES
Psychiatric Progress Note - Department of [address] 21 y.o. male MRN: [id number]  Unit/Bed#: CRUZITO RAMIREZ [facility] 218-27 Encounter: [id number]    ASSESSMENT & PLAN     Principal Problem:    Severe episode of recurrent major depressive disorder, without psychotic features ([address])  Active Problems:    Medical clearance for psychiatric admission    Autism spectrum disorder    Mood disorder ([address])    Anxiety disorder    Rash    • Continue to promote patient participation in therapeutic milieu. • Continue medical management per medicine. • Continue previously prescribed psychotropic medication regimen; see below. • Continue behavioral health checks q7 minutes. • Continue vitals per behavioral health unit protocol. • Discharge date per primary team and CM (per social work, expected next month), patient accepted at [facility] on 7/24; 201 commitment status. • Continue preparing for [facility]  • Continue monitoring bilateral hand tremors and drooling    SUBJECTIVE     Patient evaluated this a.m. for continuity of care. Patient's Haldol was moved to bedtime and his melatonin was made PRN, per Patient's request. Per treatment team, he slept well and attended 6/8 groups. Patient was doing well this morning. He still reports feeling anxious about the same thing as usual -- he is anxious about discharge. Patient spent the day yesterday attending groups, writing in his journal, and walking around. He is pleased with the medication changes. He denies any changes in sleep or appetite. He denies any loud singing or rapping, which has previously been a problem for him. Patient denies any suicidal or homicidal ideation, plan, or intent. He denies any auditory or visual hallucinations. He denies feelings of anger or aggression. No overt psychotic symptoms were noted. Patient's mild bilateral hand tremor and drooling is unchanged. He denies any new signs or symptoms.  Patient continues to inappropriately smile and chuckle during the interview. Patient still feels like he wasn't meant to come to to [address] [place] [address]. Patient was discussed at length with nursing and treatment team. No acute distress is noted throughout evaluation. Joyce Otero denies suicidal/homicidal ideation; contracts for safety. PSYCHIATRIC REVIEW OF SYSTEMS     Behavior over the last 24 hours:  unchanged  Sleep: normal  Appetite: normal  Medication side effects: Yes mild bilateral hand tremor and drooling    REVIEW OF SYSTEMS   Review of systems: no complaints, mild bilateral hand tremor and all other systems are negative    OBJECTIVE     Vital Signs in Past 24 Hours:  Temp:  [96.4 °F (35.8 °C)-97.5 °F (36.4 °C)] 97.5 °F (36.4 °C)  HR:  [63-84] 63  Resp:  [17-18] 17  BP: (102-139)/(73-84) 102/84    Intake/Output in Past 24 hours:  No intake/output data recorded. No intake/output data recorded. Laboratory Results:  I have personally reviewed all pertinent laboratory/tests results. Behavioral Health Medications: all current active meds have been reviewed.     Current Facility-Administered Medications   Medication Dose Route Frequency Provider Last Rate   • acetaminophen  650 mg Oral Q6H PRN [place], DO     • acetaminophen  650 mg Oral Q4H PRN [place], DO     • acetaminophen  975 mg Oral Q6H PRN [place], DO     • aluminum-magnesium hydroxide-simethicone  30 mL Oral Q4H PRN [place], DO     • ammonium lactate   Topical BID PRN Sang Vivas PA-C     • atropine  1 drop Sublingual TID PRN Nikko Anaya PA-C     • benztropine  1 mg Intramuscular Q4H PRN Max 6/day [place], DO     • benztropine  1 mg Oral Q4H PRN Max 6/day [place], DO     • benztropine  1 mg Oral BID [place], DO     • clonazePAM  0.5 mg Oral BID Dariel Greenwood MD     • hydrOXYzine HCL  50 mg Oral Q6H PRN Max 4/day [place], DO      Or   • diphenhydrAMINE  50 mg Intramuscular Q6H PRN [place], DO     • diphenhydrAMINE  25 mg Oral Q6H PRN Pearlean Dues, DO     • glycerin-hypromellose-  1 drop Both Eyes Q3H PRN [place], DO     • haloperidol  5 mg Oral HS Mary Stiles MD     • hydrOXYzine HCL  100 mg Oral Q6H PRN Max 4/day [place], DO      Or   • LORazepam  2 mg Intramuscular Q6H PRN [place], DO     • hydrOXYzine HCL  25 mg Oral Q6H PRN Max 4/day [place], DO     • lithium carbonate  450 mg Oral Q12H Daryl Moran MD     • melatonin  6 mg Oral HS PRN Mary Stiles MD     • mirtazapine  7.5 mg Oral HS [place], DO     • OLANZapine  10 mg Oral Q3H PRN Max 3/day [place], DO      Or   • OLANZapine  10 mg Intramuscular Q3H PRN Max 3/day [place], DO     • OLANZapine  5 mg Oral Q3H PRN Max 6/day [place], DO      Or   • OLANZapine  5 mg Intramuscular Q3H PRN Max 6/day [place], DO     • OLANZapine  2.5 mg Oral Q3H PRN Max 8/day [place], DO     • polyethylene glycol  17 g Oral Daily PRN [place], DO     • propranolol  10 mg Oral Q8H PRN [place], DO     • propranolol  10 mg Oral Q8H PRN Mary Stiles MD     • propranolol  10 mg Oral TID Mary Stiles MD     • senna-docusate sodium  1 tablet Oral Daily PRN [place], DO     • traZODone  50 mg Oral HS PRN [place], DO         Risks, benefits and possible side effects of Medications:   Risks, benefits, and possible side effects of medications explained to patient and patient verbalizes understanding. Mental Status Evaluation:  Appearance:  age appropriate, casually dressed and approrpriate hygeine. Patient had just showered. He was wearing a blue t-shirt and camo pants. Rare eye contact.    Behavior:  calm, cooperative, inappropriate chuckling and smiling and times   Speech:  increased latency of response and normal pitch, normal volume, coherent, fluent   Mood:  anxious   Affect: mood-congruent   Language naming objects and repeating phrases   Thought Process:  concrete, goal directed and logical   Thought Content:  normal and no verbalized phobias, preoccupations, delusions, obsessions   Perceptual Disturbances: None and no AVH, does not appear to be responding to internal stimuli   Risk Potential: Suicidal Ideations none, Homicidal Ideations none and Potential for Aggression No   Sensorium:  person, place, time/date and situation   Cognition:  recent and remote memory grossly intact   Consciousness:  alert and awake    Attention: attention span appeared shorter than expected for age   Insight:  limited   Judgment: limited   Intellect    Gait/Station: normal gait/station and normal balance   Motor Activity: abnormal movement noted  tremor : mild bilateral hand tremor     Memory: Short and long term memory  intact     Progress Toward Goals: progressing, as evidenced by their participation in individual, social and therapeutic milieu in addition to adherence to their medication regimen. Patient Acceptance: patient seems to accept treatment and medication  Patient Understanding: patient seems to understand treatment and medication  Patient Involvement in Reintegration Process: patient attended 6/8 groups yesterday. He is not happy about being here. Patient's Therapeutic Relationship with Psychiatrist: patient remains cooperative during the interview  Patient's Optimism Regarding Recovery: Patient reports being optimistic about recovery. Recommended Treatment:   See above for assessment and plan. Inpatient Psychiatric Certification: I certify that inpatient services are medically necessary for this patient for a duration of greater that 2 midnights for the treatment of Severe episode of recurrent major depressive disorder, without psychotic features ([address])   See H&P and MD Progress Notes for additional information about the patient's course of treatment.     Counseling/Coordination of Care    Total unit time spent today ws greater than 15 minutes. Greater than 50% of total time was spent with the patient and/or patient's relatives and/or coordination of patient's care. Patient's rights, confidentiality, exceptions to confidentiality, use of electronic medical record including appropriate staff access to medical record regarding behavioral health services and consent to treatment were reviewed. Note Share:      This note was not shared with the patient due to reasonable likelihood of causing patient harm     [employer]

## 2023-08-01 NOTE — SOCIAL WORK
SW and patient spoke briefly. Patient having some anxiety about the discharge planning process; the waiver for housing is taking longer than we expected. SW informed patient that his supports coordinator is requesting a meeting potentially this Friday to discuss the waiver and discharge. SW explained to patient that she is trying to gain a better understanding as to what specifically they want to discuss regarding the waiver, but Anuragfamilia Stoner is off today. SW informed patient that as soon as the meeting is confirmed she will let him know. Patient denied feeling depressed. Overall, he reports he is doing fine. He was dressed appropriately, and his hygiene was good.

## 2023-08-01 NOTE — NURSING NOTE
Pt is accepting of medications and meal compliant. Pt is visible in the milieu and social with select few peers. Pt denies s/s, but at times staring into nurses station needing redirection and accepting of such. Pt brightens on approach and calls writer "baby girl". Pt redirected and apologized. Pt offers no complaints and no issues noted at this time.

## 2023-08-01 NOTE — PROGRESS NOTES
08/01/23 0830   Team Meeting   Meeting Type Daily Rounds   Initial Conference Date 08/01/23   Patient/Family Present   Patient Present No   Patient's Family Present No     Daily Rounds Documentation     Team Members Present:   MD Dr. Kathryn Fung CRNP Cresenciano Downy, RN  [facility] [place], [address] [place] [address]. D    Pleasant. Cooperative. No behaviors. Mylanta PRN utilized. Attended 6/8 groups. Compliant with medications and meals. Slept. Working on scheduling a discharge discussion meeting.

## 2023-08-01 NOTE — PLAN OF CARE
Problem: Risk for Self Injury/Neglect  Goal: Treatment Goal: Remain safe during length of stay, learn and adopt new coping skills, and be free of self-injurious ideation, impulses and acts at the time of discharge  Outcome: Progressing     Problem: Depression  Goal: Refrain from isolation  Description: Interventions:  - Develop a trusting relationship   - Encourage socialization   Outcome: Progressing     Problem: Anxiety  Goal: Anxiety is at manageable level  Description: Interventions:  - Assess and monitor patient's anxiety level. - Monitor for signs and symptoms (heart palpitations, chest pain, shortness of breath, headaches, nausea, feeling jumpy, restlessness, irritable, apprehensive). - Collaborate with interdisciplinary team and initiate plan and interventions as ordered.   - Barto patient to unit/surroundings  - Explain treatment plan  - Encourage participation in care  - Encourage verbalization of concerns/fears  - Identify coping mechanisms  - Assist in developing anxiety-reducing skills  - Administer/offer alternative therapies  - Limit or eliminate stimulants  Outcome: Progressing

## 2023-08-01 NOTE — NURSING NOTE
Artis Steiner maintained on ongoing assault and SAFE precaution without incident on this shift.  Observed laying in bed with eyes closed, breath even and easy.  Q 7 minutes checks implemented.  Behavioral control

## 2023-08-02 PROCEDURE — 99232 SBSQ HOSP IP/OBS MODERATE 35: CPT | Performed by: PSYCHIATRY & NEUROLOGY

## 2023-08-02 RX ADMIN — CLONAZEPAM 0.5 MG: 0.5 TABLET ORAL at 08:39

## 2023-08-02 RX ADMIN — BENZTROPINE MESYLATE 2 MG: 2 TABLET ORAL at 17:24

## 2023-08-02 RX ADMIN — PROPRANOLOL HYDROCHLORIDE 10 MG: 10 TABLET ORAL at 21:13

## 2023-08-02 RX ADMIN — BENZTROPINE MESYLATE 2 MG: 2 TABLET ORAL at 08:39

## 2023-08-02 RX ADMIN — MIRTAZAPINE 7.5 MG: 7.5 TABLET, FILM COATED ORAL at 21:13

## 2023-08-02 RX ADMIN — HALOPERIDOL 5 MG: 5 TABLET ORAL at 21:13

## 2023-08-02 RX ADMIN — PROPRANOLOL HYDROCHLORIDE 10 MG: 10 TABLET ORAL at 17:23

## 2023-08-02 RX ADMIN — PROPRANOLOL HYDROCHLORIDE 10 MG: 10 TABLET ORAL at 08:41

## 2023-08-02 RX ADMIN — LITHIUM CARBONATE 450 MG: 450 TABLET, EXTENDED RELEASE ORAL at 21:13

## 2023-08-02 RX ADMIN — LITHIUM CARBONATE 450 MG: 450 TABLET, EXTENDED RELEASE ORAL at 08:39

## 2023-08-02 RX ADMIN — CLONAZEPAM 0.5 MG: 0.5 TABLET ORAL at 17:24

## 2023-08-02 NOTE — PROGRESS NOTES
Psychiatric Progress Note - Department of [address] 21 y.o. male MRN: [id number]  Unit/Bed#: CRUZITO RAMIREZ [facility] 038-17 Encounter: [id number]    ASSESSMENT & PLAN     Principal Problem:    Severe episode of recurrent major depressive disorder, without psychotic features ([address])  Active Problems:    Medical clearance for psychiatric admission    Autism spectrum disorder    Mood disorder ([address])    Anxiety disorder    Rash    • Continue to promote patient participation in therapeutic milieu. • Continue medical management per medicine. • Continue previously prescribed psychotropic medication regimen; see below. • Continue behavioral health checks q7 minutes. • Continue vitals per behavioral health unit protocol. • Discharge date per primary team and CM (per social work, expected next month), patient accepted at [facility] on 7/24; 201 commitment status. • Continue preparing for [facility]  • Continue monitoring bilateral hand tremors and drooling    SUBJECTIVE     Patient evaluated this a.m. for continuity of care. Per treatment team, Moshe Ceballos is still immature at times -- he called one of the nurses "baby girl". His Inderal was held due to parameters and he received Mylanta at 2223. Went to 5/8 groups. Patient was awoken for the interview this morning - he was groggy and tired during the interview. He continues to report feeling anxious about discharge. Patient spent the day yesterday attending groups, writing in his journal, and walking around. He spoke to his parents on the phone. He continues to be pleased with his medication changes. He denies any changes in sleep or appetite. He denies any loud singing or rapping. He also denies any anger or aggression. Patient denies any suicidal or homicidal ideation, plan, or intent. He denies any auditory or visual hallucinations. No overt psychotic symptoms were noted. Patient's mild bilateral hand tremor and drooling is unchanged.  He denies any new signs or symptoms. Patient continues to inappropriately smile and chuckle during the interview, although it was less this morning because he was just awoken from bed.     Patient still feels like he wasn't meant to come to to [address] [place] [address]. Patient was discussed at length with nursing and treatment team. No acute distress is noted throughout evaluation. Yehuda Martinez denies suicidal/homicidal ideation; contracts for safety. PSYCHIATRIC REVIEW OF SYSTEMS     Behavior over the last 24 hours:  unchanged  Sleep: normal  Appetite: normal  Medication side effects: Yes mild bilateral hand tremor and drooling    REVIEW OF SYSTEMS   Review of systems: no complaints, mild bilateral hand tremor and all other systems are negative    OBJECTIVE     Vital Signs in Past 24 Hours:  Temp:  [97.5 °F (36.4 °C)-97.8 °F (36.6 °C)] 97.5 °F (36.4 °C)  HR:  [81-83] 82  Resp:  [16-17] 16  BP: (114-134)/(64-80) 114/64    Intake/Output in Past 24 hours:  No intake/output data recorded. No intake/output data recorded. Laboratory Results:  I have personally reviewed all pertinent laboratory/tests results. Behavioral Health Medications: all current active meds have been reviewed.     Current Facility-Administered Medications   Medication Dose Route Frequency Provider Last Rate   • acetaminophen  650 mg Oral Q6H PRN Adra Violeta, DO     • acetaminophen  650 mg Oral Q4H PRN Adra Violeta, DO     • acetaminophen  975 mg Oral Q6H PRN Adra Violeta, DO     • aluminum-magnesium hydroxide-simethicone  30 mL Oral Q4H PRN Adra Violeta, DO     • ammonium lactate   Topical BID PRN Merlin Ceron PA-C     • atropine  1 drop Sublingual TID PRN Destiny Qureshi PA-C     • benztropine  1 mg Intramuscular Q4H PRN Max 6/day Adra Violeta, DO     • benztropine  1 mg Oral Q4H PRN Max 6/day Adra Violeta, DO     • benztropine  2 mg Oral BID Ida Mcmillan MD     • clonazePAM  0.5 mg Oral BID Ida Mcmillan MD • hydrOXYzine HCL  50 mg Oral Q6H PRN Max 4/day Sissy Matter, DO      Or   • diphenhydrAMINE  50 mg Intramuscular Q6H PRN Sissy Matter, DO     • diphenhydrAMINE  25 mg Oral Q6H PRN Jimenez Scot, DO     • glycerin-hypromellose-  1 drop Both Eyes Q3H PRN Sissy Matter, DO     • haloperidol  5 mg Oral HS Ami Sinclair MD     • hydrOXYzine HCL  100 mg Oral Q6H PRN Max 4/day Sissy Matter, DO      Or   • LORazepam  2 mg Intramuscular Q6H PRN Sissy Matter, DO     • hydrOXYzine HCL  25 mg Oral Q6H PRN Max 4/day Sissy Matter, DO     • lithium carbonate  450 mg Oral Q12H Wilder Theodore MD     • melatonin  6 mg Oral HS PRN Ami Sinclair MD     • mirtazapine  7.5 mg Oral HS Sissy Matter, DO     • OLANZapine  10 mg Oral Q3H PRN Max 3/day Sissy Matter, DO      Or   • OLANZapine  10 mg Intramuscular Q3H PRN Max 3/day Sissy Matter, DO     • OLANZapine  5 mg Oral Q3H PRN Max 6/day Sissy Matter, DO      Or   • OLANZapine  5 mg Intramuscular Q3H PRN Max 6/day Sissy Matter, DO     • OLANZapine  2.5 mg Oral Q3H PRN Max 8/day Sissy Matter, DO     • polyethylene glycol  17 g Oral Daily PRN Sissy Matter, DO     • propranolol  10 mg Oral Q8H PRN Sissy Matter, DO     • propranolol  10 mg Oral Q8H PRN Ami Sinclair MD     • propranolol  10 mg Oral TID Ami Sinclair MD     • senna-docusate sodium  1 tablet Oral Daily PRN Sissy Matter, DO     • traZODone  50 mg Oral HS PRN Sissy Matter, DO         Risks, benefits and possible side effects of Medications:   Risks, benefits, and possible side effects of medications explained to patient and patient verbalizes understanding. Mental Status Evaluation:  Appearance:  age appropriate, casually dressed, disheveled and uncombed hair, blue t-shirt. Patient had just woken up. Rare eye contact.    Behavior:  calm, cooperative, inappropriate chuckling and smiling and times although less than usual due to patient just having been woken up   Speech:  increased latency of response and normal pitch and volume, coherent, fluent   Mood:  anxious   Affect:  mood-congruent   Language naming objects and repeating phrases   Thought Process:  concrete, goal directed and logical   Thought Content:  normal and no verbalized phobias, preoccupations, delusions, obsessions   Perceptual Disturbances: None and no AVH, does not appear to be responding to internal stimuli   Risk Potential: Suicidal Ideations none, Homicidal Ideations none and Potential for Aggression No   Sensorium:  person, place, time/date and situation   Cognition:  recent and remote memory grossly intact   Consciousness:  alert and awake    Attention: attention span appeared shorter than expected for age   Insight:  limited   Judgment: limited   Intellect    Gait/Station: did not assess, patient was in bed   Motor Activity: abnormal movement noted  tremor : mild bilateral hand tremor     Memory: Short and long term memory  intact     Progress Toward Goals: progressing, as evidenced by their participation (or lack thereof) in individual, social and therapeutic milieu in addition to adherence to their medication regimen. Patient Acceptance: patient seems to accept treatment and medication  Patient Understanding: patient seems to understand treatment and medication  Patient Involvement in Reintegration Process: patient attending 5/8 groups yesterday. He is involved in process but still feels like he shouldn't have come. Patient's Therapeutic Relationship with Psychiatrist: patient remains cooperative during the interview  Patient's Optimism Regarding Recovery: patient reports being optimistic about recovery. Recommended Treatment:   See above for assessment and plan.     Inpatient Psychiatric Certification: I certify that inpatient services are medically necessary for this patient for a duration of greater that 2 midnights for the treatment of Severe episode of recurrent major depressive disorder, without psychotic features ([address])   See H&P and MD Progress Notes for additional information about the patient's course of treatment. Counseling/Coordination of Care    Total unit time spent today ws greater than 15 minutes. Greater than 50% of total time was spent with the patient and/or patient's relatives and/or coordination of patient's care. Patient's rights, confidentiality, exceptions to confidentiality, use of electronic medical record including appropriate staff access to medical record regarding behavioral health services and consent to treatment were reviewed. Note Share:      This note was not shared with the patient due to reasonable likelihood of causing patient harm     [employer]

## 2023-08-02 NOTE — PROGRESS NOTES
08/02/23 0830   Team Meeting   Meeting Type Daily Rounds   Initial Conference Date 08/02/23   Patient/Family Present   Patient Present No   Patient's Family Present No     Daily Rounds Documentation     Team Members Present:   MD Dr. Yayo Holder CRNP Adel Cumming, FRANCOIS  Burton Otoniel, \[facility] [place] [facility]\""  Dr. Mar Ross, DO    Immature at times, but no behavioral issues. Inderal held due to parameters. Mylanta PRN utilized. Attended 5/8 groups. Compliant with medications and meals. Slept. Possible meeting to discuss discharge on Friday.

## 2023-08-02 NOTE — NURSING NOTE
Erna Mayer has been awake, alert, and visible intermittently out in the milieu. Pt went out on the deck with staff and peers for fresh air group. Pt ate 100% supper. Pt denies any depression but is anxious for discharge. Pt attended and participated in evening nursing group, wrap up group, and had snack after with peers. Pt still stares, is delayed in responses, and smiles inappropriately at times. Cooperative with taking  scheduled meds. Continue to monitor/assess for any changes in behavior.

## 2023-08-02 NOTE — NURSING NOTE
Lexi Fowler has been awake, alert, and visible intermittently out in the milieu. Pt naps in room at intervals. Pt went out on deck with staff and peers for fresh air group. Pt ate 100% supper. Pt still delayed in verbal responses, stares, and smiles inappropriately at times. Pt remains anxious for discharge. Minimal interaction noted with peers. Pt attended evening nursing group, wrap up group, and had snack after with peers. Cooperative with taking scheduled meds as ordered. Continue to monitor/assess for any changes in behavior.

## 2023-08-02 NOTE — SOCIAL WORK
Meeting to discuss waiver and discharge scheduled for Friday, August 4, 2023 at 11:30AM by patient's supports coordinator Sarita Matute). SW inquired if the waiver has been approved, and if she needs to have anything prepared for the meeting; awaiting a response. Meeting invite forwarded to patient's father.

## 2023-08-02 NOTE — PLAN OF CARE
Problem: Risk for Self Injury/Neglect  Goal: Treatment Goal: Remain safe during length of stay, learn and adopt new coping skills, and be free of self-injurious ideation, impulses and acts at the time of discharge  Outcome: Progressing     Problem: Depression  Goal: Treatment Goal: Demonstrate behavioral control of depressive symptoms, verbalize feelings of improved mood/affect, and adopt new coping skills prior to discharge  Outcome: Progressing     Problem: Anxiety  Goal: Anxiety is at manageable level  Description: Interventions:  - Assess and monitor patient's anxiety level. - Monitor for signs and symptoms (heart palpitations, chest pain, shortness of breath, headaches, nausea, feeling jumpy, restlessness, irritable, apprehensive). - Collaborate with interdisciplinary team and initiate plan and interventions as ordered.   - [place] patient to unit/surroundings  - Explain treatment plan  - Encourage participation in care  - Encourage verbalization of concerns/fears  - Identify coping mechanisms  - Assist in developing anxiety-reducing skills  - Administer/offer alternative therapies  - Limit or eliminate stimulants  Outcome: Progressing

## 2023-08-02 NOTE — NURSING NOTE
Yuliya Steen requested prn Mylanta for indigestion. Mylanta 30 ml po prn given at 2223. Will monitor/assess for effectiveness of prn med.

## 2023-08-02 NOTE — NURSING NOTE
Patient is compliant with medication and meals. He is not very social at this time He continues to talk about being discharged. He has meeting  Set for Friday Auguust 4th where hoping to get answers abouta hopeful discharge.

## 2023-08-03 PROCEDURE — 99232 SBSQ HOSP IP/OBS MODERATE 35: CPT | Performed by: PSYCHIATRY & NEUROLOGY

## 2023-08-03 RX ADMIN — LITHIUM CARBONATE 450 MG: 450 TABLET, EXTENDED RELEASE ORAL at 08:48

## 2023-08-03 RX ADMIN — PROPRANOLOL HYDROCHLORIDE 10 MG: 10 TABLET ORAL at 21:45

## 2023-08-03 RX ADMIN — HALOPERIDOL 5 MG: 5 TABLET ORAL at 21:45

## 2023-08-03 RX ADMIN — CLONAZEPAM 0.5 MG: 0.5 TABLET ORAL at 08:48

## 2023-08-03 RX ADMIN — BENZTROPINE MESYLATE 2 MG: 2 TABLET ORAL at 08:49

## 2023-08-03 RX ADMIN — BENZTROPINE MESYLATE 2 MG: 2 TABLET ORAL at 17:05

## 2023-08-03 RX ADMIN — Medication 6 MG: at 21:48

## 2023-08-03 RX ADMIN — LITHIUM CARBONATE 450 MG: 450 TABLET, EXTENDED RELEASE ORAL at 21:45

## 2023-08-03 RX ADMIN — PROPRANOLOL HYDROCHLORIDE 10 MG: 10 TABLET ORAL at 17:05

## 2023-08-03 RX ADMIN — CLONAZEPAM 0.5 MG: 0.5 TABLET ORAL at 17:05

## 2023-08-03 RX ADMIN — MIRTAZAPINE 7.5 MG: 7.5 TABLET, FILM COATED ORAL at 21:45

## 2023-08-03 NOTE — PLAN OF CARE
Problem: Ineffective Coping  Goal: Demonstrates healthy coping skills  Outcome: Progressing     Problem: Risk for Self Injury/Neglect  Goal: Treatment Goal: Remain safe during length of stay, learn and adopt new coping skills, and be free of self-injurious ideation, impulses and acts at the time of discharge  Outcome: Progressing     Problem: Depression  Goal: Refrain from isolation  Description: Interventions:  - Develop a trusting relationship   - Encourage socialization   Outcome: Progressing     Problem: Anxiety  Goal: Anxiety is at manageable level  Description: Interventions:  - Assess and monitor patient's anxiety level. - Monitor for signs and symptoms (heart palpitations, chest pain, shortness of breath, headaches, nausea, feeling jumpy, restlessness, irritable, apprehensive). - Collaborate with interdisciplinary team and initiate plan and interventions as ordered.   - [place] patient to unit/surroundings  - Explain treatment plan  - Encourage participation in care  - Encourage verbalization of concerns/fears  - Identify coping mechanisms  - Assist in developing anxiety-reducing skills  - Administer/offer alternative therapies  - Limit or eliminate stimulants  Outcome: Progressing

## 2023-08-03 NOTE — TREATMENT TEAM
08/03/23 1300   Activity/Group Checklist   Group Nursing Education   Attendance Attended   Attendance Duration (min) 31-45   Interactions Interacted appropriately   Affect/Mood Appropriate

## 2023-08-03 NOTE — PROGRESS NOTES
Psychiatric Progress Note - Department of [address] 21 y.o. male MRN: [id number]  Unit/Bed#: CRUZITO RAMIREZ [facility] 280-59 Encounter: [id number]    ASSESSMENT & PLAN     Principal Problem:    Severe episode of recurrent major depressive disorder, without psychotic features ([address])  Active Problems:    Medical clearance for psychiatric admission    Autism spectrum disorder    Mood disorder ([address])    Anxiety disorder    Rash    • Continue to promote patient participation in therapeutic milieu. • Continue medical management per medicine. • Continue previously prescribed psychotropic medication regimen; see below. • Continue behavioral health checks q7 minutes. • Continue vitals per behavioral health unit protocol. • Discharge date per primary team and CM (per social work, expected next month), patient accepted at [facility] on 7/24; 201 commitment status. • Continue preparing for [facility]  • Continue monitoring bilateral hand tremors and drooling    SUBJECTIVE     Patient evaluated this a.m. for continuity of care. Per treatment team, patient's behavior is unchanged. He is visible in the evenings. He has a meeting tomorrow (8/4) regarding discharge and attended 4/7 groups yesterday. Patient slept in again today and was again awoken for the interview this morning. He reported not being able to fall asleep for 2 hours last night and feeling tired this morning. He said he was able to stay asleep after falling asleep. Patient denies any changes in appetite. He ate all his meals yesterday. Yesterday, he walked, journaled, and attended groups. He didn't call his friends or family. He denies any rapping or singing. Patient denies any anger or aggression. Patient denies any suicidal or homicidal ideation, plan, or intent. He denies any auditory or visual hallucinations. No overt psychotic symptoms were noted. Patient's mild bilateral hand tremor and drooling is unchanged.  He denies any new signs or symptoms. Patient continues to make minimal eye contact and inappropriately smile during interview. He has a very blunted and constricted affect. Patient continues to be concerned about discharge. Patient was discussed at length with nursing and treatment team. No acute distress is noted throughout evaluation. Lamar Garcia denies suicidal/homicidal ideation; contracts for safety. PSYCHIATRIC REVIEW OF SYSTEMS     Behavior over the last 24 hours:  unchanged  Sleep: patient reports difficulty falling asleep last night  Appetite: normal  Medication side effects: Yes mild bilateral hand tremor and drooling    REVIEW OF SYSTEMS   Review of systems: no complaints, mild bilateral hand tremor and all other systems are negative    OBJECTIVE     Vital Signs in Past 24 Hours:  Temp:  [97.6 °F (36.4 °C)-97.7 °F (36.5 °C)] 97.7 °F (36.5 °C)  HR:  [60-76] 60  Resp:  [16-18] 18  BP: ()/(51-70) 93/51    Intake/Output in Past 24 hours:  No intake/output data recorded. No intake/output data recorded. Laboratory Results:  I have personally reviewed all pertinent laboratory/tests results. Behavioral Health Medications: all current active meds have been reviewed.     Current Facility-Administered Medications   Medication Dose Route Frequency Provider Last Rate   • acetaminophen  650 mg Oral Q6H PRN Lenny Sierras, DO     • acetaminophen  650 mg Oral Q4H PRN Lenny Sierras, DO     • acetaminophen  975 mg Oral Q6H PRN Lenny Sierras, DO     • aluminum-magnesium hydroxide-simethicone  30 mL Oral Q4H PRN Lenny Sierras, DO     • ammonium lactate   Topical BID PRN Roddy Loja PA-C     • atropine  1 drop Sublingual TID PRN Lanie Gonzalez PA-C     • benztropine  1 mg Intramuscular Q4H PRN Max 6/day Lenny Sierras, DO     • benztropine  1 mg Oral Q4H PRN Max 6/day Lenny Sierras, DO     • benztropine  2 mg Oral BID Enzo Kim MD     • clonazePAM  0.5 mg Oral BID Ami Sinclair MD     • hydrOXYzine HCL  50 mg Oral Q6H PRN Max 4/day Sissy Matter, DO      Or   • diphenhydrAMINE  50 mg Intramuscular Q6H PRN Sissy Matter, DO     • diphenhydrAMINE  25 mg Oral Q6H PRN Jimenez Scot, DO     • glycerin-hypromellose-  1 drop Both Eyes Q3H PRN Sissy Matter, DO     • haloperidol  5 mg Oral HS Ami Sinclair MD     • hydrOXYzine HCL  100 mg Oral Q6H PRN Max 4/day Sissy Matter, DO      Or   • LORazepam  2 mg Intramuscular Q6H PRN Sissy Matter, DO     • hydrOXYzine HCL  25 mg Oral Q6H PRN Max 4/day Sissy Matter, DO     • lithium carbonate  450 mg Oral Q12H Wilder Theodore MD     • melatonin  6 mg Oral HS PRN Ami Sinclair MD     • mirtazapine  7.5 mg Oral HS Sissy Matter, DO     • OLANZapine  10 mg Oral Q3H PRN Max 3/day Sissy Matter, DO      Or   • OLANZapine  10 mg Intramuscular Q3H PRN Max 3/day Sissy Matter, DO     • OLANZapine  5 mg Oral Q3H PRN Max 6/day Sissy Matter, DO      Or   • OLANZapine  5 mg Intramuscular Q3H PRN Max 6/day Sissy Matter, DO     • OLANZapine  2.5 mg Oral Q3H PRN Max 8/day Sissy Matter, DO     • polyethylene glycol  17 g Oral Daily PRN Sissy Matter, DO     • propranolol  10 mg Oral Q8H PRN Sissy Matter, DO     • propranolol  10 mg Oral Q8H PRN Ami Sinclair MD     • propranolol  10 mg Oral TID Ami Sinclair MD     • senna-docusate sodium  1 tablet Oral Daily PRN Sissy Matter, DO     • traZODone  50 mg Oral HS PRN Sissy Matter, DO         Risks, benefits and possible side effects of Medications:   Risks, benefits, and possible side effects of medications explained to patient and patient verbalizes understanding. Mental Status Evaluation:  Appearance:  age appropriate, disheveled and uncombed hair. Patient was awoken for interview - was still seated in bed wearing pajamas.  Rare eye contact   Behavior:  calm, cooperative, more lethargic than usual, inappropriate smiling at times   Speech:  increased latency of response and normal pitch and volume, coherent, fluent   Mood:  anxious   Affect:  blunted and mood-congruent   Language naming objects and repeating phrases   Thought Process:  concrete and goal directed   Thought Content:  normal and no verbalized phobias, delusions, or obsessions. Preoccupied with discharge. Perceptual Disturbances: None and no AVH, does not appear to be responding to internal stimuli   Risk Potential: Suicidal Ideations none, Homicidal Ideations none and Potential for Aggression No   Sensorium:  person, place, time/date and situation   Cognition:  recent and remote memory grossly intact   Consciousness:  awake    Attention: attention span appeared shorter than expected for age   Insight:  limited   Judgment: limited   Intellect    Gait/Station: unable to assess, patient was in bed   Motor Activity: abnormal movement noted  tremor : mild bilateral hand tremor     Memory: Short and long term memory  intact     Progress Toward Goals: progressing, as evidenced by their participation in individual, social and therapeutic milieu in addition to adherence to their medication regimen. Patient Acceptance: patient seems to accept medication and treatment  Patient Understanding: patient seems to understand medication and treatment  Patient Involvement in Reintegration Process: patient attended 4/7 groups; he continues to be involved but is anxious for discharge  Patient's Therapeutic Relationship with Psychiatrist: patient remains cooperative during the interview  Patient's Optimism Regarding Recovery: patient is optimistic about recovery    Recommended Treatment:   See above for assessment and plan.     Inpatient Psychiatric Certification: I certify that inpatient services are medically necessary for this patient for a duration of greater that 2 midnights for the treatment of Severe episode of recurrent major depressive disorder, without psychotic features [facility])   See H&P and MD Progress Notes for additional information about the patient's course of treatment. Counseling/Coordination of Care    Total unit time spent today ws greater than 15 minutes. Greater than 50% of total time was spent with the patient and/or patient's relatives and/or coordination of patient's care. Patient's rights, confidentiality, exceptions to confidentiality, use of electronic medical record including appropriate staff access to medical record regarding behavioral health services and consent to treatment were reviewed. Note Share:      This note was not shared with the patient due to reasonable likelihood of causing patient harm     [employer]

## 2023-08-03 NOTE — NURSING NOTE
Patient is compliant with medication and meals. He does not socialize with his peers. He is still focused on  Discharge a lot. He talked about his meeting coming up,He is not sure how he feels about his father coming to the meeting. But did not verbalize much about it.

## 2023-08-03 NOTE — PROGRESS NOTES
08/03/23 0830   Team Meeting   Meeting Type Daily Rounds   Initial Conference Date 08/03/23   Patient/Family Present   Patient Present No   Patient's Family Present No     Daily Rounds Documentation     Team Members Present:   Dr. Gershon Lemming, MD Dr. Frazier Mortimer, DO Dr. Antonio Urias, DO  ANA Jamil, ROB Youssef    Visible. Pleasant. No behaviors. Some anxiety. Attended 4/7 groups. Compliant with medications and meals. Slept.   Meeting to discuss waiver and discharge tomorrow at 11:30AM.

## 2023-08-03 NOTE — PROGRESS NOTES
06/14/23 0830   Team Meeting   Meeting Type Daily Rounds   Initial Conference Date 06/14/23   Patient/Family Present   Patient Present No   Patient's Family Present No     Daily Rounds Documentation     Team Members Present:   MD Rosaline Sanchez CRNP Mariah Rave, CATHY Sahni, \[facility]\""W  Placido Mckinney, LSW    Visible. More distracted and delayed yesterday. Still frustrated with his peer, but controlled. Attended 4/7 groups. Compliant with medications and meals. Slept. SIS assessment today. H Plasty Text: Given the location of the defect, shape of the defect and the proximity to free margins a H-plasty was deemed most appropriate for repair.  Using a sterile surgical marker, the appropriate advancement arms of the H-plasty were drawn incorporating the defect and placing the expected incisions within the relaxed skin tension lines where possible. The area thus outlined was incised deep to adipose tissue with a #15 scalpel blade. The skin margins were undermined to an appropriate distance in all directions utilizing iris scissors.  The opposing advancement arms were then advanced into place in opposite direction and anchored with interrupted buried subcutaneous sutures.

## 2023-08-03 NOTE — TREATMENT TEAM
08/03/23 0930   Activity/Group Checklist   Group Spiritual resources   Attendance Attended   Attendance Duration (min) 31-45   Interactions Interacted appropriately   Affect/Mood Appropriate

## 2023-08-04 PROCEDURE — 99232 SBSQ HOSP IP/OBS MODERATE 35: CPT | Performed by: PSYCHIATRY & NEUROLOGY

## 2023-08-04 RX ORDER — PROPRANOLOL HYDROCHLORIDE 10 MG/1
10 TABLET ORAL 3 TIMES DAILY PRN
Status: DISCONTINUED | OUTPATIENT
Start: 2023-08-04 | End: 2023-08-11 | Stop reason: HOSPADM

## 2023-08-04 RX ADMIN — HALOPERIDOL 5 MG: 5 TABLET ORAL at 21:34

## 2023-08-04 RX ADMIN — LITHIUM CARBONATE 450 MG: 450 TABLET, EXTENDED RELEASE ORAL at 08:30

## 2023-08-04 RX ADMIN — CLONAZEPAM 0.5 MG: 0.5 TABLET ORAL at 17:14

## 2023-08-04 RX ADMIN — BENZTROPINE MESYLATE 2 MG: 2 TABLET ORAL at 08:31

## 2023-08-04 RX ADMIN — CLONAZEPAM 0.5 MG: 0.5 TABLET ORAL at 08:31

## 2023-08-04 RX ADMIN — BENZTROPINE MESYLATE 2 MG: 2 TABLET ORAL at 17:15

## 2023-08-04 RX ADMIN — LITHIUM CARBONATE 450 MG: 450 TABLET, EXTENDED RELEASE ORAL at 21:34

## 2023-08-04 RX ADMIN — MIRTAZAPINE 7.5 MG: 7.5 TABLET, FILM COATED ORAL at 21:34

## 2023-08-04 RX ADMIN — Medication 6 MG: at 21:52

## 2023-08-04 NOTE — PLAN OF CARE
Problem: Risk for Self Injury/Neglect  Goal: Treatment Goal: Remain safe during length of stay, learn and adopt new coping skills, and be free of self-injurious ideation, impulses and acts at the time of discharge  Outcome: Progressing     Problem: Depression  Goal: Refrain from isolation  Description: Interventions:  - Develop a trusting relationship   - Encourage socialization   Outcome: Progressing     Problem: Anxiety  Goal: Anxiety is at manageable level  Description: Interventions:  - Assess and monitor patient's anxiety level. - Monitor for signs and symptoms (heart palpitations, chest pain, shortness of breath, headaches, nausea, feeling jumpy, restlessness, irritable, apprehensive). - Collaborate with interdisciplinary team and initiate plan and interventions as ordered.   - [place] patient to unit/surroundings  - Explain treatment plan  - Encourage participation in care  - Encourage verbalization of concerns/fears  - Identify coping mechanisms  - Assist in developing anxiety-reducing skills  - Administer/offer alternative therapies  - Limit or eliminate stimulants  Outcome: Progressing

## 2023-08-04 NOTE — NURSING NOTE
Patient stated he vomited this morning when staff went to check  It was one tiny piece of food.  He then requested  Ginger ale which was given  Patient stated  He felt better

## 2023-08-04 NOTE — NURSING NOTE
Stephan Vidal has been awake, alert, and visible intermittently out in the milieu. Pt ate 100% supper. Pt walks around unit and rests in his room at intervals. Pt remains fixated on discharge and spoke of having a meeting tomorrow about his discharge. Pt attended evening group and went out on deck with staff and peers. Pt did some exercising in exercise room. Minimal interaction noted with peers. Still delayed in verbal responses, smiles and stares inappropriately at times. Pt attended and participated in wrap up group and had snack after with peers. Cooperative with taking scheduled meds as ordered. Continue to monitor/assess for any changes in behavior.

## 2023-08-04 NOTE — PROGRESS NOTES
08/04/23 0830   Team Meeting   Meeting Type Daily Rounds   Initial Conference Date 08/04/23   Patient/Family Present   Patient Present No   Patient's Family Present No     Daily Rounds Documentation     Team Members Present:   MD Dr. Emily Fraser CRNP Denney Dutton, RN  Jc Laila, [address] [place] [address]. D    Still fixated on discharge. Some anxiety. Melatonin PRN utilized. Attended 6/8 groups. Compliant with medications and meals. Slept.   Meeting today with supports coordinator and [facility] at 11:30AM.

## 2023-08-04 NOTE — NURSING NOTE
Patient remains compliant with medication and kirby Patient attended 6 groups out of 8 groups on Thursday. He has a meeting today regarding his discharge soon.

## 2023-08-04 NOTE — SOCIAL WORK
[facility]:  Message left asking if the accept [facility].

## 2023-08-04 NOTE — NURSING NOTE
Niki Ceron requested prn Melatonin for sleep. Melatonin 6 mg po prn given at 2148. Will monitor/assess for effectiveness of prn Melatonin.

## 2023-08-04 NOTE — SOCIAL WORK
SW spoke with patient briefly regarding his use of inappropriate language, but also praised him for apologizing to his peer.

## 2023-08-04 NOTE — PROGRESS NOTES
Psychiatry Progress Note [facility] 21 y.o. male MRN: [id number]  Unit/Bed#: CRUZITO RAMIREZ [facility] 028-64 Encounter: [id number]  Code Status: Level 1 - Full Code    PCP: Rubi Levin DO    Date of Admission:  5/25/2023 1349   Date of Service:  08/04/23    Patient Active Problem List   Diagnosis   • Medical clearance for psychiatric admission   • Autism spectrum disorder   • Mood disorder ([facility])   • Severe episode of recurrent major depressive disorder, without psychotic features ([address] [facility])   • Anxiety disorder   • Rash         Review of systems: Unremarkable  Diagnosis: Major depression recurrent, mood disorder unspecified, anxiety disorder, autistic spectrum disorder  assessment  • Overall Status: Happy about upcoming discharge meeting today with supports coordinator as he is being considered for the [address] in [address] [place] [address].   Not aggressive or self-abusive or agitated but still with a smirk on his face staying to himself aloof with not much interaction with peers staying to himself  • certification Statement: The patient will continue to require additional inpatient hospital stay due to potential for suicide and aggressive behaviors as he almost jumped off a high water tower prior to recent admission and has attempted to strangle self in the past    Medications: Depakote 1250 mg a day, melatonin 9 mg at bedtime, Remeron 7.5 mg at bedtime Lithobid 300 mg twice a day Inderal 10 mg every 8 hours as needed for anxiety  Side effects from treatment: None reported  Medication changes   None today   Medication education   Risks side effects benefits and precautions of medications discussed with patient and he did verbalize an understanding about risks for metabolic syndrome from being on neuroleptics and is form tardive dyskinesia etc.    Understanding of medications: Has some understanding   Justification for dual anti-psychotics: Not applicable    Non-pharmacological treatments  • Continue with individual, group, milieu and occupational therapy using recovery principles and psycho-education about accepting illness and the need for treatment. •  to get prior hospital records and contact adoptive family for more records and history  • Continued with assault precautions  • Placed on elopement precautions as it reportedly tried to elope from [facility] once using a student's ID badge  t    Safety  • Safety and communication plan established to target dynamic risk factors discussed above. Discharge Plan   • To the group home with [facility] services once stabilized as parents are refusing to take him back    Interval Progress   Happy about her upcoming discharge and needing. No longer reportedly cursing or using rap music with curse words. No episodes of banging his head or becoming agitated but still somewhat childish in nature and his interaction. Not aggressive or agitated or suicidal self-abusive on the unit. No overt psychotic production reported or noted. No current suicidal or homicidal thoughts and no plans verbalized. Attending some groups and has a smirk on his face as usual otherwise constricted in affect. Hygiene is fair.   He is tired in the morning since Haldol changed to bedtime    • Acceptance by patient: Somewhat  • Hopefulness in recovery: Being at a group home  • Involved in reintegration process: Talking with his adopted family members  • Trusting in relationship with psychiatrist: At times  • Sleep: Good  • Appetite: Good  •  Compliance with Medications: Compliant  • Group attendance: Some  Significant events: Improving but still somewhat childish with inappropriate smile and a smirk on her face    Mental Status Exam  Appearance: age appropriate, dressed appropriately, marginal hygiene, looks younger than stated age still childish and immature impatient with a smirk on his face   behavior: cooperative, mildly anxious, evasive, inappropriate cold aloof with still a transient smile at times  fspeech: normal rate and volume, fluent, clear, coherent, monotone  Mood: normal  Affect: blunted, constricted, mood-congruent with minimal mood reactivity   thought Process: organized, logical, coherent, goal directed, linear, normal rate of thoughts, concrete  Thought Content: no overt delusions, preoccupied, poverty of thought, paucity of thought, no current suicidal or homicidal thoughts and no plans verbalized. No phobias obsessions or compulsions reported. Perceptual Disturbances: denies auditory hallucinations when asked, does not appear responding to internal stimuli, no visual hallucinations, denies auditory or visual hallucinations when asked  Hx Risk Factors: chronic psychiatric problems, chronic mood disorder, history of suicidal behaviors, history of serious suicide attempts, history of incarceration on charges filed recently which were later dropped for assaulting a peer at the [address] [place] [address] Road: Oriented x 3 spheres and situation  cognition: recent and remote memory grossly intact  Consciousness: alert and awake  Attention: attention span and concentration are age appropriate  Intellect: appears to be of average intelligence   Insight: limited  Judgement: limited  Motor Activity: no abnormal movements     Vitals  Temp:  [97.5 °F (36.4 °C)-97.7 °F (36.5 °C)] 97.5 °F (36.4 °C)  HR:  [60-85] 85  Resp:  [18] 18  BP: ()/(51-75) 122/75  SpO2:  [99 %] 99 %  No intake or output data in the 24 hours ending 08/04/23 0436    Lab Results:  [facility] Admission Reviewed    Current Facility-Administered Medications   Medication Dose Route Frequency Provider Last Rate   • acetaminophen  650 mg Oral Q6H PRN Christo Perlale, DO     • acetaminophen  650 mg Oral Q4H PRN Christo Perlale, DO     • acetaminophen  975 mg Oral Q6H PRN Christo Perlale, DO     • aluminum-magnesium hydroxide-simethicone  30 mL Oral Q4H PRN Coby Cook Prayson, DO     • ammonium lactate   Topical BID PRN Jacek Rodriguez PA-C     • atropine  1 drop Sublingual TID PRN Marisol Domingo PA-C     • benztropine  1 mg Intramuscular Q4H PRN Max 6/day Luci Elijah, DO     • benztropine  1 mg Oral Q4H PRN Max 6/day Luci Elijah, DO     • benztropine  2 mg Oral BID Angela Jimenes MD     • clonazePAM  0.5 mg Oral BID Angela Jimenes MD     • hydrOXYzine HCL  50 mg Oral Q6H PRN Max 4/day Lucikatina Nava, DO      Or   • diphenhydrAMINE  50 mg Intramuscular Q6H PRN Luci Nava DO     • diphenhydrAMINE  25 mg Oral Q6H PRN Charley Chatterjee DO     • glycerin-hypromellose-  1 drop Both Eyes Q3H PRN Luci Nava, DO     • haloperidol  5 mg Oral HS Angela Jimenes MD     • hydrOXYzine HCL  100 mg Oral Q6H PRN Max 4/day Luci Nava DO      Or   • LORazepam  2 mg Intramuscular Q6H PRN Luci Nava, DO     • hydrOXYzine HCL  25 mg Oral Q6H PRN Max 4/day Luci Nava DO     • lithium carbonate  450 mg Oral Q12H Brandi Parra MD     • melatonin  6 mg Oral HS PRN Angela Jimenes MD     • mirtazapine  7.5 mg Oral HS Lucikatina Nava, DO     • OLANZapine  10 mg Oral Q3H PRN Max 3/day Luci Nava DO      Or   • OLANZapine  10 mg Intramuscular Q3H PRN Max 3/day Lucipradeep Nava, DO     • OLANZapine  5 mg Oral Q3H PRN Max 6/day Luci Elijah DO      Or   • OLANZapine  5 mg Intramuscular Q3H PRN Max 6/day Lucipradeep Nava DO     • OLANZapine  2.5 mg Oral Q3H PRN Max 8/day Ciro KATINA Prayson, DO     • polyethylene glycol  17 g Oral Daily PRN Luci Elijah, DO     • propranolol  10 mg Oral Q8H PRN Luci Nava DO     • propranolol  10 mg Oral Q8H PRN Angela Jimenes MD     • propranolol  10 mg Oral TID Angela Jimenes MD     • senna-docusate sodium  1 tablet Oral Daily PRN Luci Nava DO     • traZODone  50 mg Oral HS PRN Luci Nava DO         Counseling / Coordination of Care:  Total floor / unit time spent today 15 minutes. Greater than 50% of total time was spent with the patient and / or family counseling and / or somewhat receptive to supportive listening and teaching positive coping skills to deal with symptom mangement. Patient's Rights, confidentiality and exceptions to confidentiality, use of automated medical record, [address] [place] [address] staff access to medical record, and consent to treatment reviewed. This note has been dictated and hence there may be problems with punctuation, spelling and formatting and if anyone has any concerns please address them to Dr. Elliott Manual   This note is not shared with patient due to potential for making patient's condition worse by knowing the content of the note.

## 2023-08-04 NOTE — SOCIAL WORK
In Attendance:  Robel Oh (Father)  Corina Shepard ([address])  Jodee Jansen ([address])  Harvinder Huggins (Services Access and Management)  Ayaka Marquez (Service Access and Management)  Frankie Miller [facility])    Patient was present for this meeting; he presented as pleasant, slightly anxious, and attentive. He was dressed appropriately, and appeared adequately groomed. The purpose of the meeting was to discuss discharge. Reportedly, the waiver should be approved in a few days. Dahiana Du confirmed that they are mostly ready to admit patient, but would like and updated medical evaluation. Rob Terry sent ATIYA a MA-51 to be completed. Dahiana Du informed team he would also like a list of patient's providers so they can schedule appointments; there might be some delay in psych services due to insurance. ATIYA informed the team that patient will discharge with a 30 day supply of medications. A discharge date will be determined next week once the waiver is in place. Patient had no questions or concerns to present; he expressed that he is excited for discharge.

## 2023-08-05 PROCEDURE — 99232 SBSQ HOSP IP/OBS MODERATE 35: CPT | Performed by: PSYCHIATRY & NEUROLOGY

## 2023-08-05 RX ADMIN — HALOPERIDOL 5 MG: 5 TABLET ORAL at 21:19

## 2023-08-05 RX ADMIN — CLONAZEPAM 0.5 MG: 0.5 TABLET ORAL at 08:43

## 2023-08-05 RX ADMIN — CLONAZEPAM 0.5 MG: 0.5 TABLET ORAL at 17:02

## 2023-08-05 RX ADMIN — Medication 6 MG: at 21:42

## 2023-08-05 RX ADMIN — HYDROXYZINE HYDROCHLORIDE 50 MG: 50 TABLET, FILM COATED ORAL at 12:07

## 2023-08-05 RX ADMIN — BENZTROPINE MESYLATE 2 MG: 2 TABLET ORAL at 17:02

## 2023-08-05 RX ADMIN — BENZTROPINE MESYLATE 2 MG: 2 TABLET ORAL at 08:43

## 2023-08-05 RX ADMIN — LITHIUM CARBONATE 450 MG: 450 TABLET, EXTENDED RELEASE ORAL at 21:19

## 2023-08-05 RX ADMIN — MIRTAZAPINE 7.5 MG: 7.5 TABLET, FILM COATED ORAL at 21:19

## 2023-08-05 RX ADMIN — LITHIUM CARBONATE 450 MG: 450 TABLET, EXTENDED RELEASE ORAL at 08:43

## 2023-08-05 NOTE — PROGRESS NOTES
Progress Note - Behavioral Health     Marta Mejia 21 y.o. male MRN: [id number]   Unit/Bed#: CRUZITO RAMIREZ [facility] 365-07 Encounter: [id number]    Behavior over the last 24 hours: unchanged. Fede Walton is a 17-year-old male diagnosed with autism spectrum disorder, recurrent major depressive disorder, without psychotic features, anxiety seen today in psychiatric follow-up. Per staff, he continues to be inappropriate at times and vulgar however is redirectable. Asking various questions about discharge updates and planning. Did accept PRN melatonin for sleep last night which was effective. He reports that he is excited over positive meeting yesterday and is hopeful for discharge date for early next week. Does exhibit some anxiety that this may change however encouraged to remain optimistic. No SI/HI/AVH. Attending groups.     Sleep: normal  Appetite: normal  Medication side effects: No   ROS: no complaints, all other systems are negative    Mental Status Evaluation:    Appearance:  age appropriate, casually dressed   Behavior:  cooperative   Speech:  normal rate, normal volume, normal pitch   Mood:  anxious   Affect:  mood-congruent   Thought Process:  goal directed   Associations: perseverative   Thought Content:  no overt delusions   Perceptual Disturbances: none   Risk Potential: Suicidal ideation - None at present  Homicidal ideation - None at present  Potential for aggression - No   Sensorium:  oriented to person, place and time/date   Memory:  recent and remote memory grossly intact   Consciousness:  alert and awake   Attention/Concentration: attention span and concentration appear shorter than expected for age   Insight:  limited   Judgment: limited   Gait/Station: normal gait/station   Motor Activity: no abnormal movements     Vital signs in last 24 hours:    Temp:  [97.7 °F (36.5 °C)-97.8 °F (36.6 °C)] 97.7 °F (36.5 °C)  HR:  [] 80  Resp:  [18] 18  BP: (100-135)/(52-78) 100/52    Laboratory results: I have personally reviewed all pertinent laboratory/tests results    Results from the past 24 hours: No results found for this or any previous visit (from the past 24 hour(s)). Most Recent Labs:   Lab Results   Component Value Date    WBC 6.74 06/03/2023    RBC 5.22 06/03/2023    HGB 15.6 06/03/2023    HCT 49.0 06/03/2023     06/03/2023    RDW 13.1 06/03/2023    NEUTROABS 3.01 06/03/2023    SODIUM 140 05/31/2023    K 4.4 05/31/2023     05/31/2023    CO2 29 05/31/2023    BUN 11 05/31/2023    CREATININE 0.72 05/31/2023    GLUC 89 05/31/2023    CALCIUM 9.3 05/31/2023    AST 24 05/31/2023    ALT 43 05/31/2023    ALKPHOS 98 05/31/2023    TP 7.0 05/31/2023    ALB 4.4 05/31/2023    TBILI 0.52 05/31/2023    CHOLESTEROL 208 (H) 05/27/2023    HDL 39 (L) 05/27/2023    TRIG 190 (H) 05/27/2023    LDLCALC 131 (H) 05/27/2023    NONHDLC 169 05/27/2023    VALPROICTOT 106 (H) 05/31/2023    LITHIUM 0.7 06/21/2023    IGB9ORLXRBTI 1.637 05/27/2023    HGBA1C 5.4 05/27/2023     05/27/2023       Progress Toward Goals: progressing    Assessment/Plan   Principal Problem:    Severe episode of recurrent major depressive disorder, without psychotic features ([address])  Active Problems:    Medical clearance for psychiatric admission    Autism spectrum disorder    Mood disorder ([address] [facility])    Anxiety disorder    Rash      Recommended Treatment:     Planned medication and treatment changes:     All current active medications have been reviewed  Encourage group therapy, milieu therapy and occupational therapy  Behavioral Health checks every 7 minutes  Continue current medications and therapy  Awaiting group home placement    Current Facility-Administered Medications   Medication Dose Route Frequency Provider Last Rate   • acetaminophen  650 mg Oral Q6H PRN Ángel Bird, DO     • acetaminophen  650 mg Oral Q4H PRN Ángel Bird DO     • acetaminophen  975 mg Oral Q6H PRN Ángel Bird, DO     • aluminum-magnesium hydroxide-simethicone  30 mL Oral Q4H PRN Savannah Heaps, DO     • ammonium lactate   Topical BID PRN Tip Oh PA-C     • atropine  1 drop Sublingual TID PRN Lenin Calderon PA-C     • benztropine  1 mg Intramuscular Q4H PRN Max 6/day Savannah Heaps, DO     • benztropine  1 mg Oral Q4H PRN Max 6/day Savannah Heaps, DO     • benztropine  2 mg Oral BID Rella Frankel, MD     • clonazePAM  0.5 mg Oral BID Rella Frankel, MD     • hydrOXYzine HCL  50 mg Oral Q6H PRN Max 4/day Savannah Heaps, DO      Or   • diphenhydrAMINE  50 mg Intramuscular Q6H PRN Savannah Heaps, DO     • diphenhydrAMINE  25 mg Oral Q6H PRN Augustin Wyatt, DO     • glycerin-hypromellose-  1 drop Both Eyes Q3H PRN Savannah Heaps, DO     • haloperidol  5 mg Oral HS Rella Frankel, MD     • hydrOXYzine HCL  100 mg Oral Q6H PRN Max 4/day Savannah Heaps, DO      Or   • LORazepam  2 mg Intramuscular Q6H PRN Savannah Heaps, DO     • hydrOXYzine HCL  25 mg Oral Q6H PRN Max 4/day Savannah Heaps, DO     • lithium carbonate  450 mg Oral Q12H Reinier Maya MD     • melatonin  6 mg Oral HS PRN Rella Frankel, MD     • mirtazapine  7.5 mg Oral HS Savannah Heaps, DO     • OLANZapine  10 mg Oral Q3H PRN Max 3/day Savannah Heaps, DO      Or   • OLANZapine  10 mg Intramuscular Q3H PRN Max 3/day Savannah Heaps, DO     • OLANZapine  5 mg Oral Q3H PRN Max 6/day Savannah Heaps, DO      Or   • OLANZapine  5 mg Intramuscular Q3H PRN Max 6/day Savannah Heaps, DO     • OLANZapine  2.5 mg Oral Q3H PRN Max 8/day Savannah Heaps, DO     • polyethylene glycol  17 g Oral Daily PRN Savannah Heaps, DO     • propranolol  10 mg Oral TID PRN Rella Frankel, MD     • senna-docusate sodium  1 tablet Oral Daily PRN Savannah Heaps, DO     • traZODone  50 mg Oral HS PRN Savannah Heaps, DO       Risks / Benefits of Treatment:    Risks, benefits, and possible side effects of medications explained to patient and patient verbalizes understanding and agreement for treatment. Counseling / Coordination of Care: Total floor / unit time spent today 20 minutes. Greater than 50% of total time was spent with the patient and / or family counseling and / or coordination of care. A description of counseling / coordination of care:  Patient's progress discussed with staff in treatment team meeting. Medications, treatment progress and treatment plan reviewed with patient.     Unruly Boo PA-C 08/05/23

## 2023-08-05 NOTE — NURSING NOTE
Patient is constricted and inappropriate for age. Visible and social. Appetite and hygiene is excellent. Medication compliant. Utilizing PRNs when needed. Attended exercise and art therapy. Remains fixated on D/C. No inappropriate comments noted. Q 7 minute continual rounding in place.

## 2023-08-05 NOTE — NURSING NOTE
Melatonin 6 mg that was given an hour ago  was effective. Patient appears to be resting comfortably t this time. Eyes closed and chest movements noted.

## 2023-08-05 NOTE — NURSING NOTE
Jeffery Putnam is visible on the unit and interactive with some of his peers. He is med and meal compliant. He continues to stand at the office windows and stare at the staff and tries to read any paper work he finds in his view. Melatonin 6 mg po prn for sleep given at 2152 per request. Q 7 minute patient checks maintained.

## 2023-08-05 NOTE — PLAN OF CARE
Problem: Ineffective Coping  Goal: Identifies ineffective coping skills  Outcome: Progressing  Goal: Identifies healthy coping skills  Outcome: Progressing  Goal: Demonstrates healthy coping skills  Outcome: Progressing     Problem: Risk for Self Injury/Neglect  Goal: Treatment Goal: Remain safe during length of stay, learn and adopt new coping skills, and be free of self-injurious ideation, impulses and acts at the time of discharge  Outcome: Progressing

## 2023-08-05 NOTE — NURSING NOTE
Patient c/o anxiety r/t D/C. Worried that Lucita Bell will not be accepted. Stated he felt like his heart was racing. HR 70 and steady. PRN atarax 50mg administered at 1207. Results pending. Continues to ask various staff members questions about D/C.

## 2023-08-05 NOTE — NURSING NOTE
Patient repetitive and fixated on D/C. Frequently asking various staff members the same questions r/t discharge. Patient at times intrusive but redirectable. Encouraged to use coping skills.

## 2023-08-06 VITALS
OXYGEN SATURATION: 98 % | TEMPERATURE: 97.9 F | WEIGHT: 182 LBS | HEART RATE: 80 BPM | DIASTOLIC BLOOD PRESSURE: 59 MMHG | SYSTOLIC BLOOD PRESSURE: 113 MMHG | RESPIRATION RATE: 18 BRPM | HEIGHT: 71 IN | BODY MASS INDEX: 25.48 KG/M2

## 2023-08-06 PROCEDURE — 99232 SBSQ HOSP IP/OBS MODERATE 35: CPT | Performed by: PSYCHIATRY & NEUROLOGY

## 2023-08-06 RX ADMIN — BENZTROPINE MESYLATE 2 MG: 2 TABLET ORAL at 17:10

## 2023-08-06 RX ADMIN — Medication 6 MG: at 21:30

## 2023-08-06 RX ADMIN — CLONAZEPAM 0.5 MG: 0.5 TABLET ORAL at 17:10

## 2023-08-06 RX ADMIN — CLONAZEPAM 0.5 MG: 0.5 TABLET ORAL at 08:47

## 2023-08-06 RX ADMIN — LITHIUM CARBONATE 450 MG: 450 TABLET, EXTENDED RELEASE ORAL at 21:29

## 2023-08-06 RX ADMIN — HALOPERIDOL 5 MG: 5 TABLET ORAL at 21:29

## 2023-08-06 RX ADMIN — LITHIUM CARBONATE 450 MG: 450 TABLET, EXTENDED RELEASE ORAL at 08:47

## 2023-08-06 RX ADMIN — HYDROXYZINE HYDROCHLORIDE 50 MG: 50 TABLET, FILM COATED ORAL at 12:09

## 2023-08-06 RX ADMIN — MIRTAZAPINE 7.5 MG: 7.5 TABLET, FILM COATED ORAL at 21:30

## 2023-08-06 RX ADMIN — BENZTROPINE MESYLATE 2 MG: 2 TABLET ORAL at 08:47

## 2023-08-06 NOTE — NURSING NOTE
Started caring for Saleem Mg at 0890. He was visible out and about on the unit. Social with staff and select peers. Able to make needs known. Attended Wrap Up group. Ate snack. Took medication without difficulty. Medicated with Melatonin per request. Behavior controlled. Continue to monitor. Precautions maintained.

## 2023-08-06 NOTE — NURSING NOTE
Remains attention seeking and at times intrusive. Requesting narcotics and "downers". Patient encouraged to participate in activities to occupy his free time. Currently in bedroom reading and listening to radio.

## 2023-08-06 NOTE — PROGRESS NOTES
Progress Note - Behavioral Health     Shadia Hitchcock 21 y.o. male MRN: [id number]   [facility] Encounter: [id number]    Behavior over the last 24 hours: unchanged. Angelita Talamantes is a 13-year-old male diagnosed with autism spectrum disorder, recurrent major depressive disorder, without psychotic features, anxiety seen today in psychiatric follow-up. He is seen today out in [facility] reading a newspaper. He is cooperative and calm. He remains preservative and anxious around discharge planning and is hoping he will leave soon. Per staff, remains staff seeking often asking various members about discharge planning. Otherwise has been kind to peers, no behavioral outbursts. He denies mood changes. No SI/HI/AVH. Awaiting placement. Accepted Prn atarax and melatonin for anxiety & sleep aide.      Sleep: normal  Appetite: normal  Medication side effects: No   ROS: no complaints, all other systems are negative    Mental Status Evaluation:    Appearance:  age appropriate, casually dressed   Behavior:  cooperative, calm, poor eye contact   Speech:  normal rate, normal volume, normal pitch   Mood:  anxious   Affect:  mood-congruent   Thought Process:  goal directed   Associations: perseverative on d/c   Thought Content:  no overt delusions   Perceptual Disturbances: none   Risk Potential: Suicidal ideation - None at present  Homicidal ideation - None at present  Potential for aggression - No   Sensorium:  oriented to person, place and time/date   Memory:  recent and remote memory grossly intact   Consciousness:  alert and awake   Attention/Concentration: attention span and concentration appear shorter than expected for age   Insight:  limited   Judgment: limited   Gait/Station: normal gait/station   Motor Activity: no abnormal movements     Vital signs in last 24 hours:    Temp:  [97.5 °F (36.4 °C)-97.9 °F (36.6 °C)] 97.9 °F (36.6 °C)  HR:  [] 106  Resp:  [18] 18  BP: (114-129)/(59-69) 114/59    Laboratory results: I have personally reviewed all pertinent laboratory/tests results    Results from the past 24 hours: No results found for this or any previous visit (from the past 24 hour(s)). Most Recent Labs:   Lab Results   Component Value Date    WBC 6.74 06/03/2023    RBC 5.22 06/03/2023    HGB 15.6 06/03/2023    HCT 49.0 06/03/2023     06/03/2023    RDW 13.1 06/03/2023    NEUTROABS 3.01 06/03/2023    SODIUM 140 05/31/2023    K 4.4 05/31/2023     05/31/2023    CO2 29 05/31/2023    BUN 11 05/31/2023    CREATININE 0.72 05/31/2023    GLUC 89 05/31/2023    CALCIUM 9.3 05/31/2023    AST 24 05/31/2023    ALT 43 05/31/2023    ALKPHOS 98 05/31/2023    TP 7.0 05/31/2023    ALB 4.4 05/31/2023    TBILI 0.52 05/31/2023    CHOLESTEROL 208 (H) 05/27/2023    HDL 39 (L) 05/27/2023    TRIG 190 (H) 05/27/2023    LDLCALC 131 (H) 05/27/2023    NONHDLC 169 05/27/2023    VALPROICTOT 106 (H) 05/31/2023    LITHIUM 0.7 06/21/2023    [id number] 1.637 05/27/2023    HGBA1C 5.4 05/27/2023     05/27/2023       Progress Toward Goals: progressing , discharge planning, placement pending  Assessment/Plan   Principal Problem:    Severe episode of recurrent major depressive disorder, without psychotic features ([address])  Active Problems:    Medical clearance for psychiatric admission    Autism spectrum disorder    Mood disorder ([address])    Anxiety disorder    Rash      Recommended Treatment:     Planned medication and treatment changes:     All current active medications have been reviewed  Encourage group therapy, milieu therapy and occupational therapy  Behavioral Health checks every 7 minutes  Continue medications  GH placement    Current Facility-Administered Medications   Medication Dose Route Frequency Provider Last Rate   • acetaminophen  650 mg Oral Q6H PRN Oswaldo Edelstein, DO     • acetaminophen  650 mg Oral Q4H PRN Larodalis Edelstein, DO     • acetaminophen  975 mg Oral Q6H PRN Larnell Edelstein, DO     • aluminum-magnesium hydroxide-simethicone  30 mL Oral Q4H PRN Ge Bleak, DO     • ammonium lactate   Topical BID PRN [place] PA-C     • atropine  1 drop Sublingual TID PRN Kyra Hidalgo PA-C     • benztropine  1 mg Intramuscular Q4H PRN Max 6/day Ge Bleak, DO     • benztropine  1 mg Oral Q4H PRN Max 6/day Ge Bleak, DO     • benztropine  2 mg Oral BID Anastacio Whittington MD     • clonazePAM  0.5 mg Oral BID Anastacio Whittington MD     • hydrOXYzine HCL  50 mg Oral Q6H PRN Max 4/day Ge Bleak, DO      Or   • diphenhydrAMINE  50 mg Intramuscular Q6H PRN Ge Bleak, DO     • diphenhydrAMINE  25 mg Oral Q6H PRN Shraddha Devine, DO     • glycerin-hypromellose-  1 drop Both Eyes Q3H PRN Ge Bleak, DO     • haloperidol  5 mg Oral HS Anastaico Whittington MD     • hydrOXYzine HCL  100 mg Oral Q6H PRN Max 4/day Ge Bleak, DO      Or   • LORazepam  2 mg Intramuscular Q6H PRN Ge Bleak, DO     • hydrOXYzine HCL  25 mg Oral Q6H PRN Max 4/day Ge Bleak, DO     • lithium carbonate  450 mg Oral Q12H Denis Perez MD     • melatonin  6 mg Oral HS PRN Anastacio Whittington MD     • mirtazapine  7.5 mg Oral HS Ge Bleak, DO     • OLANZapine  10 mg Oral Q3H PRN Max 3/day Ge Bleak, DO      Or   • OLANZapine  10 mg Intramuscular Q3H PRN Max 3/day Ge Bleak, DO     • OLANZapine  5 mg Oral Q3H PRN Max 6/day Ge Bleak, DO      Or   • OLANZapine  5 mg Intramuscular Q3H PRN Max 6/day Ge Bleak, DO     • OLANZapine  2.5 mg Oral Q3H PRN Max 8/day Ge Bleak, DO     • polyethylene glycol  17 g Oral Daily PRN Ge Bleak, DO     • propranolol  10 mg Oral TID PRN Anastacio Whittington MD     • senna-docusate sodium  1 tablet Oral Daily PRN Ge Bleak, DO     • traZODone  50 mg Oral HS PRN Gefrancisca Layne, DO       Risks / Benefits of Treatment:    Risks, benefits, and possible side effects of medications explained to patient and patient verbalizes understanding and agreement for treatment. Counseling / Coordination of Care: Total floor / unit time spent today 20 minutes. Greater than 50% of total time was spent with the patient and / or family counseling and / or coordination of care. A description of counseling / coordination of care:  Patient's progress discussed with staff in treatment team meeting. Medications, treatment progress and treatment plan reviewed with patient.     Karthik Jama PA-C 08/06/23

## 2023-08-06 NOTE — NURSING NOTE
Nelly Steelier slept throughout the night. Respirations easy and non labored. No issues or behaviors. Continue to monitor. Precautions maintained.

## 2023-08-06 NOTE — PLAN OF CARE
Problem: Ineffective Coping  Goal: Demonstrates healthy coping skills  Outcome: Progressing     Problem: Risk for Self Injury/Neglect  Goal: Treatment Goal: Remain safe during length of stay, learn and adopt new coping skills, and be free of self-injurious ideation, impulses and acts at the time of discharge  Outcome: Progressing     Problem: Depression  Goal: Verbalize thoughts and feelings  Description: Interventions:  - Assess and re-assess patient's level of risk   - Engage patient in 1:1 interactions, daily, for a minimum of 15 minutes   - Encourage patient to express feelings, fears, frustrations, hopes   Outcome: Progressing  Goal: Refrain from isolation  Description: Interventions:  - Develop a trusting relationship   - Encourage socialization   Outcome: Progressing     Problem: Anxiety  Goal: Anxiety is at manageable level  Description: Interventions:  - Assess and monitor patient's anxiety level. - Monitor for signs and symptoms (heart palpitations, chest pain, shortness of breath, headaches, nausea, feeling jumpy, restlessness, irritable, apprehensive). - Collaborate with interdisciplinary team and initiate plan and interventions as ordered.   - [place] patient to unit/surroundings  - Explain treatment plan  - Encourage participation in care  - Encourage verbalization of concerns/fears  - Identify coping mechanisms  - Assist in developing anxiety-reducing skills  - Administer/offer alternative therapies  - Limit or eliminate stimulants  Outcome: Progressing
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
## 2023-08-07 PROCEDURE — 99232 SBSQ HOSP IP/OBS MODERATE 35: CPT | Performed by: PSYCHIATRY & NEUROLOGY

## 2023-08-07 RX ADMIN — CLONAZEPAM 0.5 MG: 0.5 TABLET ORAL at 08:41

## 2023-08-07 RX ADMIN — LITHIUM CARBONATE 450 MG: 450 TABLET, EXTENDED RELEASE ORAL at 08:41

## 2023-08-07 RX ADMIN — HALOPERIDOL 5 MG: 5 TABLET ORAL at 21:07

## 2023-08-07 RX ADMIN — Medication 6 MG: at 21:30

## 2023-08-07 RX ADMIN — CLONAZEPAM 0.5 MG: 0.5 TABLET ORAL at 17:17

## 2023-08-07 RX ADMIN — MIRTAZAPINE 7.5 MG: 7.5 TABLET, FILM COATED ORAL at 21:07

## 2023-08-07 RX ADMIN — BENZTROPINE MESYLATE 2 MG: 2 TABLET ORAL at 08:41

## 2023-08-07 RX ADMIN — BENZTROPINE MESYLATE 2 MG: 2 TABLET ORAL at 17:17

## 2023-08-07 RX ADMIN — LITHIUM CARBONATE 450 MG: 450 TABLET, EXTENDED RELEASE ORAL at 21:07
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
## 2023-08-07 NOTE — PROGRESS NOTES
08/07/23 0830   Team Meeting   Meeting Type Daily Rounds   Initial Conference Date 08/07/23   Patient/Family Present   Patient Present No   Patient's Family Present No     Daily Rounds Documentation     Team Members Present:   MD Dr. Sepideh Connor CRNP Joyice Bushman, ROB [address] DO    No behaviors. Melatonin PRN Friday and Sunday. Atarax PRN Saturday and Sunday. Attended 3/7 groups Friday. Compliant with medications and meals. Slept.

## 2023-08-07 NOTE — NURSING NOTE
Patient is compliant with medication and meals. Patient behavior has improved a lot. He attended 3 groups out of 7 on Friday. He is more social and  His behavior is more Appropiate then it was before. he has not used inappropriate language lately.

## 2023-08-07 NOTE — NURSING NOTE
Pt is present on the milieu social with select peers. He consumed 100 % of dinner. Took his medications without incidence. PRN 6 mg melatonin given at 2130 for insomnia. Pt is polite and cooperative. He remains anxious and perseverating about discharge. Pt provided reassurance and emotional support and was able to be redirected. Denied all psychiatric symptoms. No behavioral issues.

## 2023-08-07 NOTE — SOCIAL WORK
E-mail received this AM from Rhiannon at [facility] [employer] [facility] stating that patient has received the waiver. Ongoing communication now being had with Ivonne Palacios from the group home about possible discharge date of this Friday, August 11, 2023. Patient's father added to these e-mails, and still requesting room and board amount. SW asked for clarification regarding physical form as she received an MA 51 to complete. SW met with patient privately, and informed him of the ongoing conversation being had regarding discharge planning. Patient made aware that he has been approved for the waiver, and that he might discharge on Friday. Patient reported some anxiety as he struggles with things not being clearly defined. Patient reassured that if he doesn't discharge this week he will discharge next week. Patient denied all other symptoms. Patient spoke about his day, and how he has been journaling. He was pleasant and appropriate. He was dressed appropriately, and appeared well groomed.

## 2023-08-07 NOTE — PROGRESS NOTES
Progress Note - Behavioral Health   Balbir Burns 21 y.o. male MRN: [id number]  [facility]-42 Encounter: [id number]  Code Status: Level 1 - Full Code    Assessment/Plan   Principal Problem:    Severe episode of recurrent major depressive disorder, without psychotic features ([address])  Active Problems:    Medical clearance for psychiatric admission    Autism spectrum disorder    Mood disorder ([address])    Anxiety disorder    Rash    Recommended Treatment:     Treatment plan, treatment progress and medication changes were reviewed with Nursing Staff, Pharmacy Service and Case Management in Treatment Team:  1. Continue with group therapy, milieu therapy and occupational therapy   2. Behavioral Health checks every 7 minutes   3. Continue frequent safety checks and vitals per unit protocol  4. Continue with [facility] medical management as indicated - will order baseline set of labs for tomorrow AM  5. Continue with current medication regimen - Continue with Cogentin 2mg PO BID, Klonopin 0.5mg PO BID, Haldol 5mg PO QHS, Lithium 450mg PO BID, and Remeron 7.5mg PO QHS  6. Disposition Planning: Discharge planning and efforts remain ongoing    Subjective:    Patient was seen today for continuation of care, records reviewed and patient was discussed with the morning case review team.    Rashida Soliman was seen today for psychiatric follow-up. On assessment today, Rashida Soliman was found wondering the halls. Inappropriate smile and bizarre affect, likely due to autism diagnosis. He is able to engage in a discussion about discharge, he is less negative about treatment but does express a desire to be discharged. Rashida Soliman reports adequate daytime energy and denies any difficulties with initiating or staying asleep. Oral appetite and hydration is adequate.  We reviewed once more the specific as-needed medications they can use going forward if they experience any insomnia or destabilization of their mood, they understood and were agreeable    Rashida Soliman denies acute suicidal/self-harm ideation/intent/plan upon direct inquiry at this time. Mary Brewer is able to contract for safety while on the unit and would feel comfortable seeking staff support should suicidal symptoms or urges appear or worsen. Mary Brewer remains behaviorally appropriate, no agitation or aggression noted on exam or in report. Mary Brewer also denies HI/AH/VH, and does not appear overtly manic. Patient does not verbalize any experiences that can be categorized as paranoid, persecutory, bizarre, or somatic delusions. Mary Brewer remains adherent to his current psychotropic medication regimen and denies any side effects from medications, as well as none noted on exam.    Review of Systems:  Behavior over the last 24 hours: Unchanged  Sleep: sleeping okay throughout the night  Appetite: adequate  Medication side effects: none reported  ROS:no complaints, all other systems are negative    Objective:    Vitals:  Vitals:    08/07/23 0742   BP: 110/80   Pulse: 77   Resp: 16   Temp: 97.9 °F (36.6 °C)   SpO2: 97%     Laboratory Results:    I have personally reviewed all pertinent laboratory/tests results.   Most Recent Labs:   Lab Results   Component Value Date    WBC 6.74 06/03/2023    RBC 5.22 06/03/2023    HGB 15.6 06/03/2023    HCT 49.0 06/03/2023     06/03/2023    RDW 13.1 06/03/2023    NEUTROABS 3.01 06/03/2023    SODIUM 140 05/31/2023    K 4.4 05/31/2023     05/31/2023    CO2 29 05/31/2023    BUN 11 05/31/2023    CREATININE 0.72 05/31/2023    GLUC 89 05/31/2023    GLUF 89 05/31/2023    CALCIUM 9.3 05/31/2023    AST 24 05/31/2023    ALT 43 05/31/2023    ALKPHOS 98 05/31/2023    TP 7.0 05/31/2023    ALB 4.4 05/31/2023    TBILI 0.52 05/31/2023    CHOLESTEROL 208 (H) 05/27/2023    HDL 39 (L) 05/27/2023    TRIG 190 (H) 05/27/2023    LDLCALC 131 (H) 05/27/2023    NONHDLC 169 05/27/2023    VALPROICTOT 106 (H) 05/31/2023    LITHIUM 0.7 06/21/2023    [id number] 1.637 05/27/2023    HGBA1C 5.4 05/27/2023     05/27/2023     Mental Status Evaluation:  Appearance:  age appropriate, casually dressed, dressed appropriately, adequate grooming   Behavior:  pleasant, cooperative, calm, intense eye contact   Speech:  normal rate, normal volume, normal pitch   Mood:  anxious   Affect:  constricted   Thought Process:  goal directed   Associations: intact associations   Thought Content:  no overt delusions   Perceptual Disturbances: no auditory hallucinations, no visual hallucinations, denies when asked, does not appear responding to internal stimuli   Risk Potential: Suicidal ideation - None at present, contracts for safety on the unit, would talk to staff if not feeling safe on the unit  Homicidal ideation - None at present  Potential for aggression - Not at present   Sensorium:  oriented to person, place and time/date   Memory:  recent memory intact   Consciousness:  alert and awake   Attention/Concentration: attention span and concentration appear shorter than expected for age   Insight:  limited   Judgment: limited   Gait/Station: normal gait/station, normal balance   Motor Activity: no abnormal movements     Progress Toward Goals:   Samantha Dalal is progressing towards goals of inpatient psychiatric treatment by continued medication compliance and is attending therapeutic modalities on the milieu. However, the patient continues to require inpatient psychiatric hospitalization for continued medication management and titration to optimize symptom reduction, improve sleep hygiene, and demonstrate adequate self-care.      Suicide/Homicide Risk Assessment:  Risk of Harm to Self:   Nursing Suicide Risk Assessment Last 24 hours: C-SSRS Risk (Since Last Contact)  Calculated C-SSRS Risk Score (Since Last Contact): No Risk Indicated    Risk of Harm to Others:  Nursing Homicide Risk Assessment: Violence Risk to Others: Denies within past 6 months    Behavioral Health Medications: all current active meds have been reviewed and continue current psychiatric medications.   Current Facility-Administered Medications   Medication Dose Route Frequency Provider Last Rate   • acetaminophen  650 mg Oral Q6H PRN Cam Lukes, DO     • acetaminophen  650 mg Oral Q4H PRN Cam Lukes, DO     • acetaminophen  975 mg Oral Q6H PRN Cam Lukes, DO     • aluminum-magnesium hydroxide-simethicone  30 mL Oral Q4H PRN Cam Lukes, DO     • ammonium lactate   Topical BID PRN Abhijit Easley PA-C     • atropine  1 drop Sublingual TID PRN Jesus Good PA-C     • benztropine  1 mg Intramuscular Q4H PRN Max 6/day Cam Lukes, DO     • benztropine  1 mg Oral Q4H PRN Max 6/day Cam Lukes, DO     • benztropine  2 mg Oral BID Denver Rohrer, MD     • clonazePAM  0.5 mg Oral BID Denver Rohrer, MD     • hydrOXYzine HCL  50 mg Oral Q6H PRN Max 4/day Cam Urena, DO      Or   • diphenhydrAMINE  50 mg Intramuscular Q6H PRN Cam Luchanell, DO     • diphenhydrAMINE  25 mg Oral Q6H PRN Darliss Coca, DO     • glycerin-hypromellose-  1 drop Both Eyes Q3H PRN Cam Luchanell, DO     • haloperidol  5 mg Oral HS Denver Rohrer, MD     • hydrOXYzine HCL  100 mg Oral Q6H PRN Max 4/day Cam Romel, DO      Or   • LORazepam  2 mg Intramuscular Q6H PRN Cam Urena, DO     • hydrOXYzine HCL  25 mg Oral Q6H PRN Max 4/day Cam Lukes, DO     • lithium carbonate  450 mg Oral Q12H Darío Gould MD     • melatonin  6 mg Oral HS PRN Denver Rohrer, MD     • mirtazapine  7.5 mg Oral HS Cam Lukes, DO     • OLANZapine  10 mg Oral Q3H PRN Max 3/day Cam Luchanell, DO      Or   • OLANZapine  10 mg Intramuscular Q3H PRN Max 3/day Cam Lukes, DO     • OLANZapine  5 mg Oral Q3H PRN Max 6/day Cam Romel, DO      Or   • OLANZapine  5 mg Intramuscular Q3H PRN Max 6/day Cam Urena, DO     • OLANZapine  2.5 mg Oral Q3H PRN Max 8/day Ciro Watters DO     • polyethylene glycol  17 g Oral Daily PRN Alexa Guthrie DO Janene     • propranolol  10 mg Oral TID PRN Denver Rohrer, MD     • senna-docusate sodium  1 tablet Oral Daily PRN Cam Urena DO     • traZODone  50 mg Oral HS PRN Cam Urena DO       Risks / Benefits of Treatment:  Risks, benefits, and possible side effects of medications explained to patient. Patient has limited understanding of risks and benefits of treatment at this time, but agrees to take medications as prescribed. Counseling / Coordination of Care: Total floor/unit time spent today 25 minutes. Greater than 50% of total time was spent with the patient and / or family counseling and / or coordination of care. A description of the counseling / coordination of care:   Patient's progress discussed with staff in treatment team meeting. Medications, treatment progress and treatment plan reviewed with patient. Educated on importance of medication and treatment compliance. Reassurance and supportive therapy provided. Encouraged participation in milieu and group therapy on the unit.     ANA Rahman 08/07/23

## 2023-08-07 NOTE — PLAN OF CARE
Problem: Ineffective Coping  Goal: Identifies ineffective coping skills  Outcome: Progressing     Problem: Risk for Self Injury/Neglect  Goal: Treatment Goal: Remain safe during length of stay, learn and adopt new coping skills, and be free of self-injurious ideation, impulses and acts at the time of discharge  Outcome: Progressing     Problem: Depression  Goal: Treatment Goal: Demonstrate behavioral control of depressive symptoms, verbalize feelings of improved mood/affect, and adopt new coping skills prior to discharge  Outcome: Progressing  Goal: Verbalize thoughts and feelings  Description: Interventions:  - Assess and re-assess patient's level of risk   - Engage patient in 1:1 interactions, daily, for a minimum of 15 minutes   - Encourage patient to express feelings, fears, frustrations, hopes   Outcome: Progressing     Problem: Anxiety  Goal: Anxiety is at manageable level  Description: Interventions:  - Assess and monitor patient's anxiety level. - Monitor for signs and symptoms (heart palpitations, chest pain, shortness of breath, headaches, nausea, feeling jumpy, restlessness, irritable, apprehensive). - Collaborate with interdisciplinary team and initiate plan and interventions as ordered.   - [place] patient to unit/surroundings  - Explain treatment plan  - Encourage participation in care  - Encourage verbalization of concerns/fears  - Identify coping mechanisms  - Assist in developing anxiety-reducing skills  - Administer/offer alternative therapies  - Limit or eliminate stimulants  Outcome: Progressing

## 2023-08-08 LAB
ANION GAP SERPL CALCULATED.3IONS-SCNC: 10 MMOL/L
BASOPHILS # BLD AUTO: 0.04 THOUSANDS/ÂΜL (ref 0–0.1)
BASOPHILS NFR BLD AUTO: 1 % (ref 0–1)
BUN SERPL-MCNC: 7 MG/DL (ref 5–25)
CALCIUM SERPL-MCNC: 9.3 MG/DL (ref 8.4–10.2)
CHLORIDE SERPL-SCNC: 104 MMOL/L (ref 96–108)
CHOLEST SERPL-MCNC: 170 MG/DL
CO2 SERPL-SCNC: 25 MMOL/L (ref 21–32)
CREAT SERPL-MCNC: 0.63 MG/DL (ref 0.6–1.3)
EOSINOPHIL # BLD AUTO: 0.22 THOUSAND/ÂΜL (ref 0–0.61)
EOSINOPHIL NFR BLD AUTO: 3 % (ref 0–6)
ERYTHROCYTE [DISTWIDTH] IN BLOOD BY AUTOMATED COUNT: 12.2 % (ref 11.6–15.1)
GFR SERPL CREATININE-BSD FRML MDRD: 142 ML/MIN/1.73SQ M
GLUCOSE P FAST SERPL-MCNC: 90 MG/DL (ref 65–99)
GLUCOSE SERPL-MCNC: 90 MG/DL (ref 65–140)
HCT VFR BLD AUTO: 43.9 % (ref 36.5–49.3)
HDLC SERPL-MCNC: 32 MG/DL
HGB BLD-MCNC: 13.9 G/DL (ref 12–17)
IMM GRANULOCYTES # BLD AUTO: 0.02 THOUSAND/UL (ref 0–0.2)
IMM GRANULOCYTES NFR BLD AUTO: 0 % (ref 0–2)
LDLC SERPL CALC-MCNC: 112 MG/DL (ref 0–100)
LITHIUM SERPL-SCNC: 0.6 MMOL/L (ref 0.6–1.2)
LYMPHOCYTES # BLD AUTO: 3.23 THOUSANDS/ÂΜL (ref 0.6–4.47)
LYMPHOCYTES NFR BLD AUTO: 37 % (ref 14–44)
MCH RBC QN AUTO: 30.2 PG (ref 26.8–34.3)
MCHC RBC AUTO-ENTMCNC: 31.7 G/DL (ref 31.4–37.4)
MCV RBC AUTO: 95 FL (ref 82–98)
MONOCYTES # BLD AUTO: 0.95 THOUSAND/ÂΜL (ref 0.17–1.22)
MONOCYTES NFR BLD AUTO: 11 % (ref 4–12)
NEUTROPHILS # BLD AUTO: 4.24 THOUSANDS/ÂΜL (ref 1.85–7.62)
NEUTS SEG NFR BLD AUTO: 48 % (ref 43–75)
NONHDLC SERPL-MCNC: 138 MG/DL
NRBC BLD AUTO-RTO: 0 /100 WBCS
PLATELET # BLD AUTO: 294 THOUSANDS/UL (ref 149–390)
PMV BLD AUTO: 10.1 FL (ref 8.9–12.7)
POTASSIUM SERPL-SCNC: 3.9 MMOL/L (ref 3.5–5.3)
RBC # BLD AUTO: 4.61 MILLION/UL (ref 3.88–5.62)
SODIUM SERPL-SCNC: 139 MMOL/L (ref 135–147)
TRIGL SERPL-MCNC: 128 MG/DL
TSH SERPL DL<=0.05 MIU/L-ACNC: 2.44 UIU/ML (ref 0.45–4.5)
WBC # BLD AUTO: 8.7 THOUSAND/UL (ref 4.31–10.16)

## 2023-08-08 PROCEDURE — 80048 BASIC METABOLIC PNL TOTAL CA: CPT

## 2023-08-08 PROCEDURE — 80061 LIPID PANEL: CPT

## 2023-08-08 PROCEDURE — 85025 COMPLETE CBC W/AUTO DIFF WBC: CPT

## 2023-08-08 PROCEDURE — 99232 SBSQ HOSP IP/OBS MODERATE 35: CPT | Performed by: PSYCHIATRY & NEUROLOGY

## 2023-08-08 PROCEDURE — 80178 ASSAY OF LITHIUM: CPT

## 2023-08-08 PROCEDURE — 84443 ASSAY THYROID STIM HORMONE: CPT

## 2023-08-08 RX ORDER — LANOLIN ALCOHOL/MO/W.PET/CERES
9 CREAM (GRAM) TOPICAL
Status: DISCONTINUED | OUTPATIENT
Start: 2023-08-08 | End: 2023-08-11 | Stop reason: HOSPADM

## 2023-08-08 RX ADMIN — BENZTROPINE MESYLATE 2 MG: 2 TABLET ORAL at 08:18

## 2023-08-08 RX ADMIN — CLONAZEPAM 0.5 MG: 0.5 TABLET ORAL at 17:10

## 2023-08-08 RX ADMIN — CLONAZEPAM 0.5 MG: 0.5 TABLET ORAL at 08:18

## 2023-08-08 RX ADMIN — BENZTROPINE MESYLATE 2 MG: 2 TABLET ORAL at 17:10

## 2023-08-08 RX ADMIN — Medication 9 MG: at 21:15

## 2023-08-08 RX ADMIN — MIRTAZAPINE 7.5 MG: 7.5 TABLET, FILM COATED ORAL at 21:15

## 2023-08-08 RX ADMIN — HALOPERIDOL 5 MG: 5 TABLET ORAL at 21:15

## 2023-08-08 RX ADMIN — LITHIUM CARBONATE 450 MG: 450 TABLET, EXTENDED RELEASE ORAL at 21:15

## 2023-08-08 RX ADMIN — LITHIUM CARBONATE 450 MG: 450 TABLET, EXTENDED RELEASE ORAL at 08:18
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
## 2023-08-08 NOTE — NURSING NOTE
Patient is compliant with medication and meals Patient attended 5 groups out of n 9 0n Monday Patient continues to have  Some child like behavior Patient is for possible discharge on Friday. . Patient had all his blood work done today. Patient does socialize with selected peers.

## 2023-08-08 NOTE — BH TRANSITION RECORD
Contact Information: If you have any questions, concerns, pended studies, tests and/or procedures, or emergencies regarding your inpatient behavioral health visit. Please contact [facility] extended acute care unit [phone] and ask to speak to a , nurse or physician. A contact is available 24 hours/ 7 days a week at this number. Summary of Procedures Performed During your Stay:  Below is a list of major procedures performed during your hospital stay and a summary of results:  - Cardiac Procedures/Studies: EKG on 5/26 showed normal sinus rhythm. Pending Studies (From admission, onward)    None        Please follow up on the above pending studies with your PCP and/or referring provider.

## 2023-08-08 NOTE — PLAN OF CARE
Problem: Risk for Self Injury/Neglect  Goal: Treatment Goal: Remain safe during length of stay, learn and adopt new coping skills, and be free of self-injurious ideation, impulses and acts at the time of discharge  Outcome: Progressing     Problem: Depression  Goal: Treatment Goal: Demonstrate behavioral control of depressive symptoms, verbalize feelings of improved mood/affect, and adopt new coping skills prior to discharge  Outcome: Progressing  Goal: Refrain from isolation  Description: Interventions:  - Develop a trusting relationship   - Encourage socialization   Outcome: Progressing     Problem: Anxiety  Goal: Anxiety is at manageable level  Description: Interventions:  - Assess and monitor patient's anxiety level. - Monitor for signs and symptoms (heart palpitations, chest pain, shortness of breath, headaches, nausea, feeling jumpy, restlessness, irritable, apprehensive). - Collaborate with interdisciplinary team and initiate plan and interventions as ordered.   - [place] patient to unit/surroundings  - Explain treatment plan  - Encourage participation in care  - Encourage verbalization of concerns/fears  - Identify coping mechanisms  - Assist in developing anxiety-reducing skills  - Administer/offer alternative therapies  - Limit or eliminate stimulants  Outcome: Progressing

## 2023-08-08 NOTE — PROGRESS NOTES
Progress Note - Behavioral Health   Vern Ameya 21 y.o. male MRN: [id number]  [facility] Encounter: [id number]  Code Status: Level 1 - Full Code    Assessment/Plan   Principal Problem:    Severe episode of recurrent major depressive disorder, without psychotic features ([address])  Active Problems:    Medical clearance for psychiatric admission    Autism spectrum disorder    Mood disorder ([address])    Anxiety disorder    Rash    Recommended Treatment:     Treatment plan, treatment progress and medication changes were reviewed with Nursing Staff, Pharmacy Service and Case Management in Treatment Team:  1. Continue with group therapy, milieu therapy and occupational therapy   2. Behavioral Health checks every 7 minutes   3. Continue frequent safety checks and vitals per unit protocol  4. Continue with [facility] medical management as indicated  5. Continue with current medication regimen - Li level 0.6 this AM  6. Disposition Planning: Discharge planning and efforts remain ongoing    Subjective:    Patient was seen today for continuation of care, records reviewed and patient was discussed with the morning case review team.    Lawrence Miller was seen today for psychiatric follow-up. On assessment today, Lawrence Miller was calm and cooperative. He is doing well today, does endorse some anxieties relating to possible upcoming discharge. Blood work reviewed with patient. Lawrence Miller reports adequate daytime energy and denies any difficulties with initiating or staying asleep. Oral appetite and hydration is adequate. He did need PRN Melatonin for insomnia last night but overall is doing well. We reviewed once more the specific as-needed medications they can use going forward if they experience any insomnia or destabilization of their mood, they understood and were agreeable    Lawrence Miller denies acute suicidal/self-harm ideation/intent/plan upon direct inquiry at this time.  Lawrence Miller is able to contract for safety while on the unit and would feel comfortable seeking staff support should suicidal symptoms or urges appear or worsen. Rashida Soliman remains behaviorally appropriate, no agitation or aggression noted on exam or in report. Rashida Soliman also denies HI/AH/VH, and does not appear overtly manic. Patient does not verbalize any experiences that can be categorized as paranoid, persecutory, bizarre, or somatic delusions. Rashida Soliman remains adherent to his current psychotropic medication regimen and denies any side effects from medications, as well as none noted on exam.    Review of Systems:  Behavior over the last 24 hours: Unchanged  Sleep: sleeping okay throughout the night  Appetite: adequate  Medication side effects: none reported  ROS:no complaints, all other systems are negative    Objective:    Vitals:  Vitals:    08/08/23 0708   BP: 112/79   Pulse: 73   Resp: 17   Temp: 97.5 °F (36.4 °C)   SpO2: 100%     Laboratory Results:    I have personally reviewed all pertinent laboratory/tests results.   Most Recent Labs:   Lab Results   Component Value Date    WBC 8.70 08/08/2023    RBC 4.61 08/08/2023    HGB 13.9 08/08/2023    HCT 43.9 08/08/2023     08/08/2023    RDW 12.2 08/08/2023    NEUTROABS 4.24 08/08/2023    SODIUM 139 08/08/2023    K 3.9 08/08/2023     08/08/2023    CO2 25 08/08/2023    BUN 7 08/08/2023    CREATININE 0.63 08/08/2023    GLUC 90 08/08/2023    GLUF 90 08/08/2023    CALCIUM 9.3 08/08/2023    AST 24 05/31/2023    ALT 43 05/31/2023    ALKPHOS 98 05/31/2023    TP 7.0 05/31/2023    ALB 4.4 05/31/2023    TBILI 0.52 05/31/2023    CHOLESTEROL 170 08/08/2023    HDL 32 (L) 08/08/2023    TRIG 128 08/08/2023    LDLCALC 112 (H) 08/08/2023    NONHDLC 138 08/08/2023    VALPROICTOT 106 (H) 05/31/2023    LITHIUM 0.6 08/08/2023    HZV1RKBXJHQY 2.435 08/08/2023    HGBA1C 5.4 05/27/2023     05/27/2023     Mental Status Evaluation:  Appearance:  age appropriate, casually dressed, dressed appropriately, adequate grooming   Behavior:  pleasant, cooperative, calm, fair eye contact   Speech:  normal rate, normal volume, normal pitch   Mood:  anxious   Affect:  appropriate   Thought Process:  goal directed   Associations: intact associations   Thought Content:  no overt delusions   Perceptual Disturbances: no auditory hallucinations, no visual hallucinations, denies when asked, does not appear responding to internal stimuli   Risk Potential: Suicidal ideation - None at present, contracts for safety on the unit, would talk to staff if not feeling safe on the unit  Homicidal ideation - None at present  Potential for aggression - Not at present   Sensorium:  oriented to person, place and time/date   Memory:  recent memory intact   Consciousness:  alert and awake   Attention/Concentration: attention span and concentration appear shorter than expected for age   Insight:  limited   Judgment: limited   Gait/Station: normal gait/station, normal balance   Motor Activity: no abnormal movements     Progress Toward Goals:   Jed Khan is progressing towards goals of inpatient psychiatric treatment by continued medication compliance and is attending therapeutic modalities on the milieu. However, the patient continues to require inpatient psychiatric hospitalization for continued medication management and titration to optimize symptom reduction, improve sleep hygiene, and demonstrate adequate self-care. Suicide/Homicide Risk Assessment:  Risk of Harm to Self:   Nursing Suicide Risk Assessment Last 24 hours: C-SSRS Risk (Since Last Contact)  Calculated C-SSRS Risk Score (Since Last Contact): No Risk Indicated    Risk of Harm to Others:  Nursing Homicide Risk Assessment: Violence Risk to Others: Denies within past 6 months    Behavioral Health Medications: all current active meds have been reviewed and continue current psychiatric medications.   Current Facility-Administered Medications   Medication Dose Route Frequency Provider Last Rate   • acetaminophen  650 mg Oral Q6H PRN Adonica Dakins Prayson, DO     • acetaminophen  650 mg Oral Q4H PRN Kathlean Nakai, DO     • acetaminophen  975 mg Oral Q6H PRN Kathlean Nakai, DO     • aluminum-magnesium hydroxide-simethicone  30 mL Oral Q4H PRN Kathlean Nakai, DO     • ammonium lactate   Topical BID PRN Ozzy Sexton PA-C     • atropine  1 drop Sublingual TID PRN Artem Hernandez PA-C     • benztropine  1 mg Intramuscular Q4H PRN Max 6/day Kathlean Nakai, DO     • benztropine  1 mg Oral Q4H PRN Max 6/day Kathlean Nakai, DO     • benztropine  2 mg Oral BID Armen Tran MD     • clonazePAM  0.5 mg Oral BID Armen Tran MD     • hydrOXYzine HCL  50 mg Oral Q6H PRN Max 4/day Kathlean Nakai, DO      Or   • diphenhydrAMINE  50 mg Intramuscular Q6H PRN Katmarlene Nakai, DO     • diphenhydrAMINE  25 mg Oral Q6H PRN Nahum [place], DO     • glycerin-hypromellose-  1 drop Both Eyes Q3H PRN Kathlean Nakai, DO     • haloperidol  5 mg Oral HS Armen Tran MD     • hydrOXYzine HCL  100 mg Oral Q6H PRN Max 4/day Kathlean Nakai, DO      Or   • LORazepam  2 mg Intramuscular Q6H PRN Kathlean Nakai, DO     • hydrOXYzine HCL  25 mg Oral Q6H PRN Max 4/day Kathlean Nakai, DO     • lithium carbonate  450 mg Oral Q12H Penny Rojas MD     • melatonin  6 mg Oral HS PRN Armen Tran MD     • mirtazapine  7.5 mg Oral HS Kathlean Nakai, DO     • OLANZapine  10 mg Oral Q3H PRN Max 3/day Kathlean Nakai, DO      Or   • OLANZapine  10 mg Intramuscular Q3H PRN Max 3/day Kathlean Nakai, DO     • OLANZapine  5 mg Oral Q3H PRN Max 6/day Kathlean Nakai, DO      Or   • OLANZapine  5 mg Intramuscular Q3H PRN Max 6/day Kathlean Nakai, DO     • OLANZapine  2.5 mg Oral Q3H PRN Max 8/day Nishant Gao DO     • polyethylene glycol  17 g Oral Daily PRN Nishant aGo DO     • propranolol  10 mg Oral TID PRN Amren Tran MD     • senna-docusate sodium  1 tablet Oral Daily PRN Nishant Gao DO     • traZODone  50 mg Oral HS PRN Cam Urena,        Risks / Benefits of Treatment:  Risks, benefits, and possible side effects of medications explained to patient. Patient has limited understanding of risks and benefits of treatment at this time, but agrees to take medications as prescribed. Counseling / Coordination of Care: Total floor/unit time spent today 25 minutes. Greater than 50% of total time was spent with the patient and / or family counseling and / or coordination of care. A description of the counseling / coordination of care:   Patient's progress discussed with staff in treatment team meeting. Medications, treatment progress and treatment plan reviewed with patient. Educated on importance of medication and treatment compliance. Reassurance and supportive therapy provided. Encouraged participation in milieu and group therapy on the unit.     ANA aRhman 08/08/23

## 2023-08-08 NOTE — NURSING NOTE
Pt came up to this writer smiling and said "I just threw up a little bit." Pt denied pain, nausea, and/or discomfort. Denied any needs.

## 2023-08-08 NOTE — PROGRESS NOTES
08/08/23 0830   Team Meeting   Meeting Type Daily Rounds   Initial Conference Date 08/08/23   Patient/Family Present   Patient Present No   Patient's Family Present No     Daily Rounds Documentation     Team Members Present:   MD Dr. Nahomy Barrientos, Seven Boyd, RN  Raegan Johnston, ROBINAW  ROB Castellanos    Fixated on discharge. Some anxiety. Pleasant. Cooperative. Attended 5/9 groups. Compliant with medications and meals. Slept. Possible discharge on Friday; will send him with 30 day supply of medications, and 1 refill.

## 2023-08-09 PROCEDURE — 99232 SBSQ HOSP IP/OBS MODERATE 35: CPT | Performed by: PSYCHIATRY & NEUROLOGY

## 2023-08-09 RX ORDER — HALOPERIDOL 5 MG/1
5 TABLET ORAL
Qty: 30 TABLET | Refills: 1 | Status: SHIPPED | OUTPATIENT
Start: 2023-08-09

## 2023-08-09 RX ORDER — CLONAZEPAM 0.5 MG/1
0.5 TABLET ORAL DAILY
Status: DISCONTINUED | OUTPATIENT
Start: 2023-08-10 | End: 2023-08-11 | Stop reason: HOSPADM

## 2023-08-09 RX ORDER — CLONAZEPAM 0.5 MG/1
0.5 TABLET ORAL 2 TIMES DAILY
Qty: 30 TABLET | Refills: 1 | Status: CANCELLED | OUTPATIENT
Start: 2023-08-09 | End: 2023-09-08

## 2023-08-09 RX ORDER — ATROPINE SULFATE 10 MG/ML
1 SOLUTION/ DROPS OPHTHALMIC 3 TIMES DAILY PRN
Qty: 5 ML | Refills: 1 | Status: SHIPPED | OUTPATIENT
Start: 2023-08-09

## 2023-08-09 RX ORDER — MAGNESIUM HYDROXIDE/ALUMINUM HYDROXICE/SIMETHICONE 120; 1200; 1200 MG/30ML; MG/30ML; MG/30ML
30 SUSPENSION ORAL EVERY 4 HOURS PRN
Qty: 355 ML | Refills: 1 | Status: SHIPPED | OUTPATIENT
Start: 2023-08-09

## 2023-08-09 RX ORDER — CLONAZEPAM 0.5 MG/1
0.5 TABLET ORAL
Status: COMPLETED | OUTPATIENT
Start: 2023-08-09 | End: 2023-08-09

## 2023-08-09 RX ORDER — CLONAZEPAM 0.5 MG/1
0.5 TABLET ORAL DAILY
Status: DISCONTINUED | OUTPATIENT
Start: 2023-08-10 | End: 2023-08-09

## 2023-08-09 RX ORDER — CLONAZEPAM 0.5 MG/1
0.5 TABLET ORAL DAILY
Qty: 7 TABLET | Refills: 0 | Status: SHIPPED | OUTPATIENT
Start: 2023-08-10 | End: 2023-08-10 | Stop reason: SDUPTHER

## 2023-08-09 RX ORDER — BENZTROPINE MESYLATE 2 MG/1
2 TABLET ORAL 2 TIMES DAILY
Qty: 60 TABLET | Refills: 1 | Status: SHIPPED | OUTPATIENT
Start: 2023-08-09

## 2023-08-09 RX ORDER — LANOLIN ALCOHOL/MO/W.PET/CERES
9 CREAM (GRAM) TOPICAL
Qty: 90 TABLET | Refills: 1 | Status: SHIPPED | OUTPATIENT
Start: 2023-08-09

## 2023-08-09 RX ORDER — MIRTAZAPINE 7.5 MG/1
7.5 TABLET, FILM COATED ORAL
Qty: 30 TABLET | Refills: 1 | Status: SHIPPED | OUTPATIENT
Start: 2023-08-09

## 2023-08-09 RX ORDER — LITHIUM CARBONATE 450 MG
450 TABLET, EXTENDED RELEASE ORAL EVERY 12 HOURS SCHEDULED
Qty: 60 TABLET | Refills: 1 | Status: SHIPPED | OUTPATIENT
Start: 2023-08-09

## 2023-08-09 RX ORDER — ACETAMINOPHEN 325 MG/1
650 TABLET ORAL EVERY 4 HOURS PRN
Qty: 30 TABLET | Refills: 1 | Status: SHIPPED | OUTPATIENT
Start: 2023-08-09

## 2023-08-09 RX ORDER — HYDROXYZINE 50 MG/1
50 TABLET, FILM COATED ORAL EVERY 6 HOURS PRN
Qty: 30 TABLET | Refills: 1 | Status: SHIPPED | OUTPATIENT
Start: 2023-08-09

## 2023-08-09 RX ADMIN — LITHIUM CARBONATE 450 MG: 450 TABLET, EXTENDED RELEASE ORAL at 08:39

## 2023-08-09 RX ADMIN — LITHIUM CARBONATE 450 MG: 450 TABLET, EXTENDED RELEASE ORAL at 21:17

## 2023-08-09 RX ADMIN — MIRTAZAPINE 7.5 MG: 7.5 TABLET, FILM COATED ORAL at 21:17

## 2023-08-09 RX ADMIN — HALOPERIDOL 5 MG: 5 TABLET ORAL at 21:18

## 2023-08-09 RX ADMIN — BENZTROPINE MESYLATE 2 MG: 2 TABLET ORAL at 17:21

## 2023-08-09 RX ADMIN — Medication 9 MG: at 21:18

## 2023-08-09 RX ADMIN — CLONAZEPAM 0.5 MG: 0.5 TABLET ORAL at 21:17

## 2023-08-09 RX ADMIN — BENZTROPINE MESYLATE 2 MG: 2 TABLET ORAL at 08:39

## 2023-08-09 RX ADMIN — CLONAZEPAM 0.5 MG: 0.5 TABLET ORAL at 08:39

## 2023-08-09 NOTE — NURSING NOTE
Pt is present on the milieu social with select peers. He consumed 100 % of dinner. Took his medications without incidence. PRN melatonin 9 mg given at 2115 for insomnia. Pt is polite, pleasant, and cooperative. Brightens on approach. Denied all psychiatric symptoms. No behavioral issues.

## 2023-08-09 NOTE — SOCIAL WORK
ATIYA met with patient, and he signed papers for his waiver through [facility] and [address] [place] [address]. Paperwork then sent to Dejah Simmons at [facility] and Anay and Mike at [facility]. Dejah Simmons then confirmed that patient can discharge on Friday.

## 2023-08-09 NOTE — SOCIAL WORK
Phone call placed to [facility]; spoke to Asher in intake who confirmed that they accept [facility]. Intake appointment with a therapist scheduled for 8/15/2023 at 10:00AM; at this appointment he will be scheduled with a psychiatrist.  Samina Chino provided her with patient's medications, and she expressed that they will likely stop his Klonopin. SW discussed with Dr. Jose Acosta and ANA Comer; they are in agreement with it being stopped, and will wean him off. Patient will receive  . 5mg once a day for 7 days, and then discontinue. E-mail sent to [facility] and Bola Dean at [employer] informing them of all this information.

## 2023-08-09 NOTE — NURSING NOTE
Pt is accepting of medications and meal compliant. Pt is pleasant, polite and brightens on approach. Pt denies s/s currently and reports " I'm happy, I leave Friday". Pt attends select groups or watches TV with peers. Pt offers no complaints.

## 2023-08-09 NOTE — PLAN OF CARE
Problem: Ineffective Coping  Goal: Identifies ineffective coping skills  Outcome: Progressing  Goal: Identifies healthy coping skills  Outcome: Progressing  Goal: Demonstrates healthy coping skills  Outcome: Progressing     Problem: Risk for Self Injury/Neglect  Goal: Treatment Goal: Remain safe during length of stay, learn and adopt new coping skills, and be free of self-injurious ideation, impulses and acts at the time of discharge  Outcome: Progressing  Goal: Recognize maladaptive responses and adopt new coping mechanisms  Outcome: Progressing     Problem: Anxiety  Goal: Anxiety is at manageable level  Description: Interventions:  - Assess and monitor patient's anxiety level. - Monitor for signs and symptoms (heart palpitations, chest pain, shortness of breath, headaches, nausea, feeling jumpy, restlessness, irritable, apprehensive). - Collaborate with interdisciplinary team and initiate plan and interventions as ordered.   - [place] patient to unit/surroundings  - Explain treatment plan  - Encourage participation in care  - Encourage verbalization of concerns/fears  - Identify coping mechanisms  - Assist in developing anxiety-reducing skills  - Administer/offer alternative therapies  - Limit or eliminate stimulants  Outcome: Progressing
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
## 2023-08-09 NOTE — PROGRESS NOTES
Progress Note - Behavioral Health   Jearline Habermann 21 y.o. male MRN: [id number]  Unit/Bed#: [facility] 330-89 Encounter: [id number]  Code Status: Level 1 - Full Code    Assessment/Plan   Principal Problem:    Severe episode of recurrent major depressive disorder, without psychotic features ([address])  Active Problems:    Medical clearance for psychiatric admission    Autism spectrum disorder    Mood disorder ([address])    Anxiety disorder    Rash    Recommended Treatment:     Treatment plan, treatment progress and medication changes were reviewed with Nursing Staff, Pharmacy Service and Case Management in Treatment Team:  1. Continue with group therapy, milieu therapy and occupational therapy   2. Behavioral Health checks every 7 minutes   3. Continue frequent safety checks and vitals per unit protocol  4. Continue with SLIM medical management as indicated  5. Continue with current medication regimen   6. Disposition Planning: Discharge planning and efforts remain ongoing    Subjective:    Patient was seen today for continuation of care, records reviewed and patient was discussed with the morning case review team.    Gerber Price was seen today for psychiatric follow-up. On assessment today, Gerber Price was calm and cooperative. He reports excitement about going home soon. He is sleeping better with increase in Melatonin. Otherwise, he continues to do well. Oral intake is adequate. We reviewed once more the specific as-needed medications they can use going forward if they experience any insomnia or destabilization of their mood, they understood and were agreeable    Gerber Price denies acute suicidal/self-harm ideation/intent/plan upon direct inquiry at this time. Gerber Price is able to contract for safety while on the unit and would feel comfortable seeking staff support should suicidal symptoms or urges appear or worsen. Gerber Price remains behaviorally appropriate, no agitation or aggression noted on exam or in report.  Gerber Price also denies HI/AH/VH, and does not appear overtly manic. Patient does not verbalize any experiences that can be categorized as paranoid, persecutory, bizarre, or somatic delusions. Hans Rose remains adherent to his current psychotropic medication regimen and denies any side effects from medications, as well as none noted on exam.    Review of Systems:  Behavior over the last 24 hours: Slowly improving  Sleep: sleeping okay throughout the night  Appetite: adequate  Medication side effects: none reported  ROS:no complaints, all other systems are negative    Objective:    Vitals:  Vitals:    08/09/23 0739   BP: 110/65   Pulse: 75   Resp: 16   Temp: 97.5 °F (36.4 °C)   SpO2: 99%     Laboratory Results:    I have personally reviewed all pertinent laboratory/tests results.   Most Recent Labs:   Lab Results   Component Value Date    WBC 8.70 08/08/2023    RBC 4.61 08/08/2023    HGB 13.9 08/08/2023    HCT 43.9 08/08/2023     08/08/2023    RDW 12.2 08/08/2023    NEUTROABS 4.24 08/08/2023    SODIUM 139 08/08/2023    K 3.9 08/08/2023     08/08/2023    CO2 25 08/08/2023    BUN 7 08/08/2023    CREATININE 0.63 08/08/2023    GLUC 90 08/08/2023    GLUF 90 08/08/2023    CALCIUM 9.3 08/08/2023    AST 24 05/31/2023    ALT 43 05/31/2023    ALKPHOS 98 05/31/2023    TP 7.0 05/31/2023    ALB 4.4 05/31/2023    TBILI 0.52 05/31/2023    CHOLESTEROL 170 08/08/2023    HDL 32 (L) 08/08/2023    TRIG 128 08/08/2023    LDLCALC 112 (H) 08/08/2023    NONHDLC 138 08/08/2023    VALPROICTOT 106 (H) 05/31/2023    LITHIUM 0.6 08/08/2023    [id number] 2.435 08/08/2023    HGBA1C 5.4 05/27/2023     05/27/2023     Mental Status Evaluation:  Appearance:  age appropriate, casually dressed, dressed appropriately, adequate grooming   Behavior:  pleasant, cooperative, calm   Speech:  normal rate, normal volume, normal pitch   Mood:  improved, euthymic   Affect:  normal range and intensity, appropriate   Thought Process:  organized, logical, coherent, goal directed Associations: intact associations   Thought Content:  no overt delusions   Perceptual Disturbances: no auditory hallucinations, no visual hallucinations, denies when asked, does not appear responding to internal stimuli   Risk Potential: Suicidal ideation - None at present, contracts for safety on the unit, would talk to staff if not feeling safe on the unit  Homicidal ideation - None at present  Potential for aggression - Not at present   Sensorium:  oriented to person, place and time/date   Memory:  recent memory intact   Consciousness:  alert and awake   Attention/Concentration: attention span and concentration are age appropriate   Insight:  fair   Judgment: fair   Gait/Station: normal gait/station, normal balance   Motor Activity: no abnormal movements     Progress Toward Goals:   Rah Robles is progressing towards goals of inpatient psychiatric treatment by continued medication compliance and is attending therapeutic modalities on the milieu. However, the patient continues to require inpatient psychiatric hospitalization for continued medication management and titration to optimize symptom reduction, improve sleep hygiene, and demonstrate adequate self-care. Suicide/Homicide Risk Assessment:  Risk of Harm to Self:   Nursing Suicide Risk Assessment Last 24 hours: C-SSRS Risk (Since Last Contact)  Calculated C-SSRS Risk Score (Since Last Contact): No Risk Indicated    Risk of Harm to Others:  Nursing Homicide Risk Assessment: Violence Risk to Others: Denies within past 6 months    Behavioral Health Medications: all current active meds have been reviewed and continue current psychiatric medications.   Current Facility-Administered Medications   Medication Dose Route Frequency Provider Last Rate   • acetaminophen  650 mg Oral Q6H PRN Sanjuana Nallely, DO     • acetaminophen  650 mg Oral Q4H PRN Sanjuana Nallely, DO     • acetaminophen  975 mg Oral Q6H PRN Sanjuana Nallely, DO     • aluminum-magnesium hydroxide-simethicone  30 mL Oral Q4H PRN Reta Ray, DO     • ammonium lactate   Topical BID PRN Nelia Bowles PA-C     • atropine  1 drop Sublingual TID PRN Donnie Hillman PA-C     • benztropine  1 mg Intramuscular Q4H PRN Max 6/day Reta Ray, DO     • benztropine  1 mg Oral Q4H PRN Max 6/day Reta Ray, DO     • benztropine  2 mg Oral BID Shereen Cabrera MD     • clonazePAM  0.5 mg Oral BID Shereen Cabrera MD     • hydrOXYzine HCL  50 mg Oral Q6H PRN Max 4/day Reta Ray, DO      Or   • diphenhydrAMINE  50 mg Intramuscular Q6H PRN Reta Ray, DO     • diphenhydrAMINE  25 mg Oral Q6H PRN Pamela Lawrence, DO     • glycerin-hypromellose-  1 drop Both Eyes Q3H PRN Reta Ray, DO     • haloperidol  5 mg Oral HS Shereen Cabrera MD     • hydrOXYzine HCL  100 mg Oral Q6H PRN Max 4/day Reta Ray, DO      Or   • LORazepam  2 mg Intramuscular Q6H PRN Reta Ray, DO     • hydrOXYzine HCL  25 mg Oral Q6H PRN Max 4/day Reta Ray, DO     • lithium carbonate  450 mg Oral Q12H Gardenia Hedrick MD     • melatonin  9 mg Oral HS PRN ANA Soto     • mirtazapine  7.5 mg Oral HS Reta Ray, DO     • OLANZapine  10 mg Oral Q3H PRN Max 3/day Reta Ray, DO      Or   • OLANZapine  10 mg Intramuscular Q3H PRN Max 3/day Reta Ray, DO     • OLANZapine  5 mg Oral Q3H PRN Max 6/day Reta Ray, DO      Or   • OLANZapine  5 mg Intramuscular Q3H PRN Max 6/day Reta Ray, DO     • OLANZapine  2.5 mg Oral Q3H PRN Max 8/day Reta Ray, DO     • polyethylene glycol  17 g Oral Daily PRN Reta Ray, DO     • propranolol  10 mg Oral TID PRN Shereen Cabrera MD     • senna-docusate sodium  1 tablet Oral Daily PRN Reta Ray, DO     • traZODone  50 mg Oral HS PRN Reta Ray, DO       Risks / Benefits of Treatment:  Risks, benefits, and possible side effects of medications explained to patient.  Patient has limited understanding of risks and benefits of treatment at this time, but agrees to take medications as prescribed. Counseling / Coordination of Care: Total floor/unit time spent today 25 minutes. Greater than 50% of total time was spent with the patient and / or family counseling and / or coordination of care. A description of the counseling / coordination of care:   Patient's progress discussed with staff in treatment team meeting. Medications, treatment progress and treatment plan reviewed with patient. Educated on importance of medication and treatment compliance. Reassurance and supportive therapy provided. Encouraged participation in milieu and group therapy on the unit.     ANA Max 08/09/23

## 2023-08-09 NOTE — PROGRESS NOTES
08/09/23 0830   Team Meeting   Meeting Type Daily Rounds   Initial Conference Date 08/09/23   Patient/Family Present   Patient Present No   Patient's Family Present No     Daily Rounds Documentation     Team Members Present:   MD Dr. Luis Miguel Fung, DO Dr. Errol Brunner, DO  Saadia Pederson, FRANCOIS Hatfield, LSW  Strovolos, LSW    Melatonin PRN. Pleasant and cooperative. No behaviors. Attended 3/5 groups. Compliant with medications and meals. Slept. Discharge Friday.

## 2023-08-10 PROCEDURE — 99232 SBSQ HOSP IP/OBS MODERATE 35: CPT | Performed by: PSYCHIATRY & NEUROLOGY

## 2023-08-10 RX ORDER — CLONAZEPAM 0.5 MG/1
0.5 TABLET ORAL DAILY
Qty: 7 TABLET | Refills: 0 | Status: SHIPPED | OUTPATIENT
Start: 2023-08-10 | End: 2023-08-17

## 2023-08-10 RX ADMIN — CLONAZEPAM 0.5 MG: 0.5 TABLET ORAL at 08:31

## 2023-08-10 RX ADMIN — LITHIUM CARBONATE 450 MG: 450 TABLET, EXTENDED RELEASE ORAL at 08:32

## 2023-08-10 RX ADMIN — MIRTAZAPINE 7.5 MG: 7.5 TABLET, FILM COATED ORAL at 21:06

## 2023-08-10 RX ADMIN — BENZTROPINE MESYLATE 2 MG: 2 TABLET ORAL at 17:05

## 2023-08-10 RX ADMIN — Medication 9 MG: at 21:06

## 2023-08-10 RX ADMIN — LITHIUM CARBONATE 450 MG: 450 TABLET, EXTENDED RELEASE ORAL at 21:06

## 2023-08-10 RX ADMIN — BENZTROPINE MESYLATE 2 MG: 2 TABLET ORAL at 08:31

## 2023-08-10 RX ADMIN — HALOPERIDOL 5 MG: 5 TABLET ORAL at 21:06

## 2023-08-10 NOTE — SOCIAL WORK
Signed MA application, and list of patient's medications, appointments, diagnoses, laboratory recommendations, and allergies sent to [address], patient's father, and Bhupendra Smith [facility]).

## 2023-08-10 NOTE — NURSING NOTE
Pt present on the milieu social with select peers. He consumed 100 % of dinner. Took his medications without incidence. PRN 9 mg melatonin given for insomnia. It was effective. Pt is bright, pleasant, and cooperative. He is excited for upcoming discharge. Pt offered no complaints. Denies s/s. No behavioral issues.

## 2023-08-10 NOTE — DISCHARGE INSTR - OTHER ORDERS
Betzaida Gtz has met all discharge criteria from Phase II. Vital Signs are stable, ambulating  without difficulty. Discharge instructions given, patient verbalized understanding. Discharged from facility via wheelchair in stable condition.        You will discharge to [address] [place] [address] for residential placement; your new address is [address] [facility] [address] [place] Phone: [phone] Fax: [phone]. Crisis Plan:    Triggers you identified during your hospital stay that led to: fear that you were going to go back to group home, severe anxiety, and feelings of inadequacy. Coping skills you identified during your hospital stay include:  Writing, Reading, Music, Walking, and Breathing Exercises. After discharge, if you find your coping skills are not effective and you continue to feel distressed please inform a staff person at the group home, and follow-up with your outpatient therapist.    If that is not effective and you feel you are a danger to yourself or others please contact [facility] at [phone], [facility] at (105) [phone] or Treatment Access and [address] [place] [address] (Hospitals in [place]) at [phone], [facility]:  [phone], Peer Support Talk Line (Seven days a week, 1:00 PM - 9:00 PM) Call: [phone] or Text: [phone]), [facility] on [address] [place] [address] ([facility]) HELPLINE: [phone]/Email: www.ayaz. org, or Substance Abuse and [facility] ([address] [place] [address] Drive, which is a confidential, free, 24-hour-a-day information service for individuals and family members facing mental health and/or substance use disorders. This service provides referrals to local treatment facilities, support groups, and community-based organizations. Callers can also order free publications and other information.   Call 8-309.236.3877/[facility]: www.[facility].gov

## 2023-08-10 NOTE — PROGRESS NOTES
08/10/23 0904   Team Meeting   Meeting Type Daily Rounds   Team Members Present   Team Members Present Physician;Nurse;   [facility] Team [facility] Brownlee, [facility]   Nursing Team Member Janett Boateng RN   Care Management Team Member Larisa   Patient/Family Present   Patient Present No   Patient's Family Present No     Given prn melatonin. Compliant with meds and meals. Attending 4/5 groups. Taper Klonopin in next 7 days. D/c Friday.

## 2023-08-10 NOTE — DISCHARGE INSTR - APPOINTMENTS
Please follow-up with [facility] ([address], [address] [facility] [address] Phone: [phone] Fax: [phone]); intake scheduled for Tuesday, August 15, 2023 at 10:00AM with a therapist.  At this appointment you will be scheduled with the psychiatrist.  Kemal Hart will leave this hospital with a 30 day supply of your medication, and one refill. Your summary of care will be faxed to this provider for contunity of care. Your staff at [address] [place] [address] will assist you with scheduling an appointment with your PCP. IMPORTANT: Call [address] [place] [address] upon discharge, and inform that that you are no longer hospitalized, and give them your new address and phone number. Your SSI application was completed on 7/25/2023, and is still pending review.

## 2023-08-10 NOTE — PROGRESS NOTES
Progress Note - Behavioral Health   Rashaun Grove 21 y.o. male MRN: [id number]  Unit/Bed#: [facility] 634-59 Encounter: [id number]  Code Status: Level 1 - Full Code    Assessment/Plan   Principal Problem:    Severe episode of recurrent major depressive disorder, without psychotic features ([address])  Active Problems:    Medical clearance for psychiatric admission    Autism spectrum disorder    Mood disorder ([address])    Anxiety disorder    Rash    Recommended Treatment:     Treatment plan, treatment progress and medication changes were reviewed with Nursing Staff, Pharmacy Service and Case Management in Treatment Team:  1. Continue with group therapy, milieu therapy and occupational therapy   2. Behavioral Health checks every 7 minutes   3. Continue frequent safety checks and vitals per unit protocol  4. Continue with SLIM medical management as indicated  5. Continue with current medication regimen - Klonopin reduced to 0.5mg PO Daily (was 0.5mg PO BID) and eventually taper off once patient is discharged  6. Disposition Planning: Discharge planning and efforts remain ongoing    Subjective:    Patient was seen today for continuation of care, records reviewed and patient was discussed with the morning case review team.    David Torres was seen today for psychiatric follow-up. On assessment today, David Torres was calm and cooperative. He is bright today, looking forward to discharge tomorrow. Does have insight into his illness and understands and agrees to take medication as prescribed once discharged. David Torres reports adequate daytime energy and denies any difficulties with initiating or staying asleep. Oral appetite and hydration is adequate. We reviewed once more the specific as-needed medications they can use going forward if they experience any insomnia or destabilization of their mood, they understood and were agreeable    David Torres denies acute suicidal/self-harm ideation/intent/plan upon direct inquiry at this time.  David Torres is able to contract for safety while on the unit and would feel comfortable seeking staff support should suicidal symptoms or urges appear or worsen. Jed Khan remains behaviorally appropriate, no agitation or aggression noted on exam or in report. Jed Khan also denies HI/AH/VH, and does not appear overtly manic. Patient does not verbalize any experiences that can be categorized as paranoid, persecutory, bizarre, or somatic delusions. Jed Khan remains adherent to his current psychotropic medication regimen and denies any side effects from medications, as well as none noted on exam.    Review of Systems:  Behavior over the last 24 hours: Unchanged  Sleep: sleeping okay throughout the night  Appetite: adequate  Medication side effects: none reported  ROS:no complaints, all other systems are negative    Objective:    Vitals:  Vitals:    08/10/23 0748   BP: 131/56   Pulse: 95   Resp: 18   Temp: 97.5 °F (36.4 °C)   SpO2: 98%     Laboratory Results:    I have personally reviewed all pertinent laboratory/tests results.   Most Recent Labs:   Lab Results   Component Value Date    WBC 8.70 08/08/2023    RBC 4.61 08/08/2023    HGB 13.9 08/08/2023    HCT 43.9 08/08/2023     08/08/2023    RDW 12.2 08/08/2023    NEUTROABS 4.24 08/08/2023    SODIUM 139 08/08/2023    K 3.9 08/08/2023     08/08/2023    CO2 25 08/08/2023    BUN 7 08/08/2023    CREATININE 0.63 08/08/2023    GLUC 90 08/08/2023    GLUF 90 08/08/2023    CALCIUM 9.3 08/08/2023    AST 24 05/31/2023    ALT 43 05/31/2023    ALKPHOS 98 05/31/2023    TP 7.0 05/31/2023    ALB 4.4 05/31/2023    TBILI 0.52 05/31/2023    CHOLESTEROL 170 08/08/2023    HDL 32 (L) 08/08/2023    TRIG 128 08/08/2023    LDLCALC 112 (H) 08/08/2023    NONHDLC 138 08/08/2023    VALPROICTOT 106 (H) 05/31/2023    LITHIUM 0.6 08/08/2023    [id number] 2.435 08/08/2023    HGBA1C 5.4 05/27/2023     05/27/2023     Mental Status Evaluation:  Appearance:  age appropriate, casually dressed, dressed appropriately, adequate grooming   Behavior:  pleasant, cooperative, calm, good eye contact   Speech:  normal rate, normal volume, normal pitch   Mood:  less anxious   Affect:  slightly brighter   Thought Process:  organized, logical   Associations: intact associations   Thought Content:  no overt delusions   Perceptual Disturbances: no auditory hallucinations, no visual hallucinations, denies when asked, does not appear responding to internal stimuli   Risk Potential: Suicidal ideation - None at present, contracts for safety on the unit, would talk to staff if not feeling safe on the unit  Homicidal ideation - None at present  Potential for aggression - Not at present   Sensorium:  oriented to person, place and time/date   Memory:  recent memory intact   Consciousness:  alert and awake   Attention/Concentration: attention span and concentration appear shorter than expected for age   Insight:  fair and improving   Judgment: fair and improving   Gait/Station: normal gait/station, normal balance   Motor Activity: no abnormal movements     Progress Toward Goals:   Rah Robles is progressing towards goals of inpatient psychiatric treatment by continued medication compliance and is attending therapeutic modalities on the milieu. However, the patient continues to require inpatient psychiatric hospitalization for continued medication management and titration to optimize symptom reduction, improve sleep hygiene, and demonstrate adequate self-care. Suicide/Homicide Risk Assessment:  Risk of Harm to Self:   Nursing Suicide Risk Assessment Last 24 hours: C-SSRS Risk (Since Last Contact)  Calculated C-SSRS Risk Score (Since Last Contact): No Risk Indicated    Risk of Harm to Others:  Nursing Homicide Risk Assessment: Violence Risk to Others: Denies within past 6 months    Behavioral Health Medications: all current active meds have been reviewed and continue current psychiatric medications.   Current Facility-Administered Medications   Medication Dose Route Frequency Provider Last Rate   • acetaminophen  650 mg Oral Q6H PRN Carlos Bernardo, DO     • acetaminophen  650 mg Oral Q4H PRN Carlos Bernardo, DO     • acetaminophen  975 mg Oral Q6H PRN Carlos Bernardo, DO     • aluminum-magnesium hydroxide-simethicone  30 mL Oral Q4H PRN Carlos Bernardo, DO     • ammonium lactate   Topical BID PRN Uri Clark PA-C     • atropine  1 drop Sublingual TID PRN Gregoria Calloway PA-C     • benztropine  1 mg Intramuscular Q4H PRN Max 6/day Carlos Bernardo, DO     • benztropine  1 mg Oral Q4H PRN Max 6/day Carlos Bernardo, DO     • benztropine  2 mg Oral BID Elba Harry MD     • clonazePAM  0.5 mg Oral Daily ANA Jacobs     • hydrOXYzine HCL  50 mg Oral Q6H PRN Max 4/day Carlosfallon Chang, DO      Or   • diphenhydrAMINE  50 mg Intramuscular Q6H PRN Carlosfallon Chang, DO     • diphenhydrAMINE  25 mg Oral Q6H PRN Moncia Friedman, DO     • glycerin-hypromellose-  1 drop Both Eyes Q3H PRN Carlosfallon Hansonter, DO     • haloperidol  5 mg Oral HS Elba Harry MD     • hydrOXYzine HCL  100 mg Oral Q6H PRN Max 4/day Carlosfallon Hansonter, DO      Or   • LORazepam  2 mg Intramuscular Q6H PRN Carlosfallon Hansonter, DO     • hydrOXYzine HCL  25 mg Oral Q6H PRN Max 4/day Carlosfallon Chang, DO     • lithium carbonate  450 mg Oral Q12H Norma Pang MD     • melatonin  9 mg Oral HS PRN ANA Jacobs     • mirtazapine  7.5 mg Oral HS Carlos Bernardo, DO     • OLANZapine  10 mg Oral Q3H PRN Max 3/day Carlosfallon Chang, DO      Or   • OLANZapine  10 mg Intramuscular Q3H PRN Max 3/day Carlosfallon Hansonter, DO     • OLANZapine  5 mg Oral Q3H PRN Max 6/day Carlosfallon Chang, DO      Or   • OLANZapine  5 mg Intramuscular Q3H PRN Max 6/day Ciro A Prayson, DO     • OLANZapine  2.5 mg Oral Q3H PRN Max 8/day Ciro A Prayson, DO     • polyethylene glycol  17 g Oral Daily PRN Carlos Chang DO     • propranolol  10 mg Oral TID PRN Elba Harry MD     • senna-docusate sodium  1 tablet Oral Daily PRN Carlos Chang, DO     • traZODone  50 mg Oral HS PRN Carlos Chang, DO       Risks / Benefits of Treatment:  Risks, benefits, and possible side effects of medications explained to patient. Patient has limited understanding of risks and benefits of treatment at this time, but agrees to take medications as prescribed. Counseling / Coordination of Care: Total floor/unit time spent today 25 minutes. Greater than 50% of total time was spent with the patient and / or family counseling and / or coordination of care. A description of the counseling / coordination of care:   Patient's progress discussed with staff in treatment team meeting. Medications, treatment progress and treatment plan reviewed with patient. Educated on importance of medication and treatment compliance. Reassurance and supportive therapy provided. Encouraged participation in milieu and group therapy on the unit.     ANA Jacobs 08/10/23

## 2023-08-10 NOTE — Clinical Note
Patient remains compliant with medications and meals. Patient attended 4 groups out of 5 on Thursday. Patient is excited about being discharged tomorrow.

## 2023-08-10 NOTE — SOCIAL WORK
Attendance:  Angela Smith ([facility])  Emmy Shi (Patient)  Lisa Delgadillo (Father)  Linda Atkinson [facility])  David Player [facility])  Oneal Barnard (BRAD)  Sara Paz ([facility])  Marisel Moran ([facility])    Meeting held to confirm discharge plans for tomorrow. Krystal Hawthorne confirmed that they can provide transportation; they will pick him up tomorrow between 11:00AM and 1:00PM; directions provided. SW reviewed his medications, and outpatient behavioral health appointment. SW explained to his father how to pay for patient's medications by phone, and he agreed to do this today so SW can pick them up before discharge tomorrow. Krystal Hawthorne explained that they are going to schedule his PCP appointment since they will be providing transportation. The rest of the meeting was spent discussing the waiver.

## 2023-08-10 NOTE — PLAN OF CARE
Problem: Ineffective Coping  Goal: Identifies ineffective coping skills  Outcome: Progressing  Goal: Identifies healthy coping skills  Outcome: Progressing  Goal: Demonstrates healthy coping skills  Outcome: Progressing     Problem: Risk for Self Injury/Neglect  Goal: Treatment Goal: Remain safe during length of stay, learn and adopt new coping skills, and be free of self-injurious ideation, impulses and acts at the time of discharge  Outcome: Progressing  Goal: Recognize maladaptive responses and adopt new coping mechanisms  Outcome: Progressing     Problem: Depression  Goal: Treatment Goal: Demonstrate behavioral control of depressive symptoms, verbalize feelings of improved mood/affect, and adopt new coping skills prior to discharge  Outcome: Progressing  Goal: Verbalize thoughts and feelings  Description: Interventions:  - Assess and re-assess patient's level of risk   - Engage patient in 1:1 interactions, daily, for a minimum of 15 minutes   - Encourage patient to express feelings, fears, frustrations, hopes   Outcome: Progressing  Goal: Refrain from isolation  Description: Interventions:  - Develop a trusting relationship   - Encourage socialization   Outcome: Progressing     Problem: Anxiety  Goal: Anxiety is at manageable level  Description: Interventions:  - Assess and monitor patient's anxiety level. - Monitor for signs and symptoms (heart palpitations, chest pain, shortness of breath, headaches, nausea, feeling jumpy, restlessness, irritable, apprehensive). - Collaborate with interdisciplinary team and initiate plan and interventions as ordered.   - [place] patient to unit/surroundings  - Explain treatment plan  - Encourage participation in care  - Encourage verbalization of concerns/fears  - Identify coping mechanisms  - Assist in developing anxiety-reducing skills  - Administer/offer alternative therapies  - Limit or eliminate stimulants  Outcome: Progressing

## 2023-08-11 VITALS
DIASTOLIC BLOOD PRESSURE: 69 MMHG | OXYGEN SATURATION: 96 % | HEART RATE: 99 BPM | SYSTOLIC BLOOD PRESSURE: 116 MMHG | BODY MASS INDEX: 25.48 KG/M2 | HEIGHT: 71 IN | WEIGHT: 182 LBS | RESPIRATION RATE: 18 BRPM | TEMPERATURE: 97.5 F

## 2023-08-11 PROCEDURE — 99239 HOSP IP/OBS DSCHRG MGMT >30: CPT | Performed by: PSYCHIATRY & NEUROLOGY

## 2023-08-11 RX ADMIN — CLONAZEPAM 0.5 MG: 0.5 TABLET ORAL at 08:30

## 2023-08-11 RX ADMIN — ATROPINE SULFATE 1 DROP: 10 SOLUTION OPHTHALMIC at 09:59

## 2023-08-11 RX ADMIN — LITHIUM CARBONATE 450 MG: 450 TABLET, EXTENDED RELEASE ORAL at 08:30

## 2023-08-11 RX ADMIN — BENZTROPINE MESYLATE 2 MG: 2 TABLET ORAL at 08:30

## 2023-08-11 NOTE — SOCIAL WORK
SW met with patient; patient found in bed, but awake. Patient was calm, polite, and attentive. SW reminded him of his discharge timeframe, and also reviewed what will be in his discharge folder. Patient had no questions or concerns to present. Patient expressed readiness and excitement for discharge. SW praised patient for how much progress he has made here.

## 2023-08-11 NOTE — PLAN OF CARE
Problem: Risk for Self Injury/Neglect  Goal: Treatment Goal: Remain safe during length of stay, learn and adopt new coping skills, and be free of self-injurious ideation, impulses and acts at the time of discharge  Outcome: Progressing  Goal: Recognize maladaptive responses and adopt new coping mechanisms  Outcome: Progressing     Problem: Depression  Goal: Verbalize thoughts and feelings  Description: Interventions:  - Assess and re-assess patient's level of risk   - Engage patient in 1:1 interactions, daily, for a minimum of 15 minutes   - Encourage patient to express feelings, fears, frustrations, hopes   Outcome: Progressing

## 2023-08-11 NOTE — PROGRESS NOTES
08/11/23 0830   Team Meeting   Meeting Type Daily Rounds   Initial Conference Date 08/11/23   Patient/Family Present   Patient Present No   Patient's Family Present No     Daily Rounds Documentation     Team Members Present:   Dr. Clide Bosworth, MD Dr. Lucetta Simmers, RN  [facility]    Discharge meeting went well yesterday; patient is ready for discharge today. Pleasant and cooperative. No behavioral issues. Attended 3/9 groups. Compliant with medications and meals. Slept. SW to try to get a more definitive discharge time.

## 2023-08-11 NOTE — PLAN OF CARE
Problem: Ineffective Coping  Goal: Identifies ineffective coping skills  Outcome: Adequate for Discharge  Goal: Identifies healthy coping skills  Outcome: Adequate for Discharge  Goal: Demonstrates healthy coping skills  Outcome: Adequate for Discharge     Problem: Depression  Goal: Verbalize thoughts and feelings  Description: Interventions:  - Assess and re-assess patient's level of risk   - Engage patient in 1:1 interactions, daily, for a minimum of 15 minutes   - Encourage patient to express feelings, fears, frustrations, hopes   Outcome: Adequate for Discharge  Goal: Refrain from isolation  Description: Interventions:  - Develop a trusting relationship   - Encourage socialization   Outcome: Adequate for Discharge     Problem: Anxiety  Goal: Anxiety is at manageable level  Description: Interventions:  - Assess and monitor patient's anxiety level. - Monitor for signs and symptoms (heart palpitations, chest pain, shortness of breath, headaches, nausea, feeling jumpy, restlessness, irritable, apprehensive). - Collaborate with interdisciplinary team and initiate plan and interventions as ordered.   - [place] patient to unit/surroundings  - Explain treatment plan  - Encourage participation in care  - Encourage verbalization of concerns/fears  - Identify coping mechanisms  - Assist in developing anxiety-reducing skills  - Administer/offer alternative therapies  - Limit or eliminate stimulants  Outcome: Adequate for Discharge     Problem: DISCHARGE PLANNING - CARE MANAGEMENT  Goal: Discharge to post-acute care or home with appropriate resources  Description: INTERVENTIONS:  - Conduct assessment to determine patient/family and health care team treatment goals, and need for post-acute services based on payer coverage, community resources, and patient preferences, and barriers to discharge  - Address psychosocial, clinical, and financial barriers to discharge as identified in assessment in conjunction with the patient/family and health care team  - Arrange appropriate level of post-acute services according to patient’s   needs and preference and payer coverage in collaboration with the physician and health care team  - Communicate with and update the patient/family, physician, and health care team regarding progress on the discharge plan  - Arrange appropriate transportation to post-acute venues  Outcome: Adequate for Discharge

## 2023-08-11 NOTE — NURSING NOTE
Pt is pleasant upon approach this morning and "happy to be getting out of here finally". Pt sits with peers in dining room for coffee group, but keeps to self at this time.

## 2023-08-11 NOTE — NURSING NOTE
Patient was visible in the milieu with minimal peer interaction. Report anxiety, denies all other psych s/s. No behaviors noted. Took his HS medications. Safety checks ongoing.

## 2023-08-11 NOTE — NURSING NOTE
All belongings and items from security returned to patient. Medications filled, picked up from pharmacy and given to pt and receiving facility personnel. Pt left unit walking with writer to lobby for .
